# Patient Record
Sex: MALE | Race: BLACK OR AFRICAN AMERICAN | NOT HISPANIC OR LATINO | URBAN - METROPOLITAN AREA
[De-identification: names, ages, dates, MRNs, and addresses within clinical notes are randomized per-mention and may not be internally consistent; named-entity substitution may affect disease eponyms.]

---

## 2017-01-30 ENCOUNTER — INPATIENT (INPATIENT)
Facility: HOSPITAL | Age: 82
LOS: 3 days | Discharge: NURSING, REHAB OR LONG-TERM CARE | End: 2017-02-03
Attending: FAMILY MEDICINE | Admitting: HOSPITALIST
Payer: MEDICARE

## 2017-01-30 VITALS
WEIGHT: 171.08 LBS | HEART RATE: 102 BPM | SYSTOLIC BLOOD PRESSURE: 187 MMHG | HEIGHT: 69 IN | DIASTOLIC BLOOD PRESSURE: 97 MMHG | TEMPERATURE: 98 F | RESPIRATION RATE: 18 BRPM | OXYGEN SATURATION: 100 %

## 2017-01-30 PROCEDURE — 99285 EMERGENCY DEPT VISIT HI MDM: CPT

## 2017-01-30 NOTE — ED PROVIDER NOTE - OBJECTIVE STATEMENT
89 y/o male presents to the ED c/o weakness. Pt has no complaints at this time. Further hx of events is limited.

## 2017-01-30 NOTE — ED PROVIDER NOTE - CARE PLAN
Principal Discharge DX:	Urinary tract infection without hematuria, site unspecified  Secondary Diagnosis:	Altered mental status, unspecified altered mental status type

## 2017-01-30 NOTE — ED PROVIDER NOTE - DETAILS:
I, Shaun Ingram, performed the initial face to face bedside interview with this patient regarding history of present illness, review of symptoms and relevant past medical, social and family history.  I completed an independent physical examination.  I was the initial provider who evaluated this patient. I have signed out the follow up of any pending tests (i.e. labs, radiological studies) to the ACP.  I have communicated the patient’s plan of care and disposition with the ACP.  The history, relevant review of systems, past medical and surgical history, medical decision making, and physical examination was documented by the scribe in my presence and I attest to the accuracy of the documentation.

## 2017-01-30 NOTE — ED ADULT NURSE NOTE - PAIN: PRESENCE, MLM
non-verbal indicator of pain/discomfort not present non-verbal indicator of pain/discomfort present denies pain/discomfort

## 2017-01-30 NOTE — ED PROVIDER NOTE - CONSTITUTIONAL, MLM
normal... Well appearing, well nourished, awake, alert, oriented to person, place, time/situation and in no apparent distress. Well appearing, well nourished, no apparent distress.

## 2017-01-31 LAB
ACANTHOCYTES BLD QL SMEAR: SLIGHT — SIGNIFICANT CHANGE UP
ALBUMIN SERPL ELPH-MCNC: 3.5 G/DL — SIGNIFICANT CHANGE UP (ref 3.3–5)
ALP SERPL-CCNC: 127 U/L — HIGH (ref 40–120)
ALT FLD-CCNC: 44 U/L — SIGNIFICANT CHANGE UP (ref 12–78)
ANION GAP SERPL CALC-SCNC: 8 MMOL/L — SIGNIFICANT CHANGE UP (ref 5–17)
ANISOCYTOSIS BLD QL: SLIGHT — SIGNIFICANT CHANGE UP
APPEARANCE UR: CLEAR — SIGNIFICANT CHANGE UP
AST SERPL-CCNC: 59 U/L — HIGH (ref 15–37)
BACTERIA # UR AUTO: (no result)
BASOPHILS # BLD AUTO: 0 K/UL — SIGNIFICANT CHANGE UP (ref 0–0.2)
BASOPHILS NFR BLD AUTO: 1.4 % — SIGNIFICANT CHANGE UP (ref 0–2)
BILIRUB SERPL-MCNC: 0.3 MG/DL — SIGNIFICANT CHANGE UP (ref 0.2–1.2)
BILIRUB UR-MCNC: NEGATIVE — SIGNIFICANT CHANGE UP
BUN SERPL-MCNC: 33 MG/DL — HIGH (ref 7–23)
BURR CELLS BLD QL SMEAR: PRESENT — SIGNIFICANT CHANGE UP
CALCIUM SERPL-MCNC: 8.9 MG/DL — SIGNIFICANT CHANGE UP (ref 8.5–10.1)
CHLORIDE SERPL-SCNC: 104 MMOL/L — SIGNIFICANT CHANGE UP (ref 96–108)
CO2 SERPL-SCNC: 27 MMOL/L — SIGNIFICANT CHANGE UP (ref 22–31)
COLOR SPEC: YELLOW — SIGNIFICANT CHANGE UP
CREAT SERPL-MCNC: 1.84 MG/DL — HIGH (ref 0.5–1.3)
DIFF PNL FLD: (no result)
ELLIPTOCYTES BLD QL SMEAR: SLIGHT — SIGNIFICANT CHANGE UP
EOSINOPHIL # BLD AUTO: 0 K/UL — SIGNIFICANT CHANGE UP (ref 0–0.5)
EOSINOPHIL NFR BLD AUTO: 0.9 % — SIGNIFICANT CHANGE UP (ref 0–6)
EPI CELLS # UR: NEGATIVE — SIGNIFICANT CHANGE UP
GIANT PLATELETS BLD QL SMEAR: PRESENT — SIGNIFICANT CHANGE UP
GLUCOSE SERPL-MCNC: 219 MG/DL — HIGH (ref 70–99)
GLUCOSE UR QL: NEGATIVE MG/DL — SIGNIFICANT CHANGE UP
HCT VFR BLD CALC: 36.6 % — LOW (ref 39–50)
HGB BLD-MCNC: 11.6 G/DL — LOW (ref 13–17)
KETONES UR-MCNC: NEGATIVE — SIGNIFICANT CHANGE UP
LACTATE SERPL-SCNC: 0.7 MMOL/L — SIGNIFICANT CHANGE UP (ref 0.7–2)
LEUKOCYTE ESTERASE UR-ACNC: NEGATIVE — SIGNIFICANT CHANGE UP
LG PLATELETS BLD QL AUTO: SLIGHT — SIGNIFICANT CHANGE UP
LYMPHOCYTES # BLD AUTO: 0.9 K/UL — LOW (ref 1–3.3)
LYMPHOCYTES # BLD AUTO: 28.2 % — SIGNIFICANT CHANGE UP (ref 13–44)
MACROCYTES BLD QL: SLIGHT — SIGNIFICANT CHANGE UP
MANUAL DIF COMMENT BLD-IMP: SIGNIFICANT CHANGE UP
MCHC RBC-ENTMCNC: 27.8 PG — SIGNIFICANT CHANGE UP (ref 27–34)
MCHC RBC-ENTMCNC: 31.6 GM/DL — LOW (ref 32–36)
MCV RBC AUTO: 88 FL — SIGNIFICANT CHANGE UP (ref 80–100)
MONOCYTES # BLD AUTO: 0.4 K/UL — SIGNIFICANT CHANGE UP (ref 0–0.9)
MONOCYTES NFR BLD AUTO: 12.4 % — SIGNIFICANT CHANGE UP (ref 2–14)
NEUTROPHILS # BLD AUTO: 1.9 K/UL — SIGNIFICANT CHANGE UP (ref 1.8–7.4)
NEUTROPHILS NFR BLD AUTO: 57.1 % — SIGNIFICANT CHANGE UP (ref 43–77)
NITRITE UR-MCNC: NEGATIVE — SIGNIFICANT CHANGE UP
OVALOCYTES BLD QL SMEAR: SLIGHT — SIGNIFICANT CHANGE UP
PH UR: 6 — SIGNIFICANT CHANGE UP (ref 4.8–8)
PLAT MORPH BLD: NORMAL — SIGNIFICANT CHANGE UP
PLATELET # BLD AUTO: 131 K/UL — LOW (ref 150–400)
POIKILOCYTOSIS BLD QL AUTO: SLIGHT — SIGNIFICANT CHANGE UP
POLYCHROMASIA BLD QL SMEAR: SLIGHT — SIGNIFICANT CHANGE UP
POTASSIUM SERPL-MCNC: 4.5 MMOL/L — SIGNIFICANT CHANGE UP (ref 3.5–5.3)
POTASSIUM SERPL-SCNC: 4.5 MMOL/L — SIGNIFICANT CHANGE UP (ref 3.5–5.3)
PROT SERPL-MCNC: 7.7 GM/DL — SIGNIFICANT CHANGE UP (ref 6–8.3)
PROT UR-MCNC: 15 MG/DL
RBC # BLD: 4.16 M/UL — LOW (ref 4.2–5.8)
RBC # FLD: 14.1 % — SIGNIFICANT CHANGE UP (ref 10.3–14.5)
RBC BLD AUTO: (no result)
RBC CASTS # UR COMP ASSIST: >50 /HPF (ref 0–4)
SCHISTOCYTES BLD QL AUTO: SLIGHT — SIGNIFICANT CHANGE UP
SODIUM SERPL-SCNC: 139 MMOL/L — SIGNIFICANT CHANGE UP (ref 135–145)
SP GR SPEC: 1.01 — SIGNIFICANT CHANGE UP (ref 1.01–1.02)
TROPONIN I SERPL-MCNC: <0.015 NG/ML — SIGNIFICANT CHANGE UP (ref 0.01–0.04)
UROBILINOGEN FLD QL: NEGATIVE MG/DL — SIGNIFICANT CHANGE UP
WBC # BLD: 3.2 K/UL — LOW (ref 3.8–10.5)
WBC # FLD AUTO: 3.2 K/UL — LOW (ref 3.8–10.5)
WBC UR QL: (no result)

## 2017-01-31 PROCEDURE — 93010 ELECTROCARDIOGRAM REPORT: CPT

## 2017-01-31 PROCEDURE — 70450 CT HEAD/BRAIN W/O DYE: CPT | Mod: 26

## 2017-01-31 RX ORDER — ACETAMINOPHEN 500 MG
650 TABLET ORAL EVERY 6 HOURS
Qty: 0 | Refills: 0 | Status: DISCONTINUED | OUTPATIENT
Start: 2017-01-31 | End: 2017-02-03

## 2017-01-31 RX ORDER — DEXTROSE 50 % IN WATER 50 %
25 SYRINGE (ML) INTRAVENOUS ONCE
Qty: 0 | Refills: 0 | Status: DISCONTINUED | OUTPATIENT
Start: 2017-01-31 | End: 2017-02-03

## 2017-01-31 RX ORDER — CEFTRIAXONE 500 MG/1
1 INJECTION, POWDER, FOR SOLUTION INTRAMUSCULAR; INTRAVENOUS EVERY 24 HOURS
Qty: 0 | Refills: 0 | Status: DISCONTINUED | OUTPATIENT
Start: 2017-01-31 | End: 2017-02-01

## 2017-01-31 RX ORDER — SENNA PLUS 8.6 MG/1
2 TABLET ORAL AT BEDTIME
Qty: 0 | Refills: 0 | Status: DISCONTINUED | OUTPATIENT
Start: 2017-01-31 | End: 2017-02-03

## 2017-01-31 RX ORDER — DEXTROSE 50 % IN WATER 50 %
1 SYRINGE (ML) INTRAVENOUS ONCE
Qty: 0 | Refills: 0 | Status: DISCONTINUED | OUTPATIENT
Start: 2017-01-31 | End: 2017-02-03

## 2017-01-31 RX ORDER — CEFTRIAXONE 500 MG/1
1 INJECTION, POWDER, FOR SOLUTION INTRAMUSCULAR; INTRAVENOUS ONCE
Qty: 0 | Refills: 0 | Status: COMPLETED | OUTPATIENT
Start: 2017-01-31 | End: 2017-01-31

## 2017-01-31 RX ORDER — DOCUSATE SODIUM 100 MG
100 CAPSULE ORAL THREE TIMES A DAY
Qty: 0 | Refills: 0 | Status: DISCONTINUED | OUTPATIENT
Start: 2017-01-31 | End: 2017-02-03

## 2017-01-31 RX ORDER — GLUCAGON INJECTION, SOLUTION 0.5 MG/.1ML
1 INJECTION, SOLUTION SUBCUTANEOUS ONCE
Qty: 0 | Refills: 0 | Status: DISCONTINUED | OUTPATIENT
Start: 2017-01-31 | End: 2017-02-03

## 2017-01-31 RX ORDER — INSULIN LISPRO 100/ML
VIAL (ML) SUBCUTANEOUS
Qty: 0 | Refills: 0 | Status: DISCONTINUED | OUTPATIENT
Start: 2017-01-31 | End: 2017-02-03

## 2017-01-31 RX ORDER — ONDANSETRON 8 MG/1
4 TABLET, FILM COATED ORAL EVERY 6 HOURS
Qty: 0 | Refills: 0 | Status: DISCONTINUED | OUTPATIENT
Start: 2017-01-31 | End: 2017-02-03

## 2017-01-31 RX ORDER — HEPARIN SODIUM 5000 [USP'U]/ML
5000 INJECTION INTRAVENOUS; SUBCUTANEOUS EVERY 12 HOURS
Qty: 0 | Refills: 0 | Status: DISCONTINUED | OUTPATIENT
Start: 2017-01-31 | End: 2017-02-03

## 2017-01-31 RX ORDER — DEXTROSE 50 % IN WATER 50 %
12.5 SYRINGE (ML) INTRAVENOUS ONCE
Qty: 0 | Refills: 0 | Status: DISCONTINUED | OUTPATIENT
Start: 2017-01-31 | End: 2017-02-03

## 2017-01-31 RX ORDER — SODIUM CHLORIDE 9 MG/ML
1000 INJECTION INTRAMUSCULAR; INTRAVENOUS; SUBCUTANEOUS
Qty: 0 | Refills: 0 | Status: COMPLETED | OUTPATIENT
Start: 2017-01-31 | End: 2017-02-01

## 2017-01-31 RX ORDER — SODIUM CHLORIDE 9 MG/ML
1000 INJECTION, SOLUTION INTRAVENOUS
Qty: 0 | Refills: 0 | Status: DISCONTINUED | OUTPATIENT
Start: 2017-01-31 | End: 2017-02-03

## 2017-01-31 RX ORDER — SODIUM CHLORIDE 9 MG/ML
500 INJECTION INTRAMUSCULAR; INTRAVENOUS; SUBCUTANEOUS
Qty: 0 | Refills: 0 | Status: COMPLETED | OUTPATIENT
Start: 2017-01-31 | End: 2017-01-31

## 2017-01-31 RX ADMIN — SODIUM CHLORIDE 2000 MILLILITER(S): 9 INJECTION INTRAMUSCULAR; INTRAVENOUS; SUBCUTANEOUS at 06:36

## 2017-01-31 RX ADMIN — SODIUM CHLORIDE 2000 MILLILITER(S): 9 INJECTION INTRAMUSCULAR; INTRAVENOUS; SUBCUTANEOUS at 06:51

## 2017-01-31 RX ADMIN — SODIUM CHLORIDE 2000 MILLILITER(S): 9 INJECTION INTRAMUSCULAR; INTRAVENOUS; SUBCUTANEOUS at 06:06

## 2017-01-31 RX ADMIN — CEFTRIAXONE 100 GRAM(S): 500 INJECTION, POWDER, FOR SOLUTION INTRAMUSCULAR; INTRAVENOUS at 14:11

## 2017-01-31 RX ADMIN — CEFTRIAXONE 100 GRAM(S): 500 INJECTION, POWDER, FOR SOLUTION INTRAMUSCULAR; INTRAVENOUS at 06:33

## 2017-01-31 RX ADMIN — SODIUM CHLORIDE 2000 MILLILITER(S): 9 INJECTION INTRAMUSCULAR; INTRAVENOUS; SUBCUTANEOUS at 06:20

## 2017-01-31 RX ADMIN — SODIUM CHLORIDE 50 MILLILITER(S): 9 INJECTION INTRAMUSCULAR; INTRAVENOUS; SUBCUTANEOUS at 16:41

## 2017-01-31 RX ADMIN — HEPARIN SODIUM 5000 UNIT(S): 5000 INJECTION INTRAVENOUS; SUBCUTANEOUS at 17:14

## 2017-01-31 RX ADMIN — SODIUM CHLORIDE 2000 MILLILITER(S): 9 INJECTION INTRAMUSCULAR; INTRAVENOUS; SUBCUTANEOUS at 06:33

## 2017-01-31 NOTE — H&P ADULT - ASSESSMENT
pt is 89 yo male with pmh as per HPI here with weakness    * weakness/ARF - pt did meet SIRS criteria on arrival to ED but no clear source for sepsis, doubt UTI give negative UA will f/u pending cx sent from ED. ?pt on previous abx prior to UA being sent, given unable to obtain clear hx will continue empiric abx for now until further hx obtained from family or will d/c if cx negative.  likely the weakness is due to ARF. upon review of chart in [facility] his baseline cr is 1.1 in 7/16.    - will hydrate and monitor cr.  - check BMP in AM  - hold losartan    * prediabetes - as per prev dc summary  - will place on ISS  - check hbA1c    * dementia - was on meds for this on d/c in 7/16   - confirm with family and no meds on dr first pt is 89 yo male with pmh as per HPI here with weakness    * weakness/ARF - pt did meet SIRS criteria on arrival to ED but no clear source for sepsis, doubt UTI give negative UA will f/u pending cx sent from ED. ?pt on previous abx prior to UA being sent, given unable to obtain clear hx will continue empiric abx for now until further hx obtained from family or will d/c if cx negative.  likely the weakness is due to ARF. upon review of chart in [facility] his baseline cr is 1.1 in 7/16.    - will hydrate and monitor cr.  - check BMP in AM  - hold losartan    * prediabetes - as per prev dc summary  - will place on ISS  - check hbA1c    * dementia - was on meds for this on d/c in 7/16   - confirm with family and no meds on dr first    -discussed with Niece , agrees with plan. pt is 89 yo male with pmh as per HPI here with weakness    * weakness/ARF - pt did meet SIRS criteria on arrival to ED but no clear source for sepsis, doubt UTI give negative UA will f/u pending cx sent from ED. ?pt on previous abx prior to UA being sent, given unable to obtain clear hx will continue empiric abx for now until further hx obtained from family or will d/c if cx negative.  likely the weakness is due to ARF. upon review of chart in [facility] his baseline cr is 1.1 in 7/16.    - will hydrate and monitor cr.  - check BMP in AM  - hold losartan    * prediabetes - as per prev dc summary  - will place on ISS  - check hbA1c    * dementia - was on meds for this on d/c in 7/16   - confirm with family and no meds on dr first    -discussed with Niece, would like him to go to rehab if possible

## 2017-01-31 NOTE — PATIENT PROFILE ADULT. - FALL HARM RISK
dimentia/coagulation(Bleeding disorder R/T clinical cond/anti-coags)/age(85 years old or older)/other

## 2017-01-31 NOTE — H&P ADULT - HISTORY OF PRESENT ILLNESS
pt is 89 yo Togolese creole speaking male with dementia who cannot provide any hx brought in for worsening weakness.  unable to reach family for further clarification at this time and as per chart admitted for possible urinary symptoms. pt is 89 yo [place] Verde[place] creole speaking male with dementia who cannot provide any hx brought in for worsening weakness.   as per chart admitted for possible urinary symptoms.    Collateral Information , niece Chelsea states over the past week pt has been feeling weak, and had increased weakness in the RUE and RLE which prevented him from feeding himself. She states pt was confused with weakness after day care on 1/30.Denied fever,chills, syncope, seizures,dizziness,sob,cp. Pt currently lives with niece.

## 2017-01-31 NOTE — H&P ADULT - NSHPPHYSICALEXAM_GEN_ALL_CORE
HEENT:   pupils equal and reactive, EOMI, no oropharyngeal lesions, erythema, exudates, oral thrush    NECK:   supple, no carotid bruits, no palpable lymph nodes, no thyromegaly    CV:  +S1, +S2, regular, no murmurs or rubs    RESP:   lungs clear to auscultation bilaterally, no wheezing, rales, rhonchi, good air entry bilaterally    BREAST:  not examined    GI:  abdomen soft, non-tender, non-distended, normal BS, no bruits, no abdominal masses, no palpable masses    RECTAL:  not examined    :  not examined    MSK:   normal muscle tone, no atrophy, no rigidity, no contractions    EXT:   no clubbing, no cyanosis, no edema, no calf pain, swelling or erythema    NEURO:  AAOX0, no focal neurological deficits, follows all commands, able to move extremities spontaneously    SKIN:  no ulcers, lesions or rashes

## 2017-01-31 NOTE — ED ADULT NURSE REASSESSMENT NOTE - NS ED NURSE REASSESS COMMENT FT1
Pt is resting comfortably without signs and symptoms of distress.  symptoms of weakness have resolved.

## 2017-01-31 NOTE — H&P ADULT - NSHPSOURCEINFOTX_GEN_ALL_CORE
pt is poor historian and hx obtained from chart since family not at bedside and cannot be reached at this time

## 2017-02-01 LAB
ACANTHOCYTES BLD QL SMEAR: SLIGHT — SIGNIFICANT CHANGE UP
ANION GAP SERPL CALC-SCNC: 9 MMOL/L — SIGNIFICANT CHANGE UP (ref 5–17)
ANISOCYTOSIS BLD QL: SLIGHT — SIGNIFICANT CHANGE UP
BASOPHILS # BLD AUTO: 0.1 K/UL — SIGNIFICANT CHANGE UP (ref 0–0.2)
BASOPHILS NFR BLD AUTO: 2.3 % — HIGH (ref 0–2)
BUN SERPL-MCNC: 13 MG/DL — SIGNIFICANT CHANGE UP (ref 7–23)
BURR CELLS BLD QL SMEAR: PRESENT — SIGNIFICANT CHANGE UP
CALCIUM SERPL-MCNC: 8.7 MG/DL — SIGNIFICANT CHANGE UP (ref 8.5–10.1)
CHLORIDE SERPL-SCNC: 106 MMOL/L — SIGNIFICANT CHANGE UP (ref 96–108)
CO2 SERPL-SCNC: 26 MMOL/L — SIGNIFICANT CHANGE UP (ref 22–31)
CREAT SERPL-MCNC: 1.02 MG/DL — SIGNIFICANT CHANGE UP (ref 0.5–1.3)
ELLIPTOCYTES BLD QL SMEAR: SLIGHT — SIGNIFICANT CHANGE UP
EOSINOPHIL # BLD AUTO: 0 K/UL — SIGNIFICANT CHANGE UP (ref 0–0.5)
EOSINOPHIL NFR BLD AUTO: 0.7 % — SIGNIFICANT CHANGE UP (ref 0–6)
GIANT PLATELETS BLD QL SMEAR: PRESENT — SIGNIFICANT CHANGE UP
GLUCOSE SERPL-MCNC: 137 MG/DL — HIGH (ref 70–99)
HBA1C BLD-MCNC: 7 % — HIGH (ref 4–5.6)
HCT VFR BLD CALC: 40 % — SIGNIFICANT CHANGE UP (ref 39–50)
HGB BLD-MCNC: 12.6 G/DL — LOW (ref 13–17)
LYMPHOCYTES # BLD AUTO: 1 K/UL — SIGNIFICANT CHANGE UP (ref 1–3.3)
LYMPHOCYTES # BLD AUTO: 28.3 % — SIGNIFICANT CHANGE UP (ref 13–44)
MAGNESIUM SERPL-MCNC: 1.8 MG/DL — SIGNIFICANT CHANGE UP (ref 1.8–2.4)
MANUAL DIF COMMENT BLD-IMP: SIGNIFICANT CHANGE UP
MCHC RBC-ENTMCNC: 27.3 PG — SIGNIFICANT CHANGE UP (ref 27–34)
MCHC RBC-ENTMCNC: 31.4 GM/DL — LOW (ref 32–36)
MCV RBC AUTO: 86.8 FL — SIGNIFICANT CHANGE UP (ref 80–100)
MONOCYTES # BLD AUTO: 0.4 K/UL — SIGNIFICANT CHANGE UP (ref 0–0.9)
MONOCYTES NFR BLD AUTO: 11.7 % — SIGNIFICANT CHANGE UP (ref 2–14)
NEUTROPHILS # BLD AUTO: 2 K/UL — SIGNIFICANT CHANGE UP (ref 1.8–7.4)
NEUTROPHILS NFR BLD AUTO: 57 % — SIGNIFICANT CHANGE UP (ref 43–77)
PHOSPHATE SERPL-MCNC: 1.9 MG/DL — LOW (ref 2.5–4.5)
PLAT MORPH BLD: NORMAL — SIGNIFICANT CHANGE UP
PLATELET # BLD AUTO: 132 K/UL — LOW (ref 150–400)
PLATELET COUNT - ESTIMATE: (no result)
POIKILOCYTOSIS BLD QL AUTO: SLIGHT — SIGNIFICANT CHANGE UP
POTASSIUM SERPL-MCNC: 3.8 MMOL/L — SIGNIFICANT CHANGE UP (ref 3.5–5.3)
POTASSIUM SERPL-SCNC: 3.8 MMOL/L — SIGNIFICANT CHANGE UP (ref 3.5–5.3)
RBC # BLD: 4.61 M/UL — SIGNIFICANT CHANGE UP (ref 4.2–5.8)
RBC # FLD: 14 % — SIGNIFICANT CHANGE UP (ref 10.3–14.5)
RBC BLD AUTO: (no result)
SCHISTOCYTES BLD QL AUTO: SLIGHT — SIGNIFICANT CHANGE UP
SODIUM SERPL-SCNC: 141 MMOL/L — SIGNIFICANT CHANGE UP (ref 135–145)
TARGETS BLD QL SMEAR: SLIGHT — SIGNIFICANT CHANGE UP
WBC # BLD: 3.5 K/UL — LOW (ref 3.8–10.5)
WBC # FLD AUTO: 3.5 K/UL — LOW (ref 3.8–10.5)

## 2017-02-01 RX ORDER — CEFUROXIME AXETIL 250 MG
250 TABLET ORAL EVERY 12 HOURS
Qty: 0 | Refills: 0 | Status: DISCONTINUED | OUTPATIENT
Start: 2017-02-02 | End: 2017-02-03

## 2017-02-01 RX ORDER — AMLODIPINE BESYLATE 2.5 MG/1
10 TABLET ORAL DAILY
Qty: 0 | Refills: 0 | Status: DISCONTINUED | OUTPATIENT
Start: 2017-02-01 | End: 2017-02-03

## 2017-02-01 RX ORDER — QUETIAPINE FUMARATE 200 MG/1
25 TABLET, FILM COATED ORAL ONCE
Qty: 0 | Refills: 0 | Status: COMPLETED | OUTPATIENT
Start: 2017-02-01 | End: 2017-02-01

## 2017-02-01 RX ADMIN — Medication 100 MILLIGRAM(S): at 13:20

## 2017-02-01 RX ADMIN — AMLODIPINE BESYLATE 10 MILLIGRAM(S): 2.5 TABLET ORAL at 13:20

## 2017-02-01 RX ADMIN — Medication 1 TABLET(S): at 13:20

## 2017-02-01 RX ADMIN — HEPARIN SODIUM 5000 UNIT(S): 5000 INJECTION INTRAVENOUS; SUBCUTANEOUS at 05:45

## 2017-02-01 RX ADMIN — Medication 2: at 13:18

## 2017-02-01 RX ADMIN — Medication 100 MILLIGRAM(S): at 05:45

## 2017-02-01 RX ADMIN — SODIUM CHLORIDE 50 MILLILITER(S): 9 INJECTION INTRAMUSCULAR; INTRAVENOUS; SUBCUTANEOUS at 18:26

## 2017-02-01 RX ADMIN — CEFTRIAXONE 100 GRAM(S): 500 INJECTION, POWDER, FOR SOLUTION INTRAMUSCULAR; INTRAVENOUS at 13:21

## 2017-02-01 RX ADMIN — QUETIAPINE FUMARATE 25 MILLIGRAM(S): 200 TABLET, FILM COATED ORAL at 02:37

## 2017-02-01 RX ADMIN — HEPARIN SODIUM 5000 UNIT(S): 5000 INJECTION INTRAVENOUS; SUBCUTANEOUS at 17:23

## 2017-02-01 NOTE — PHYSICAL THERAPY INITIAL EVALUATION ADULT - PATIENT PROFILE REVIEW, REHAB EVAL
yes pt rec'd lying in bed at nursing station for close observation due to dementia,restless per RN,trying to climb OOB earlier,just settled down and dozing off/yes

## 2017-02-01 NOTE — PHYSICAL THERAPY INITIAL EVALUATION ADULT - MANUAL MUSCLE TESTING RESULTS, REHAB EVAL
grossly assessed due to/appears grossly FAIR,moving all limbs against gravity; unable to follow/comprehend

## 2017-02-01 NOTE — CHART NOTE - NSCHARTNOTEFT_GEN_A_CORE
called by rn as patient agitated     pt is 89 yo Faroese creole speaking male with dementia who cannot provide any hx brought in for worsening weakness.       patient admitted for weakness arf and possible urinary symptoms    Vital Signs Last 24 Hrs  T(C): 36.8, Max: 36.9 (01-31 @ 09:06)  T(F): 98.2, Max: 98.4 (01-31 @ 09:06)  HR: 93 (89 - 95)  BP: 156/78 (143/68 - 156/78)  BP(mean): --  RR: 17 (16 - 17)  SpO2: 99% (97% - 99%)    assessment and plan  #agitation with possible psychosis  - s/p seroquel 25mg x 1   - wrist restraints placed by rn   - continue monitoring patient     d/w Dr Betancur Attending Hospitalist .

## 2017-02-01 NOTE — PHYSICAL THERAPY INITIAL EVALUATION ADULT - PRECAUTIONS/LIMITATIONS, REHAB EVAL
dementia/memory impairment,Language barrier dementia/memory impairment,Language barrier/fall precautions

## 2017-02-01 NOTE — PROGRESS NOTE ADULT - ASSESSMENT
Assessment and Plan:     		  pt is 89 yo male with pmh as per HPI here with weakness    * weakness/ARF - pt did meet SIRS criteria on arrival to ED but no clear source for sepsis, doubt UTI give negative UA will f/u pending cx sent from ED. confirmed with neice not on abx.    - no need for empiric abx as pt improving as ARF resolved  - plan for PT eval today and possible rehab then resuming adult day care program which was enrolled in   - bld cx negative/ UA negative.  likely the weakness is due to ARF. upon review of chart in [facility] his baseline cr is 1.1 in 7/16.    - check BMP in AM  - hold losartan    * prediabetes - as per prev dc summary  - will place on ISS  - check hbA1c    * HTN - uncontrolled  - pt started on norvasc. losartan held due to resolving renal failure  - would benefit from ACEI/ARB in the future as given DM     * dementia - was on meds for this on d/c in 7/16   - confirm with family and no meds on dr first    -discussed with Clare, would like him to go to rehab if possible   pt is 89 yo male with pmh as per HPI here with weakness    * weakness/ARF - pt did meet SIRS criteria on arrival to ED but no clear source for sepsis, doubt UTI give negative UA will f/u pending cx sent from ED. ?pt on previous abx prior to UA being sent, given unable to obtain clear hx will continue empiric abx for now until further hx obtained from family or will d/c if cx negative.  likely the weakness is due to ARF. upon review of chart in [facility] his baseline cr is 1.1 in 7/16.    - will hydrate and monitor cr.  - check BMP in AM  - hold losartan    * prediabetes - as per prev dc summary  - will place on ISS  - check hbA1c    * dementia - was on meds for this on d/c in 7/16   - confirm with family and no meds on dr first    -discussed with Clare , agrees with plan.        Plan discussed with clare Tran [phone].    dispo - possible rehab tomorrow after 3 night stay

## 2017-02-01 NOTE — PHYSICAL THERAPY INITIAL EVALUATION ADULT - PERTINENT HX OF CURRENT PROBLEM, REHAB EVAL
increasing weakness from home,poor historian due to dementia,+ language barrier/unable to effectively use  phone

## 2017-02-01 NOTE — PHYSICAL THERAPY INITIAL EVALUATION ADULT - PASSIVE RANGE OF MOTION EXAMINATION, REHAB EVAL
pt not cooperating/following for AROm assessment,withdraws intermittently when PROM testing performed RUE,LLE/bilateral upper extremity Passive ROM was WFL (within functional limits)/bilateral lower extremity Passive ROM was WFL (within functional limits)

## 2017-02-01 NOTE — PHYSICAL THERAPY INITIAL EVALUATION ADULT - FOLLOWS COMMANDS/ANSWERS QUESTIONS, REHAB EVAL
unable to answer questions/with demonstration & initiation,speaks [place] Creole,native of [place],+ dementia ,unable to use  phone/able to follow single-step instructions/25% of the time

## 2017-02-01 NOTE — PHYSICAL THERAPY INITIAL EVALUATION ADULT - GENERAL OBSERVATIONS, REHAB EVAL
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

## 2017-02-01 NOTE — CHART NOTE - NSCHARTNOTEFT_GEN_A_CORE
called by rn as patient agitated     pt is 89 yo Citizen of [place] creole speaking male with dementia who cannot provide any hx brought in for worsening weakness.       patient admitted for weakness arf and possible urinary symptoms    Vital Signs Last 24 Hrs  T(C): 36.8, Max: 36.9 (01-31 @ 09:06)  T(F): 98.2, Max: 98.4 (01-31 @ 09:06)  HR: 93 (89 - 95)  BP: 156/78 (143/68 - 156/78)  BP(mean): --  RR: 17 (16 - 17)  SpO2: 99% (97% - 99%)    physical   cv +s1/s2  pulm- cta b/l no wheezing no rhonchi  abd- soft nontender bs+    assessment and plan  #agitation with possible psychosis  - seroquel 25mg x 1   - continue monitoring patient     d/w Dr Worley PGY3

## 2017-02-02 DIAGNOSIS — F03.90 UNSPECIFIED DEMENTIA, UNSPECIFIED SEVERITY, WITHOUT BEHAVIORAL DISTURBANCE, PSYCHOTIC DISTURBANCE, MOOD DISTURBANCE, AND ANXIETY: ICD-10-CM

## 2017-02-02 DIAGNOSIS — R53.1 WEAKNESS: ICD-10-CM

## 2017-02-02 DIAGNOSIS — N39.0 URINARY TRACT INFECTION, SITE NOT SPECIFIED: ICD-10-CM

## 2017-02-02 DIAGNOSIS — E11.9 TYPE 2 DIABETES MELLITUS WITHOUT COMPLICATIONS: ICD-10-CM

## 2017-02-02 DIAGNOSIS — N17.9 ACUTE KIDNEY FAILURE, UNSPECIFIED: ICD-10-CM

## 2017-02-02 LAB
ANION GAP SERPL CALC-SCNC: 6 MMOL/L — SIGNIFICANT CHANGE UP (ref 5–17)
BUN SERPL-MCNC: 18 MG/DL — SIGNIFICANT CHANGE UP (ref 7–23)
CALCIUM SERPL-MCNC: 8.7 MG/DL — SIGNIFICANT CHANGE UP (ref 8.5–10.1)
CHLORIDE SERPL-SCNC: 106 MMOL/L — SIGNIFICANT CHANGE UP (ref 96–108)
CO2 SERPL-SCNC: 28 MMOL/L — SIGNIFICANT CHANGE UP (ref 22–31)
CREAT SERPL-MCNC: 1.23 MG/DL — SIGNIFICANT CHANGE UP (ref 0.5–1.3)
GLUCOSE SERPL-MCNC: 89 MG/DL — SIGNIFICANT CHANGE UP (ref 70–99)
POTASSIUM SERPL-MCNC: 3.7 MMOL/L — SIGNIFICANT CHANGE UP (ref 3.5–5.3)
POTASSIUM SERPL-SCNC: 3.7 MMOL/L — SIGNIFICANT CHANGE UP (ref 3.5–5.3)
PREALB SERPL-MCNC: 12 MG/DL — LOW (ref 18–45)
SODIUM SERPL-SCNC: 140 MMOL/L — SIGNIFICANT CHANGE UP (ref 135–145)

## 2017-02-02 RX ORDER — INSULIN LISPRO 100/ML
VIAL (ML) SUBCUTANEOUS AT BEDTIME
Qty: 0 | Refills: 0 | Status: DISCONTINUED | OUTPATIENT
Start: 2017-02-02 | End: 2017-02-03

## 2017-02-02 RX ADMIN — Medication 100 MILLIGRAM(S): at 13:24

## 2017-02-02 RX ADMIN — Medication 1 TABLET(S): at 13:24

## 2017-02-02 RX ADMIN — Medication 250 MILLIGRAM(S): at 18:26

## 2017-02-02 RX ADMIN — Medication 250 MILLIGRAM(S): at 06:07

## 2017-02-02 RX ADMIN — HEPARIN SODIUM 5000 UNIT(S): 5000 INJECTION INTRAVENOUS; SUBCUTANEOUS at 18:26

## 2017-02-02 RX ADMIN — Medication 100 MILLIGRAM(S): at 21:32

## 2017-02-02 RX ADMIN — HEPARIN SODIUM 5000 UNIT(S): 5000 INJECTION INTRAVENOUS; SUBCUTANEOUS at 06:08

## 2017-02-02 RX ADMIN — AMLODIPINE BESYLATE 10 MILLIGRAM(S): 2.5 TABLET ORAL at 06:06

## 2017-02-02 RX ADMIN — Medication 100 MILLIGRAM(S): at 06:08

## 2017-02-02 NOTE — PROGRESS NOTE ADULT - ASSESSMENT
Patient is a 89 y.o. male with PMH Dementia, HTN, Diabetes Type 2 admitted with worsening weakness secondary to acute kidney injury and presumed UTI. Obtained history from Niece Chelsea via telephone. Patient resting comfortably in bed in no apparent distress.  Blood cultures have shown no growth.  UA, leuks and nitrites negative and UTI not likely.

## 2017-02-02 NOTE — CHART NOTE - NSCHARTNOTEFT_GEN_A_CORE
called by rn for fingerstick of 210    iss nighttime scale added   recheck sugar in am     d/w Dr Worley PGY3.

## 2017-02-03 VITALS
DIASTOLIC BLOOD PRESSURE: 67 MMHG | RESPIRATION RATE: 16 BRPM | OXYGEN SATURATION: 100 % | SYSTOLIC BLOOD PRESSURE: 141 MMHG | TEMPERATURE: 98 F | HEART RATE: 80 BPM

## 2017-02-03 RX ORDER — DOCUSATE SODIUM 100 MG
1 CAPSULE ORAL
Qty: 0 | Refills: 0 | COMMUNITY
Start: 2017-02-03

## 2017-02-03 RX ORDER — AMLODIPINE BESYLATE 2.5 MG/1
1 TABLET ORAL
Qty: 0 | Refills: 0 | COMMUNITY
Start: 2017-02-03

## 2017-02-03 RX ORDER — SENNA PLUS 8.6 MG/1
2 TABLET ORAL
Qty: 0 | Refills: 0 | COMMUNITY
Start: 2017-02-03

## 2017-02-03 RX ADMIN — Medication 1 TABLET(S): at 12:07

## 2017-02-03 RX ADMIN — HEPARIN SODIUM 5000 UNIT(S): 5000 INJECTION INTRAVENOUS; SUBCUTANEOUS at 18:19

## 2017-02-03 RX ADMIN — HEPARIN SODIUM 5000 UNIT(S): 5000 INJECTION INTRAVENOUS; SUBCUTANEOUS at 05:34

## 2017-02-03 RX ADMIN — AMLODIPINE BESYLATE 10 MILLIGRAM(S): 2.5 TABLET ORAL at 05:34

## 2017-02-03 RX ADMIN — Medication 100 MILLIGRAM(S): at 12:07

## 2017-02-03 RX ADMIN — Medication 100 MILLIGRAM(S): at 05:34

## 2017-02-03 RX ADMIN — Medication 250 MILLIGRAM(S): at 05:34

## 2017-02-03 NOTE — DISCHARGE NOTE ADULT - NS AS ACTIVITY OBS
No Heavy lifting/straining/Bathing allowed/Do not make important decisions/Do not drive or operate machinery

## 2017-02-03 NOTE — PROGRESS NOTE ADULT - ASSESSMENT
Patient is a 89 y.o. male with PMH Dementia, HTN, Diabetes Type 2 admitted with worsening weakness secondary to acute kidney injury and presumed UTI.

## 2017-02-03 NOTE — PROGRESS NOTE ADULT - PROBLEM SELECTOR PLAN 3
-unlikely as no leuks or nitrites  -no antibiotics on discharge
-unlikely as no leuks or nitrites  -discontinue antibiotics

## 2017-02-03 NOTE — PROGRESS NOTE ADULT - PROBLEM SELECTOR PROBLEM 5
Dementia without behavioral disturbance, unspecified dementia type
Dementia without behavioral disturbance, unspecified dementia type

## 2017-02-03 NOTE — PROGRESS NOTE ADULT - SUBJECTIVE AND OBJECTIVE BOX
CHIEF COMPLAINT:  worsening weakness    SUBJECTIVE: Patient is a 89 y.o. male with PMH Dementia, HTN, Diabetes Type 2 admitted with worsening weakness secondary to acute kidney injury and presumed UTI. Obtained history from Niece Chelsea via telephone. Patient resting comfortably in bed in no apparent distress.  Blood cultures have shown no growth.  UA, leuks and nitrites negative and UTI not likely.     REVIEW OF SYSTEMS:    CONSTITUTIONAL: No weakness, fevers or chills  EYES/ENT: No visual changes;  No vertigo or throat pain   NECK: No pain or stiffness  RESPIRATORY: No cough, wheezing, hemoptysis; No shortness of breath  CARDIOVASCULAR: No chest pain or palpitations  GASTROINTESTINAL: No abdominal or epigastric pain. No nausea, vomiting, or hematemesis; No diarrhea or constipation. No melena or hematochezia.  GENITOURINARY: No dysuria, frequency or hematuria  NEUROLOGICAL: No numbness or weakness  SKIN: No itching, burning, rashes, or lesions   All other review of systems is negative unless indicated above    Vital Signs Last 24 Hrs  T(C): 36.4, Max: 36.4 (02-02 @ 15:11)  T(F): 97.5, Max: 97.5 (02-02 @ 15:11)  HR: 83 (83 - 91)  BP: 159/67 (144/64 - 159/67)  BP(mean): --  RR: 17 (16 - 17)  SpO2: 98% (98% - 98%)    I&O's Summary    I & Os for current day (as of 02 Feb 2017 19:33)  =============================================  IN: 800 ml / OUT: 0 ml / NET: 800 ml      CAPILLARY BLOOD GLUCOSE  111 (02 Feb 2017 15:11)  136 (02 Feb 2017 12:35)  86 (02 Feb 2017 08:02)  210 (01 Feb 2017 21:40)      PHYSICAL EXAM:    Constitutional: NAD, awake and alert, well-developed  HEENT: PERR, EOMI, Normal Hearing, MMM  Neck: Soft and supple, No LAD, No JVD  Respiratory: Breath sounds are clear bilaterally, No wheezing, rales or rhonchi  Cardiovascular: S1 and S2, regular rate and rhythm, no Murmurs, gallops or rubs  Gastrointestinal: Bowel Sounds present, soft, nontender, nondistended, no guarding, no rebound  Extremities: No peripheral edema  Vascular: 2+ peripheral pulses  Neurological: A/O x 3, no focal deficits  Musculoskeletal: 5/5 strength b/l upper and lower extremities  Skin: No rashes    MEDICATIONS:  MEDICATIONS  (STANDING):  docusate sodium 100milliGRAM(s) Oral three times a day  multivitamin 1Tablet(s) Oral daily  insulin lispro (HumaLOG) corrective regimen sliding scale  SubCutaneous three times a day before meals  dextrose 5%. 1000milliLiter(s) IV Continuous <Continuous>  dextrose 50% Injectable 12.5Gram(s) IV Push once  dextrose 50% Injectable 25Gram(s) IV Push once  dextrose 50% Injectable 25Gram(s) IV Push once  heparin  Injectable 5000Unit(s) SubCutaneous every 12 hours  amLODIPine   Tablet 10milliGRAM(s) Oral daily  cefuroxime   Tablet 250milliGRAM(s) Oral every 12 hours  insulin lispro (HumaLOG) corrective regimen sliding scale  SubCutaneous at bedtime      LABS: All Labs Reviewed:                        12.6   3.5   )-----------( 132      ( 01 Feb 2017 10:47 )             40.0     02 Feb 2017 06:08    140    |  106    |  18     ----------------------------<  89     3.7     |  28     |  1.23     Ca    8.7        02 Feb 2017 06:08  Phos  1.9       01 Feb 2017 10:47  Mg     1.8       01 Feb 2017 10:47            Blood Culture: 01-31 @ 06:28  Organism --  Gram Stain Blood -- Gram Stain --  Specimen Source .Blood None  Culture-Blood --    01-30 @ 20:41  Organism --  Gram Stain Blood -- Gram Stain --  Specimen Source .Blood Blood-Venous  Culture-Blood --        RADIOLOGY/EKG:    DVT PPX:    ADVANCED DIRECTIVE:    DISPOSITION:
CHIEF COMPLAINT:  weakness    SUBJECTIVE: Patient is a 89 y.o. male with PMH Dementia, HTN, Diabetes Type 2 admitted with worsening weakness secondary to acute kidney injury and presumed UTI. Obtained history from Niece Chelsea via telephone. Patient resting comfortably in bed in no apparent distress.  Blood cultures have shown no growth.  UA, leuks and nitrites negative and UTI not likely.     2/3  Patient resting comfortably in bed.  Physical Therapy appreciated and plan is to discharge to subacute rehab today for strengthening and conditioning.     REVIEW OF SYSTEMS:    CONSTITUTIONAL: + weakness, no fevers or chills  EYES/ENT: No visual changes;  No vertigo or throat pain   NECK: No pain or stiffness  RESPIRATORY: No cough, wheezing, hemoptysis; No shortness of breath  CARDIOVASCULAR: No chest pain or palpitations  GASTROINTESTINAL: No abdominal or epigastric pain. No nausea, vomiting, or hematemesis; No diarrhea or constipation. No melena or hematochezia.  GENITOURINARY: No dysuria, frequency or hematuria  NEUROLOGICAL: No numbness or weakness  SKIN: No itching, burning, rashes, or lesions   All other review of systems is negative unless indicated above    Vital Signs Last 24 Hrs  T(C): 36.5, Max: 37.1 (02-03 @ 04:18)  T(F): 97.7, Max: 98.7 (02-03 @ 04:18)  HR: 80 (74 - 80)  BP: 141/67 (141/67 - 157/79)  BP(mean): --  RR: 16 (16 - 16)  SpO2: 100% (100% - 100%)    I&O's Summary      CAPILLARY BLOOD GLUCOSE  113 (03 Feb 2017 16:49)  144 (03 Feb 2017 12:45)  86 (03 Feb 2017 08:20)  147 (02 Feb 2017 21:05)      PHYSICAL EXAM:    Constitutional: NAD, awake and alert, well-developed  HEENT: PERR, EOMI, Normal Hearing, MMM  Neck: Soft and supple, No LAD, No JVD  Respiratory: Breath sounds are clear bilaterally, No wheezing, rales or rhonchi  Cardiovascular: S1 and S2, regular rate and rhythm, no Murmurs, gallops or rubs  Gastrointestinal: Bowel Sounds present, soft, nontender, nondistended, no guarding, no rebound  Extremities: No peripheral edema  Vascular: 2+ peripheral pulses  Neurological: pleasantly confused, no focal deficits  Musculoskeletal: 5/5 strength b/l upper and lower extremities  Skin: No rashes    MEDICATIONS:  MEDICATIONS  (STANDING):  docusate sodium 100milliGRAM(s) Oral three times a day  multivitamin 1Tablet(s) Oral daily  insulin lispro (HumaLOG) corrective regimen sliding scale  SubCutaneous three times a day before meals  dextrose 5%. 1000milliLiter(s) IV Continuous <Continuous>  dextrose 50% Injectable 12.5Gram(s) IV Push once  dextrose 50% Injectable 25Gram(s) IV Push once  dextrose 50% Injectable 25Gram(s) IV Push once  heparin  Injectable 5000Unit(s) SubCutaneous every 12 hours  amLODIPine   Tablet 10milliGRAM(s) Oral daily  insulin lispro (HumaLOG) corrective regimen sliding scale  SubCutaneous at bedtime      LABS: All Labs Reviewed:    02 Feb 2017 06:08    140    |  106    |  18     ----------------------------<  89     3.7     |  28     |  1.23     Ca    8.7        02 Feb 2017 06:08            Blood Culture: 01-31 @ 06:28  Organism --  Gram Stain Blood -- Gram Stain --  Specimen Source .Blood None  Culture-Blood --    01-30 @ 20:41  Organism --  Gram Stain Blood -- Gram Stain --  Specimen Source .Blood Blood-Venous  Culture-Blood --        RADIOLOGY/EKG:    DVT PPX:    ADVANCED DIRECTIVE:    DISPOSITION:
Chief Complaint: weakness    HPI:  pt is 89 yo Taiwanese creole speaking male with dementia who cannot provide any hx brought in for worsening weakness.   as per chart admitted for possible urinary symptoms.    Collateral Information , clare Tran states over the past week pt has been feeling weak, and had increased weakness in the RUE and RLE which prevented him from feeding himself. She states pt was confused with weakness after day care on 1/30.Denied fever,chills, syncope, seizures,dizziness,sob,cp. Pt currently lives with niece. (31 Jan 2017 12:23)    2/1 - tranlator RN - pt still confused but eating more today and agitated at time and sitting by nursing station.        REVIEW OF SYSTEMS:  unable to obtain due to demenita     Vital Signs Last 24 Hrs  T(C): 36.7, Max: 36.8 (01-31 @ 15:31)  T(F): 98, Max: 98.2 (01-31 @ 15:31)  HR: 96 (93 - 96)  BP: 155/78 (150/74 - 156/78)  BP(mean): --  RR: 16 (16 - 17)  SpO2: 99% (99% - 99%)  I&O's Summary        Physical Exam: HEENT:   pupils equal and reactive, EOMI, no oropharyngeal lesions, erythema, exudates, oral thrush    NECK:   supple, no carotid bruits, no palpable lymph nodes, no thyromegaly    CV:  +S1, +S2, regular, no murmurs or rubs    RESP:   lungs clear to auscultation bilaterally, no wheezing, rales, rhonchi, good air entry bilaterally    BREAST:  not examined    GI:  abdomen soft, non-tender, non-distended, normal BS, no bruits, no abdominal masses, no palpable masses    RECTAL:  not examined    :  not examined    MSK:   normal muscle tone, no atrophy, no rigidity, no contractions    EXT:   no clubbing, no cyanosis, no edema, no calf pain, swelling or erythema    NEURO:  AAOX0, no focal neurological deficits, follows all commands, able to move extremities spontaneously    SKIN:  no ulcers, lesions or rashes    Medications:  MEDICATIONS  (STANDING):  sodium chloride 0.9%. 1000milliLiter(s) IV Continuous <Continuous>  docusate sodium 100milliGRAM(s) Oral three times a day  multivitamin 1Tablet(s) Oral daily  insulin lispro (HumaLOG) corrective regimen sliding scale  SubCutaneous three times a day before meals  dextrose 5%. 1000milliLiter(s) IV Continuous <Continuous>  dextrose 50% Injectable 12.5Gram(s) IV Push once  dextrose 50% Injectable 25Gram(s) IV Push once  dextrose 50% Injectable 25Gram(s) IV Push once  cefTRIAXone   IVPB 1Gram(s) IV Intermittent every 24 hours  heparin  Injectable 5000Unit(s) SubCutaneous every 12 hours  amLODIPine   Tablet 10milliGRAM(s) Oral daily      Labs: All Labs Reviewed:                        12.6   3.5   )-----------( 132      ( 01 Feb 2017 10:47 )             40.0     01 Feb 2017 10:47    141    |  106    |  13     ----------------------------<  137    3.8     |  26     |  1.02     Ca    8.7        01 Feb 2017 10:47  Phos  1.9       01 Feb 2017 10:47  Mg     1.8       01 Feb 2017 10:47        Culture - Blood (01.31.17 @ 06:28)    Specimen Source: .Blood None    Culture Results:   No growth to date.

## 2017-02-03 NOTE — DISCHARGE NOTE ADULT - PATIENT PORTAL LINK FT
“You can access the [facility] Patient Portal, offered by [facility], by registering with the following website: [email or web address]”

## 2017-02-03 NOTE — DISCHARGE NOTE ADULT - HOSPITAL COURSE
pt is 89 yo [place] creole speaking male with dementia who cannot provide any hx brought in for worsening weakness.   as per chart admitted for possible urinary symptoms.    Collateral Information , niece Chelsea states over the past week pt has been feeling weak, and had increased weakness in the RUE and RLE which prevented him from feeding himself. She states pt was confused with weakness after day care on 1/30.Denied fever,chills, syncope, seizures,dizziness,sob,cp. Pt currently lives with niece. Initially patient was being treated with antibiotics for presumed uti and bacteruria. No further antibiotics on discharge as UA is not consistent with UTI.  On admission to hospital, patient had an acute kidney injury which likely is the cause of the weakness that patient presented.  Acute kidney injury improved and resolved with gentle hydration. Patient being discharged to [facility] for strengthening and conditioning.

## 2017-02-03 NOTE — PROGRESS NOTE ADULT - PROBLEM SELECTOR PLAN 1
- secondary to acute kidney injury  - creatinine improved significantly since admission and returned near baseline  -discharge to [facility] as per PT recommendations
- secondary to acute kidney injury  - creatinine improved significantly since admission and returned near baseline  -discharge home with home PT as per Physical Therapy recommendations

## 2017-02-03 NOTE — DISCHARGE NOTE ADULT - CARE PLAN
Principal Discharge DX:	Acute kidney injury  Goal:	improved renal function  Instructions for follow-up, activity and diet:	monitor renal function  avoid nephrotoxic agents  Secondary Diagnosis:	Weakness  Goal:	rehab to improve strength  Instructions for follow-up, activity and diet:	Physical therapy as tolerated  Secondary Diagnosis:	Dementia without behavioral disturbance, unspecified dementia type  Goal:	supportive care  Instructions for follow-up, activity and diet:	maintain scheduled routine as much as possible  Secondary Diagnosis:	Type 2 diabetes mellitus without complication, without long-term current use of insulin  Goal:	Glycemic control  Instructions for follow-up, activity and diet:	Carbohydrate consistent diet  Secondary Diagnosis:	Essential hypertension  Goal:	Maintrol good blood pressure control  Instructions for follow-up, activity and diet:	low sodium diet

## 2017-02-03 NOTE — DISCHARGE NOTE ADULT - OTHER SIGNIFICANT FINDINGS
Urinalysis (01.31.17 @ 01:12)    Blood, Urine: Large    Glucose Qualitative, Urine: Negative mg/dL    pH Urine: 6.0    Color: Yellow    Urine Appearance: Clear    Bilirubin: Negative    Ketone - Urine: Negative    Specific Gravity: 1.015    Protein, Urine: 15 mg/dL    Urobilinogen: Negative mg/dL     Nitrite: Negative    Leukocyte Esterase Concentration: Negative  Urine Microscopic-Add On (NC) (01.31.17 @ 01:12)    Epithelial Cells: Negative    White Blood Cell - Urine: 11-25    Bacteria: Occasional    Red Blood Cell - Urine: >50 /HPF      EXAM:  CT BRAIN                            PROCEDURE DATE:  01/31/2017        INTERPRETATION:  CLINICAL INFORMATION: Right-sided weakness.    TECHNIQUE: Contiguous noncontrast axial images of the head were obtained   from the skull base to the vertex with coronal and sagittal reformats.   Beam hardening artifact slightly obscures evaluation of the posterior   fossa and brainstem.    COMPARISON: None available.    FINDINGS:  There is no acute intracranial hemorrhage, hydrocephalus, midline shift,   extra-axial fluid collection or mass effect. There is no evidence of   large acute vascular territory infarct. There is a right pontine lacunar   infarct.    There is diffuse cerebral volume loss with secondary prominence of sulci   and ventricles. There is severe white matter lucency which is nonspecific   and obscures detection of ischemia, but is likely the sequela of chronic   microvascular ischemic change given presence of intracranial   atherosclerotic calcifications.    The paranasal sinuses and mastoids are clear. The calvarium is intact.    IMPRESSION:  No hemorrhage or mass effect. Severe white matter disease, which obscures   detection of ischemia.     If there are new or persistent symptoms, consider further evaluation with   brain MRI if clinically warranted and if there are no contraindications.

## 2017-02-03 NOTE — DISCHARGE NOTE ADULT - SECONDARY DIAGNOSIS.
Weakness Dementia without behavioral disturbance, unspecified dementia type Type 2 diabetes mellitus without complication, without long-term current use of insulin Essential hypertension

## 2017-02-03 NOTE — DISCHARGE NOTE ADULT - CARE PROVIDER_API CALL
Karin Jose), Family Medicine  [address] [place] [address]  Phone: [phone]  Fax: [phone]

## 2017-02-03 NOTE — DISCHARGE NOTE ADULT - PLAN OF CARE
improved renal function monitor renal function  avoid nephrotoxic agents rehab to improve strength Physical therapy as tolerated supportive care maintain scheduled routine as much as possible Glycemic control Carbohydrate consistent diet Maintrol good blood pressure control low sodium diet

## 2017-02-03 NOTE — DISCHARGE NOTE ADULT - MEDICATION SUMMARY - MEDICATIONS TO TAKE
I will START or STAY ON the medications listed below when I get home from the hospital:    losartan 100 mg oral tablet  -- 1 tab(s) by mouth once a day  -- Indication: For HTN    amLODIPine 10 mg oral tablet  -- 1 tab(s) by mouth once a day  -- Indication: For HTN    senna oral tablet  -- 2 tab(s) by mouth once a day (at bedtime), As needed, Constipation  -- Indication: For constipation    docusate sodium 100 mg oral capsule  -- 1 cap(s) by mouth 3 times a day  -- Indication: For constipation    Multiple Vitamins oral tablet  -- 1 tab(s) by mouth once a day  -- Indication: For health maintenance

## 2017-02-05 LAB
CULTURE RESULTS: SIGNIFICANT CHANGE UP
CULTURE RESULTS: SIGNIFICANT CHANGE UP
SPECIMEN SOURCE: SIGNIFICANT CHANGE UP
SPECIMEN SOURCE: SIGNIFICANT CHANGE UP

## 2017-02-07 DIAGNOSIS — N17.9 ACUTE KIDNEY FAILURE, UNSPECIFIED: ICD-10-CM

## 2017-02-07 DIAGNOSIS — F03.90 UNSPECIFIED DEMENTIA, UNSPECIFIED SEVERITY, WITHOUT BEHAVIORAL DISTURBANCE, PSYCHOTIC DISTURBANCE, MOOD DISTURBANCE, AND ANXIETY: ICD-10-CM

## 2017-02-07 DIAGNOSIS — I10 ESSENTIAL (PRIMARY) HYPERTENSION: ICD-10-CM

## 2017-02-07 DIAGNOSIS — R41.82 ALTERED MENTAL STATUS, UNSPECIFIED: ICD-10-CM

## 2017-02-07 DIAGNOSIS — R73.03 PREDIABETES: ICD-10-CM

## 2017-02-27 ENCOUNTER — EMERGENCY (EMERGENCY)
Facility: HOSPITAL | Age: 82
LOS: 0 days | Discharge: HOME | End: 2017-02-27
Attending: EMERGENCY MEDICINE | Admitting: EMERGENCY MEDICINE
Payer: MEDICARE

## 2017-02-27 VITALS
DIASTOLIC BLOOD PRESSURE: 66 MMHG | RESPIRATION RATE: 18 BRPM | TEMPERATURE: 98 F | OXYGEN SATURATION: 99 % | HEART RATE: 88 BPM | SYSTOLIC BLOOD PRESSURE: 135 MMHG

## 2017-02-27 VITALS
DIASTOLIC BLOOD PRESSURE: 70 MMHG | HEART RATE: 91 BPM | OXYGEN SATURATION: 97 % | TEMPERATURE: 99 F | RESPIRATION RATE: 18 BRPM | SYSTOLIC BLOOD PRESSURE: 139 MMHG | HEIGHT: 64 IN | WEIGHT: 134.92 LBS

## 2017-02-27 DIAGNOSIS — R53.1 WEAKNESS: ICD-10-CM

## 2017-02-27 DIAGNOSIS — N18.9 CHRONIC KIDNEY DISEASE, UNSPECIFIED: ICD-10-CM

## 2017-02-27 DIAGNOSIS — F03.90 UNSPECIFIED DEMENTIA, UNSPECIFIED SEVERITY, WITHOUT BEHAVIORAL DISTURBANCE, PSYCHOTIC DISTURBANCE, MOOD DISTURBANCE, AND ANXIETY: ICD-10-CM

## 2017-02-27 DIAGNOSIS — N39.0 URINARY TRACT INFECTION, SITE NOT SPECIFIED: ICD-10-CM

## 2017-02-27 LAB
ALBUMIN SERPL ELPH-MCNC: 3.2 G/DL — LOW (ref 3.3–5)
ALP SERPL-CCNC: 184 U/L — HIGH (ref 40–120)
ALT FLD-CCNC: 29 U/L — SIGNIFICANT CHANGE UP (ref 12–78)
ANION GAP SERPL CALC-SCNC: 13 MMOL/L — SIGNIFICANT CHANGE UP (ref 5–17)
APPEARANCE UR: CLEAR — SIGNIFICANT CHANGE UP
APTT BLD: 22.4 SEC — LOW (ref 27.5–37.4)
AST SERPL-CCNC: 56 U/L — HIGH (ref 15–37)
BACTERIA # UR AUTO: (no result)
BASOPHILS # BLD AUTO: 0.1 K/UL — SIGNIFICANT CHANGE UP (ref 0–0.2)
BASOPHILS NFR BLD AUTO: 1.2 % — SIGNIFICANT CHANGE UP (ref 0–2)
BILIRUB SERPL-MCNC: 0.4 MG/DL — SIGNIFICANT CHANGE UP (ref 0.2–1.2)
BILIRUB UR-MCNC: NEGATIVE — SIGNIFICANT CHANGE UP
BUN SERPL-MCNC: 26 MG/DL — HIGH (ref 7–23)
CALCIUM SERPL-MCNC: 9 MG/DL — SIGNIFICANT CHANGE UP (ref 8.5–10.1)
CHLORIDE SERPL-SCNC: 103 MMOL/L — SIGNIFICANT CHANGE UP (ref 96–108)
CO2 SERPL-SCNC: 24 MMOL/L — SIGNIFICANT CHANGE UP (ref 22–31)
COLOR SPEC: YELLOW — SIGNIFICANT CHANGE UP
CREAT SERPL-MCNC: 2.32 MG/DL — HIGH (ref 0.5–1.3)
DIFF PNL FLD: (no result)
EOSINOPHIL # BLD AUTO: 0 K/UL — SIGNIFICANT CHANGE UP (ref 0–0.5)
EOSINOPHIL NFR BLD AUTO: 0.4 % — SIGNIFICANT CHANGE UP (ref 0–6)
EPI CELLS # UR: SIGNIFICANT CHANGE UP
GLUCOSE SERPL-MCNC: 190 MG/DL — HIGH (ref 70–99)
GLUCOSE UR QL: NEGATIVE MG/DL — SIGNIFICANT CHANGE UP
GRAN CASTS # UR COMP ASSIST: (no result) /LPF
HCT VFR BLD CALC: 43.8 % — SIGNIFICANT CHANGE UP (ref 39–50)
HGB BLD-MCNC: 14.3 G/DL — SIGNIFICANT CHANGE UP (ref 13–17)
INR BLD: 0.98 RATIO — SIGNIFICANT CHANGE UP (ref 0.88–1.16)
KETONES UR-MCNC: NEGATIVE — SIGNIFICANT CHANGE UP
LACTATE SERPL-SCNC: 3.8 MMOL/L — HIGH (ref 0.7–2)
LEUKOCYTE ESTERASE UR-ACNC: NEGATIVE — SIGNIFICANT CHANGE UP
LYMPHOCYTES # BLD AUTO: 1.3 K/UL — SIGNIFICANT CHANGE UP (ref 1–3.3)
LYMPHOCYTES # BLD AUTO: 28.2 % — SIGNIFICANT CHANGE UP (ref 13–44)
MCHC RBC-ENTMCNC: 27.5 PG — SIGNIFICANT CHANGE UP (ref 27–34)
MCHC RBC-ENTMCNC: 32.6 GM/DL — SIGNIFICANT CHANGE UP (ref 32–36)
MCV RBC AUTO: 84.3 FL — SIGNIFICANT CHANGE UP (ref 80–100)
MONOCYTES # BLD AUTO: 0.3 K/UL — SIGNIFICANT CHANGE UP (ref 0–0.9)
MONOCYTES NFR BLD AUTO: 7 % — SIGNIFICANT CHANGE UP (ref 2–14)
NEUTROPHILS # BLD AUTO: 2.8 K/UL — SIGNIFICANT CHANGE UP (ref 1.8–7.4)
NEUTROPHILS NFR BLD AUTO: 63.2 % — SIGNIFICANT CHANGE UP (ref 43–77)
NITRITE UR-MCNC: NEGATIVE — SIGNIFICANT CHANGE UP
PH UR: 7 — SIGNIFICANT CHANGE UP (ref 4.8–8)
PLATELET # BLD AUTO: 115 K/UL — LOW (ref 150–400)
POTASSIUM SERPL-MCNC: 4.3 MMOL/L — SIGNIFICANT CHANGE UP (ref 3.5–5.3)
POTASSIUM SERPL-SCNC: 4.3 MMOL/L — SIGNIFICANT CHANGE UP (ref 3.5–5.3)
PROT SERPL-MCNC: 8.1 GM/DL — SIGNIFICANT CHANGE UP (ref 6–8.3)
PROT UR-MCNC: 30 MG/DL
PROTHROM AB SERPL-ACNC: 10.8 SEC — SIGNIFICANT CHANGE UP (ref 10–13.1)
RBC # BLD: 5.2 M/UL — SIGNIFICANT CHANGE UP (ref 4.2–5.8)
RBC # FLD: 13.5 % — SIGNIFICANT CHANGE UP (ref 10.3–14.5)
RBC CASTS # UR COMP ASSIST: (no result) /HPF (ref 0–4)
SODIUM SERPL-SCNC: 140 MMOL/L — SIGNIFICANT CHANGE UP (ref 135–145)
SP GR SPEC: 1 — LOW (ref 1.01–1.02)
TROPONIN I SERPL-MCNC: <0.015 NG/ML — SIGNIFICANT CHANGE UP (ref 0.01–0.04)
UROBILINOGEN FLD QL: NEGATIVE MG/DL — SIGNIFICANT CHANGE UP
WBC # BLD: 4.5 K/UL — SIGNIFICANT CHANGE UP (ref 3.8–10.5)
WBC # FLD AUTO: 4.5 K/UL — SIGNIFICANT CHANGE UP (ref 3.8–10.5)
WBC UR QL: (no result)

## 2017-02-27 PROCEDURE — 70450 CT HEAD/BRAIN W/O DYE: CPT | Mod: 26

## 2017-02-27 PROCEDURE — 99285 EMERGENCY DEPT VISIT HI MDM: CPT

## 2017-02-27 PROCEDURE — 74176 CT ABD & PELVIS W/O CONTRAST: CPT | Mod: 26

## 2017-02-27 PROCEDURE — 71010: CPT | Mod: 26

## 2017-02-27 PROCEDURE — 93010 ELECTROCARDIOGRAM REPORT: CPT

## 2017-02-27 RX ORDER — CIPROFLOXACIN LACTATE 400MG/40ML
50 VIAL (ML) INTRAVENOUS ONCE
Qty: 0 | Refills: 0 | Status: DISCONTINUED | OUTPATIENT
Start: 2017-02-27 | End: 2017-02-27

## 2017-02-27 RX ORDER — MOXIFLOXACIN HYDROCHLORIDE TABLETS, 400 MG 400 MG/1
1 TABLET, FILM COATED ORAL
Qty: 20 | Refills: 0 | OUTPATIENT
Start: 2017-02-27 | End: 2017-03-09

## 2017-02-27 RX ORDER — CIPROFLOXACIN LACTATE 400MG/40ML
500 VIAL (ML) INTRAVENOUS ONCE
Qty: 0 | Refills: 0 | Status: COMPLETED | OUTPATIENT
Start: 2017-02-27 | End: 2017-02-27

## 2017-02-27 RX ORDER — CIPROFLOXACIN LACTATE 400MG/40ML
400 VIAL (ML) INTRAVENOUS ONCE
Qty: 0 | Refills: 0 | Status: COMPLETED | OUTPATIENT
Start: 2017-02-27 | End: 2017-02-27

## 2017-02-27 RX ORDER — SODIUM CHLORIDE 9 MG/ML
1000 INJECTION INTRAMUSCULAR; INTRAVENOUS; SUBCUTANEOUS ONCE
Qty: 0 | Refills: 0 | Status: COMPLETED | OUTPATIENT
Start: 2017-02-27 | End: 2017-02-27

## 2017-02-27 RX ADMIN — SODIUM CHLORIDE 1000 MILLILITER(S): 9 INJECTION INTRAMUSCULAR; INTRAVENOUS; SUBCUTANEOUS at 17:00

## 2017-02-27 RX ADMIN — Medication 500 MILLIGRAM(S): at 22:23

## 2017-02-27 RX ADMIN — Medication 200 MILLIGRAM(S): at 20:42

## 2017-02-27 NOTE — ED PROVIDER NOTE - PROGRESS NOTE DETAILS
patient awake and alert  tolerating po  patient with slight uti, afebrile, vss  mild worsening renal insufficiency patient has caretaker at home  discussed with son in ED plan to discharge on po antibiotics

## 2017-02-27 NOTE — ED ADULT NURSE REASSESSMENT NOTE - NS ED NURSE REASSESS COMMENT FT1
patient received from Lauren Mckinnon RN
patient resting comfortably. VSS. patient currently waiting for transportation to facility.

## 2017-02-27 NOTE — ED PROVIDER NOTE - CARE PLAN
Principal Discharge DX:	Urinary tract infection  Secondary Diagnosis:	Dementia  Secondary Diagnosis:	Renal insufficiency

## 2017-02-27 NOTE — ED PROVIDER NOTE - NS ED MD SCRIBE ATTENDING SCRIBE SECTIONS
RESULTS/DISPOSITION/REVIEW OF SYSTEMS/PAST MEDICAL/SURGICAL/SOCIAL HISTORY/PHYSICAL EXAM/PROGRESS NOTE/HISTORY OF PRESENT ILLNESS/VITAL SIGNS( Pullset)

## 2017-02-27 NOTE — ED PROVIDER NOTE - GASTROINTESTINAL, MLM
Abdomen soft, no guarding. Diffusely tender. Bowel sounds Abdomen soft, no guarding. Diffusely tender.

## 2017-02-27 NOTE — ED PROVIDER NOTE - MUSCULOSKELETAL, MLM
Spine appears normal, range of motion is not limited, no muscle or joint tenderness. Moving both upper and lower extremities. LLE old healing wound, no erythema no signs of infection.

## 2017-02-27 NOTE — ED PROVIDER NOTE - OBJECTIVE STATEMENT
89 y/o creole speaking male with PMHx of dementia presents to the ED BIBEMS sent in from rehab for increased weakness. Came in for weakness 1 month ago on 1/31 and found to have a UTI. Pt was admitted for 4 days and d/c on 2/3 to subacute rehab. Dale Tran [facility] [phone] Pt is a poor historian.

## 2017-02-27 NOTE — ED PROVIDER NOTE - NEUROLOGICAL, MLM
Alert and oriented, no focal deficits, no motor or sensory deficits. Alert. no focal deficits, no motor or sensory deficits.

## 2017-02-28 LAB
CULTURE RESULTS: SIGNIFICANT CHANGE UP
SPECIMEN SOURCE: SIGNIFICANT CHANGE UP

## 2017-06-06 ENCOUNTER — INPATIENT (INPATIENT)
Facility: HOSPITAL | Age: 82
LOS: 6 days | Discharge: NURSING, REHAB OR LONG-TERM CARE | End: 2017-06-13
Attending: INTERNAL MEDICINE | Admitting: INTERNAL MEDICINE
Payer: MEDICARE

## 2017-06-06 VITALS
RESPIRATION RATE: 15 BRPM | DIASTOLIC BLOOD PRESSURE: 81 MMHG | HEIGHT: 64 IN | HEART RATE: 79 BPM | OXYGEN SATURATION: 100 % | TEMPERATURE: 98 F | WEIGHT: 139.99 LBS | SYSTOLIC BLOOD PRESSURE: 149 MMHG

## 2017-06-06 LAB
ALBUMIN SERPL ELPH-MCNC: 3.3 G/DL — SIGNIFICANT CHANGE UP (ref 3.3–5)
ALP SERPL-CCNC: 158 U/L — HIGH (ref 40–120)
ALT FLD-CCNC: 45 U/L — SIGNIFICANT CHANGE UP (ref 12–78)
ANION GAP SERPL CALC-SCNC: 6 MMOL/L — SIGNIFICANT CHANGE UP (ref 5–17)
APPEARANCE UR: CLEAR — SIGNIFICANT CHANGE UP
APTT BLD: 27.3 SEC — LOW (ref 27.5–37.4)
AST SERPL-CCNC: 79 U/L — HIGH (ref 15–37)
BASOPHILS # BLD AUTO: 0.1 K/UL — SIGNIFICANT CHANGE UP (ref 0–0.2)
BASOPHILS NFR BLD AUTO: 1.4 % — SIGNIFICANT CHANGE UP (ref 0–2)
BILIRUB SERPL-MCNC: 0.3 MG/DL — SIGNIFICANT CHANGE UP (ref 0.2–1.2)
BILIRUB UR-MCNC: NEGATIVE — SIGNIFICANT CHANGE UP
BUN SERPL-MCNC: 32 MG/DL — HIGH (ref 7–23)
CALCIUM SERPL-MCNC: 9.2 MG/DL — SIGNIFICANT CHANGE UP (ref 8.5–10.1)
CHLORIDE SERPL-SCNC: 104 MMOL/L — SIGNIFICANT CHANGE UP (ref 96–108)
CO2 SERPL-SCNC: 30 MMOL/L — SIGNIFICANT CHANGE UP (ref 22–31)
COLOR SPEC: YELLOW — SIGNIFICANT CHANGE UP
CREAT SERPL-MCNC: 1.67 MG/DL — HIGH (ref 0.5–1.3)
DIFF PNL FLD: NEGATIVE — SIGNIFICANT CHANGE UP
EOSINOPHIL # BLD AUTO: 0 K/UL — SIGNIFICANT CHANGE UP (ref 0–0.5)
EOSINOPHIL NFR BLD AUTO: 0.2 % — SIGNIFICANT CHANGE UP (ref 0–6)
GLUCOSE SERPL-MCNC: 159 MG/DL — HIGH (ref 70–99)
GLUCOSE UR QL: NEGATIVE MG/DL — SIGNIFICANT CHANGE UP
HCT VFR BLD CALC: 39.2 % — SIGNIFICANT CHANGE UP (ref 39–50)
HGB BLD-MCNC: 12.8 G/DL — LOW (ref 13–17)
INR BLD: 1.02 RATIO — SIGNIFICANT CHANGE UP (ref 0.88–1.16)
KETONES UR-MCNC: NEGATIVE — SIGNIFICANT CHANGE UP
LACTATE SERPL-SCNC: 1.9 MMOL/L — SIGNIFICANT CHANGE UP (ref 0.7–2)
LEUKOCYTE ESTERASE UR-ACNC: NEGATIVE — SIGNIFICANT CHANGE UP
LIDOCAIN IGE QN: 281 U/L — SIGNIFICANT CHANGE UP (ref 73–393)
LYMPHOCYTES # BLD AUTO: 0.6 K/UL — LOW (ref 1–3.3)
LYMPHOCYTES # BLD AUTO: 15.7 % — SIGNIFICANT CHANGE UP (ref 13–44)
MCHC RBC-ENTMCNC: 28.1 PG — SIGNIFICANT CHANGE UP (ref 27–34)
MCHC RBC-ENTMCNC: 32.7 GM/DL — SIGNIFICANT CHANGE UP (ref 32–36)
MCV RBC AUTO: 86 FL — SIGNIFICANT CHANGE UP (ref 80–100)
MONOCYTES # BLD AUTO: 0.4 K/UL — SIGNIFICANT CHANGE UP (ref 0–0.9)
MONOCYTES NFR BLD AUTO: 10.1 % — SIGNIFICANT CHANGE UP (ref 2–14)
NEUTROPHILS # BLD AUTO: 3 K/UL — SIGNIFICANT CHANGE UP (ref 1.8–7.4)
NEUTROPHILS NFR BLD AUTO: 72.6 % — SIGNIFICANT CHANGE UP (ref 43–77)
NITRITE UR-MCNC: NEGATIVE — SIGNIFICANT CHANGE UP
NT-PROBNP SERPL-SCNC: 171 PG/ML — SIGNIFICANT CHANGE UP (ref 0–450)
PH UR: 6 — SIGNIFICANT CHANGE UP (ref 5–8)
PLATELET # BLD AUTO: 132 K/UL — LOW (ref 150–400)
POTASSIUM SERPL-MCNC: 4.6 MMOL/L — SIGNIFICANT CHANGE UP (ref 3.5–5.3)
POTASSIUM SERPL-SCNC: 4.6 MMOL/L — SIGNIFICANT CHANGE UP (ref 3.5–5.3)
PROT SERPL-MCNC: 7.8 GM/DL — SIGNIFICANT CHANGE UP (ref 6–8.3)
PROT UR-MCNC: 15 MG/DL
PROTHROM AB SERPL-ACNC: 11 SEC — SIGNIFICANT CHANGE UP (ref 9.8–12.7)
RBC # BLD: 4.56 M/UL — SIGNIFICANT CHANGE UP (ref 4.2–5.8)
RBC # FLD: 15 % — HIGH (ref 10.3–14.5)
RBC CASTS # UR COMP ASSIST: (no result) /HPF (ref 0–4)
SODIUM SERPL-SCNC: 140 MMOL/L — SIGNIFICANT CHANGE UP (ref 135–145)
SP GR SPEC: 1.01 — SIGNIFICANT CHANGE UP (ref 1.01–1.02)
TROPONIN I SERPL-MCNC: <0.015 NG/ML — SIGNIFICANT CHANGE UP (ref 0.01–0.04)
UROBILINOGEN FLD QL: NEGATIVE MG/DL — SIGNIFICANT CHANGE UP
WBC # BLD: 4.1 K/UL — SIGNIFICANT CHANGE UP (ref 3.8–10.5)
WBC # FLD AUTO: 4.1 K/UL — SIGNIFICANT CHANGE UP (ref 3.8–10.5)
WBC UR QL: SIGNIFICANT CHANGE UP

## 2017-06-06 PROCEDURE — 99285 EMERGENCY DEPT VISIT HI MDM: CPT

## 2017-06-06 PROCEDURE — 71010: CPT | Mod: 26

## 2017-06-06 PROCEDURE — 70450 CT HEAD/BRAIN W/O DYE: CPT | Mod: 26

## 2017-06-06 PROCEDURE — 93010 ELECTROCARDIOGRAM REPORT: CPT

## 2017-06-06 RX ORDER — INSULIN GLARGINE 100 [IU]/ML
10 INJECTION, SOLUTION SUBCUTANEOUS AT BEDTIME
Qty: 0 | Refills: 0 | Status: DISCONTINUED | OUTPATIENT
Start: 2017-06-06 | End: 2017-06-13

## 2017-06-06 RX ORDER — TAMSULOSIN HYDROCHLORIDE 0.4 MG/1
0.4 CAPSULE ORAL AT BEDTIME
Qty: 0 | Refills: 0 | Status: DISCONTINUED | OUTPATIENT
Start: 2017-06-06 | End: 2017-06-13

## 2017-06-06 RX ORDER — SODIUM CHLORIDE 9 MG/ML
1000 INJECTION, SOLUTION INTRAVENOUS
Qty: 0 | Refills: 0 | Status: DISCONTINUED | OUTPATIENT
Start: 2017-06-06 | End: 2017-06-13

## 2017-06-06 RX ORDER — LOSARTAN POTASSIUM 100 MG/1
1 TABLET, FILM COATED ORAL
Qty: 0 | Refills: 0 | COMMUNITY

## 2017-06-06 RX ORDER — VALSARTAN 80 MG/1
320 TABLET ORAL DAILY
Qty: 0 | Refills: 0 | Status: DISCONTINUED | OUTPATIENT
Start: 2017-06-06 | End: 2017-06-13

## 2017-06-06 RX ORDER — SODIUM CHLORIDE 9 MG/ML
500 INJECTION INTRAMUSCULAR; INTRAVENOUS; SUBCUTANEOUS ONCE
Qty: 0 | Refills: 0 | Status: COMPLETED | OUTPATIENT
Start: 2017-06-06 | End: 2017-06-06

## 2017-06-06 RX ORDER — AMLODIPINE BESYLATE 2.5 MG/1
5 TABLET ORAL DAILY
Qty: 0 | Refills: 0 | Status: DISCONTINUED | OUTPATIENT
Start: 2017-06-06 | End: 2017-06-13

## 2017-06-06 RX ORDER — DEXTROSE 50 % IN WATER 50 %
12.5 SYRINGE (ML) INTRAVENOUS ONCE
Qty: 0 | Refills: 0 | Status: DISCONTINUED | OUTPATIENT
Start: 2017-06-06 | End: 2017-06-13

## 2017-06-06 RX ORDER — ONDANSETRON 8 MG/1
4 TABLET, FILM COATED ORAL EVERY 6 HOURS
Qty: 0 | Refills: 0 | Status: DISCONTINUED | OUTPATIENT
Start: 2017-06-06 | End: 2017-06-13

## 2017-06-06 RX ORDER — HEPARIN SODIUM 5000 [USP'U]/ML
5000 INJECTION INTRAVENOUS; SUBCUTANEOUS EVERY 12 HOURS
Qty: 0 | Refills: 0 | Status: DISCONTINUED | OUTPATIENT
Start: 2017-06-06 | End: 2017-06-13

## 2017-06-06 RX ORDER — ASPIRIN/CALCIUM CARB/MAGNESIUM 324 MG
325 TABLET ORAL DAILY
Qty: 0 | Refills: 0 | Status: DISCONTINUED | OUTPATIENT
Start: 2017-06-06 | End: 2017-06-13

## 2017-06-06 RX ORDER — DEXTROSE 50 % IN WATER 50 %
1 SYRINGE (ML) INTRAVENOUS ONCE
Qty: 0 | Refills: 0 | Status: DISCONTINUED | OUTPATIENT
Start: 2017-06-06 | End: 2017-06-13

## 2017-06-06 RX ORDER — SODIUM CHLORIDE 9 MG/ML
1000 INJECTION INTRAMUSCULAR; INTRAVENOUS; SUBCUTANEOUS
Qty: 0 | Refills: 0 | Status: DISCONTINUED | OUTPATIENT
Start: 2017-06-06 | End: 2017-06-13

## 2017-06-06 RX ORDER — DEXTROSE 50 % IN WATER 50 %
25 SYRINGE (ML) INTRAVENOUS ONCE
Qty: 0 | Refills: 0 | Status: DISCONTINUED | OUTPATIENT
Start: 2017-06-06 | End: 2017-06-13

## 2017-06-06 RX ORDER — INSULIN LISPRO 100/ML
VIAL (ML) SUBCUTANEOUS
Qty: 0 | Refills: 0 | Status: DISCONTINUED | OUTPATIENT
Start: 2017-06-06 | End: 2017-06-13

## 2017-06-06 RX ORDER — GLUCAGON INJECTION, SOLUTION 0.5 MG/.1ML
1 INJECTION, SOLUTION SUBCUTANEOUS ONCE
Qty: 0 | Refills: 0 | Status: DISCONTINUED | OUTPATIENT
Start: 2017-06-06 | End: 2017-06-13

## 2017-06-06 RX ADMIN — SODIUM CHLORIDE 75 MILLILITER(S): 9 INJECTION INTRAMUSCULAR; INTRAVENOUS; SUBCUTANEOUS at 22:08

## 2017-06-06 RX ADMIN — INSULIN GLARGINE 10 UNIT(S): 100 INJECTION, SOLUTION SUBCUTANEOUS at 22:08

## 2017-06-06 RX ADMIN — TAMSULOSIN HYDROCHLORIDE 0.4 MILLIGRAM(S): 0.4 CAPSULE ORAL at 22:07

## 2017-06-06 RX ADMIN — SODIUM CHLORIDE 1000 MILLILITER(S): 9 INJECTION INTRAMUSCULAR; INTRAVENOUS; SUBCUTANEOUS at 14:30

## 2017-06-06 NOTE — H&P ADULT - ASSESSMENT
89 y.o. male Crele speaking with [facility] IDDM, dementia, prostate dz presents for possible syncope. Pt was at [facility] ([phone]) when EMS was called. Attempted to speak with patient in Creole via [employer]  (ID [id number]) but pt was speaking in jiberish and not answering questions appropriately due to dementia. Unable to obtain further history from patient.    #unresponsiveness likely syncope   -admit to tele  -ADAMARIS  -ASA 325mg  -check orthostatic vitals  -CT head  -consider echo  -lipid panel  -iv fluid  -cardio consult    #ARVIN on CKD3  #dehydration  -prior lab from 7/2016, Cr 1.14-1.40  -currently at Cr 1.67  -IV fluids and monitor    #thrombocytopenia  -at baseline 130s    #HTN  -cont home meds for now    #BPH  -on flomax    #DM2  -cont lantus 10 units qhs  -fingerstick monitoring and ISS    #dementia  -supportive care

## 2017-06-06 NOTE — ED PROVIDER NOTE - NS ED MD SCRIBE ATTENDING SCRIBE SECTIONS
PHYSICAL EXAM/RESULTS/PAST MEDICAL/SURGICAL/SOCIAL HISTORY/REVIEW OF SYSTEMS/HISTORY OF PRESENT ILLNESS/VITAL SIGNS( Pullset)/PROGRESS NOTE/DISPOSITION

## 2017-06-06 NOTE — H&P ADULT - NSHPPHYSICALEXAM_GEN_ALL_CORE
Vital Signs Last 24 Hrs  T(C): 36.8, Max: 36.8 (06-06 @ 13:08)  T(F): 98.2, Max: 98.2 (06-06 @ 13:08)  HR: 79 (79 - 79)  BP: 145/82 (145/82 - 149/81)  BP(mean): --  RR: 15 (15 - 15)  SpO2: 100% (100% - 100%)    GEN: appears comfortable, dementia  Neuro: Alert, CN II-XII grossly intact  HEENT: NC/AT, EOMI  Neck: no thyroidmegaly, no JVD  Cardiovascular: S1S2 present, regular rhythm, no murmur  Respiratory: breath sounds normal bilaterally, no wheezing, no rales, no rhonchi  Gastrointestinal: bowel sounds normal, soft, no abdominal tenderness  Musculoskeletal: no muscle tenderness  Extremities: No edema, L shin wound  Skin: No rash

## 2017-06-06 NOTE — ED ADULT NURSE NOTE - OBJECTIVE STATEMENT
Pt sent in by ambulance for vomiting from [facility]. Pt's niece states pt "passed out" in the . Pt is now alert, combative to care. No evidence of pain noted. Pt noted to have digested food vomitus on shirt. Pt cleaned and made comfortable. IV started by IV team because unable to obtain IV

## 2017-06-06 NOTE — ED PROVIDER NOTE - OBJECTIVE STATEMENT
89 y/o male with PMHx presents to the ED c/o Pt was at adult  and started vomiting. Pt speaks Creole. Hx not obtained due to Pt's dementia.

## 2017-06-06 NOTE — ED ADULT NURSE REASSESSMENT NOTE - COMFORT CARE
15:00 rounding complete, vitals done as well, no other assistance needed
side rails up/plan of care explained/repositioned
20:00 rounding complete, vitals done as well, no other assistance needed
linen changed pt clean and dry

## 2017-06-06 NOTE — H&P ADULT - HISTORY OF PRESENT ILLNESS
89 y.o. male Crele speaking with PMH IDDM, dementia, prostate dz presents for possible syncope. Pt was at [facility] ([phone]) when EMS was called. Attempted to speak with patient in Creole via [employer]  (ID [id number]) but pt was speaking in jiberish and not answering questions appropriately due to dementia. Unable to obtain further history from patient.    Per EMS note, found pt sitting in chair at  for possible syncope. .     Contacted [facility], but was closed at this time. Per the staff, pt was sitting in chair and was given lunch. Pt was unresponsive for brief time and 911 was contacted. Pt did came back to baseline. Per staff, doesn't know further details as Day Care was closed.    PMH: as above  PSH: left shin wound  Family Hx: noncontrib to current presentation  Social Hx: goes to Day Care, unknown social hx  ROS: unable to obtain due to dementia

## 2017-06-06 NOTE — PATIENT PROFILE ADULT. - LANGUAGE ASSISTANCE NEEDED
Dementia - Unable to comprehend/No-Patient/Caregiver offered and refused free interpretation services.

## 2017-06-07 DIAGNOSIS — F03.90 UNSPECIFIED DEMENTIA, UNSPECIFIED SEVERITY, WITHOUT BEHAVIORAL DISTURBANCE, PSYCHOTIC DISTURBANCE, MOOD DISTURBANCE, AND ANXIETY: ICD-10-CM

## 2017-06-07 DIAGNOSIS — N18.3 CHRONIC KIDNEY DISEASE, STAGE 3 (MODERATE): ICD-10-CM

## 2017-06-07 DIAGNOSIS — D69.6 THROMBOCYTOPENIA, UNSPECIFIED: ICD-10-CM

## 2017-06-07 DIAGNOSIS — R55 SYNCOPE AND COLLAPSE: ICD-10-CM

## 2017-06-07 DIAGNOSIS — E86.0 DEHYDRATION: ICD-10-CM

## 2017-06-07 LAB
ANION GAP SERPL CALC-SCNC: 6 MMOL/L — SIGNIFICANT CHANGE UP (ref 5–17)
BUN SERPL-MCNC: 26 MG/DL — HIGH (ref 7–23)
CALCIUM SERPL-MCNC: 8.5 MG/DL — SIGNIFICANT CHANGE UP (ref 8.5–10.1)
CHLORIDE SERPL-SCNC: 109 MMOL/L — HIGH (ref 96–108)
CO2 SERPL-SCNC: 29 MMOL/L — SIGNIFICANT CHANGE UP (ref 22–31)
CREAT SERPL-MCNC: 1.13 MG/DL — SIGNIFICANT CHANGE UP (ref 0.5–1.3)
GLUCOSE SERPL-MCNC: 77 MG/DL — SIGNIFICANT CHANGE UP (ref 70–99)
HBA1C BLD-MCNC: 7.4 % — HIGH (ref 4–5.6)
HCT VFR BLD CALC: 37.8 % — LOW (ref 39–50)
HGB BLD-MCNC: 12.7 G/DL — LOW (ref 13–17)
MCHC RBC-ENTMCNC: 28.9 PG — SIGNIFICANT CHANGE UP (ref 27–34)
MCHC RBC-ENTMCNC: 33.4 GM/DL — SIGNIFICANT CHANGE UP (ref 32–36)
MCV RBC AUTO: 86.5 FL — SIGNIFICANT CHANGE UP (ref 80–100)
PLATELET # BLD AUTO: 129 K/UL — LOW (ref 150–400)
POTASSIUM SERPL-MCNC: 3.8 MMOL/L — SIGNIFICANT CHANGE UP (ref 3.5–5.3)
POTASSIUM SERPL-SCNC: 3.8 MMOL/L — SIGNIFICANT CHANGE UP (ref 3.5–5.3)
RBC # BLD: 4.38 M/UL — SIGNIFICANT CHANGE UP (ref 4.2–5.8)
RBC # FLD: 15 % — HIGH (ref 10.3–14.5)
SODIUM SERPL-SCNC: 144 MMOL/L — SIGNIFICANT CHANGE UP (ref 135–145)
WBC # BLD: 3.9 K/UL — SIGNIFICANT CHANGE UP (ref 3.8–10.5)
WBC # FLD AUTO: 3.9 K/UL — SIGNIFICANT CHANGE UP (ref 3.8–10.5)

## 2017-06-07 PROCEDURE — 93306 TTE W/DOPPLER COMPLETE: CPT | Mod: 26

## 2017-06-07 RX ORDER — CEFTRIAXONE 500 MG/1
1 INJECTION, POWDER, FOR SOLUTION INTRAMUSCULAR; INTRAVENOUS EVERY 24 HOURS
Qty: 0 | Refills: 0 | Status: DISCONTINUED | OUTPATIENT
Start: 2017-06-08 | End: 2017-06-13

## 2017-06-07 RX ORDER — CEFTRIAXONE 500 MG/1
INJECTION, POWDER, FOR SOLUTION INTRAMUSCULAR; INTRAVENOUS
Qty: 0 | Refills: 0 | Status: DISCONTINUED | OUTPATIENT
Start: 2017-06-07 | End: 2017-06-13

## 2017-06-07 RX ORDER — CEFTRIAXONE 500 MG/1
1 INJECTION, POWDER, FOR SOLUTION INTRAMUSCULAR; INTRAVENOUS ONCE
Qty: 0 | Refills: 0 | Status: COMPLETED | OUTPATIENT
Start: 2017-06-07 | End: 2017-06-07

## 2017-06-07 RX ORDER — VANCOMYCIN HCL 1 G
1000 VIAL (EA) INTRAVENOUS ONCE
Qty: 0 | Refills: 0 | Status: COMPLETED | OUTPATIENT
Start: 2017-06-07 | End: 2017-06-07

## 2017-06-07 RX ADMIN — Medication 250 MILLIGRAM(S): at 19:09

## 2017-06-07 RX ADMIN — AMLODIPINE BESYLATE 5 MILLIGRAM(S): 2.5 TABLET ORAL at 05:43

## 2017-06-07 RX ADMIN — Medication 1: at 12:44

## 2017-06-07 RX ADMIN — HEPARIN SODIUM 5000 UNIT(S): 5000 INJECTION INTRAVENOUS; SUBCUTANEOUS at 19:09

## 2017-06-07 RX ADMIN — INSULIN GLARGINE 10 UNIT(S): 100 INJECTION, SOLUTION SUBCUTANEOUS at 21:33

## 2017-06-07 RX ADMIN — HEPARIN SODIUM 5000 UNIT(S): 5000 INJECTION INTRAVENOUS; SUBCUTANEOUS at 05:42

## 2017-06-07 RX ADMIN — CEFTRIAXONE 100 GRAM(S): 500 INJECTION, POWDER, FOR SOLUTION INTRAMUSCULAR; INTRAVENOUS at 19:09

## 2017-06-07 RX ADMIN — VALSARTAN 320 MILLIGRAM(S): 80 TABLET ORAL at 05:42

## 2017-06-07 RX ADMIN — Medication 325 MILLIGRAM(S): at 12:44

## 2017-06-07 RX ADMIN — TAMSULOSIN HYDROCHLORIDE 0.4 MILLIGRAM(S): 0.4 CAPSULE ORAL at 21:33

## 2017-06-07 NOTE — PROGRESS NOTE ADULT - ASSESSMENT
89/M admitted with    #unresponsiveness likely syncope   -admit to tele  -MI ruled out  -ASA 325mg  -check orthostatic vitals  -CT head  -consider echo  -lipid panel  -cardio consult apprecaited    #ARVIN on CKD3  #dehydration  -prior lab from 7/2016, Cr 1.14-1.40  -currently at Cr 1.13  -IV fluids and monitor    #thrombocytopenia  -at baseline 130s    #HTN  -cont home meds for now    #BPH  -on flomax    #DM2  -cont lantus 10 units qhs  -fingerstick monitoring and ISS    #dementia + agitation:  -supportive care  - psych consult    # Left leg wound, infected:  starg vanco + rocephin  ID and wound care consults    poc discussed with team

## 2017-06-07 NOTE — CONSULT NOTE ADULT - SUBJECTIVE AND OBJECTIVE BOX
CHIEF COMPLAINT:  Patient is a 89y old  Male who presents with a chief complaint of syncope (2017 18:09)      HPI: 17:  89 y.o. male Crele speaking with PMH IDDM, dementia, prostate dz presents for possible syncope. Pt was at [facility] ([phone]) when EMS was called. Staff attempted to speak with patient in Creole via [employer]  (ID [id number]) but [facility] and not answering questions appropriately due to dementia. Unable to obtain further history from patient.    Per EMS note, found pt sitting in chair at  for possible syncope. .     Hospitalist contacted [facility], but was closed at that time. Per the staff, pt was sitting in chair and was given lunch. Pt was unresponsive for brief time and 911 was contacted. Pt did come back to baseline. Per staff, doesn't know further details as Day Care was closed.    PMH: as above  PSH: left shin wound  Family Hx: noncontrib to current presentation  Social Hx: goes to Day Care, unknown social hx  ROS: unable to obtain due to dementia (2017 18:09)        PMHx:  PAST MEDICAL & SURGICAL HISTORY:  Dementia  IDDM  Prostate disease      FAMILY HISTORY:   FAMILY HISTORY:  No pertinent family history in first degree relatives      ALLERGIES:  Allergies  No Known Allergies      REVIEW OF SYSTEMS:    CONSTITUTIONAL: No fevers or chills  EYES/ENT: No visual changes;  No vertigo or throat pain   NECK: No pain or stiffness  RESPIRATORY: No cough, wheezing, hemoptysis; No shortness of breath  CARDIOVASCULAR: No chest pain or palpitations  GASTROINTESTINAL: No abdominal or epigastric pain. No nausea, vomiting, or hematemesis; No diarrhea or constipation. No melena or hematochezia.  GENITOURINARY: No dysuria, frequency or hematuria  NEUROLOGICAL: No numbness or weakness  SKIN: No itching, burning, rashes, or lesions   All other review of systems is negative unless indicated above    Vital Signs Last 24 Hrs  T(C): 36.6, Max: 37.3 ( @ 21:25)  T(F): 97.9, Max: 99.2 ( @ 21:25)  HR: 81 (75 - 88)  BP: 145/72 (112/66 - 149/81)  BP(mean): --  RR: 16 (15 - 16)  SpO2: 96% (96% - 100%)    I&O's Summary    I & Os for current day (as of 2017 06:48)  =============================================  IN: 1606 ml / OUT: 525 ml / NET: 1081 ml      CAPILLARY BLOOD GLUCOSE  122 (2017 21:44)      PHYSICAL EXAM:   Constitutional: NAD, awake, well-developed  HEENT: PERR, EOMI, Normal Hearing  Neck: Soft and supple, No LAD, No JVD  Respiratory: Breath sounds are clear bilaterally, No wheezing, rales or rhonchi  Cardiovascular: S1 and S2, regular rate and rhythm, soft JOSE ALFREDO at base, no, gallops or rubs  Gastrointestinal: Bowel Sounds present, soft, nontender, nondistended, no guarding, no rebound  Extremities: No peripheral edema  Vascular: 2+ peripheral pulses  Neurological: no focal deficits  Skin: No rashes      MEDICATIONS  (STANDING):  heparin  Injectable 5000Unit(s) SubCutaneous every 12 hours  sodium chloride 0.9%. 1000milliLiter(s) IV Continuous <Continuous>  insulin glargine Injectable (LANTUS) 10Unit(s) SubCutaneous at bedtime  insulin lispro (HumaLOG) corrective regimen sliding scale  SubCutaneous three times a day before meals  dextrose 5%. 1000milliLiter(s) IV Continuous <Continuous>  dextrose 50% Injectable 12.5Gram(s) IV Push once  dextrose 50% Injectable 25Gram(s) IV Push once  dextrose 50% Injectable 25Gram(s) IV Push once  amLODIPine   Tablet 5milliGRAM(s) Oral daily  valsartan 320milliGRAM(s) Oral daily  aspirin 325milliGRAM(s) Oral daily  tamsulosin 0.4milliGRAM(s) Oral at bedtime      LABS: All Labs Reviewed:                        12.8   4.1   )-----------( 132      ( 2017 15:06 )             39.2     06-06    140  |  104  |  32<H>  ----------------------------<  159<H>  4.6   |  30  |  1.67<H>    Ca    9.2      2017 15:06    TPro  7.8  /  Alb  3.3  /  TBili  0.3  /  DBili  x   /  AST  79<H>  /  ALT  45  /  AlkPhos  158<H>      PT/INR - ( 2017 15:06 )   PT: 11.0 sec;   INR: 1.02 ratio         PTT - ( 2017 15:06 )  PTT:27.3 sec  CARDIAC MARKERS ( 2017 22:03 )  <0.015 ng/mL / x     / x     / x     / x      CARDIAC MARKERS ( 2017 19:56 )  <0.015 ng/mL / x     / x     / x     / x      CARDIAC MARKERS ( 2017 15:06 )  <0.015 ng/mL / x     / x     / x     / x        Serum Pro-Brain Natriuretic Peptide: 171 pg/mL ( @ 15:06)      RADIOLOGY:   CXR: 17:  There or biapical right greater than left paratracheal thickening  unchanged as compared to prior study. No acute consolidation.  There are   no pleural effusion. Cardiac and mediastinal structures are within normal limits.  There are osteopenia   and degenerative change    CT of HEAD: 17:  No acute intracranial hemorrhage, large cortical infarct or mass effect.   No significant interval change since 2017. If clinically indicated,  a short-term follow-up or an MRI may be obtained for further evaluation.      EK17:  NSR, WNL    TELEMETRY:  17:  NSR

## 2017-06-07 NOTE — PROGRESS NOTE ADULT - SUBJECTIVE AND OBJECTIVE BOX
89 y.o. male Crele speaking with PMH IDDM, dementia, prostate dz presents for possible syncope. Pt was at [facility] ([phone]) when EMS was called. Attempted to speak with patient in Creole via [employer]  (ID [id number]) but pt was speaking in jiberish and not answering questions appropriately due to dementia. Unable to obtain further history from patient.    :  sitting comfortably in chair      PHYSICAL EXAM:    Daily     Daily Weight in k.2 (2017 21:25)    ICU Vital Signs Last 24 Hrs  T(C): 36.6, Max: 37.3 ( @ 21:25)  T(F): 97.9, Max: 99.2 ( @ 21:25)  HR: 81 (75 - 88)  BP: 145/72 (112/66 - 148/83)  BP(mean): --  ABP: --  ABP(mean): --  RR: 16 (16 - 16)  SpO2: 96% (96% - 100%)      Constitutional: Well appearing  HEENT: Atraumatic, JACQUES, Normal, No congestion  Respiratory: Breath Sounds normal, no rhonchi/wheeze  Cardiovascular: N S1S2; JOSE ALFREDO present  Gastrointestinal: Abdomen soft, non tender, Bowel Sounds present  Extremities: No edema, peripheral pulses present  Neurological: AAO x 0; no gross focal motor deficits  Skin: Non cellulitic, no rash, left leg chronic ulcer, foul smelling  Lymph Nodes: No lymphadenopathy noted  Back: No CVA tenderness   Musculoskeletal: non tender  Breasts: Deferred  Genitourinary: deferred  Rectal: Deferred                          12.7   3.9   )-----------( 129      ( 2017 09:19 )             37.8       CBC Full  -  ( 2017 09:19 )  WBC Count : 3.9 K/uL  Hemoglobin : 12.7 g/dL  Hematocrit : 37.8 %  Platelet Count - Automated : 129 K/uL  Mean Cell Volume : 86.5 fl  Mean Cell Hemoglobin : 28.9 pg  Mean Cell Hemoglobin Concentration : 33.4 gm/dL  Auto Neutrophil # : x  Auto Lymphocyte # : x  Auto Monocyte # : x  Auto Eosinophil # : x  Auto Basophil # : x  Auto Neutrophil % : x  Auto Lymphocyte % : x  Auto Monocyte % : x  Auto Eosinophil % : x  Auto Basophil % : x          144  |  109<H>  |  26<H>  ----------------------------<  77  3.8   |  29  |  1.13    Ca    8.5      2017 06:01    TPro  7.8  /  Alb  3.3  /  TBili  0.3  /  DBili  x   /  AST  79<H>  /  ALT  45  /  AlkPhos  158<H>  06      LIVER FUNCTIONS - ( 2017 15:06 )  Alb: 3.3 g/dL / Pro: 7.8 gm/dL / ALK PHOS: 158 U/L / ALT: 45 U/L / AST: 79 U/L / GGT: x             PT/INR - ( 2017 15:06 )   PT: 11.0 sec;   INR: 1.02 ratio         PTT - ( 2017 15:06 )  PTT:27.3 sec    CARDIAC MARKERS ( 2017 22:03 )  <0.015 ng/mL / x     / x     / x     / x      CARDIAC MARKERS ( 2017 19:56 )  <0.015 ng/mL / x     / x     / x     / x      CARDIAC MARKERS ( 2017 15:06 )  <0.015 ng/mL / x     / x     / x     / x            Urinalysis Basic - ( 2017 19:53 )    Color: Yellow / Appearance: Clear / S.010 / pH: x  Gluc: x / Ketone: Negative  / Bili: Negative / Urobili: Negative mg/dL   Blood: x / Protein: 15 mg/dL / Nitrite: Negative   Leuk Esterase: Negative / RBC: 3-5 /HPF / WBC 3-5   Sq Epi: x / Non Sq Epi: x / Bacteria: x            MEDICATIONS  (STANDING):  heparin  Injectable 5000Unit(s) SubCutaneous every 12 hours  sodium chloride 0.9%. 1000milliLiter(s) IV Continuous <Continuous>  insulin glargine Injectable (LANTUS) 10Unit(s) SubCutaneous at bedtime  insulin lispro (HumaLOG) corrective regimen sliding scale  SubCutaneous three times a day before meals  dextrose 5%. 1000milliLiter(s) IV Continuous <Continuous>  dextrose 50% Injectable 12.5Gram(s) IV Push once  dextrose 50% Injectable 25Gram(s) IV Push once  dextrose 50% Injectable 25Gram(s) IV Push once  amLODIPine   Tablet 5milliGRAM(s) Oral daily  valsartan 320milliGRAM(s) Oral daily  aspirin 325milliGRAM(s) Oral daily  tamsulosin 0.4milliGRAM(s) Oral at bedtime  vancomycin  IVPB 1000milliGRAM(s) IV Intermittent once  cefTRIAXone   IVPB  IV Intermittent

## 2017-06-07 NOTE — CONSULT NOTE ADULT - ASSESSMENT
6/7/17:  Pt with above history and unresponsiveness of ? etiology.  I doubt a significant arrhythmia, and with the normal EKG and (-) troponins, I doubt an ACS.  Agree with medical work-up as outlined by medicine.  An echo would be appropriate and if LVEF normal, makes a significant arrhythmia even less likely.  If symptoms recur, off a monitor, a LINQ placement may be considered.  Dehydration likely contributing to his event with his renal insufficiency and elevated Creatinine from baseline.  I doubt hypoglycemia with normal blood glucose by EMS and here but still possible.  No indication for invasive cardiac testing or tilt table at this time.  Home with outpt follow up with his PCP as outpt if work-up (-).  Will follow intermittently prn.    #ARVIN on CKD3  #dehydration  -prior lab from 7/2016, Cr 1.14-1.40  -currently at Cr 1.67  -IV fluids and monitor    #thrombocytopenia  -at baseline 130s    #HTN  -cont home meds for now    #BPH  -on flomax    #DM2  -cont lantus 10 units qhs  -fingerstick monitoring and ISS    #dementia  -supportive care

## 2017-06-08 LAB
-  AMIKACIN: SIGNIFICANT CHANGE UP
-  AMPICILLIN/SULBACTAM: SIGNIFICANT CHANGE UP
-  AMPICILLIN: SIGNIFICANT CHANGE UP
-  AZTREONAM: SIGNIFICANT CHANGE UP
-  CEFAZOLIN: SIGNIFICANT CHANGE UP
-  CEFEPIME: SIGNIFICANT CHANGE UP
-  CEFOXITIN: SIGNIFICANT CHANGE UP
-  CEFTAZIDIME: SIGNIFICANT CHANGE UP
-  CEFTRIAXONE: SIGNIFICANT CHANGE UP
-  CIPROFLOXACIN: SIGNIFICANT CHANGE UP
-  ERTAPENEM: SIGNIFICANT CHANGE UP
-  GENTAMICIN: SIGNIFICANT CHANGE UP
-  LEVOFLOXACIN: SIGNIFICANT CHANGE UP
-  MEROPENEM: SIGNIFICANT CHANGE UP
-  NITROFURANTOIN: SIGNIFICANT CHANGE UP
-  PIPERACILLIN/TAZOBACTAM: SIGNIFICANT CHANGE UP
-  TOBRAMYCIN: SIGNIFICANT CHANGE UP
-  TRIMETHOPRIM/SULFAMETHOXAZOLE: SIGNIFICANT CHANGE UP
CULTURE RESULTS: SIGNIFICANT CHANGE UP
METHOD TYPE: SIGNIFICANT CHANGE UP
ORGANISM # SPEC MICROSCOPIC CNT: SIGNIFICANT CHANGE UP
ORGANISM # SPEC MICROSCOPIC CNT: SIGNIFICANT CHANGE UP
SPECIMEN SOURCE: SIGNIFICANT CHANGE UP

## 2017-06-08 PROCEDURE — 93970 EXTREMITY STUDY: CPT | Mod: 26

## 2017-06-08 RX ORDER — QUETIAPINE FUMARATE 200 MG/1
12.5 TABLET, FILM COATED ORAL DAILY
Qty: 0 | Refills: 0 | Status: DISCONTINUED | OUTPATIENT
Start: 2017-06-08 | End: 2017-06-13

## 2017-06-08 RX ORDER — QUETIAPINE FUMARATE 200 MG/1
25 TABLET, FILM COATED ORAL AT BEDTIME
Qty: 0 | Refills: 0 | Status: DISCONTINUED | OUTPATIENT
Start: 2017-06-08 | End: 2017-06-13

## 2017-06-08 RX ORDER — HALOPERIDOL DECANOATE 100 MG/ML
1 INJECTION INTRAMUSCULAR
Qty: 0 | Refills: 0 | Status: DISCONTINUED | OUTPATIENT
Start: 2017-06-08 | End: 2017-06-13

## 2017-06-08 RX ADMIN — CEFTRIAXONE 100 GRAM(S): 500 INJECTION, POWDER, FOR SOLUTION INTRAMUSCULAR; INTRAVENOUS at 18:42

## 2017-06-08 RX ADMIN — INSULIN GLARGINE 10 UNIT(S): 100 INJECTION, SOLUTION SUBCUTANEOUS at 23:24

## 2017-06-08 RX ADMIN — AMLODIPINE BESYLATE 5 MILLIGRAM(S): 2.5 TABLET ORAL at 06:28

## 2017-06-08 RX ADMIN — HEPARIN SODIUM 5000 UNIT(S): 5000 INJECTION INTRAVENOUS; SUBCUTANEOUS at 18:42

## 2017-06-08 RX ADMIN — HEPARIN SODIUM 5000 UNIT(S): 5000 INJECTION INTRAVENOUS; SUBCUTANEOUS at 06:28

## 2017-06-08 RX ADMIN — TAMSULOSIN HYDROCHLORIDE 0.4 MILLIGRAM(S): 0.4 CAPSULE ORAL at 23:25

## 2017-06-08 RX ADMIN — QUETIAPINE FUMARATE 12.5 MILLIGRAM(S): 200 TABLET, FILM COATED ORAL at 13:27

## 2017-06-08 RX ADMIN — QUETIAPINE FUMARATE 25 MILLIGRAM(S): 200 TABLET, FILM COATED ORAL at 23:25

## 2017-06-08 RX ADMIN — Medication 325 MILLIGRAM(S): at 13:27

## 2017-06-08 NOTE — PROGRESS NOTE ADULT - ASSESSMENT
89/M admitted with    #unresponsiveness likely syncope   -admitted to tele  -MI ruled out  -ASA 325mg  -CT head negative  - echo EF 60-65%  -lipid panel  -cardio consult appreciated    #ARVIN on CKD3  #dehydration  -prior lab from 7/2016, Cr 1.14-1.40  -currently at Cr 1.13    #thrombocytopenia  -at baseline 130s    #HTN  -cont home meds for now    #BPH  -on flomax    #DM2  -cont lantus 10 units qhs  -fingerstick monitoring and ISS    #dementia + agitation:  -supportive care  - psych consult appreciated; started on seroquel 12.5 mg po in am and 25 mg po q hs    # Left leg wound, infected:  cont rocephin  ID and wound care consults appreciated    # Left lower leg swelling: r/o DVT  venous doppler urgent    poc discussed with team

## 2017-06-08 NOTE — PROGRESS NOTE ADULT - SUBJECTIVE AND OBJECTIVE BOX
89 y.o. male Crele speaking with PMH IDDM, dementia, prostate dz presents for possible syncope. Pt was at [facility] ([phone]) when EMS was called. Attempted to speak with patient in Creole via [employer]  (ID [id number]) but [facility] and not answering questions appropriately due to dementia. Unable to obtain further history from patient.    :  sitting comfortably in chair    :  lying comfortably in bed, no complaints      PHYSICAL EXAM:    Daily     Daily     ICU Vital Signs Last 24 Hrs  T(C): 36.8, Max: 36.9 (-08 @ 05:19)  T(F): 98.3, Max: 98.5 (06-08 @ 05:19)  HR: 74 (67 - 80)  BP: 119/50 (119/50 - 141/73)  BP(mean): 90 (90 - 90)  ABP: --  ABP(mean): --  RR: 16 (16 - 16)  SpO2: 100% (100% - 100%)      Constitutional: Well appearing  HEENT: Atraumatic, JACQUES, Normal, No congestion  Respiratory: Breath Sounds normal, no rhonchi/wheeze  Cardiovascular: N S1S2; JOSE ALFREDO present  Gastrointestinal: Abdomen soft, non tender, Bowel Sounds present  Extremities: No edema, peripheral pulses present  Neurological: AAO x 3, no gross focal motor deficits  Skin: Non cellulitic, no rash, left leg chronic ulcer, foul smelling, swelling of lower left leg  Lymph Nodes: No lymphadenopathy noted  Back: No CVA tenderness   Musculoskeletal: non tender  Breasts: Deferred  Genitourinary: deferred  Rectal: Deferred                          12.7   3.9   )-----------( 129      ( 2017 09:19 )             37.8       CBC Full  -  ( 2017 09:19 )  WBC Count : 3.9 K/uL  Hemoglobin : 12.7 g/dL  Hematocrit : 37.8 %  Platelet Count - Automated : 129 K/uL  Mean Cell Volume : 86.5 fl  Mean Cell Hemoglobin : 28.9 pg  Mean Cell Hemoglobin Concentration : 33.4 gm/dL  Auto Neutrophil # : x  Auto Lymphocyte # : x  Auto Monocyte # : x  Auto Eosinophil # : x  Auto Basophil # : x  Auto Neutrophil % : x  Auto Lymphocyte % : x  Auto Monocyte % : x  Auto Eosinophil % : x  Auto Basophil % : x      06-07    144  |  109<H>  |  26<H>  ----------------------------<  77  3.8   |  29  |  1.13    Ca    8.5      2017 06:01    TPro  7.8  /  Alb  3.3  /  TBili  0.3  /  DBili  x   /  AST  79<H>  /  ALT  45  /  AlkPhos  158<H>        LIVER FUNCTIONS - ( 2017 15:06 )  Alb: 3.3 g/dL / Pro: 7.8 gm/dL / ALK PHOS: 158 U/L / ALT: 45 U/L / AST: 79 U/L / GGT: x             PT/INR - ( 2017 15:06 )   PT: 11.0 sec;   INR: 1.02 ratio         PTT - ( 2017 15:06 )  PTT:27.3 sec    CARDIAC MARKERS ( 2017 22:03 )  <0.015 ng/mL / x     / x     / x     / x      CARDIAC MARKERS ( 2017 19:56 )  <0.015 ng/mL / x     / x     / x     / x      CARDIAC MARKERS ( 2017 15:06 )  <0.015 ng/mL / x     / x     / x     / x            Urinalysis Basic - ( 2017 19:53 )    Color: Yellow / Appearance: Clear / S.010 / pH: x  Gluc: x / Ketone: Negative  / Bili: Negative / Urobili: Negative mg/dL   Blood: x / Protein: 15 mg/dL / Nitrite: Negative   Leuk Esterase: Negative / RBC: 3-5 /HPF / WBC 3-5   Sq Epi: x / Non Sq Epi: x / Bacteria: x            MEDICATIONS  (STANDING):  heparin  Injectable 5000Unit(s) SubCutaneous every 12 hours  sodium chloride 0.9%. 1000milliLiter(s) IV Continuous <Continuous>  insulin glargine Injectable (LANTUS) 10Unit(s) SubCutaneous at bedtime  insulin lispro (HumaLOG) corrective regimen sliding scale  SubCutaneous three times a day before meals  dextrose 5%. 1000milliLiter(s) IV Continuous <Continuous>  dextrose 50% Injectable 12.5Gram(s) IV Push once  dextrose 50% Injectable 25Gram(s) IV Push once  dextrose 50% Injectable 25Gram(s) IV Push once  amLODIPine   Tablet 5milliGRAM(s) Oral daily  valsartan 320milliGRAM(s) Oral daily  aspirin 325milliGRAM(s) Oral daily  tamsulosin 0.4milliGRAM(s) Oral at bedtime  cefTRIAXone   IVPB  IV Intermittent   cefTRIAXone   IVPB 1Gram(s) IV Intermittent every 24 hours  QUEtiapine 25milliGRAM(s) Oral at bedtime  QUEtiapine 12.5milliGRAM(s) Oral daily

## 2017-06-08 NOTE — ADVANCED PRACTICE NURSE CONSULT - ASSESSMENT
This is a 89 year old male that was admitted to the hospital on 6/6/2017 for syncope. PMH- IDDM, dementia, prostate dz. Requested by MD to assess patient's LLE ulcer. Upon removing dressing mild odor noted. Small opening noted to LLE with epithelialized tissue noted around wound. LLE noted to be more edematous than the RLE. Patient also noted to be reporting discomfort with LLE touched. Wound irrigated with normal saline. Xeroform and foam dressing placed.     Dr. Lazo called and made aware of findings. U/S to be ordered of LLE.  Patient remains in bed.

## 2017-06-08 NOTE — PHYSICAL THERAPY INITIAL EVALUATION ADULT - ACTIVE RANGE OF MOTION EXAMINATION, REHAB EVAL
patient observed to be moving all 4 extremities freely against gravity. bilateral  lower extremity Active ROM was WFL (within functional limits)/patient observed to be moving all 4 extremities freely against gravity./bilateral upper extremity Active ROM was WFL (within functional limits)

## 2017-06-08 NOTE — PHYSICAL THERAPY INITIAL EVALUATION ADULT - GENERAL OBSERVATIONS, REHAB EVAL
Patient received sitting on the edge of the bed on 3E, CNA in room trying to get patient to lye down. (+)  left leg wound.

## 2017-06-08 NOTE — CONSULT NOTE ADULT - ASSESSMENT
89 y.o. male Creole speaking with PMH IDDM, dementia, prostate dz presents for possible syncope. Pt was at [facility] ([phone]) when EMS was called.  Pt found with infection ARVIN, left leg wound. No past psychiatric history     Impression    Pt with advanced dementia at baseline, with increased agitation likely due to change in environment vs delirium secondary to infection/ARVIN He is calm currently, though he is agitated intermittently as per staff.     Recommend  Seroquel 12.5mg q am and 25 mg HS  Recommend follow up with outpt psychiatrist to monitor and adjust meds as needed.

## 2017-06-08 NOTE — ADVANCED PRACTICE NURSE CONSULT - RECOMMEDATIONS
1) Turn and position every 2 hours  2) Elevate lower extremities off mattress  3) Change dressing to LLE daily.

## 2017-06-08 NOTE — CONSULT NOTE ADULT - SUBJECTIVE AND OBJECTIVE BOX
HPI:  89 y.o. male  with PMH IDDM, dementia, prostate dz admitted on  for evaluation of syncope; history per medical record as patient unable to provide history; was noted to have left lower extremity ulcer.          PMH: as above  PSH: as above  Meds: per reconcilation sheet, noted below  MEDICATIONS  (STANDING):  heparin  Injectable 5000Unit(s) SubCutaneous every 12 hours  sodium chloride 0.9%. 1000milliLiter(s) IV Continuous <Continuous>  insulin glargine Injectable (LANTUS) 10Unit(s) SubCutaneous at bedtime  insulin lispro (HumaLOG) corrective regimen sliding scale  SubCutaneous three times a day before meals  dextrose 5%. 1000milliLiter(s) IV Continuous <Continuous>  dextrose 50% Injectable 12.5Gram(s) IV Push once  dextrose 50% Injectable 25Gram(s) IV Push once  dextrose 50% Injectable 25Gram(s) IV Push once  amLODIPine   Tablet 5milliGRAM(s) Oral daily  valsartan 320milliGRAM(s) Oral daily  aspirin 325milliGRAM(s) Oral daily  tamsulosin 0.4milliGRAM(s) Oral at bedtime  cefTRIAXone   IVPB  IV Intermittent   cefTRIAXone   IVPB 1Gram(s) IV Intermittent every 24 hours    MEDICATIONS  (PRN):  dextrose Gel 1Dose(s) Oral once PRN Blood Glucose LESS THAN 70 milliGRAM(s)/deciliter  glucagon  Injectable 1milliGRAM(s) IntraMuscular once PRN Glucose LESS THAN 70 milligrams/deciliter  ondansetron Injectable 4milliGRAM(s) IV Push every 6 hours PRN Nausea    Allergies    No Known Allergies    Intolerances      Social: no smoking, no alcohol, no illegal drugs; no recent travel, no exposure to TB  FAMILY HISTORY:  No pertinent family history in first degree relatives    ROS: unable to obtain secondary to patient medical condition   Vital Signs Last 24 Hrs  T(C): 36.8, Max: 36.9 (-08 @ 05:19)  T(F): 98.3, Max: 98.5 (06-08 @ 05:19)  HR: 74 (67 - 80)  BP: 119/50 (119/50 - 141/73)  BP(mean): 90 (90 - 90)  RR: 16 (16 - 16)  SpO2: 100% (100% - 100%)  Daily     Daily   Constitutional: frail looking  HEENT: NC/AT, EOMI, PERRLA  Neck: supple  Respiratory: clear, no r/r/w  Cardiovascular: S1S2 regular, no murmurs  Abdomen: soft, not tender, not distended, positive BS  Genitourinary: deferred  Rectal: deferred  Musculoskeletal: left lower extremity with ulcer on anterior shin  Neurological: nonfocal  Skin: no rashes                          12.7   3.9   )-----------( 129      ( 2017 09:19 )             37.8     -    144  |  109<H>  |  26<H>  ----------------------------<  77  3.8   |  29  |  1.13    Ca    8.5      2017 06:01    TPro  7.8  /  Alb  3.3  /  TBili  0.3  /  DBili  x   /  AST  79<H>  /  ALT  45  /  AlkPhos  158<H>  06-06     LIVER FUNCTIONS - ( 2017 15:06 )  Alb: 3.3 g/dL / Pro: 7.8 gm/dL / ALK PHOS: 158 U/L / ALT: 45 U/L / AST: 79 U/L / GGT: x           Urinalysis Basic - ( 2017 19:53 )    Color: Yellow / Appearance: Clear / S.010 / pH: x  Gluc: x / Ketone: Negative  / Bili: Negative / Urobili: Negative mg/dL   Blood: x / Protein: 15 mg/dL / Nitrite: Negative   Leuk Esterase: Negative / RBC: 3-5 /HPF / WBC 3-5   Sq Epi: x / Non Sq Epi: x / Bacteria: x          Radiology:    Advanced directive addressed: full resuscitation

## 2017-06-08 NOTE — PROGRESS NOTE ADULT - SUBJECTIVE AND OBJECTIVE BOX
CHIEF COMPLAINT:  Patient is a 89y old  Male who presents with a chief complaint of syncope (2017 18:09)      HPI: 17:  89 y.o. male Crele speaking with PMH IDDM, dementia, prostate dz presents for possible syncope. Pt was at [facility] ([phone]) when EMS was called. Staff attempted to speak with patient in Creole via [employer]  (ID [id number]) but [facility] and not answering questions appropriately due to dementia. Unable to obtain further history from patient.    Per EMS note, found pt sitting in chair at  for possible syncope. .     Hospitalist contacted [facility], but was closed at that time. Per the staff, pt was sitting in chair and was given lunch. Pt was unresponsive for brief time and 911 was contacted. Pt did come back to baseline. Per staff, doesn't know further details as Day Care was closed.    17:  No new cardiac symptoms.  Maintaining NSR without arrhythmias or heart block on the monitor.  Echo, 17 showing mild LVH with normal LV systolic function and LVEF=60-65% but mild diastolic dysfunction with mild TR and mild thickening of the mitral valve with trace MR and mild LA enlargement and trace AI but otherwise a normal echo.    PMH: as above  PSH: left shin wound  Family Hx: non-contrib to current presentation  Social Hx: goes to Day Care, unknown social hx  ROS: unable to obtain due to dementia (2017 18:09)        PMHx:  PAST MEDICAL & SURGICAL HISTORY:  Dementia  IDDM  Prostate disease      FAMILY HISTORY:   FAMILY HISTORY:  No pertinent family history in first degree relatives      ALLERGIES:  Allergies  No Known Allergies      REVIEW OF SYSTEMS:    CONSTITUTIONAL: No fevers or chills  EYES/ENT: No visual changes;  No vertigo or throat pain   NECK: No pain or stiffness  RESPIRATORY: No cough, wheezing, hemoptysis; No shortness of breath  CARDIOVASCULAR: No chest pain or palpitations  GASTROINTESTINAL: No abdominal or epigastric pain. No nausea, vomiting, or hematemesis; No diarrhea or constipation. No melena or hematochezia.  GENITOURINARY: No dysuria, frequency or hematuria  NEUROLOGICAL: No numbness or weakness  SKIN: No itching, burning, rashes, or lesions   All other review of systems is negative unless indicated above    Vital Signs Last 24 Hrs  T(C): 36.9, Max: 36.9 (-08 @ 05:19)  T(F): 98.5, Max: 98.5 (-08 @ 05:19)  HR: 67 (67 - 80)  BP: 127/60 (127/60 - 141/73)  BP(mean): 90 (90 - 90)  RR: 16 (16 - 16)  SpO2: 100% (100% - 100%)    I&O's Summary    I & Os for current day (as of 2017 07:38)  =============================================  IN: 320 ml / OUT: 825 ml / NET: -505 ml      CAPILLARY BLOOD GLUCOSE  106 (2017 16:44)      PHYSICAL EXAM:   Constitutional: NAD, awake, well-developed  HEENT: PERR, EOMI, Normal Hearing  Neck: Soft and supple, No LAD, No JVD  Respiratory: Breath sounds are clear bilaterally, No wheezing, rales or rhonchi  Cardiovascular: S1 and S2, regular rate and rhythm, soft JOSE ALFREDO at base, no, gallops or rubs  Gastrointestinal: Bowel Sounds present, soft, nontender, nondistended, no guarding, no rebound  Extremities: No peripheral edema  Vascular: 2+ peripheral pulses  Neurological: no focal deficits  Skin: No rashes      MEDICATIONS  (STANDING):  heparin  Injectable 5000Unit(s) SubCutaneous every 12 hours  sodium chloride 0.9%. 1000milliLiter(s) IV Continuous <Continuous>  insulin glargine Injectable (LANTUS) 10Unit(s) SubCutaneous at bedtime  insulin lispro (HumaLOG) corrective regimen sliding scale  SubCutaneous three times a day before meals  dextrose 5%. 1000milliLiter(s) IV Continuous <Continuous>  dextrose 50% Injectable 12.5Gram(s) IV Push once  dextrose 50% Injectable 25Gram(s) IV Push once  dextrose 50% Injectable 25Gram(s) IV Push once  amLODIPine   Tablet 5milliGRAM(s) Oral daily  valsartan 320milliGRAM(s) Oral daily  aspirin 325milliGRAM(s) Oral daily  tamsulosin 0.4milliGRAM(s) Oral at bedtime  cefTRIAXone   IVPB  IV Intermittent   cefTRIAXone   IVPB 1Gram(s) IV Intermittent every 24 hours      LABS: All Labs Reviewed:                        12.7   3.9   )-----------( 129      ( 2017 09:19 )             37.8         144  |  109<H>  |  26<H>  ----------------------------<  77  3.8   |  29  |  1.13    Ca    8.5      2017 06:01    TPro  7.8  /  Alb  3.3  /  TBili  0.3  /  DBili  x   /  AST  79<H>  /  ALT  45  /  AlkPhos  158<H>      PT/INR - ( 2017 15:06 )   PT: 11.0 sec;   INR: 1.02 ratio      PTT - ( 2017 15:06 )  PTT:27.3 sec  CARDIAC MARKERS ( 2017 22:03 )  <0.015 ng/mL / x     / x     / x     / x      CARDIAC MARKERS ( 2017 19:56 )  <0.015 ng/mL / x     / x     / x     / x      CARDIAC MARKERS ( 2017 15:06 )  <0.015 ng/mL / x     / x     / x     / x        Serum Pro-Brain Natriuretic Peptide: 171 pg/mL ( @ 15:06)    BLOOD CULTURES: Organism --  Gram Stain Blood -- Gram Stain --  Specimen Source .Urine None  Culture-Blood --      RADIOLOGY:   CXR: 17:  There or biapical right greater than left paratracheal thickening  unchanged as compared to prior study. No acute consolidation.  There are   no pleural effusion. Cardiac and mediastinal structures are within normal limits.  There are osteopenia   and degenerative change    CT of HEAD: 17:  No acute intracranial hemorrhage, large cortical infarct or mass effect.   No significant interval change since 2017. If clinically indicated,  a short-term follow-up or an MRI may be obtained for further evaluation.      EK17:  NSR, WNL    TELEMETRY:  17:  NSR    ECHO: 17:   The left ventricle is normal in size and contractility.   Mild concentric left ventricular hypertrophy is present.   Estimated left ventricular ejection fraction is 60-65 %.   The left atrium is mildly dilated.   Normal appearing right ventricle structure and function.   Fibrocalcific changes noted to the aortic valve leaflets with preserved excursion.   Trace aortic regurgitation is present.   EA reversal of the mitral inflow consistent with reduced compliance of the left ventricle.   Trace mitral regurgitation is present. Mildly thickened mitral valve.   Mild mitral annular calcification is present.   Mild (1+) tricuspid valve regurgitation is present.

## 2017-06-08 NOTE — PROGRESS NOTE ADULT - ASSESSMENT
6/7/17:  Pt with above history and unresponsiveness of ? etiology.  I doubt a significant arrhythmia, and with the normal EKG and (-) troponins, I doubt an ACS.  Agree with medical work-up as outlined by medicine.  An echo would be appropriate and if LVEF normal, makes a significant arrhythmia even less likely.  If symptoms recur, off a monitor, a LINQ placement may be considered.  Dehydration likely contributing to his event with his renal insufficiency and elevated Creatinine from baseline.  I doubt hypoglycemia with normal blood glucose by EMS and here but still possible.  No indication for invasive cardiac testing or tilt table at this time.  Home with outpt follow up with his PCP as outpt if work-up (-).  Will follow intermittently prn.    6/8/17:  No new cardiac symptoms and no recurrent syncope of other cardiac issues.  Echo as above of little clinical significance and not change in meds indicated.  Continue as outlined by medicine etal.  No indication for further cardiac testing at this time.  Home when ok with medicine.  Follow up with his PCP as outpt.  Will follow intermittently prn.    #ARVIN on CKD3  #dehydration  -prior lab from 7/2016, Cr 1.14-1.40  -currently at Cr 1.67  -IV fluids and monitor    #thrombocytopenia  -at baseline 130s    #HTN  -cont home meds for now    #BPH  -on flomax    #DM2  -cont lantus 10 units qhs  -fingerstick monitoring and ISS    #dementia  -supportive care

## 2017-06-08 NOTE — CONSULT NOTE ADULT - SUBJECTIVE AND OBJECTIVE BOX
HPI:  89 y.o. male Creole speaking with PMH IDDM, dementia, prostate dz presents for possible syncope. Pt was at [facility] ([phone]) when EMS was called.  Eduardaist attempted to speak with patient in Creole via [employer]  (ID [id number]) but pt was speaking in [facility] and not answering questions appropriately due to dementia. Unable to obtain further history from patient.     Per EMS note, found pt sitting in chair at [facility] for possible syncope. .    Per the staff at [facility] pt was sitting in chair and was given lunch. Pt was unresponsive for brief time and 911 was contacted. Pt did came back to baseline. Per staff, doesn't know further details as Day Care was closed.     Upon interview pt was seen with help of , oriented to self only, not to place or time, he kept saying he was in [facility]. Unable to participate in interview. He was pleasant though, cheerful and smiling as he had a relative visiting. According to staff he has been intermittently agitated but not aggressive, pulls on IV lines, wanders.      PMH: as above  PSH: left shin wound  Family Hx: noncontrib to current presentation  Social Hx: goes to Day Care, unknown social hx  ROS: unable to obtain due to dementia (06 Jun 2017 18:09)          Current Psychiatric Tx  Provider: None  Meds: none    Social Hx  Employment:  retired  Substance abuse:  none  Living situation:  at home , day care    Past Psychiatric Hx  Past hospitalizations: none  Past rehab: none  Past suicidality/aggression: none known    Psychiatric/substance abuse Family Hx: none known          Risk assessment :  high as pt unable to take care of self due to dementia    Mental Status Exam  Oriented to person only  General Appearance:  well nourished.  Behavior: confused  Muscle tone/ Strength: No abnormal movements  Gait/Station: wnl  Speech: rambles  Mood: cheerful  Affect: congruent   Thought Process: illogical   Thought Associations: loose  Thought Content: could not assess  Perceptions: does not appear to be hallucinating   Attention/Concentration: distracted  Recent Memory: impaired  Remote Memory: impaired  Fund of knowledge: impaired  Language: wnl, Creole speaking  Judgement : poor  Insight: poor    Labs:   06-07    144  |  109<H>  |  26<H>  ----------------------------<  77  3.8   |  29  |  1.13    Ca    8.5      07 Jun 2017 06:01    TPro  7.8  /  Alb  3.3  /  TBili  0.3  /  DBili  x   /  AST  79<H>  /  ALT  45  /  AlkPhos  158<H>  06-06                          12.7   3.9   )-----------( 129      ( 07 Jun 2017 09:19 )             37.8     CT brain  No acute intracranial hemorrhage, large cortical infarct or mass effect.   No significant interval change since 2/27/2017. If clinically indicated,   a short-term follow-up or an MRI may be obtained for further evaluation.     EKG  Ventricular Rate 73 BPM    Atrial Rate 73 BPM    P-R Interval 160 ms    QRS Duration 82 ms    Q-T Interval 374 ms    QTC Calculation(Bezet) 412 ms    P Axis 43 degrees    R Axis -2 degrees    T Axis 40 degrees    Diagnosis Line Normal sinus rhythm  Normal ECG  When compared with ECG of 06-JUN-2017 13:19,  Fusion complexes are no longer Present  Premature ventricular complexes are no longer Present  Confirmed by Fabiano Aguilar MD ([id number]) on 6/7/2017 7:54:27 PM

## 2017-06-08 NOTE — CONSULT NOTE ADULT - ASSESSMENT
89 y.o. male  with PMH IDDM, dementia, prostate dz admitted on 6/6 for evaluation of syncope; history per medical record as patient unable to provide history; was noted to have left lower extremity ulcer.  1. Left lower extremity ulcer  - follow up cultures   - wound care  - agree with ceftriaxone as ordered, will give a short course as ulcer looks chronic and slow to heal  2. other issues:  IDDM, dementia, prostate dz   - per medicine

## 2017-06-08 NOTE — PHYSICAL THERAPY INITIAL EVALUATION ADULT - ADDITIONAL COMMENTS
Unknown, patient poor historian. EMR note 7/8/16, patient ambulated independently without device. Unknown, patient poor historian. EMR note 7/8/16, patient ambulated independently without device.  6/12/17 Info above from daughter at bedside. Household ambulator with RW

## 2017-06-08 NOTE — PHYSICAL THERAPY INITIAL EVALUATION ADULT - PERTINENT HX OF CURRENT PROBLEM, REHAB EVAL
89 yo Creole speaking M with dementia, not able to use  phone, admitted from [facility] post syncopal episode.

## 2017-06-09 LAB
ANION GAP SERPL CALC-SCNC: 4 MMOL/L — LOW (ref 5–17)
BUN SERPL-MCNC: 20 MG/DL — SIGNIFICANT CHANGE UP (ref 7–23)
CALCIUM SERPL-MCNC: 9.3 MG/DL — SIGNIFICANT CHANGE UP (ref 8.5–10.1)
CHLORIDE SERPL-SCNC: 106 MMOL/L — SIGNIFICANT CHANGE UP (ref 96–108)
CO2 SERPL-SCNC: 33 MMOL/L — HIGH (ref 22–31)
CREAT SERPL-MCNC: 1.1 MG/DL — SIGNIFICANT CHANGE UP (ref 0.5–1.3)
GLUCOSE SERPL-MCNC: 87 MG/DL — SIGNIFICANT CHANGE UP (ref 70–99)
HCT VFR BLD CALC: 39.9 % — SIGNIFICANT CHANGE UP (ref 39–50)
HGB BLD-MCNC: 13.1 G/DL — SIGNIFICANT CHANGE UP (ref 13–17)
MCHC RBC-ENTMCNC: 28.3 PG — SIGNIFICANT CHANGE UP (ref 27–34)
MCHC RBC-ENTMCNC: 32.8 GM/DL — SIGNIFICANT CHANGE UP (ref 32–36)
MCV RBC AUTO: 86.3 FL — SIGNIFICANT CHANGE UP (ref 80–100)
PLATELET # BLD AUTO: 152 K/UL — SIGNIFICANT CHANGE UP (ref 150–400)
POTASSIUM SERPL-MCNC: 4 MMOL/L — SIGNIFICANT CHANGE UP (ref 3.5–5.3)
POTASSIUM SERPL-SCNC: 4 MMOL/L — SIGNIFICANT CHANGE UP (ref 3.5–5.3)
RBC # BLD: 4.63 M/UL — SIGNIFICANT CHANGE UP (ref 4.2–5.8)
RBC # FLD: 14.8 % — HIGH (ref 10.3–14.5)
SODIUM SERPL-SCNC: 143 MMOL/L — SIGNIFICANT CHANGE UP (ref 135–145)
WBC # BLD: 2.8 K/UL — LOW (ref 3.8–10.5)
WBC # FLD AUTO: 2.8 K/UL — LOW (ref 3.8–10.5)

## 2017-06-09 RX ADMIN — QUETIAPINE FUMARATE 25 MILLIGRAM(S): 200 TABLET, FILM COATED ORAL at 21:35

## 2017-06-09 RX ADMIN — HEPARIN SODIUM 5000 UNIT(S): 5000 INJECTION INTRAVENOUS; SUBCUTANEOUS at 17:42

## 2017-06-09 RX ADMIN — INSULIN GLARGINE 10 UNIT(S): 100 INJECTION, SOLUTION SUBCUTANEOUS at 21:35

## 2017-06-09 RX ADMIN — Medication 1: at 11:07

## 2017-06-09 RX ADMIN — CEFTRIAXONE 100 GRAM(S): 500 INJECTION, POWDER, FOR SOLUTION INTRAMUSCULAR; INTRAVENOUS at 17:42

## 2017-06-09 RX ADMIN — AMLODIPINE BESYLATE 5 MILLIGRAM(S): 2.5 TABLET ORAL at 05:44

## 2017-06-09 RX ADMIN — VALSARTAN 320 MILLIGRAM(S): 80 TABLET ORAL at 05:44

## 2017-06-09 RX ADMIN — TAMSULOSIN HYDROCHLORIDE 0.4 MILLIGRAM(S): 0.4 CAPSULE ORAL at 21:35

## 2017-06-09 RX ADMIN — Medication 325 MILLIGRAM(S): at 11:08

## 2017-06-09 RX ADMIN — QUETIAPINE FUMARATE 12.5 MILLIGRAM(S): 200 TABLET, FILM COATED ORAL at 11:07

## 2017-06-09 NOTE — PROGRESS NOTE ADULT - SUBJECTIVE AND OBJECTIVE BOX
HPI:  89 y.o. male  with PMH IDDM, dementia, prostate dz admitted on  for evaluation of syncope; history per medical record as patient unable to provide history; was noted to have left lower extremity ulcer.  Today  patient comfortable    MEDICATIONS  (STANDING):  heparin  Injectable 5000Unit(s) SubCutaneous every 12 hours  sodium chloride 0.9%. 1000milliLiter(s) IV Continuous <Continuous>  insulin glargine Injectable (LANTUS) 10Unit(s) SubCutaneous at bedtime  insulin lispro (HumaLOG) corrective regimen sliding scale  SubCutaneous three times a day before meals  dextrose 5%. 1000milliLiter(s) IV Continuous <Continuous>  dextrose 50% Injectable 12.5Gram(s) IV Push once  dextrose 50% Injectable 25Gram(s) IV Push once  dextrose 50% Injectable 25Gram(s) IV Push once  amLODIPine   Tablet 5milliGRAM(s) Oral daily  valsartan 320milliGRAM(s) Oral daily  aspirin 325milliGRAM(s) Oral daily  tamsulosin 0.4milliGRAM(s) Oral at bedtime  cefTRIAXone   IVPB  IV Intermittent   cefTRIAXone   IVPB 1Gram(s) IV Intermittent every 24 hours  QUEtiapine 25milliGRAM(s) Oral at bedtime  QUEtiapine 12.5milliGRAM(s) Oral daily    MEDICATIONS  (PRN):  dextrose Gel 1Dose(s) Oral once PRN Blood Glucose LESS THAN 70 milliGRAM(s)/deciliter  glucagon  Injectable 1milliGRAM(s) IntraMuscular once PRN Glucose LESS THAN 70 milligrams/deciliter  ondansetron Injectable 4milliGRAM(s) IV Push every 6 hours PRN Nausea  haloperidol     Tablet 1milliGRAM(s) Oral two times a day PRN agitation      Vital Signs Last 24 Hrs  T(C): 37.4, Max: 37.4 (- @ 11:26)  T(F): 99.3, Max: 99.3 (- @ 11:26)  HR: 75 (63 - 75)  BP: 132/75 (132/75 - 156/75)  BP(mean): 88 (88 - 88)  RR: 17 (17 - 17)  SpO2: 100% (100% - 100%)    Physical Exam:            Constitutional: frail looking  HEENT: NC/AT, EOMI, PERRLA  Neck: supple  Respiratory: clear, no r/r/w  Cardiovascular: S1S2 regular, no murmurs  Abdomen: soft, not tender, not distended, positive BS  Genitourinary: deferred  Rectal: deferred  Musculoskeletal: left lower extremity with ulcer on anterior shin  Neurological: nonfocal  Skin: no rashes                          12.7   3.9   )-----------( 129      ( 2017 09:19 )             37.8     -    144  |  109<H>  |  26<H>  ----------------------------<  77  3.8   |  29  |  1.13    Ca    8.5      2017 06:01    TPro  7.8  /  Alb  3.3  /  TBili  0.3  /  DBili  x   /  AST  79<H>  /  ALT  45  /  AlkPhos  158<H>  06     LIVER FUNCTIONS - ( 2017 15:06 )  Alb: 3.3 g/dL / Pro: 7.8 gm/dL / ALK PHOS: 158 U/L / ALT: 45 U/L / AST: 79 U/L / GGT: x           Urinalysis Basic - ( 2017 19:53 )    Color: Yellow / Appearance: Clear / S.010 / pH: x  Gluc: x / Ketone: Negative  / Bili: Negative / Urobili: Negative mg/dL   Blood: x / Protein: 15 mg/dL / Nitrite: Negative   Leuk Esterase: Negative / RBC: 3-5 /HPF / WBC 3-5   Sq Epi: x / Non Sq Epi: x / Bacteria: x    Culture - Urine (17 @ 19:53)    -  Meropenem: S <=1    -  Amikacin: S <=8    -  Ampicillin/Sulbactam: S <=4/2    -  Cefazolin: S <=2    -  Cefoxitin: S <=4    -  Ertapenem: S <=0.5    -  Levofloxacin: S <=1    -  Trimethoprim/Sulfamethoxazole: S <=0.5/9.5    -  Ceftazidime: S <=1    -  Ceftriaxone: S <=1    -  Ciprofloxacin: S <=0.5    -  Nitrofurantoin: R >64    -  Ampicillin: R >16    -  Aztreonam: S <=4    -  Cefepime: S <=2    -  Gentamicin: S <=1    -  Piperacillin/Tazobactam: S <=8    -  Tobramycin: S <=2    Specimen Source: .Urine None    Culture Results:   10,000 - 49,000 CFU/mL Proteus mirabilis    Organism Identification: Proteus mirabilis    Organism: Proteus mirabilis    Method Type: PAMELA          Radiology:    Advanced directive addressed: full resuscitation

## 2017-06-09 NOTE — PROGRESS NOTE ADULT - SUBJECTIVE AND OBJECTIVE BOX
89 y.o. male Cre[place] speaking with PMH IDDM, dementia, prostate dz presents for possible syncope. Pt was at [facility] ([phone]) when EMS was called. Attempted to speak with patient in Creole via [employer]  (ID [id number]) but pt was speaking in jiberish and not answering questions appropriately due to dementia. Unable to obtain further history from patient.    6/7:  sitting comfortably in chair    6/8:  lying comfortably in bed, no complaints    6/9:  no complaints, eating breakfast      PHYSICAL EXAM:    Daily     Daily     ICU Vital Signs Last 24 Hrs  T(C): 37.4, Max: 37.4 (06-09 @ 11:26)  T(F): 99.3, Max: 99.3 (06-09 @ 11:26)  HR: 75 (63 - 75)  BP: 132/75 (132/75 - 156/75)  BP(mean): 88 (88 - 88)  ABP: --  ABP(mean): --  RR: 17 (17 - 17)  SpO2: 100% (100% - 100%)      Constitutional: Weak appearing  HEENT: Atraumatic, JACQUES, Normal, No congestion  Respiratory: Breath Sounds normal, no rhonchi/wheeze  Cardiovascular: N S1S2; JOSE ALFREDO present  Gastrointestinal: Abdomen soft, non tender, Bowel Sounds present  Extremities: No edema, peripheral pulses present  Neurological: AAO x 0, no gross focal motor deficits  Skin: Non cellulitic, no rash, ulcers  Lymph Nodes: No lymphadenopathy noted  Back: No CVA tenderness   Musculoskeletal: non tender  Breasts: Deferred  Genitourinary: deferred  Rectal: Deferred                          13.1   2.8   )-----------( 152      ( 09 Jun 2017 05:58 )             39.9       CBC Full  -  ( 09 Jun 2017 05:58 )  WBC Count : 2.8 K/uL  Hemoglobin : 13.1 g/dL  Hematocrit : 39.9 %  Platelet Count - Automated : 152 K/uL  Mean Cell Volume : 86.3 fl  Mean Cell Hemoglobin : 28.3 pg  Mean Cell Hemoglobin Concentration : 32.8 gm/dL  Auto Neutrophil # : x  Auto Lymphocyte # : x  Auto Monocyte # : x  Auto Eosinophil # : x  Auto Basophil # : x  Auto Neutrophil % : x  Auto Lymphocyte % : x  Auto Monocyte % : x  Auto Eosinophil % : x  Auto Basophil % : x      06-09    143  |  106  |  20  ----------------------------<  87  4.0   |  33<H>  |  1.10    Ca    9.3      09 Jun 2017 05:58                              MEDICATIONS  (STANDING):  heparin  Injectable 5000Unit(s) SubCutaneous every 12 hours  sodium chloride 0.9%. 1000milliLiter(s) IV Continuous <Continuous>  insulin glargine Injectable (LANTUS) 10Unit(s) SubCutaneous at bedtime  insulin lispro (HumaLOG) corrective regimen sliding scale  SubCutaneous three times a day before meals  dextrose 5%. 1000milliLiter(s) IV Continuous <Continuous>  dextrose 50% Injectable 12.5Gram(s) IV Push once  dextrose 50% Injectable 25Gram(s) IV Push once  dextrose 50% Injectable 25Gram(s) IV Push once  amLODIPine   Tablet 5milliGRAM(s) Oral daily  valsartan 320milliGRAM(s) Oral daily  aspirin 325milliGRAM(s) Oral daily  tamsulosin 0.4milliGRAM(s) Oral at bedtime  cefTRIAXone   IVPB  IV Intermittent   cefTRIAXone   IVPB 1Gram(s) IV Intermittent every 24 hours  QUEtiapine 25milliGRAM(s) Oral at bedtime  QUEtiapine 12.5milliGRAM(s) Oral daily

## 2017-06-09 NOTE — PROGRESS NOTE ADULT - ASSESSMENT
89/M admitted with    #unresponsiveness likely syncope   -admitted to tele  -MI ruled out  -ASA 325mg  -CT head negative  - echo EF 60-65%  -lipid panel  -cardio consult appreciated; no further workup at this time    #ARVIN on CKD3  #dehydration  -prior lab from 7/2016, Cr 1.14-1.40  -currently at Cr 1.1    #thrombocytopenia: resolved, 152 k platelets on 6/9    #HTN  -cont home meds for now    #BPH  -on flomax    #DM2  -cont lantus 10 units qhs  -fingerstick monitoring and ISS    #dementia + agitation:  -supportive care  - psych consult appreciated; started on seroquel 12.5 mg po in am and 25 mg po q hs    # Left leg wound, infected:  cont rocephin  ID and wound care consults appreciated    # Left lower leg swelling: r/o DVT  venous doppler urgently done; NO DVT    Dispo: Would discharge pt home if family could be reached and can take care of him at home. Otherwise for permanent placement in NH.     poc discussed with team 89/M admitted with    #unresponsiveness likely syncope   -admitted to tele  -MI ruled out  -ASA 325mg  -CT head negative  - echo EF 60-65%  -lipid panel  -cardio consult appreciated; no further workup at this time    #ARVIN on CKD3  #dehydration  -prior lab from 7/2016, Cr 1.14-1.40  -currently at Cr 1.1    #thrombocytopenia: resolved, 152 k platelets on 6/9    #HTN  -cont home meds for now    #BPH  -on flomax    #DM2  -cont lantus 10 units qhs  -fingerstick monitoring and ISS    #dementia + agitation:  -supportive care  - psych consult appreciated; started on seroquel 12.5 mg po in am and 25 mg po q hs    # Left leg wound, infected:  cont rocephin  ID and wound care consults appreciated    # Left lower leg swelling: r/o DVT  venous doppler urgently done; NO DVT    Dispo: Would discharge pt home if family could be reached and can take care of him at home. Otherwise for permanent placement in NH.     Spoke with pt's daughter, Lois , she can't take care of him at home and he would need permanent placement in NH.     poc discussed with team

## 2017-06-09 NOTE — PROGRESS NOTE ADULT - ASSESSMENT
89 y.o. male  with PMH IDDM, dementia, prostate dz admitted on 6/6 for evaluation of syncope; history per medical record as patient unable to provide history; was noted to have left lower extremity ulcer.  1. Left lower extremity ulcer  - follow up cultures   - wound care  - agree with ceftriaxone as ordered, will give a short course as ulcer looks chronic and slow to heal, day #2  - tolerating antibiotics without rashes or side effects   2. other issues:  IDDM, dementia, prostate dz   - per medicine

## 2017-06-10 RX ORDER — AMLODIPINE BESYLATE 2.5 MG/1
5 TABLET ORAL ONCE
Qty: 0 | Refills: 0 | Status: COMPLETED | OUTPATIENT
Start: 2017-06-10 | End: 2017-06-10

## 2017-06-10 RX ADMIN — HEPARIN SODIUM 5000 UNIT(S): 5000 INJECTION INTRAVENOUS; SUBCUTANEOUS at 17:19

## 2017-06-10 RX ADMIN — CEFTRIAXONE 100 GRAM(S): 500 INJECTION, POWDER, FOR SOLUTION INTRAMUSCULAR; INTRAVENOUS at 17:19

## 2017-06-10 RX ADMIN — VALSARTAN 320 MILLIGRAM(S): 80 TABLET ORAL at 05:39

## 2017-06-10 RX ADMIN — Medication 1: at 11:02

## 2017-06-10 RX ADMIN — AMLODIPINE BESYLATE 5 MILLIGRAM(S): 2.5 TABLET ORAL at 17:54

## 2017-06-10 RX ADMIN — HEPARIN SODIUM 5000 UNIT(S): 5000 INJECTION INTRAVENOUS; SUBCUTANEOUS at 05:39

## 2017-06-10 RX ADMIN — Medication 325 MILLIGRAM(S): at 11:02

## 2017-06-10 RX ADMIN — QUETIAPINE FUMARATE 25 MILLIGRAM(S): 200 TABLET, FILM COATED ORAL at 22:29

## 2017-06-10 RX ADMIN — QUETIAPINE FUMARATE 12.5 MILLIGRAM(S): 200 TABLET, FILM COATED ORAL at 11:02

## 2017-06-10 RX ADMIN — AMLODIPINE BESYLATE 5 MILLIGRAM(S): 2.5 TABLET ORAL at 05:39

## 2017-06-10 NOTE — PROGRESS NOTE ADULT - ASSESSMENT
89/M admitted with    #unresponsiveness likely syncope   -admitted to tele  -MI ruled out  -ASA 325mg  -CT head negative  - echo EF 60-65%  -lipid panel  -cardio consult appreciated; no further workup at this time    #ARVIN on CKD3  #dehydration  -prior lab from 7/2016, Cr 1.14-1.40  -currently at Cr 1.1    #thrombocytopenia: resolved, 152 k platelets on 6/9    #HTN  -cont home meds for now    #BPH  -on flomax    #DM2  -cont lantus 10 units qhs  -fingerstick monitoring and ISS    #dementia + agitation:  -supportive care  - psych consult appreciated; started on seroquel 12.5 mg po in am and 25 mg po q hs    # Left leg wound, infected:  cont rocephin  ID and wound care consults appreciated    # Left lower leg swelling: r/o DVT  venous doppler urgently done; NO DVT    # Leukopenia 2.8 k: repeat cbc in am tomorrow    Dispo: Would be discharged to SNF on Monday 6/12.    Spoke with pt's daughter, Lois , she can't take care of him at home and he would need permanent placement in NH.     poc discussed with team

## 2017-06-10 NOTE — PROGRESS NOTE ADULT - SUBJECTIVE AND OBJECTIVE BOX
89 y.o. male Creole speaking with PMH IDDM, dementia, prostate dz presents for possible syncope. Pt was at [facility] ([phone]) when EMS was called. Attempted to speak with patient in Creole via [employer]  (ID [id number]) but [facility] and not answering questions appropriately due to dementia. Unable to obtain further history from patient.    6/7:  sitting comfortably in chair    6/8:  lying comfortably in bed, no complaints    6/9:  no complaints, eating breakfast    6/10:  no complaints, comfortable      PHYSICAL EXAM:    Daily     Daily     ICU Vital Signs Last 24 Hrs  T(C): 36.9, Max: 37.2 (06-09 @ 17:27)  T(F): 98.5, Max: 99 (06-09 @ 17:27)  HR: 69 (69 - 70)  BP: 148/79 (148/79 - 156/81)  BP(mean): 97 (97 - 97)  ABP: --  ABP(mean): --  RR: 16 (16 - 16)  SpO2: 100% (100% - 100%)      Constitutional: Well appearing  HEENT: Atraumatic, JACQUES, Normal, No congestion  Respiratory: Breath Sounds normal, no rhonchi/wheeze  Cardiovascular: N S1S2; JOSE ALFREDO present  Gastrointestinal: Abdomen soft, non tender, Bowel Sounds present  Extremities: No edema, peripheral pulses present  Neurological: AAO x 0, no gross focal motor deficits  Skin: Non cellulitic, no rash, ulcers  Lymph Nodes: No lymphadenopathy noted  Back: No CVA tenderness   Musculoskeletal: non tender  Breasts: Deferred  Genitourinary: deferred  Rectal: Deferred                          13.1   2.8   )-----------( 152      ( 09 Jun 2017 05:58 )             39.9       CBC Full  -  ( 09 Jun 2017 05:58 )  WBC Count : 2.8 K/uL  Hemoglobin : 13.1 g/dL  Hematocrit : 39.9 %  Platelet Count - Automated : 152 K/uL  Mean Cell Volume : 86.3 fl  Mean Cell Hemoglobin : 28.3 pg  Mean Cell Hemoglobin Concentration : 32.8 gm/dL  Auto Neutrophil # : x  Auto Lymphocyte # : x  Auto Monocyte # : x  Auto Eosinophil # : x  Auto Basophil # : x  Auto Neutrophil % : x  Auto Lymphocyte % : x  Auto Monocyte % : x  Auto Eosinophil % : x  Auto Basophil % : x      06-09    143  |  106  |  20  ----------------------------<  87  4.0   |  33<H>  |  1.10    Ca    9.3      09 Jun 2017 05:58                              MEDICATIONS  (STANDING):  heparin  Injectable 5000Unit(s) SubCutaneous every 12 hours  sodium chloride 0.9%. 1000milliLiter(s) IV Continuous <Continuous>  insulin glargine Injectable (LANTUS) 10Unit(s) SubCutaneous at bedtime  insulin lispro (HumaLOG) corrective regimen sliding scale  SubCutaneous three times a day before meals  dextrose 5%. 1000milliLiter(s) IV Continuous <Continuous>  dextrose 50% Injectable 12.5Gram(s) IV Push once  dextrose 50% Injectable 25Gram(s) IV Push once  dextrose 50% Injectable 25Gram(s) IV Push once  amLODIPine   Tablet 5milliGRAM(s) Oral daily  valsartan 320milliGRAM(s) Oral daily  aspirin 325milliGRAM(s) Oral daily  tamsulosin 0.4milliGRAM(s) Oral at bedtime  cefTRIAXone   IVPB  IV Intermittent   cefTRIAXone   IVPB 1Gram(s) IV Intermittent every 24 hours  QUEtiapine 25milliGRAM(s) Oral at bedtime  QUEtiapine 12.5milliGRAM(s) Oral daily

## 2017-06-11 LAB
ANION GAP SERPL CALC-SCNC: 5 MMOL/L — SIGNIFICANT CHANGE UP (ref 5–17)
BUN SERPL-MCNC: 25 MG/DL — HIGH (ref 7–23)
CALCIUM SERPL-MCNC: 8.8 MG/DL — SIGNIFICANT CHANGE UP (ref 8.5–10.1)
CHLORIDE SERPL-SCNC: 109 MMOL/L — HIGH (ref 96–108)
CO2 SERPL-SCNC: 28 MMOL/L — SIGNIFICANT CHANGE UP (ref 22–31)
CREAT SERPL-MCNC: 1.2 MG/DL — SIGNIFICANT CHANGE UP (ref 0.5–1.3)
GLUCOSE SERPL-MCNC: 82 MG/DL — SIGNIFICANT CHANGE UP (ref 70–99)
HCT VFR BLD CALC: 38.4 % — LOW (ref 39–50)
HGB BLD-MCNC: 12.8 G/DL — LOW (ref 13–17)
MCHC RBC-ENTMCNC: 28.4 PG — SIGNIFICANT CHANGE UP (ref 27–34)
MCHC RBC-ENTMCNC: 33.3 GM/DL — SIGNIFICANT CHANGE UP (ref 32–36)
MCV RBC AUTO: 85.1 FL — SIGNIFICANT CHANGE UP (ref 80–100)
PLATELET # BLD AUTO: 120 K/UL — LOW (ref 150–400)
POTASSIUM SERPL-MCNC: 3.8 MMOL/L — SIGNIFICANT CHANGE UP (ref 3.5–5.3)
POTASSIUM SERPL-SCNC: 3.8 MMOL/L — SIGNIFICANT CHANGE UP (ref 3.5–5.3)
RBC # BLD: 4.52 M/UL — SIGNIFICANT CHANGE UP (ref 4.2–5.8)
RBC # FLD: 14.8 % — HIGH (ref 10.3–14.5)
SODIUM SERPL-SCNC: 142 MMOL/L — SIGNIFICANT CHANGE UP (ref 135–145)
WBC # BLD: 3.1 K/UL — LOW (ref 3.8–10.5)
WBC # FLD AUTO: 3.1 K/UL — LOW (ref 3.8–10.5)

## 2017-06-11 RX ADMIN — Medication 325 MILLIGRAM(S): at 11:34

## 2017-06-11 RX ADMIN — TAMSULOSIN HYDROCHLORIDE 0.4 MILLIGRAM(S): 0.4 CAPSULE ORAL at 22:27

## 2017-06-11 RX ADMIN — QUETIAPINE FUMARATE 12.5 MILLIGRAM(S): 200 TABLET, FILM COATED ORAL at 11:34

## 2017-06-11 RX ADMIN — AMLODIPINE BESYLATE 5 MILLIGRAM(S): 2.5 TABLET ORAL at 06:52

## 2017-06-11 RX ADMIN — VALSARTAN 320 MILLIGRAM(S): 80 TABLET ORAL at 06:53

## 2017-06-11 RX ADMIN — Medication 2: at 12:16

## 2017-06-11 RX ADMIN — HEPARIN SODIUM 5000 UNIT(S): 5000 INJECTION INTRAVENOUS; SUBCUTANEOUS at 17:06

## 2017-06-11 RX ADMIN — CEFTRIAXONE 100 GRAM(S): 500 INJECTION, POWDER, FOR SOLUTION INTRAMUSCULAR; INTRAVENOUS at 17:06

## 2017-06-11 RX ADMIN — HEPARIN SODIUM 5000 UNIT(S): 5000 INJECTION INTRAVENOUS; SUBCUTANEOUS at 06:58

## 2017-06-11 RX ADMIN — QUETIAPINE FUMARATE 25 MILLIGRAM(S): 200 TABLET, FILM COATED ORAL at 22:27

## 2017-06-11 NOTE — PROGRESS NOTE ADULT - ASSESSMENT
89/M admitted with    #unresponsiveness likely syncope   -admitted to tele  -MI ruled out  -ASA 325mg  -CT head negative  - echo EF 60-65%  -lipid panel  -cardio consult appreciated; no further workup at this time    #ARVIN on CKD3  #dehydration  -prior lab from 7/2016, Cr 1.14-1.40  -currently at Cr 1.1    #thrombocytopenia: chronic, 120 k platelets on 6/11    #HTN: cont home meds for now    #BPH: on flomax    #DM2: cont lantus 10 units qhs; fingerstick monitoring and ISS    #dementia + agitation: supportive care; psych consult appreciated; started on seroquel 12.5 mg po in am and 25 mg po q hs    # Left leg wound, infected; cont rocephin  ID and wound care consults appreciated    # Left lower leg swelling: r/o DVT  venous doppler urgently done; NO DVT    # Leukopenia 2.8 k: repeat cbc in am tomorrow; 3.1 k.    Dispo: Would be discharged to SNF on Monday 6/12.    Spoke with pt's daughter, Lois , she can't take care of him at home and he would need permanent placement in NH.     Dispo: Likely d/c to SNF on 6/12    poc discussed with team

## 2017-06-11 NOTE — PROGRESS NOTE ADULT - SUBJECTIVE AND OBJECTIVE BOX
89 y.o. male Creole speaking with PMH IDDM, dementia, prostate dz presents for possible syncope. Pt was at [facility] ([phone]) when EMS was called. Attempted to speak with patient in Creole via [employer]  (ID [id number]) but pt was speaking in jiberish and not answering questions appropriately due to dementia. Unable to obtain further history from patient.    6/7:  sitting comfortably in chair    6/8:  lying comfortably in bed, no complaints    6/9:  no complaints, eating breakfast    6/10:  no complaints, comfortable    6/11:  comfortable      PHYSICAL EXAM:    Daily     Daily     ICU Vital Signs Last 24 Hrs  T(C): 36.8, Max: 36.8 (06-10 @ 18:45)  T(F): 98.3, Max: 98.3 (06-10 @ 18:45)  HR: 75 (65 - 75)  BP: 157/69 (142/78 - 171/88)  BP(mean): 106 (106 - 106)  ABP: --  ABP(mean): --  RR: 18 (17 - 18)  SpO2: 97% (97% - 100%)      Constitutional: Weak appearing  HEENT: Atraumatic, JACQUES, Normal, No congestion  Respiratory: Breath Sounds normal, no rhonchi/wheeze  Cardiovascular: N S1S2; JOSE ALFREDO present  Gastrointestinal: Abdomen soft, non tender, Bowel Sounds present  Extremities: No edema, peripheral pulses present  Neurological: AAO x 0, no gross focal motor deficits  Skin: Non cellulitic, no rash, ulcers  Lymph Nodes: No lymphadenopathy noted  Back: No CVA tenderness   Musculoskeletal: non tender  Breasts: Deferred  Genitourinary: deferred  Rectal: Deferred                          12.8   3.1   )-----------( 120      ( 11 Jun 2017 07:10 )             38.4       CBC Full  -  ( 11 Jun 2017 07:10 )  WBC Count : 3.1 K/uL  Hemoglobin : 12.8 g/dL  Hematocrit : 38.4 %  Platelet Count - Automated : 120 K/uL  Mean Cell Volume : 85.1 fl  Mean Cell Hemoglobin : 28.4 pg  Mean Cell Hemoglobin Concentration : 33.3 gm/dL  Auto Neutrophil # : x  Auto Lymphocyte # : x  Auto Monocyte # : x  Auto Eosinophil # : x  Auto Basophil # : x  Auto Neutrophil % : x  Auto Lymphocyte % : x  Auto Monocyte % : x  Auto Eosinophil % : x  Auto Basophil % : x      06-11    142  |  109<H>  |  25<H>  ----------------------------<  82  3.8   |  28  |  1.20    Ca    8.8      11 Jun 2017 07:10                              MEDICATIONS  (STANDING):  heparin  Injectable 5000Unit(s) SubCutaneous every 12 hours  sodium chloride 0.9%. 1000milliLiter(s) IV Continuous <Continuous>  insulin glargine Injectable (LANTUS) 10Unit(s) SubCutaneous at bedtime  insulin lispro (HumaLOG) corrective regimen sliding scale  SubCutaneous three times a day before meals  dextrose 5%. 1000milliLiter(s) IV Continuous <Continuous>  dextrose 50% Injectable 12.5Gram(s) IV Push once  dextrose 50% Injectable 25Gram(s) IV Push once  dextrose 50% Injectable 25Gram(s) IV Push once  amLODIPine   Tablet 5milliGRAM(s) Oral daily  valsartan 320milliGRAM(s) Oral daily  aspirin 325milliGRAM(s) Oral daily  tamsulosin 0.4milliGRAM(s) Oral at bedtime  cefTRIAXone   IVPB  IV Intermittent   cefTRIAXone   IVPB 1Gram(s) IV Intermittent every 24 hours  QUEtiapine 25milliGRAM(s) Oral at bedtime  QUEtiapine 12.5milliGRAM(s) Oral daily

## 2017-06-12 LAB
HCT VFR BLD CALC: 39.6 % — SIGNIFICANT CHANGE UP (ref 39–50)
HGB BLD-MCNC: 12.8 G/DL — LOW (ref 13–17)
MCHC RBC-ENTMCNC: 27.7 PG — SIGNIFICANT CHANGE UP (ref 27–34)
MCHC RBC-ENTMCNC: 32.3 GM/DL — SIGNIFICANT CHANGE UP (ref 32–36)
MCV RBC AUTO: 85.7 FL — SIGNIFICANT CHANGE UP (ref 80–100)
PLATELET # BLD AUTO: 119 K/UL — LOW (ref 150–400)
RBC # BLD: 4.62 M/UL — SIGNIFICANT CHANGE UP (ref 4.2–5.8)
RBC # FLD: 14.4 % — SIGNIFICANT CHANGE UP (ref 10.3–14.5)
WBC # BLD: 2.9 K/UL — LOW (ref 3.8–10.5)
WBC # FLD AUTO: 2.9 K/UL — LOW (ref 3.8–10.5)

## 2017-06-12 RX ADMIN — QUETIAPINE FUMARATE 12.5 MILLIGRAM(S): 200 TABLET, FILM COATED ORAL at 13:16

## 2017-06-12 RX ADMIN — AMLODIPINE BESYLATE 5 MILLIGRAM(S): 2.5 TABLET ORAL at 06:07

## 2017-06-12 RX ADMIN — CEFTRIAXONE 100 GRAM(S): 500 INJECTION, POWDER, FOR SOLUTION INTRAMUSCULAR; INTRAVENOUS at 17:31

## 2017-06-12 RX ADMIN — VALSARTAN 320 MILLIGRAM(S): 80 TABLET ORAL at 06:07

## 2017-06-12 RX ADMIN — HEPARIN SODIUM 5000 UNIT(S): 5000 INJECTION INTRAVENOUS; SUBCUTANEOUS at 17:33

## 2017-06-12 RX ADMIN — Medication 325 MILLIGRAM(S): at 13:15

## 2017-06-12 RX ADMIN — INSULIN GLARGINE 10 UNIT(S): 100 INJECTION, SOLUTION SUBCUTANEOUS at 21:53

## 2017-06-12 NOTE — PROGRESS NOTE ADULT - SUBJECTIVE AND OBJECTIVE BOX
89 y.o. male Creole speaking with PMH IDDM, dementia, prostate dz presents for possible syncope. Pt was at [facility] ([phone]) when EMS was called. Attempted to speak with patient in Creole via [employer]  (ID [id number]) but pt was speaking in jiberish and not answering questions appropriately due to dementia. Unable to obtain further history from patient.    6/7:  sitting comfortably in chair    6/8:  lying comfortably in bed, no complaints    6/9:  no complaints, eating breakfast    6/10:  no complaints, comfortable    6/11:  comfortable    6/12: lying comfortably      PHYSICAL EXAM:    Daily     Daily     ICU Vital Signs Last 24 Hrs  T(C): 36.9, Max: 36.9 (06-12 @ 10:56)  T(F): 98.5, Max: 98.5 (06-12 @ 10:56)  HR: 68 (62 - 68)  BP: 151/66 (149/67 - 151/66)  BP(mean): --  ABP: --  ABP(mean): --  RR: 16 (16 - 18)  SpO2: 100% (100% - 100%)      Constitutional: Well appearing  HEENT: Atraumatic, JACQUES, Normal, No congestion  Respiratory: Breath Sounds normal, no rhonchi/wheeze  Cardiovascular: N S1S2; JOSE ALFREDO present  Gastrointestinal: Abdomen soft, non tender, Bowel Sounds present  Extremities: No edema, peripheral pulses present  Neurological: AAO x 0, no gross focal motor deficits  Skin: Non cellulitic, no rash, ulcers  Lymph Nodes: No lymphadenopathy noted  Back: No CVA tenderness   Musculoskeletal: non tender  Breasts: Deferred  Genitourinary: deferred  Rectal: Deferred                          12.8   2.9   )-----------( 119      ( 12 Jun 2017 10:09 )             39.6       CBC Full  -  ( 12 Jun 2017 10:09 )  WBC Count : 2.9 K/uL  Hemoglobin : 12.8 g/dL  Hematocrit : 39.6 %  Platelet Count - Automated : 119 K/uL  Mean Cell Volume : 85.7 fl  Mean Cell Hemoglobin : 27.7 pg  Mean Cell Hemoglobin Concentration : 32.3 gm/dL  Auto Neutrophil # : x  Auto Lymphocyte # : x  Auto Monocyte # : x  Auto Eosinophil # : x  Auto Basophil # : x  Auto Neutrophil % : x  Auto Lymphocyte % : x  Auto Monocyte % : x  Auto Eosinophil % : x  Auto Basophil % : x      06-11    142  |  109<H>  |  25<H>  ----------------------------<  82  3.8   |  28  |  1.20    Ca    8.8      11 Jun 2017 07:10                              MEDICATIONS  (STANDING):  heparin  Injectable 5000Unit(s) SubCutaneous every 12 hours  sodium chloride 0.9%. 1000milliLiter(s) IV Continuous <Continuous>  insulin glargine Injectable (LANTUS) 10Unit(s) SubCutaneous at bedtime  insulin lispro (HumaLOG) corrective regimen sliding scale  SubCutaneous three times a day before meals  dextrose 5%. 1000milliLiter(s) IV Continuous <Continuous>  dextrose 50% Injectable 12.5Gram(s) IV Push once  dextrose 50% Injectable 25Gram(s) IV Push once  dextrose 50% Injectable 25Gram(s) IV Push once  amLODIPine   Tablet 5milliGRAM(s) Oral daily  valsartan 320milliGRAM(s) Oral daily  aspirin 325milliGRAM(s) Oral daily  tamsulosin 0.4milliGRAM(s) Oral at bedtime  cefTRIAXone   IVPB  IV Intermittent   cefTRIAXone   IVPB 1Gram(s) IV Intermittent every 24 hours  QUEtiapine 25milliGRAM(s) Oral at bedtime  QUEtiapine 12.5milliGRAM(s) Oral daily

## 2017-06-12 NOTE — PROGRESS NOTE ADULT - ASSESSMENT
89/M admitted with    #unresponsiveness likely syncope   -admitted to tele  -MI ruled out  -ASA 325mg  -CT head negative  - echo EF 60-65%  -lipid panel  -cardio consult appreciated; no further workup at this time    #ARVIN on CKD3  #dehydration  -prior lab from 7/2016, Cr 1.14-1.40  -currently at Cr 1.1    #thrombocytopenia: chronic, 120 k platelets on 6/11    #HTN: cont home meds for now    #BPH: on flomax    #DM2: cont lantus 10 units qhs; fingerstick monitoring and ISS    #dementia + agitation: supportive care; psych consult appreciated; started on seroquel 12.5 mg po in am and 25 mg po q hs    # Left leg wound, infected; cont rocephin  ID and wound care consults appreciated    # Left lower leg swelling: r/o DVT  venous doppler urgently done; NO DVT    # Leukopenia 2.8 k: repeat cbc in am tomorrow; 3.1 k. 2.9 on 6/12.     Dispo: Would be discharged to SNF on Monday 6/12.    Spoke with pt's daughter, Lois , she can't take care of him at home and he would need permanent placement in NH.     Dispo: Likely d/c to SNF on 6/13 once accepted    poc discussed with team

## 2017-06-13 VITALS
DIASTOLIC BLOOD PRESSURE: 73 MMHG | SYSTOLIC BLOOD PRESSURE: 158 MMHG | HEART RATE: 66 BPM | OXYGEN SATURATION: 100 % | RESPIRATION RATE: 18 BRPM | TEMPERATURE: 98 F

## 2017-06-13 RX ORDER — QUETIAPINE FUMARATE 200 MG/1
1 TABLET, FILM COATED ORAL
Qty: 0 | Refills: 0 | COMMUNITY
Start: 2017-06-13

## 2017-06-13 RX ORDER — TAMSULOSIN HYDROCHLORIDE 0.4 MG/1
1 CAPSULE ORAL
Qty: 0 | Refills: 0 | COMMUNITY
Start: 2017-06-13

## 2017-06-13 RX ORDER — INSULIN LISPRO 100/ML
0 VIAL (ML) SUBCUTANEOUS
Qty: 0 | Refills: 0 | COMMUNITY
Start: 2017-06-13

## 2017-06-13 RX ORDER — TAMSULOSIN HYDROCHLORIDE 0.4 MG/1
1 CAPSULE ORAL
Qty: 0 | Refills: 0 | COMMUNITY

## 2017-06-13 RX ORDER — QUETIAPINE FUMARATE 200 MG/1
0.5 TABLET, FILM COATED ORAL
Qty: 0 | Refills: 0 | COMMUNITY
Start: 2017-06-13

## 2017-06-13 RX ORDER — AMLODIPINE BESYLATE 2.5 MG/1
1 TABLET ORAL
Qty: 0 | Refills: 0 | COMMUNITY
Start: 2017-06-13

## 2017-06-13 RX ORDER — HALOPERIDOL DECANOATE 100 MG/ML
1 INJECTION INTRAMUSCULAR
Qty: 0 | Refills: 0 | COMMUNITY
Start: 2017-06-13

## 2017-06-13 RX ADMIN — Medication 325 MILLIGRAM(S): at 12:23

## 2017-06-13 RX ADMIN — HEPARIN SODIUM 5000 UNIT(S): 5000 INJECTION INTRAVENOUS; SUBCUTANEOUS at 06:16

## 2017-06-13 RX ADMIN — QUETIAPINE FUMARATE 12.5 MILLIGRAM(S): 200 TABLET, FILM COATED ORAL at 12:23

## 2017-06-13 NOTE — DISCHARGE NOTE ADULT - CARE PLAN
Principal Discharge DX:	Syncope, unspecified syncope type  Goal:	resolved  Instructions for follow-up, activity and diet:	f/u with pcp

## 2017-06-13 NOTE — DISCHARGE NOTE ADULT - CARE PROVIDER_API CALL
Karin Jose), Family Medicine  [address] [place] [address]  Phone: [phone]  Fax: [phone]

## 2017-06-13 NOTE — PROGRESS NOTE ADULT - SUBJECTIVE AND OBJECTIVE BOX
HPI:  89 y.o. male  with PMH IDDM, dementia, prostate dz admitted on  for evaluation of syncope; history per medical record as patient unable to provide history; was noted to have left lower extremity ulcer.  Today  patient comfortable  Today  patient appears comfortable; afebrile          MEDICATIONS  (STANDING):  heparin  Injectable 5000Unit(s) SubCutaneous every 12 hours  sodium chloride 0.9%. 1000milliLiter(s) IV Continuous <Continuous>  insulin glargine Injectable (LANTUS) 10Unit(s) SubCutaneous at bedtime  insulin lispro (HumaLOG) corrective regimen sliding scale  SubCutaneous three times a day before meals  dextrose 5%. 1000milliLiter(s) IV Continuous <Continuous>  dextrose 50% Injectable 12.5Gram(s) IV Push once  dextrose 50% Injectable 25Gram(s) IV Push once  dextrose 50% Injectable 25Gram(s) IV Push once  amLODIPine   Tablet 5milliGRAM(s) Oral daily  valsartan 320milliGRAM(s) Oral daily  aspirin 325milliGRAM(s) Oral daily  tamsulosin 0.4milliGRAM(s) Oral at bedtime  QUEtiapine 25milliGRAM(s) Oral at bedtime  QUEtiapine 12.5milliGRAM(s) Oral daily    MEDICATIONS  (PRN):  dextrose Gel 1Dose(s) Oral once PRN Blood Glucose LESS THAN 70 milliGRAM(s)/deciliter  glucagon  Injectable 1milliGRAM(s) IntraMuscular once PRN Glucose LESS THAN 70 milligrams/deciliter  ondansetron Injectable 4milliGRAM(s) IV Push every 6 hours PRN Nausea  haloperidol     Tablet 1milliGRAM(s) Oral two times a day PRN agitation      Vital Signs Last 24 Hrs  T(C): 36.9, Max: 37.2 (06-12 @ 23:35)  T(F): 98.4, Max: 99 (06-12 @ 23:35)  HR: 66 (57 - 66)  BP: 158/73 (129/67 - 158/73)  BP(mean): --  RR: 18 (18 - 18)  SpO2: 100% (97% - 100%)    Physical Exam:            Constitutional: frail looking  HEENT: NC/AT, EOMI, PERRLA  Neck: supple  Respiratory: clear, no r/r/w  Cardiovascular: S1S2 regular, no murmurs  Abdomen: soft, not tender, not distended, positive BS  Genitourinary: deferred  Rectal: deferred  Musculoskeletal: left lower extremity with ulcer on anterior shin  Neurological: nonfocal  Skin: no rashes                          12.7   3.9   )-----------( 129      ( 2017 09:19 )             37.8         144  |  109<H>  |  26<H>  ----------------------------<  77  3.8   |  29  |  1.13    Ca    8.5      2017 06:01    TPro  7.8  /  Alb  3.3  /  TBili  0.3  /  DBili  x   /  AST  79<H>  /  ALT  45  /  AlkPhos  158<H>       LIVER FUNCTIONS - ( 2017 15:06 )  Alb: 3.3 g/dL / Pro: 7.8 gm/dL / ALK PHOS: 158 U/L / ALT: 45 U/L / AST: 79 U/L / GGT: x           Urinalysis Basic - ( 2017 19:53 )    Color: Yellow / Appearance: Clear / S.010 / pH: x  Gluc: x / Ketone: Negative  / Bili: Negative / Urobili: Negative mg/dL   Blood: x / Protein: 15 mg/dL / Nitrite: Negative   Leuk Esterase: Negative / RBC: 3-5 /HPF / WBC 3-5   Sq Epi: x / Non Sq Epi: x / Bacteria: x    Culture - Urine (17 @ 19:53)    -  Meropenem: S <=1    -  Amikacin: S <=8    -  Ampicillin/Sulbactam: S <=4/2    -  Cefazolin: S <=2    -  Cefoxitin: S <=4    -  Ertapenem: S <=0.5    -  Levofloxacin: S <=1    -  Trimethoprim/Sulfamethoxazole: S <=0.5/9.5    -  Ceftazidime: S <=1    -  Ceftriaxone: S <=1    -  Ciprofloxacin: S <=0.5    -  Nitrofurantoin: R >64    -  Ampicillin: R >16    -  Aztreonam: S <=4    -  Cefepime: S <=2    -  Gentamicin: S <=1    -  Piperacillin/Tazobactam: S <=8    -  Tobramycin: S <=2    Specimen Source: .Urine None    Culture Results:   10,000 - 49,000 CFU/mL Proteus mirabilis    Organism Identification: Proteus mirabilis    Organism: Proteus mirabilis    Method Type: PAMELA          Radiology:    Advanced directive addressed: full resuscitation

## 2017-06-13 NOTE — DISCHARGE NOTE ADULT - MEDICATION SUMMARY - MEDICATIONS TO TAKE
I will START or STAY ON the medications listed below when I get home from the hospital:    aspirin 325 mg oral tablet  -- 1 tab(s) by mouth once a day  -- Indication: For prophy    tamsulosin 0.4 mg oral capsule  -- 1 cap(s) by mouth once a day (at bedtime)  -- Indication: For bph    Lantus 100 units/mL subcutaneous solution  -- 10 unit(s) subcutaneous once a day (at bedtime)  -- Indication: For Dm    insulin lispro 100 units/mL subcutaneous solution  --  subcutaneous 3 times a day (before meals); 1 Unit(s) if Glucose 151 - 200  2 Unit(s) if Glucose 201 - 250  3 Unit(s) if Glucose 251 - 300  4 Unit(s) if Glucose 301 - 350  5 Unit(s) if Glucose 351 - 400  6 Unit(s) if Glucose Greater Than 400  -- Indication: For Dm    amlodipine-valsartan 5 mg-320 mg oral tablet  -- 1 tab(s) by mouth once a day  -- Indication: For htn    haloperidol 1 mg oral tablet  -- 1 tab(s) by mouth 2 times a day, As needed, agitation  -- Indication: For agitation    QUEtiapine 25 mg oral tablet  -- 1 tab(s) by mouth once a day (at bedtime)  -- Indication: For agitation    QUEtiapine 25 mg oral tablet  -- 0.5 tab(s) by mouth once a day  -- Indication: For agitation    senna oral tablet  -- 2 tab(s) by mouth once a day (at bedtime), As needed, Constipation  -- Indication: For prn constipation    docusate sodium 100 mg oral capsule  -- 1 cap(s) by mouth 3 times a day  -- Indication: For prn constipation    Multiple Vitamins oral tablet  -- 1 tab(s) by mouth once a day  -- Indication: For Suppl

## 2017-06-13 NOTE — DIETITIAN INITIAL EVALUATION ADULT. - OTHER INFO
IDDM, dementia, prostate dz IDDM, dementia, prostate dz, Patient creole speaking, has dementia, unwilling to use  phone. Spoke with RN who reports good appetite, patient able to feed himself. Patient normally eats better when daughter is present for meals. UBW from 7/4/16: 77.1 kg- unable to obtain details on weight loss due to hx of dementia

## 2017-06-13 NOTE — PROGRESS NOTE ADULT - ASSESSMENT
89 y.o. male  with PMH IDDM, dementia, prostate dz admitted on 6/6 for evaluation of syncope; history per medical record as patient unable to provide history; was noted to have left lower extremity ulcer.  1. Left lower extremity ulcer  - follow up cultures   - wound care  - agree with ceftriaxone as ordered, will give a short course as ulcer looks chronic and slow to heal, day #6  -will stop antibiotics today  - discharge planning  - tolerating antibiotics without rashes or side effects   2. other issues:  IDDM, dementia, prostate dz   - per medicine

## 2017-06-13 NOTE — DISCHARGE NOTE ADULT - MEDICATION SUMMARY - MEDICATIONS TO STOP TAKING
I will STOP taking the medications listed below when I get home from the hospital:    losartan 100 mg oral tablet  -- 1 tab(s) by mouth once a day    amLODIPine 10 mg oral tablet  -- 1 tab(s) by mouth once a day    Cipro 500 mg oral tablet  -- 1 tab(s) by mouth every 12 hours  -- Avoid prolonged or excessive exposure to direct and/or artificial sunlight while taking this medication.  Check with your doctor before becoming pregnant.  Do not take dairy products, antacids, or iron preparations within one hour of this medication.  Finish all this medication unless otherwise directed by prescriber.  Medication should be taken with plenty of water.

## 2017-06-16 DIAGNOSIS — E86.0 DEHYDRATION: ICD-10-CM

## 2017-06-16 DIAGNOSIS — E11.9 TYPE 2 DIABETES MELLITUS WITHOUT COMPLICATIONS: ICD-10-CM

## 2017-06-16 DIAGNOSIS — R55 SYNCOPE AND COLLAPSE: ICD-10-CM

## 2017-06-16 DIAGNOSIS — F03.90 UNSPECIFIED DEMENTIA, UNSPECIFIED SEVERITY, WITHOUT BEHAVIORAL DISTURBANCE, PSYCHOTIC DISTURBANCE, MOOD DISTURBANCE, AND ANXIETY: ICD-10-CM

## 2017-06-16 DIAGNOSIS — N18.3 CHRONIC KIDNEY DISEASE, STAGE 3 (MODERATE): ICD-10-CM

## 2017-06-16 DIAGNOSIS — I12.9 HYPERTENSIVE CHRONIC KIDNEY DISEASE WITH STAGE 1 THROUGH STAGE 4 CHRONIC KIDNEY DISEASE, OR UNSPECIFIED CHRONIC KIDNEY DISEASE: ICD-10-CM

## 2017-06-16 DIAGNOSIS — N17.9 ACUTE KIDNEY FAILURE, UNSPECIFIED: ICD-10-CM

## 2017-06-16 DIAGNOSIS — Z79.4 LONG TERM (CURRENT) USE OF INSULIN: ICD-10-CM

## 2017-06-16 DIAGNOSIS — D69.6 THROMBOCYTOPENIA, UNSPECIFIED: ICD-10-CM

## 2017-06-16 DIAGNOSIS — L97.829 NON-PRESSURE CHRONIC ULCER OF OTHER PART OF LEFT LOWER LEG WITH UNSPECIFIED SEVERITY: ICD-10-CM

## 2017-06-16 DIAGNOSIS — N40.0 BENIGN PROSTATIC HYPERPLASIA WITHOUT LOWER URINARY TRACT SYMPTOMS: ICD-10-CM

## 2017-06-16 DIAGNOSIS — D72.819 DECREASED WHITE BLOOD CELL COUNT, UNSPECIFIED: ICD-10-CM

## 2017-09-26 ENCOUNTER — INPATIENT (INPATIENT)
Facility: HOSPITAL | Age: 82
LOS: 9 days | Discharge: NURSING, REHAB OR LONG-TERM CARE | DRG: 871 | End: 2017-10-06
Attending: INTERNAL MEDICINE | Admitting: INTERNAL MEDICINE
Payer: MEDICARE

## 2017-09-26 VITALS
HEIGHT: 67 IN | DIASTOLIC BLOOD PRESSURE: 74 MMHG | TEMPERATURE: 100 F | RESPIRATION RATE: 32 BRPM | HEART RATE: 120 BPM | WEIGHT: 160.06 LBS | OXYGEN SATURATION: 97 % | SYSTOLIC BLOOD PRESSURE: 139 MMHG

## 2017-09-26 DIAGNOSIS — A41.9 SEPSIS, UNSPECIFIED ORGANISM: ICD-10-CM

## 2017-09-26 DIAGNOSIS — N19 UNSPECIFIED KIDNEY FAILURE: ICD-10-CM

## 2017-09-26 DIAGNOSIS — Z29.9 ENCOUNTER FOR PROPHYLACTIC MEASURES, UNSPECIFIED: ICD-10-CM

## 2017-09-26 DIAGNOSIS — N39.0 URINARY TRACT INFECTION, SITE NOT SPECIFIED: ICD-10-CM

## 2017-09-26 DIAGNOSIS — R73.9 HYPERGLYCEMIA, UNSPECIFIED: ICD-10-CM

## 2017-09-26 DIAGNOSIS — R74.8 ABNORMAL LEVELS OF OTHER SERUM ENZYMES: ICD-10-CM

## 2017-09-26 DIAGNOSIS — F03.90 UNSPECIFIED DEMENTIA, UNSPECIFIED SEVERITY, WITHOUT BEHAVIORAL DISTURBANCE, PSYCHOTIC DISTURBANCE, MOOD DISTURBANCE, AND ANXIETY: ICD-10-CM

## 2017-09-26 DIAGNOSIS — N17.9 ACUTE KIDNEY FAILURE, UNSPECIFIED: ICD-10-CM

## 2017-09-26 DIAGNOSIS — R79.89 OTHER SPECIFIED ABNORMAL FINDINGS OF BLOOD CHEMISTRY: ICD-10-CM

## 2017-09-26 DIAGNOSIS — I10 ESSENTIAL (PRIMARY) HYPERTENSION: ICD-10-CM

## 2017-09-26 DIAGNOSIS — I20.9 ANGINA PECTORIS, UNSPECIFIED: ICD-10-CM

## 2017-09-26 DIAGNOSIS — R41.82 ALTERED MENTAL STATUS, UNSPECIFIED: ICD-10-CM

## 2017-09-26 DIAGNOSIS — E11.9 TYPE 2 DIABETES MELLITUS WITHOUT COMPLICATIONS: ICD-10-CM

## 2017-09-26 LAB
ACETONE SERPL-MCNC: NEGATIVE — SIGNIFICANT CHANGE UP
ALBUMIN SERPL ELPH-MCNC: 1.9 G/DL — LOW (ref 3.3–5)
ALBUMIN SERPL ELPH-MCNC: 2.3 G/DL — LOW (ref 3.3–5)
ALP SERPL-CCNC: 227 U/L — HIGH (ref 30–120)
ALP SERPL-CCNC: 395 U/L — HIGH (ref 30–120)
ALT FLD-CCNC: 73 U/L DA — HIGH (ref 10–60)
ALT FLD-CCNC: 92 U/L DA — HIGH (ref 10–60)
AMMONIA BLD-MCNC: 71 UMOL/L — HIGH (ref 11–32)
ANION GAP SERPL CALC-SCNC: 12 MMOL/L — SIGNIFICANT CHANGE UP (ref 5–17)
ANION GAP SERPL CALC-SCNC: 12 MMOL/L — SIGNIFICANT CHANGE UP (ref 5–17)
APPEARANCE UR: ABNORMAL
APTT BLD: 26.4 SEC — LOW (ref 27.5–37.4)
AST SERPL-CCNC: 225 U/L — HIGH (ref 10–40)
AST SERPL-CCNC: 251 U/L — HIGH (ref 10–40)
BASE EXCESS BLDA CALC-SCNC: -3 MMOL/L — LOW (ref -2–2)
BASOPHILS # BLD AUTO: 0 K/UL — SIGNIFICANT CHANGE UP (ref 0–0.2)
BILIRUB SERPL-MCNC: 1.5 MG/DL — HIGH (ref 0.2–1.2)
BILIRUB SERPL-MCNC: 2.2 MG/DL — HIGH (ref 0.2–1.2)
BILIRUB UR-MCNC: ABNORMAL
BLOOD GAS SOURCE: SIGNIFICANT CHANGE UP
BUN SERPL-MCNC: 138 MG/DL — HIGH (ref 7–23)
BUN SERPL-MCNC: 153 MG/DL — HIGH (ref 7–23)
CALCIUM SERPL-MCNC: 10.7 MG/DL — HIGH (ref 8.4–10.5)
CALCIUM SERPL-MCNC: 9.4 MG/DL — SIGNIFICANT CHANGE UP (ref 8.4–10.5)
CHLORIDE SERPL-SCNC: 113 MMOL/L — HIGH (ref 96–108)
CHLORIDE SERPL-SCNC: 121 MMOL/L — HIGH (ref 96–108)
CO2 SERPL-SCNC: 22 MMOL/L — SIGNIFICANT CHANGE UP (ref 22–31)
CO2 SERPL-SCNC: 26 MMOL/L — SIGNIFICANT CHANGE UP (ref 22–31)
COLOR SPEC: YELLOW — SIGNIFICANT CHANGE UP
CREAT SERPL-MCNC: 4.13 MG/DL — HIGH (ref 0.5–1.3)
CREAT SERPL-MCNC: 5.27 MG/DL — HIGH (ref 0.5–1.3)
DIFF PNL FLD: ABNORMAL
EOSINOPHIL # BLD AUTO: 0 K/UL — SIGNIFICANT CHANGE UP (ref 0–0.5)
GLUCOSE SERPL-MCNC: 298 MG/DL — HIGH (ref 70–99)
GLUCOSE SERPL-MCNC: 615 MG/DL — CRITICAL HIGH (ref 70–99)
GLUCOSE UR QL: 250 MG/DL
HCO3 BLDA-SCNC: 22 MMOL/L — SIGNIFICANT CHANGE UP (ref 21–29)
HCT VFR BLD CALC: 51.1 % — HIGH (ref 39–50)
HGB BLD-MCNC: 16.1 G/DL — SIGNIFICANT CHANGE UP (ref 13–17)
HOROWITZ INDEX BLDA+IHG-RTO: 28 — SIGNIFICANT CHANGE UP
INR BLD: 1.06 RATIO — SIGNIFICANT CHANGE UP (ref 0.88–1.16)
KETONES UR-MCNC: NEGATIVE — SIGNIFICANT CHANGE UP
LACTATE SERPL-SCNC: 1.5 MMOL/L — SIGNIFICANT CHANGE UP (ref 0.7–2)
LACTATE SERPL-SCNC: 2.9 MMOL/L — HIGH (ref 0.7–2)
LACTATE SERPL-SCNC: 3.2 MMOL/L — HIGH (ref 0.7–2)
LACTATE SERPL-SCNC: 5.2 MMOL/L — CRITICAL HIGH (ref 0.7–2)
LEUKOCYTE ESTERASE UR-ACNC: ABNORMAL
LIDOCAIN IGE QN: 192 U/L — SIGNIFICANT CHANGE UP (ref 73–393)
LYMPHOCYTES # BLD AUTO: 0.6 K/UL — LOW (ref 1–3.3)
LYMPHOCYTES # BLD AUTO: 7 % — LOW (ref 13–44)
MCHC RBC-ENTMCNC: 28.9 PG — SIGNIFICANT CHANGE UP (ref 27–34)
MCHC RBC-ENTMCNC: 31.5 GM/DL — LOW (ref 32–36)
MCV RBC AUTO: 91.8 FL — SIGNIFICANT CHANGE UP (ref 80–100)
MONOCYTES # BLD AUTO: 0.9 K/UL — SIGNIFICANT CHANGE UP (ref 0–0.9)
MONOCYTES NFR BLD AUTO: 5 % — SIGNIFICANT CHANGE UP (ref 2–14)
NEUTROPHILS # BLD AUTO: 10.7 K/UL — HIGH (ref 1.8–7.4)
NEUTROPHILS NFR BLD AUTO: 82 % — HIGH (ref 43–77)
NITRITE UR-MCNC: NEGATIVE — SIGNIFICANT CHANGE UP
PCO2 BLDA: 37 MMHG — SIGNIFICANT CHANGE UP (ref 32–46)
PH BLD: 7.38 — SIGNIFICANT CHANGE UP (ref 7.35–7.45)
PH UR: 5 — SIGNIFICANT CHANGE UP (ref 5–8)
PLATELET # BLD AUTO: 112 K/UL — LOW (ref 150–400)
PO2 BLDA: 90 MMHG — SIGNIFICANT CHANGE UP (ref 74–108)
POTASSIUM SERPL-MCNC: 4 MMOL/L — SIGNIFICANT CHANGE UP (ref 3.5–5.3)
POTASSIUM SERPL-MCNC: 4.6 MMOL/L — SIGNIFICANT CHANGE UP (ref 3.5–5.3)
POTASSIUM SERPL-SCNC: 4 MMOL/L — SIGNIFICANT CHANGE UP (ref 3.5–5.3)
POTASSIUM SERPL-SCNC: 4.6 MMOL/L — SIGNIFICANT CHANGE UP (ref 3.5–5.3)
PROCALCITONIN SERPL-MCNC: 6.7 NG/ML — HIGH (ref 0–0.04)
PROT SERPL-MCNC: 6.4 G/DL — SIGNIFICANT CHANGE UP (ref 6–8.3)
PROT SERPL-MCNC: 7.8 G/DL — SIGNIFICANT CHANGE UP (ref 6–8.3)
PROT UR-MCNC: 100 MG/DL
PROTHROM AB SERPL-ACNC: 11.6 SEC — SIGNIFICANT CHANGE UP (ref 9.8–12.7)
RBC # BLD: 5.57 M/UL — SIGNIFICANT CHANGE UP (ref 4.2–5.8)
RBC # FLD: 16 % — HIGH (ref 10.3–14.5)
SAO2 % BLDA: 96 % — SIGNIFICANT CHANGE UP (ref 92–96)
SODIUM SERPL-SCNC: 151 MMOL/L — HIGH (ref 135–145)
SODIUM SERPL-SCNC: 155 MMOL/L — HIGH (ref 135–145)
SP GR SPEC: 1.02 — SIGNIFICANT CHANGE UP (ref 1.01–1.02)
TROPONIN I SERPL-MCNC: 0.5 NG/ML — HIGH (ref 0.02–0.06)
TROPONIN I SERPL-MCNC: 0.53 NG/ML — HIGH (ref 0.02–0.06)
UROBILINOGEN FLD QL: 8 MG/DL
WBC # BLD: 12.2 K/UL — HIGH (ref 3.8–10.5)
WBC # FLD AUTO: 12.2 K/UL — HIGH (ref 3.8–10.5)

## 2017-09-26 PROCEDURE — 76700 US EXAM ABDOM COMPLETE: CPT | Mod: 26

## 2017-09-26 PROCEDURE — 71250 CT THORAX DX C-: CPT | Mod: 26

## 2017-09-26 PROCEDURE — 99285 EMERGENCY DEPT VISIT HI MDM: CPT

## 2017-09-26 PROCEDURE — 70450 CT HEAD/BRAIN W/O DYE: CPT | Mod: 26

## 2017-09-26 PROCEDURE — 71010: CPT | Mod: 26

## 2017-09-26 PROCEDURE — 93010 ELECTROCARDIOGRAM REPORT: CPT

## 2017-09-26 PROCEDURE — 74176 CT ABD & PELVIS W/O CONTRAST: CPT | Mod: 26

## 2017-09-26 RX ORDER — DEXTROSE 50 % IN WATER 50 %
12.5 SYRINGE (ML) INTRAVENOUS ONCE
Qty: 0 | Refills: 0 | Status: DISCONTINUED | OUTPATIENT
Start: 2017-09-26 | End: 2017-10-04

## 2017-09-26 RX ORDER — HEPARIN SODIUM 5000 [USP'U]/ML
5000 INJECTION INTRAVENOUS; SUBCUTANEOUS EVERY 12 HOURS
Qty: 0 | Refills: 0 | Status: DISCONTINUED | OUTPATIENT
Start: 2017-09-26 | End: 2017-09-30

## 2017-09-26 RX ORDER — DEXTROSE 50 % IN WATER 50 %
25 SYRINGE (ML) INTRAVENOUS ONCE
Qty: 0 | Refills: 0 | Status: DISCONTINUED | OUTPATIENT
Start: 2017-09-26 | End: 2017-10-04

## 2017-09-26 RX ORDER — SODIUM CHLORIDE 9 MG/ML
3 INJECTION INTRAMUSCULAR; INTRAVENOUS; SUBCUTANEOUS ONCE
Qty: 0 | Refills: 0 | Status: COMPLETED | OUTPATIENT
Start: 2017-09-26 | End: 2017-09-26

## 2017-09-26 RX ORDER — SODIUM CHLORIDE 9 MG/ML
1000 INJECTION INTRAMUSCULAR; INTRAVENOUS; SUBCUTANEOUS ONCE
Qty: 0 | Refills: 0 | Status: COMPLETED | OUTPATIENT
Start: 2017-09-26 | End: 2017-09-26

## 2017-09-26 RX ORDER — ASPIRIN/CALCIUM CARB/MAGNESIUM 324 MG
81 TABLET ORAL DAILY
Qty: 0 | Refills: 0 | Status: DISCONTINUED | OUTPATIENT
Start: 2017-09-26 | End: 2017-09-27

## 2017-09-26 RX ORDER — ACETAMINOPHEN 500 MG
650 TABLET ORAL EVERY 6 HOURS
Qty: 0 | Refills: 0 | Status: DISCONTINUED | OUTPATIENT
Start: 2017-09-26 | End: 2017-09-26

## 2017-09-26 RX ORDER — DEXTROSE 50 % IN WATER 50 %
1 SYRINGE (ML) INTRAVENOUS ONCE
Qty: 0 | Refills: 0 | Status: DISCONTINUED | OUTPATIENT
Start: 2017-09-26 | End: 2017-10-04

## 2017-09-26 RX ORDER — SODIUM CHLORIDE 9 MG/ML
1000 INJECTION, SOLUTION INTRAVENOUS
Qty: 0 | Refills: 0 | Status: DISCONTINUED | OUTPATIENT
Start: 2017-09-26 | End: 2017-10-04

## 2017-09-26 RX ORDER — ACETAMINOPHEN 500 MG
650 TABLET ORAL ONCE
Qty: 0 | Refills: 0 | Status: COMPLETED | OUTPATIENT
Start: 2017-09-26 | End: 2017-09-26

## 2017-09-26 RX ORDER — SODIUM CHLORIDE 9 MG/ML
1000 INJECTION, SOLUTION INTRAVENOUS
Qty: 0 | Refills: 0 | Status: DISCONTINUED | OUTPATIENT
Start: 2017-09-26 | End: 2017-09-28

## 2017-09-26 RX ORDER — FAMOTIDINE 10 MG/ML
20 INJECTION INTRAVENOUS DAILY
Qty: 0 | Refills: 0 | Status: DISCONTINUED | OUTPATIENT
Start: 2017-09-26 | End: 2017-09-27

## 2017-09-26 RX ORDER — LACTULOSE 10 G/15ML
200 SOLUTION ORAL EVERY 6 HOURS
Qty: 0 | Refills: 0 | Status: DISCONTINUED | OUTPATIENT
Start: 2017-09-26 | End: 2017-09-27

## 2017-09-26 RX ORDER — INSULIN GLARGINE 100 [IU]/ML
10 INJECTION, SOLUTION SUBCUTANEOUS AT BEDTIME
Qty: 0 | Refills: 0 | Status: DISCONTINUED | OUTPATIENT
Start: 2017-09-26 | End: 2017-10-02

## 2017-09-26 RX ORDER — ACETAMINOPHEN 500 MG
650 TABLET ORAL ONCE
Qty: 0 | Refills: 0 | Status: DISCONTINUED | OUTPATIENT
Start: 2017-09-26 | End: 2017-09-26

## 2017-09-26 RX ORDER — INSULIN LISPRO 100/ML
VIAL (ML) SUBCUTANEOUS
Qty: 0 | Refills: 0 | Status: DISCONTINUED | OUTPATIENT
Start: 2017-09-26 | End: 2017-09-27

## 2017-09-26 RX ORDER — GLUCAGON INJECTION, SOLUTION 0.5 MG/.1ML
1 INJECTION, SOLUTION SUBCUTANEOUS ONCE
Qty: 0 | Refills: 0 | Status: DISCONTINUED | OUTPATIENT
Start: 2017-09-26 | End: 2017-10-04

## 2017-09-26 RX ORDER — SODIUM CHLORIDE 9 MG/ML
1000 INJECTION INTRAMUSCULAR; INTRAVENOUS; SUBCUTANEOUS ONCE
Qty: 0 | Refills: 0 | Status: DISCONTINUED | OUTPATIENT
Start: 2017-09-26 | End: 2017-09-26

## 2017-09-26 RX ORDER — TAMSULOSIN HYDROCHLORIDE 0.4 MG/1
0.4 CAPSULE ORAL AT BEDTIME
Qty: 0 | Refills: 0 | Status: DISCONTINUED | OUTPATIENT
Start: 2017-09-26 | End: 2017-09-27

## 2017-09-26 RX ORDER — PIPERACILLIN AND TAZOBACTAM 4; .5 G/20ML; G/20ML
3.38 INJECTION, POWDER, LYOPHILIZED, FOR SOLUTION INTRAVENOUS ONCE
Qty: 0 | Refills: 0 | Status: COMPLETED | OUTPATIENT
Start: 2017-09-26 | End: 2017-09-26

## 2017-09-26 RX ORDER — SODIUM CHLORIDE 9 MG/ML
500 INJECTION INTRAMUSCULAR; INTRAVENOUS; SUBCUTANEOUS ONCE
Qty: 0 | Refills: 0 | Status: COMPLETED | OUTPATIENT
Start: 2017-09-26 | End: 2017-09-26

## 2017-09-26 RX ORDER — PIPERACILLIN AND TAZOBACTAM 4; .5 G/20ML; G/20ML
3.38 INJECTION, POWDER, LYOPHILIZED, FOR SOLUTION INTRAVENOUS EVERY 12 HOURS
Qty: 0 | Refills: 0 | Status: DISCONTINUED | OUTPATIENT
Start: 2017-09-26 | End: 2017-09-29

## 2017-09-26 RX ORDER — PIPERACILLIN AND TAZOBACTAM 4; .5 G/20ML; G/20ML
3.38 INJECTION, POWDER, LYOPHILIZED, FOR SOLUTION INTRAVENOUS ONCE
Qty: 0 | Refills: 0 | Status: DISCONTINUED | OUTPATIENT
Start: 2017-09-26 | End: 2017-09-26

## 2017-09-26 RX ORDER — VANCOMYCIN HCL 1 G
1000 VIAL (EA) INTRAVENOUS ONCE
Qty: 0 | Refills: 0 | Status: COMPLETED | OUTPATIENT
Start: 2017-09-26 | End: 2017-09-26

## 2017-09-26 RX ORDER — INSULIN LISPRO 100/ML
4 VIAL (ML) SUBCUTANEOUS ONCE
Qty: 0 | Refills: 0 | Status: COMPLETED | OUTPATIENT
Start: 2017-09-26 | End: 2017-09-26

## 2017-09-26 RX ORDER — LACTOBACILLUS ACIDOPHILUS 100MM CELL
1 CAPSULE ORAL EVERY 8 HOURS
Qty: 0 | Refills: 0 | Status: DISCONTINUED | OUTPATIENT
Start: 2017-09-26 | End: 2017-09-27

## 2017-09-26 RX ORDER — NYSTATIN CREAM 100000 [USP'U]/G
1 CREAM TOPICAL
Qty: 0 | Refills: 0 | Status: DISCONTINUED | OUTPATIENT
Start: 2017-09-26 | End: 2017-10-04

## 2017-09-26 RX ORDER — PIPERACILLIN AND TAZOBACTAM 4; .5 G/20ML; G/20ML
4.5 INJECTION, POWDER, LYOPHILIZED, FOR SOLUTION INTRAVENOUS ONCE
Qty: 0 | Refills: 0 | Status: DISCONTINUED | OUTPATIENT
Start: 2017-09-26 | End: 2017-09-26

## 2017-09-26 RX ADMIN — Medication 250 MILLIGRAM(S): at 13:38

## 2017-09-26 RX ADMIN — SODIUM CHLORIDE 1000 MILLILITER(S): 9 INJECTION INTRAMUSCULAR; INTRAVENOUS; SUBCUTANEOUS at 12:28

## 2017-09-26 RX ADMIN — SODIUM CHLORIDE 1000 MILLILITER(S): 9 INJECTION INTRAMUSCULAR; INTRAVENOUS; SUBCUTANEOUS at 13:28

## 2017-09-26 RX ADMIN — SODIUM CHLORIDE 3 MILLILITER(S): 9 INJECTION INTRAMUSCULAR; INTRAVENOUS; SUBCUTANEOUS at 12:55

## 2017-09-26 RX ADMIN — SODIUM CHLORIDE 1000 MILLILITER(S): 9 INJECTION INTRAMUSCULAR; INTRAVENOUS; SUBCUTANEOUS at 21:30

## 2017-09-26 RX ADMIN — Medication: at 19:00

## 2017-09-26 RX ADMIN — HEPARIN SODIUM 5000 UNIT(S): 5000 INJECTION INTRAVENOUS; SUBCUTANEOUS at 17:17

## 2017-09-26 RX ADMIN — SODIUM CHLORIDE 120 MILLILITER(S): 9 INJECTION, SOLUTION INTRAVENOUS at 15:57

## 2017-09-26 RX ADMIN — Medication 650 MILLIGRAM(S): at 13:04

## 2017-09-26 RX ADMIN — Medication 4 UNIT(S): at 15:43

## 2017-09-26 RX ADMIN — Medication: at 15:24

## 2017-09-26 RX ADMIN — Medication 2: at 22:08

## 2017-09-26 RX ADMIN — PIPERACILLIN AND TAZOBACTAM 200 GRAM(S): 4; .5 INJECTION, POWDER, LYOPHILIZED, FOR SOLUTION INTRAVENOUS at 14:39

## 2017-09-26 RX ADMIN — SODIUM CHLORIDE 120 MILLILITER(S): 9 INJECTION, SOLUTION INTRAVENOUS at 18:31

## 2017-09-26 RX ADMIN — INSULIN GLARGINE 10 UNIT(S): 100 INJECTION, SOLUTION SUBCUTANEOUS at 22:07

## 2017-09-26 RX ADMIN — SODIUM CHLORIDE 1000 MILLILITER(S): 9 INJECTION INTRAMUSCULAR; INTRAVENOUS; SUBCUTANEOUS at 14:18

## 2017-09-26 NOTE — CONSULT NOTE ADULT - SUBJECTIVE AND OBJECTIVE BOX
Patient seen and examined today Plan discussed/Questions answered  (with patient/ancillary staff/colleagues) Chart notes reviewd More detailed Pulm/Critical Care notes, assessment and plan  will be added later today as needed after reviewing further labs as they become available     Notable interval events reviewed    ROS/PE  . REVIEW OF SYSTEMS Patient is unable to give any reliable review of systems   PHYSICAL EXAM    HEENT Unremarkable PERRLA atraumatic  RESP Fair air entry EXP prolonged    Harsh breath sound Resp distres mild  CARDIAC S1 S2 No S3     NO JVD   ABDOMEN SOFT BS PRESENT NOT DISTENDED No hepatosplenomegaly  PEDAL EDEMA present No calf tenderness  NO rash GENERAL Not TOXIC looking  Awake A & O x 1  CONTACT Shikha Lemos  Loli Best Yinllon    . HPI 9/26/2017 Sent from [facility] for lethargy BP 90  Hi bld sugar found to be septic with pus draining from Quinones   PMH CVA DM2 Hypertension CAD Chr ulcer L thigh with necrosis ARVIN bph sYNCOPE   6/11/2017 w 3.1 hB 12.8 pLT 120     9/26/2017 EKG  prwp V1 V2   NH Meds  Norvasc 5 ASA 81 Lantus 10 mvi Tamsuloin .4 JANUSZ NCS Lo fat reg consistency Novolog R groin excoriated area Patient seen and examined today Plan discussed/Questions answered  (with patient/ancillary staff/colleagues) Chart notes reviewd More detailed Pulm/Critical Care notes, assessment and plan  will be added later today as needed after reviewing further labs as they become available     Notable interval events reviewed    ROS/PE  . REVIEW OF SYSTEMS Patient is unable to give any reliable review of systems   PHYSICAL EXAM    HEENT Unremarkable PERRLA atraumatic  RESP Fair air entry EXP prolonged    Harsh breath sound Resp distres mild  CARDIAC S1 S2 No S3     NO JVD   ABDOMEN SOFT BS PRESENT NOT DISTENDED No hepatosplenomegaly  PEDAL EDEMA present No calf tenderness  NO rash GENERAL Not TOXIC looking  Awake A & O x 1  CONTACT Shikha Lemos  Loli Silaw Loraine    . HPI 9/26/2017 Sent from [facility] for lethargy BP 90  Hi bld sugar found to be septic with pus draining from Quinones   PM CVA DM2 Hypertension CAD Chr ulcer L thigh with necrosis ARVIN bph sYNCOPE   6/11/2017 w 3.1 hB 12.8 pLT 120     9/26/2017 EKG  prwp V1 V2   NH Meds  Norvasc 5 ASA 81 Lantus 10 mvi Tamsuloin .4 JANUSZ NCS Lo fat reg consistency Novolog R groin excoriated area   Patient                                       NAVEEN EDUARDO [facility] P [id number] 11/13/1924  ALLERGY nka   CONTACT Dtr  Self  Shikha Lemos  Loli Silaw Loraine    REASON FOR VISIT   Initial evaluation/Pulmonary Critical Care consultation requested  9/26/2017 by Dr Steve from Dr Calvin   Patient examined chart reviewed  HOSPITAL ADMISSION [facility] Dr Steve  9/26/2017  PATIENT CAME  FROM (if information available)    PAST MEDICAL & SURGICAL HISTORY:   Past Medical History:  Angina pectoris    Dementia    Diabetes    Hypertension    Renal failure.   Past Surgical History:  No significant past surgical history.  PATIENT DESCRIPTION CC HPI PMH SOCIAL   89 m NH resident [facility] was sent from [facility] for lethargy BP 90  Hi bld sugar found to be septic with pus draining from Quinones  admitted [facility] S 9/26/2017   PM CVA DM2 Hypertension CAD 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   NH Meds  Norvasc 5 ASA 81 Lantus 10 mvi Tamsuloin .4 JANUSZ NCS Lo fat reg consistency Novolog R groin excoriated area   PROBLEM ASSESSMENT PLAN   RESP   9/26/2017 3p 2l 738/37/90   LACTICEMIA POA   9/26/2017 LA 2.9-1.5   UTI PNEUMONIA SEPSIS   Zosyn (9/26)   9/26 W 12.2 B 6   9/26/2017 UA 1020 Notr n L estr Mod Bld lge W Over 50 Bact tntc   9/26/2017 CXR napd   POSSIBLE MI   9/26/2017 Tr 1 .531 (i) ASA 81 (9/26)   HYPERNATREMIA  9/26/2017 Na 151  ARVIN   9/26 Cr 5.2   IV 1/2  (9/26)   HYPERGLYCEMIA  9/26/20-17 G 615   ELEVATED LFTs   9/26/2017   alt 92   GLOBAL ISSUE/BEST PRACTICE:        PROBLEM:      Analgesia:     na                        PROBLEM: Sedation:     na               PROBLEM: HOB elevation:   y              PROBLEM: Stress ulcer proph:    pepcid 20 (9/26)                       PROBLEM: VTE prophylaxis:      hsc (9/26)                   PROBLEM: Glycemic control:    iss (9/26)    PROBLEM: Nutrition:    npo (9/26)           PROBLEM: Advanced directive: na     PROBLEM: Allergies:  na  HOSPITAL COURSE 9/26/2017 ADMISSION [facility] S 9/26/2017  PROBLEM SPICIFIC COURSE  PLAN   RESP  Gas exchange was acceptable on low flow O2 on arrival   LACTICEMIA POA Rxed with IVF   UTI PNEUMONIA SEPSIS POA Rxed with zosyn (9/26) empirically   9/26/2017 CXR napd   POSSIBLE MI  9/26/2017 Tr 1 .531 (i)  Was Rxed with ASA (9/26)   HYPERNATREMIA 9/26/2017 Na 151 Rxed with hypotonic fluids   ARVIN   9/26 Cr 5.2 P{ossibly sec dehydrationm Was Rxed with IVF    HYPERGLYCEMIA 9/26/2017 G 615 Was Rxed with iss (9/26) No evidence of HONK or DKA (Ketones neg   ELEVATED LFTs  9/26/2017   alt 92 Poss sec shock US abd ordeerd 9/26/2017  TIME SPENT Over 55 minutes aggregate critical care time spent on encounter; activities included   direct patient care, counseling and/or coordinating care reviewing notes, lab data/ imaging , discussion with multidisciplinary team/ patient  /family. Risks, benefits, alternatives  discussed in detail. Questions/concerns  were addressed  to the best of my ability .

## 2017-09-26 NOTE — CONSULT NOTE ADULT - SUBJECTIVE AND OBJECTIVE BOX
Patient is a 89y Male whom presented to the hospital with     PAST MEDICAL & SURGICAL HISTORY:  Parkinson disease  Neuropathy  CKD (chronic kidney disease)  BPH (benign prostatic hyperplasia)  Diabetes  Angina pectoris  Hypertension  Diabetes  Renal failure  Dementia  No significant past surgical history      MEDICATIONS  (STANDING):  aspirin enteric coated 81 milliGRAM(s) Oral daily  piperacillin/tazobactam IVPB. 3.375 Gram(s) IV Intermittent every 12 hours  insulin lispro (HumaLOG) corrective regimen sliding scale   SubCutaneous every 3 hours  dextrose 5%. 1000 milliLiter(s) (50 mL/Hr) IV Continuous <Continuous>  dextrose 50% Injectable 12.5 Gram(s) IV Push once  dextrose 50% Injectable 25 Gram(s) IV Push once  dextrose 50% Injectable 25 Gram(s) IV Push once  sodium chloride 0.45%. 1000 milliLiter(s) (120 mL/Hr) IV Continuous <Continuous>  heparin  Injectable 5000 Unit(s) SubCutaneous every 12 hours  tamsulosin 0.4 milliGRAM(s) Oral at bedtime  lactobacillus acidophilus 1 Tablet(s) Oral every 8 hours  famotidine    Tablet 20 milliGRAM(s) Oral daily      Allergies    No Known Allergies    Intolerances        SOCIAL HISTORY:  Denies ETOh,Smoking,     FAMILY HISTORY:  No pertinent family history in first degree relatives      REVIEW OF SYSTEMS:    CONSTITUTIONAL: No weakness, fevers or chills  EYES/ENT: No visual changes;  No vertigo or throat pain   NECK: No pain or stiffness  RESPIRATORY: No cough, wheezing, hemoptysis; No shortness of breath  CARDIOVASCULAR: No chest pain or palpitations  GASTROINTESTINAL: No abdominal or epigastric pain. No nausea, vomiting, or hematemesis; No diarrhea or constipation. No melena or hematochezia.  GENITOURINARY: No dysuria, frequency or hematuria  NEUROLOGICAL: No numbness or weakness  SKIN: No itching, burning, rashes, or lesions   All other review of systems is negative unless indicated above.    VITAL:  T(C): , Max: 37.8 (17 @ 12:15)  T(F): , Max: 100 (17 @ 12:15)  HR: 114 (17 @ 16:10)  BP: 176/91 (17 @ 16:10)  BP(mean): --  RR: 24 (17 @ 16:10)  SpO2: 99% (17 @ 16:10)  Wt(kg): --    I and O's:     @ 07:01  -   @ 16:33  --------------------------------------------------------  IN: 2250 mL / OUT: 300 mL / NET: 1950 mL      Height (cm): 170.18 ( @ 12:24)  Weight (kg): 72.6 ( @ 12:24)  BMI (kg/m2): 25.1 ( @ 12:24)  BSA (m2): 1.84 ( @ :)    PHYSICAL EXAM:    Constitutional: NAD  HEENT: PERRLA, EOMI,    Neck: No LAD, No JVD  Respiratory: CTAB  Cardiovascular: S1 and S2  Gastrointestinal: BS+, soft, NT/ND  Extremities: No peripheral edema  Neurological: A/O x 3, no focal deficits  Psychiatric: Normal mood, normal affect  : No Quinones  Skin: No rashes  Access: Not applicable    LABS:                        16.1   12.2  )-----------( 112      ( 26 Sep 2017 12:53 )             51.1         151<H>  |  113<H>  |  153<H>  ----------------------------<  615<HH>  4.6   |  26  |  5.27<H>    Ca    10.7<H>      26 Sep 2017 12:53    TPro  7.8  /  Alb  2.3<L>  /  TBili  1.5<H>  /  DBili  x   /  AST  251<H>  /  ALT  92<H>  /  AlkPhos  227<H>        Urine Studies:  Urinalysis Basic - ( 26 Sep 2017 13:13 )    Color: Yellow / Appearance: Turbid / S.020 / pH: x  Gluc: x / Ketone: Negative  / Bili: Moderate / Urobili: 8 mg/dL   Blood: x / Protein: 100 mg/dL / Nitrite: Negative   Leuk Esterase: Moderate / RBC: >50 /HPF / WBC >50   Sq Epi: x / Non Sq Epi: x / Bacteria: TNTC            RADIOLOGY & ADDITIONAL STUDIES: Patient is a 89y Male whom presented to the hospital with ckd and chrissy     PAST MEDICAL & SURGICAL HISTORY:  Parkinson disease  Neuropathy  CKD (chronic kidney disease)  BPH (benign prostatic hyperplasia)  Diabetes  Angina pectoris  Hypertension  Diabetes  Renal failure  Dementia  No significant past surgical history      MEDICATIONS  (STANDING):  aspirin enteric coated 81 milliGRAM(s) Oral daily  piperacillin/tazobactam IVPB. 3.375 Gram(s) IV Intermittent every 12 hours  insulin lispro (HumaLOG) corrective regimen sliding scale   SubCutaneous every 3 hours  dextrose 5%. 1000 milliLiter(s) (50 mL/Hr) IV Continuous <Continuous>  dextrose 50% Injectable 12.5 Gram(s) IV Push once  dextrose 50% Injectable 25 Gram(s) IV Push once  dextrose 50% Injectable 25 Gram(s) IV Push once  sodium chloride 0.45%. 1000 milliLiter(s) (120 mL/Hr) IV Continuous <Continuous>  heparin  Injectable 5000 Unit(s) SubCutaneous every 12 hours  tamsulosin 0.4 milliGRAM(s) Oral at bedtime  lactobacillus acidophilus 1 Tablet(s) Oral every 8 hours  famotidine    Tablet 20 milliGRAM(s) Oral daily      Allergies    No Known Allergies    Intolerances        SOCIAL HISTORY:  Denies ETOh,Smoking,     FAMILY HISTORY:  No pertinent family history in first degree relatives      REVIEW OF SYSTEMS:    CONSTITUTIONAL: No  fevers or chills  unable     VITAL:  T(C): , Max: 37.8 (17 @ 12:15)  T(F): , Max: 100 (17 @ 12:15)  HR: 114 (17 @ 16:10)  BP: 176/91 (17 @ 16:10)  BP(mean): --  RR: 24 (17 @ 16:10)  SpO2: 99% (17 @ 16:10)  Wt(kg): --    I and O's:     @ 07:01  -   @ 16:33  --------------------------------------------------------  IN: 2250 mL / OUT: 300 mL / NET: 1950 mL      Height (cm): 170.18 ( @ 12:24)  Weight (kg): 72.6 ( @ 12:24)  BMI (kg/m2): 25.1 ( @ 12:24)  BSA (m2): 1.84 ( @ 12:24)    PHYSICAL EXAM:    Constitutional: NAD  Neck: No LAD, No JVD  Respiratory: CTAB  Cardiovascular: S1 and S2  Gastrointestinal: BS+, soft, NT/ND  Extremities: No peripheral edema  :  Quinones  Skin: dry   Access: Not applicable    LABS:                        16.1   12.2  )-----------( 112      ( 26 Sep 2017 12:53 )             51.1         151<H>  |  113<H>  |  153<H>  ----------------------------<  615<HH>  4.6   |  26  |  5.27<H>    Ca    10.7<H>      26 Sep 2017 12:53    TPro  7.8  /  Alb  2.3<L>  /  TBili  1.5<H>  /  DBili  x   /  AST  251<H>  /  ALT  92<H>  /  AlkPhos  227<H>        Urine Studies:  Urinalysis Basic - ( 26 Sep 2017 13:13 )    Color: Yellow / Appearance: Turbid / S.020 / pH: x  Gluc: x / Ketone: Negative  / Bili: Moderate / Urobili: 8 mg/dL   Blood: x / Protein: 100 mg/dL / Nitrite: Negative   Leuk Esterase: Moderate / RBC: >50 /HPF / WBC >50   Sq Epi: x / Non Sq Epi: x / Bacteria: TNTC            RADIOLOGY & ADDITIONAL STUDIES:

## 2017-09-26 NOTE — ED PROVIDER NOTE - OBJECTIVE STATEMENT
89 y/o M presents to the ED from NH for sepsis and hyperglycemia, 89 y/o M presents to the ED from NH for sepsis and hyperglycemia, as per nursing home staff pt was given 15U of novolog at 730 am, still hyperglycemic, given another 10U at 930 was still hyperglycemic, was sent to the ED to for sepsis work up. No other information available at this time.

## 2017-09-26 NOTE — ED ADULT NURSE NOTE - OBJECTIVE STATEMENT
Pt presents from [facility]. According to AMT pt had fever and was unresponsive. Unresponsive to verbal stimuli. Responds to tactile and painful stimuli. Skin hot and dry to touch color fair. Tachypneic. RR 32. CM sinus tach with no ectopics noted. Lungs essentially clear on auscultation. Abdomen soft non tender. Positive bowel sounds noted. stage 2 pressure ulcer surrounding rectum. No drainage noted. healed 6cmx 4cm open area noted left lower leg surrounded by chronic stasis changes. No erythema or drainage noted Pt presents from [facility]. According to AMT pt had fever and was unresponsive. Unresponsive to verbal stimuli. Responds to tactile and painful stimuli. Skin hot and dry to touch color fair. Tachypneic. RR 32. CM sinus tach with no ectopics noted. Lungs essentially clear on auscultation. Abdomen soft non tender. Positive bowel sounds noted. stage 2 pressure ulcer surrounding rectum. No drainage noted. healed 6cmx 4cm open area noted left lower leg surrounded by chronic stasis changes. 1 cm x 0.75 cm stage 1 in center No erythema or drainage noted

## 2017-09-26 NOTE — CONSULT NOTE ADULT - PROBLEM SELECTOR RECOMMENDATION 4
Admit for septic workup and ID evaluation,send blood and urine cx,serial lactate levels,monitor vitals closley,ivfs hydration,monitor urine output and renal profile,iv abx as per id cons
Likely related to hypoperfusion during shock state. Continue IVF hydration. Will trend BUN/Cr. Avoid nephrotoxic agents.

## 2017-09-26 NOTE — CONSULT NOTE ADULT - PROBLEM SELECTOR RECOMMENDATION 6
Likely multifactorial- demand ischemia secondary to shock state and acute on chronic renal failure. Downtrending. Will continue to follow.

## 2017-09-26 NOTE — H&P ADULT - PMH
Angina pectoris    Dementia    Diabetes    Hypertension    Renal failure Angina pectoris    BPH (benign prostatic hyperplasia)    CKD (chronic kidney disease)    Dementia    Diabetes    Diabetes    Hypertension    Neuropathy    Parkinson disease    Renal failure

## 2017-09-26 NOTE — H&P ADULT - NSHPATTENDINGPLANDISCUSS_GEN_ALL_CORE
med staff , Dr Calvin ,Dr Allred and daughter on a phone .Spoke to PCP from [facility] Dr Freedman

## 2017-09-26 NOTE — ED PROVIDER NOTE - MEDICAL DECISION MAKING DETAILS
89 y/o M presents to the ED for sepsis and hyperglycemia, urine appears to be purulent, pt in urosepsis, will admit to ICU.

## 2017-09-26 NOTE — CONSULT NOTE ADULT - PROBLEM SELECTOR RECOMMENDATION 8
BP currently stable. Will hold antihypertensive medications for now in the setting of recent sepsis related hypotension.

## 2017-09-26 NOTE — CONSULT NOTE ADULT - PROBLEM SELECTOR RECOMMENDATION 2
serial lactate levels,monitor vitals closley,ivfs hydration,monitor urine output and renal profile,iv abx as per id cons

## 2017-09-26 NOTE — CONSULT NOTE ADULT - PROBLEM SELECTOR RECOMMENDATION 9
Urosepsis. Initially hypotensive but BP has responded to IVF boluses. Urosepsis. Initially hypotensive but BP has responded to IVF boluses. Lactate initially normalized but has increased to 5.2. Given additional IVF bolus. Will continue to trend lactate. F/u CT abdomen and pelvis. Continue empiric antibiotic therapy with zosyn, received dose of vanco in ED, will check random level in morning given decreased renal function. F/u pancultures and adjust antibiotics accordingly. F/u procalcitonin. Continue IVF hydration. Will continue to closely monitor hemodynamics.

## 2017-09-26 NOTE — CONSULT NOTE ADULT - PROBLEM SELECTOR RECOMMENDATION 5
Cannot exclude this as a stress response to urosepsis. As per report, received 10u IV insulin in ED. Currently on ISS (q 3 hrs). BG most recently in mid-200's. May require insulin gtt. Lantus started.

## 2017-09-26 NOTE — H&P ADULT - HISTORY OF PRESENT ILLNESS
89 yo AAM [facility] resident was brought to ER for evaluation of cms  uncontrolled ,hyperglycemia ,recived total of 25 u of humalog this morning with no effect BG esvin dmission was found to be above 600 Quinones cath is draining purulent urine and patient diagnosed with uti and urosepsis Found to have hypotension ,ARVIN on CKD and elevated LFTS ,likely secondary to shock liver and due to sepsis Admitted for septic workup and evaluation,send blood and urine cx,serial lactate levels,monitor vitals closley,ivfs hydration,monitor urine output and renal profile,iv abx initiated Found to have troponin elevation and admitted to spcu ,seen by cardiologist ,likely troponinemia related to ARF patient became a long term resident about 3 mnts ago due to progression of dem,entia and Parkinsons disease Palliative care consult requested ,to discuss advance directives and complete MOLST

## 2017-09-26 NOTE — ED ADULT NURSE REASSESSMENT NOTE - NS ED NURSE REASSESS COMMENT FT1
More alert. responsive to tactile painful stimuli. CM sinus tach with no ectopics noted. Lungs clear on auscultation Tachypnea less. Attended by Dr Calvin and Dr Borjas. Cloudy purulent drainage noted from limon

## 2017-09-26 NOTE — H&P ADULT - PROBLEM SELECTOR PLAN 6
seen by card Admit to telemetry unit for monitoring,send 3 sets of cardiac ensymes to rule out coronary event,obtain ECHO to evaluate LVEF,cardiology consult,continue current managmnet,O2 supply,anticoagulation plan as per cardiology consult

## 2017-09-26 NOTE — CONSULT NOTE ADULT - SUBJECTIVE AND OBJECTIVE BOX
Patient is a 89y old  Male who presents with a chief complaint of cms (26 Sep 2017 13:48)      BRIEF HOSPITAL COURSE: 89 y/o M with a h/o DM, CKD, HTN, BPH, Parkinson's dz, dementia, from NH, presents with uncontrolled hyperglycemia (BG > 600)- not responsive to 25u @ NH this morning). Found to have UTI with purulent urine draining from limon, urosepsis with hypotension that was responsive to 3L IVF bolus.       PAST MEDICAL & SURGICAL HISTORY:  Parkinson disease  Neuropathy  CKD (chronic kidney disease)  BPH (benign prostatic hyperplasia)  Diabetes  Angina pectoris  Hypertension  Diabetes  Renal failure  Dementia  No significant past surgical history    Allergies    No Known Allergies    Intolerances      FAMILY HISTORY:  No pertinent family history in first degree relatives      Review of Systems:  CONSTITUTIONAL: No fever, chills, or fatigue  EYES: No eye pain, visual disturbances, or discharge  ENMT:  No difficulty hearing, tinnitus, vertigo; No sinus or throat pain  NECK: No pain or stiffness  RESPIRATORY: No cough, wheezing, chills or hemoptysis; No shortness of breath  CARDIOVASCULAR: No chest pain, palpitations, dizziness, or leg swelling  GASTROINTESTINAL: No abdominal or epigastric pain. No nausea, vomiting, or hematemesis; No diarrhea or constipation. No melena or hematochezia.  GENITOURINARY: No dysuria, frequency, hematuria, or incontinence  NEUROLOGICAL: No headaches, memory loss, loss of strength, numbness, or tremors  SKIN: No itching, burning, rashes, or lesions   MUSCULOSKELETAL: No joint pain or swelling; No muscle, back, or extremity pain  PSYCHIATRIC: No depression, anxiety, mood swings, or difficulty sleeping      Medications:  piperacillin/tazobactam IVPB. 3.375 Gram(s) IV Intermittent every 12 hours    tamsulosin 0.4 milliGRAM(s) Oral at bedtime          aspirin enteric coated 81 milliGRAM(s) Oral daily  heparin  Injectable 5000 Unit(s) SubCutaneous every 12 hours    famotidine    Tablet 20 milliGRAM(s) Oral daily      insulin lispro (HumaLOG) corrective regimen sliding scale   SubCutaneous every 3 hours  dextrose Gel 1 Dose(s) Oral once PRN  dextrose 50% Injectable 12.5 Gram(s) IV Push once  dextrose 50% Injectable 25 Gram(s) IV Push once  dextrose 50% Injectable 25 Gram(s) IV Push once  glucagon  Injectable 1 milliGRAM(s) IntraMuscular once PRN  insulin glargine Injectable (LANTUS) 10 Unit(s) SubCutaneous at bedtime    dextrose 5%. 1000 milliLiter(s) IV Continuous <Continuous>  sodium chloride 0.45%. 1000 milliLiter(s) IV Continuous <Continuous>  sodium chloride 0.9% Bolus 500 milliLiter(s) IV Bolus once      nystatin Powder 1 Application(s) Topical two times a day    lactobacillus acidophilus 1 Tablet(s) Oral every 8 hours          ICU Vital Signs Last 24 Hrs  T(C): 37.3 (26 Sep 2017 21:00), Max: 38.3 (26 Sep 2017 17:15)  T(F): 99.2 (26 Sep 2017 21:00), Max: 101 (26 Sep 2017 17:15)  HR: 109 (26 Sep 2017 21:00) (99 - 126)  BP: 117/71 (26 Sep 2017 21:00) (95/54 - 176/91)  BP(mean): --  ABP: --  ABP(mean): --  RR: 22 (26 Sep 2017 21:00) (20 - 32)  SpO2: 94% (26 Sep 2017 21:00) (94% - 99%)    Vital Signs Last 24 Hrs  T(C): 37.3 (26 Sep 2017 21:00), Max: 38.3 (26 Sep 2017 17:15)  T(F): 99.2 (26 Sep 2017 21:00), Max: 101 (26 Sep 2017 17:15)  HR: 109 (26 Sep 2017 21:00) (99 - 126)  BP: 117/71 (26 Sep 2017 21:00) (95/54 - 176/91)  BP(mean): --  RR: 22 (26 Sep 2017 21:00) (20 - 32)  SpO2: 94% (26 Sep 2017 21:00) (94% - 99%)    ABG - ( 26 Sep 2017 15:50 )  pH: x     /  pCO2: 37    /  pO2: 90    / HCO3: 22    / Base Excess: -3.0  /  SaO2: 96                  I&O's Detail    26 Sep 2017 07:01  -  26 Sep 2017 21:36  --------------------------------------------------------  IN:    IV PiggyBack: 250 mL    Sodium Chloride 0.9% IV Bolus: 2000 mL  Total IN: 2250 mL    OUT:    Indwelling Catheter - Urethral: 300 mL  Total OUT: 300 mL    Total NET: 1950 mL            LABS:                        16.1   12.2  )-----------( 112      ( 26 Sep 2017 12:53 )             51.1         151<H>  |  113<H>  |  153<H>  ----------------------------<  615<HH>  4.6   |    |  5.27<H>    Ca    10.7<H>      26 Sep 2017 12:53    TPro  7.8  /  Alb  2.3<L>  /  TBili  1.5<H>  /  DBili  x   /  AST  251<H>  /  ALT  92<H>  /  AlkPhos  227<H>        CARDIAC MARKERS ( 26 Sep 2017 19:49 )  .499 ng/mL / x     / x     / x     / x      CARDIAC MARKERS ( 26 Sep 2017 13:17 )  .531 ng/mL / x     / x     / x     / x          CAPILLARY BLOOD GLUCOSE  511 (26 Sep 2017 12:28)        PT/INR - ( 26 Sep 2017 12:53 )   PT: 11.6 sec;   INR: 1.06 ratio         PTT - ( 26 Sep 2017 12:53 )  PTT:26.4 sec  Urinalysis Basic - ( 26 Sep 2017 13:13 )    Color: Yellow / Appearance: Turbid / S.020 / pH: x  Gluc: x / Ketone: Negative  / Bili: Moderate / Urobili: 8 mg/dL   Blood: x / Protein: 100 mg/dL / Nitrite: Negative   Leuk Esterase: Moderate / RBC: >50 /HPF / WBC >50   Sq Epi: x / Non Sq Epi: x / Bacteria: TNTC      CULTURES:      Physical Examination:    General: No acute distress.  Alert, oriented, interactive, nonfocal    HEENT: Pupils equal, reactive to light.  Symmetric.    PULM: Clear to auscultation bilaterally, no significant sputum production    CVS: Regular rate and rhythm, no murmurs, rubs, or gallops    ABD: Soft, nondistended, nontender, normoactive bowel sounds, no masses    EXT: No edema, nontender    SKIN: Warm and well perfused, no rashes noted.    NEURO: A&Ox3, strength 5/5 all extremities, cranial nerves grossly intact, no focal deficits    RADIOLOGY: ***    CRITICAL CARE TIME SPENT: ***  Evaluating/treating patient, reviewing data/labs/imaging, discussing case with multidisciplinary team, discussing plan/goals of care with patient/family. Non-inclusive of procedure time. Patient is a 89y old  Male who presents with a chief complaint of cms (26 Sep 2017 13:48)      BRIEF HOSPITAL COURSE: 89 y/o M with a h/o DM, CKD, HTN, BPH, Parkinson's dz, dementia, from NH, presents with uncontrolled hyperglycemia (BG > 600)- not responsive to 25u Humalog @ NH this morning). Found to have UTI with purulent urine draining from limon, hypotension that was responsive to 3L IVF bolus. Patient seen and examined in ED. Obtunded, not following commands, arousable to noxious stimuli. Tachycardic (HR: 110-115), BP stable (MAP: 71). Febrile (Tmax: 101). Lactate: 2.9 --> 1.5 --> 5.2. Trops (+), ARVIN on CKD. Elevated LFT's. CT head shows no acute intracranial hemorrhage or acute territorial infarct.Ventricles are prominent somewhat out of proportion to sulci raising the possibility of normal pressure hydrocephalus.      PAST MEDICAL & SURGICAL HISTORY:  Parkinson disease  Neuropathy  CKD (chronic kidney disease)  BPH (benign prostatic hyperplasia)  Diabetes  Angina pectoris  Hypertension  Diabetes  Renal failure  Dementia  No significant past surgical history    Allergies    No Known Allergies    Intolerances      FAMILY HISTORY:  No pertinent family history in first degree relatives      Review of Systems: Unable to obtain secondary to mental status.  CONSTITUTIONAL: No fever, chills, or fatigue  EYES: No eye pain, visual disturbances, or discharge  ENMT:  No difficulty hearing, tinnitus, vertigo; No sinus or throat pain  NECK: No pain or stiffness  RESPIRATORY: No cough, wheezing, chills or hemoptysis; No shortness of breath  CARDIOVASCULAR: No chest pain, palpitations, dizziness, or leg swelling  GASTROINTESTINAL: No abdominal or epigastric pain. No nausea, vomiting, or hematemesis; No diarrhea or constipation. No melena or hematochezia.  GENITOURINARY: No dysuria, frequency, hematuria, or incontinence  NEUROLOGICAL: No headaches, memory loss, loss of strength, numbness, or tremors  SKIN: No itching, burning, rashes, or lesions   MUSCULOSKELETAL: No joint pain or swelling; No muscle, back, or extremity pain  PSYCHIATRIC: No depression, anxiety, mood swings, or difficulty sleeping      Medications:  piperacillin/tazobactam IVPB. 3.375 Gram(s) IV Intermittent every 12 hours  tamsulosin 0.4 milliGRAM(s) Oral at bedtime  aspirin enteric coated 81 milliGRAM(s) Oral daily  heparin  Injectable 5000 Unit(s) SubCutaneous every 12 hours  famotidine    Tablet 20 milliGRAM(s) Oral daily  insulin lispro (HumaLOG) corrective regimen sliding scale   SubCutaneous every 3 hours  dextrose Gel 1 Dose(s) Oral once PRN  dextrose 50% Injectable 12.5 Gram(s) IV Push once  dextrose 50% Injectable 25 Gram(s) IV Push once  dextrose 50% Injectable 25 Gram(s) IV Push once  glucagon  Injectable 1 milliGRAM(s) IntraMuscular once PRN  insulin glargine Injectable (LANTUS) 10 Unit(s) SubCutaneous at bedtime  dextrose 5%. 1000 milliLiter(s) IV Continuous <Continuous>  sodium chloride 0.45%. 1000 milliLiter(s) IV Continuous <Continuous>  sodium chloride 0.9% Bolus 500 milliLiter(s) IV Bolus once  nystatin Powder 1 Application(s) Topical two times a day  lactobacillus acidophilus 1 Tablet(s) Oral every 8 hours          ICU Vital Signs Last 24 Hrs  T(C): 37.3 (26 Sep 2017 21:00), Max: 38.3 (26 Sep 2017 17:15)  T(F): 99.2 (26 Sep 2017 21:00), Max: 101 (26 Sep 2017 17:15)  HR: 109 (26 Sep 2017 21:00) (99 - 126)  BP: 117/71 (26 Sep 2017 21:00) (95/54 - 176/91)  BP(mean): --  ABP: --  ABP(mean): --  RR: 22 (26 Sep 2017 21:00) (20 - 32)  SpO2: 94% (26 Sep 2017 21:00) (94% - 99%)    Vital Signs Last 24 Hrs  T(C): 37.3 (26 Sep 2017 21:00), Max: 38.3 (26 Sep 2017 17:15)  T(F): 99.2 (26 Sep 2017 21:00), Max: 101 (26 Sep 2017 17:15)  HR: 109 (26 Sep 2017 21:00) (99 - 126)  BP: 117/71 (26 Sep 2017 21:00) (95/54 - 176/91)  BP(mean): --  RR: 22 (26 Sep 2017 21:00) (20 - 32)  SpO2: 94% (26 Sep 2017 21:00) (94% - 99%)    ABG - ( 26 Sep 2017 15:50 )  pH: x     /  pCO2: 37    /  pO2: 90    / HCO3: 22    / Base Excess: -3.0  /  SaO2: 96                  I&O's Detail    26 Sep 2017 07:01  -  26 Sep 2017 21:36  --------------------------------------------------------  IN:    IV PiggyBack: 250 mL    Sodium Chloride 0.9% IV Bolus: 2000 mL  Total IN: 2250 mL    OUT:    Indwelling Catheter - Urethral: 300 mL  Total OUT: 300 mL    Total NET: 1950 mL            LABS:                        16.1   12.2  )-----------( 112      ( 26 Sep 2017 12:53 )             51.1     09-    151<H>  |  113<H>  |  153<H>  ----------------------------<  615<HH>  4.6   |  26  |  5.27<H>    Ca    10.7<H>      26 Sep 2017 12:53    TPro  7.8  /  Alb  2.3<L>  /  TBili  1.5<H>  /  DBili  x   /  AST  251<H>  /  ALT  92<H>  /  AlkPhos  227<H>  09-26      CARDIAC MARKERS ( 26 Sep 2017 19:49 )  .499 ng/mL / x     / x     / x     / x      CARDIAC MARKERS ( 26 Sep 2017 13:17 )  .531 ng/mL / x     / x     / x     / x          CAPILLARY BLOOD GLUCOSE  511 (26 Sep 2017 12:28)        PT/INR - ( 26 Sep 2017 12:53 )   PT: 11.6 sec;   INR: 1.06 ratio         PTT - ( 26 Sep 2017 12:53 )  PTT:26.4 sec  Urinalysis Basic - ( 26 Sep 2017 13:13 )    Color: Yellow / Appearance: Turbid / S.020 / pH: x  Gluc: x / Ketone: Negative  / Bili: Moderate / Urobili: 8 mg/dL   Blood: x / Protein: 100 mg/dL / Nitrite: Negative   Leuk Esterase: Moderate / RBC: >50 /HPF / WBC >50   Sq Epi: x / Non Sq Epi: x / Bacteria: TNTC      CULTURES:      Physical Examination:    General: No acute distress.  Obtunded, not following commands    HEENT: Pupils equal (2mm), reactive to light.  Symmetric.    PULM: Clear to auscultation bilaterally, no significant sputum production    CVS: tachycardic, regular rhythm, no murmurs, rubs, or gallops    ABD: Soft, nondistended, nontender, normoactive bowel sounds, no masses    EXT: No edema, nontender    SKIN: Warm and well perfused, left lower extremity ulceration    NEURO: A&Ox0, obtunded, not following commands, arouses to noxious stimuli    RADIOLOGY:     < from: CT Head No Cont (17 @ 20:25) >  IMPRESSION:    No acute intracranial hemorrhage or acute territorial infarct.   Ventricles are prominent somewhat out of proportion to sulci raising the   possibility of normal pressure hydrocephalus.  Scattered benign-appearing punctate calcifications      < from: US Abdomen Complete (17 @ 17:29) >  IMPRESSION:    Coarsened liver without focal abnormality  Gallbladder sludge with mild gallbladder wall thickening. There is no   ductal dilatation  Small right renal cyst    < from: Xray Chest 1 View AP- PORTABLE-Urgent (17 @ 12:49) >    No evidence of infiltrate effusion or congestive failure. Heart size   within normal limits. Hilar regions mediastinal contours and bony thorax   are intact. Atherosclerotic changes aorta.    IMPRESSION: No active disease.      CRITICAL CARE TIME SPENT: 65 mins  Evaluating/treating patient, reviewing data/labs/imaging, discussing case with multidisciplinary team, discussing plan/goals of care with patient/family. Non-inclusive of procedure time.

## 2017-09-26 NOTE — CONSULT NOTE ADULT - PROBLEM SELECTOR RECOMMENDATION 7
Likely shock liver, although past labs from prior admissions all seem to show transaminitis. No documented h/o liver disease. Will trend hepatic function panels. Abdominal US shows coarsened liver without focal abnormality. F/u ammonia level.

## 2017-09-26 NOTE — ED PROVIDER NOTE - CRITICAL CARE PROVIDED
documentation/direct patient care (not related to procedure)/interpretation of diagnostic studies/consultation with other physicians/additional history taking

## 2017-09-26 NOTE — ED PROVIDER NOTE - PROGRESS NOTE DETAILS
89 y/o M from [facility] sent for evaluation of fever today. Hx of dementia, non verbal cannot provide history. Orange, purulent urine noted. Will admit for urosepsis 89 y/o M from [facility] sent for evaluation of fever today. Hx of dementia, non verbal cannot provide history. Orange, purulent urine noted. Will admit for urosepsis. Dr. Steve covering Dr. Freedman. Dr. Calvin consulting.

## 2017-09-26 NOTE — INPATIENT CERTIFICATION FOR MEDICARE PATIENTS - RISKS OF ADVERSE EVENTS
Concern for delay in diagnosis and treatment/Concern for cardiopulmonary deterioration/Concern for renal deterioration

## 2017-09-26 NOTE — ED ADULT NURSE REASSESSMENT NOTE - NS ED NURSE REASSESS COMMENT FT1
condition improving. Remains unresponsive to verbal stimuli. responsive to tactile and painful stimuli. CM sinus tach with no ectopics noted. Less tachypneic RR 26 o2 on at 2 liters. vital signs stable. IV fluids infusing well

## 2017-09-26 NOTE — ED PROVIDER NOTE - CARE PLAN
Principal Discharge DX:	Sepsis, due to unspecified organism Principal Discharge DX:	Sepsis, due to unspecified organism  Secondary Diagnosis:	UTI (urinary tract infection)

## 2017-09-26 NOTE — CONSULT NOTE ADULT - SUBJECTIVE AND OBJECTIVE BOX
History of Present Illness: The patient is a 89 year old male with a history of HTN, DM, CKD, dementia who presents with AMS, fever, hyperglycemia. The patient is unable to provide additional history. In ED, noted to have orange, purulent urine.    Past Medical/Surgical History:  HTN, DM, CKD, dementia    Medications:  MEDICATIONS  (STANDING):  sodium chloride 0.9% Bolus 1000 milliLiter(s) IV Bolus once  piperacillin/tazobactam IVPB. 3.375 Gram(s) IV Intermittent once    MEDICATIONS  (PRN):      Family History: Non-contributory family history of premature cardiovascular atherosclerotic disease    Social History: No tobacco, alcohol or drug use    Review of Systems:  General: No fevers, chills, weight loss or gain  Skin: No rashes, color changes  Cardiovascular: No chest pain, orthopnea  Respiratory: No shortness of breath, cough  Gastrointestinal: No nausea, abdominal pain  Genitourinary: No incontinence, pain with urination  Musculoskeletal: No pain, swelling, decreased range of motion  Neurological: No headache, weakness  Psychiatric: No depression, anxiety  Endocrine: No weight loss or gain, increased thirst  All other systems are comprehensively negative.    Physical Exam:  Vitals:        Vital Signs Last 24 Hrs  T(C): 37.8 (26 Sep 2017 12:15), Max: 37.8 (26 Sep 2017 12:15)  T(F): 100 (26 Sep 2017 12:15), Max: 100 (26 Sep 2017 12:15)  HR: 104 (26 Sep 2017 14:06) (104 - 120)  BP: 119/54 (26 Sep 2017 14:06) (116/62 - 139/74)  BP(mean): --  RR: 24 (26 Sep 2017 14:06) (24 - 32)  SpO2: 97% (26 Sep 2017 14:06) (97% - 97%)  General: Lethargic  HEENT: dry MM  Neck: No JVD, no carotid bruit  Lungs: CTAB  CV: Tachycardic, nl S1/S2, no M/R/G  Abdomen: S/NT/ND, +BS  Extremities: No LE edema, no cyanosis  Neuro: AAOx3, non-focal  Skin: No rash    Labs:                        16.1   12.2  )-----------( 112      ( 26 Sep 2017 12:53 )             51.1     09-26    151<H>  |  113<H>  |  153<H>  ----------------------------<  615<HH>  4.6   |  26  |  5.27<H>    Ca    10.7<H>      26 Sep 2017 12:53    TPro  7.8  /  Alb  2.3<L>  /  TBili  1.5<H>  /  DBili  x   /  AST  251<H>  /  ALT  92<H>  /  AlkPhos  227<H>  09-26    CARDIAC MARKERS ( 26 Sep 2017 13:17 )  .531 ng/mL / x     / x     / x     / x          PT/INR - ( 26 Sep 2017 12:53 )   PT: 11.6 sec;   INR: 1.06 ratio         PTT - ( 26 Sep 2017 12:53 )  PTT:26.4 sec    ECG: Sinus tachycardia, no ST abnormality

## 2017-09-27 LAB
ALBUMIN SERPL ELPH-MCNC: 2.1 G/DL — LOW (ref 3.3–5)
ALP SERPL-CCNC: 264 U/L — HIGH (ref 30–120)
ALT FLD-CCNC: 87 U/L DA — HIGH (ref 10–60)
AMMONIA BLD-MCNC: <10 UMOL/L — LOW (ref 11–32)
ANION GAP SERPL CALC-SCNC: 9 MMOL/L — SIGNIFICANT CHANGE UP (ref 5–17)
AST SERPL-CCNC: 243 U/L — HIGH (ref 10–40)
BASE EXCESS BLDV CALC-SCNC: -0.6 MMOL/L — SIGNIFICANT CHANGE UP (ref -2–2)
BILIRUB DIRECT SERPL-MCNC: 2.1 MG/DL — HIGH (ref 0–0.2)
BILIRUB INDIRECT FLD-MCNC: 0.5 MG/DL — SIGNIFICANT CHANGE UP (ref 0.2–1)
BILIRUB SERPL-MCNC: 2.6 MG/DL — HIGH (ref 0.2–1.2)
BUN SERPL-MCNC: 122 MG/DL — HIGH (ref 7–23)
CALCIUM SERPL-MCNC: 9.6 MG/DL — SIGNIFICANT CHANGE UP (ref 8.4–10.5)
CHLORIDE SERPL-SCNC: 120 MMOL/L — HIGH (ref 96–108)
CK SERPL-CCNC: 3906 U/L — HIGH (ref 39–308)
CO2 SERPL-SCNC: 26 MMOL/L — SIGNIFICANT CHANGE UP (ref 22–31)
CREAT SERPL-MCNC: 3.76 MG/DL — HIGH (ref 0.5–1.3)
E COLI DNA BLD POS QL NAA+NON-PROBE: SIGNIFICANT CHANGE UP
GAS PNL BLDV: SIGNIFICANT CHANGE UP
GLUCOSE SERPL-MCNC: 303 MG/DL — HIGH (ref 70–99)
GRAM STN FLD: SIGNIFICANT CHANGE UP
GRAM STN FLD: SIGNIFICANT CHANGE UP
HAV IGM SER-ACNC: SIGNIFICANT CHANGE UP
HBA1C BLD-MCNC: 9.5 % — HIGH (ref 4–5.6)
HBV CORE IGM SER-ACNC: SIGNIFICANT CHANGE UP
HBV SURFACE AG SER-ACNC: SIGNIFICANT CHANGE UP
HCO3 BLDV-SCNC: 22 MMOL/L — SIGNIFICANT CHANGE UP (ref 21–29)
HCT VFR BLD CALC: 47.4 % — SIGNIFICANT CHANGE UP (ref 39–50)
HCV AB S/CO SERPL IA: 15.18 S/CO — SIGNIFICANT CHANGE UP
HCV AB SERPL-IMP: REACTIVE
HGB BLD-MCNC: 15.4 G/DL — SIGNIFICANT CHANGE UP (ref 13–17)
HOROWITZ INDEX BLDV+IHG-RTO: 21 — SIGNIFICANT CHANGE UP
INR BLD: 1.14 RATIO — SIGNIFICANT CHANGE UP (ref 0.88–1.16)
LACTATE SERPL-SCNC: 1.5 MMOL/L — SIGNIFICANT CHANGE UP (ref 0.7–2)
LACTATE SERPL-SCNC: 2.6 MMOL/L — HIGH (ref 0.7–2)
LEGIONELLA AG UR QL: NEGATIVE — SIGNIFICANT CHANGE UP
MAGNESIUM SERPL-MCNC: 2.6 MG/DL — SIGNIFICANT CHANGE UP (ref 1.6–2.6)
MCHC RBC-ENTMCNC: 29.2 PG — SIGNIFICANT CHANGE UP (ref 27–34)
MCHC RBC-ENTMCNC: 32.5 GM/DL — SIGNIFICANT CHANGE UP (ref 32–36)
MCV RBC AUTO: 90.1 FL — SIGNIFICANT CHANGE UP (ref 80–100)
METHOD TYPE: SIGNIFICANT CHANGE UP
PCO2 BLDV: 46 MMHG — SIGNIFICANT CHANGE UP (ref 35–50)
PH BLDV: 7.34 — LOW (ref 7.35–7.45)
PHOSPHATE SERPL-MCNC: 2.7 MG/DL — SIGNIFICANT CHANGE UP (ref 2.5–4.5)
PLATELET # BLD AUTO: 102 K/UL — LOW (ref 150–400)
PO2 BLDV: 36 — SIGNIFICANT CHANGE UP (ref 25–45)
POTASSIUM SERPL-MCNC: 4.8 MMOL/L — SIGNIFICANT CHANGE UP (ref 3.5–5.3)
POTASSIUM SERPL-SCNC: 4.8 MMOL/L — SIGNIFICANT CHANGE UP (ref 3.5–5.3)
PROT SERPL-MCNC: 7.3 G/DL — SIGNIFICANT CHANGE UP (ref 6–8.3)
PROTHROM AB SERPL-ACNC: 12.5 SEC — SIGNIFICANT CHANGE UP (ref 9.8–12.7)
RBC # BLD: 5.25 M/UL — SIGNIFICANT CHANGE UP (ref 4.2–5.8)
RBC # FLD: 16 % — HIGH (ref 10.3–14.5)
SAO2 % BLDV: 59 % — LOW (ref 67–88)
SODIUM SERPL-SCNC: 155 MMOL/L — HIGH (ref 135–145)
SPECIMEN SOURCE: SIGNIFICANT CHANGE UP
SPECIMEN SOURCE: SIGNIFICANT CHANGE UP
TROPONIN I SERPL-MCNC: 0.48 NG/ML — HIGH (ref 0.02–0.06)
TROPONIN I SERPL-MCNC: 0.49 NG/ML — HIGH (ref 0.02–0.06)
VANCOMYCIN FLD-MCNC: 9.1 UG/ML — SIGNIFICANT CHANGE UP
WBC # BLD: 20.8 K/UL — HIGH (ref 3.8–10.5)
WBC # FLD AUTO: 20.8 K/UL — HIGH (ref 3.8–10.5)

## 2017-09-27 PROCEDURE — 99231 SBSQ HOSP IP/OBS SF/LOW 25: CPT

## 2017-09-27 PROCEDURE — 71010: CPT | Mod: 26

## 2017-09-27 RX ORDER — ASPIRIN/CALCIUM CARB/MAGNESIUM 324 MG
300 TABLET ORAL DAILY
Qty: 0 | Refills: 0 | Status: DISCONTINUED | OUTPATIENT
Start: 2017-09-27 | End: 2017-09-28

## 2017-09-27 RX ORDER — INSULIN LISPRO 100/ML
VIAL (ML) SUBCUTANEOUS
Qty: 0 | Refills: 0 | Status: DISCONTINUED | OUTPATIENT
Start: 2017-09-27 | End: 2017-09-28

## 2017-09-27 RX ORDER — FAMOTIDINE 10 MG/ML
20 INJECTION INTRAVENOUS DAILY
Qty: 0 | Refills: 0 | Status: DISCONTINUED | OUTPATIENT
Start: 2017-09-27 | End: 2017-10-04

## 2017-09-27 RX ORDER — LACTOBACILLUS ACIDOPHILUS 100MM CELL
1 CAPSULE ORAL EVERY 8 HOURS
Qty: 0 | Refills: 0 | Status: DISCONTINUED | OUTPATIENT
Start: 2017-09-27 | End: 2017-10-04

## 2017-09-27 RX ORDER — INSULIN LISPRO 100/ML
VIAL (ML) SUBCUTANEOUS
Qty: 0 | Refills: 0 | Status: DISCONTINUED | OUTPATIENT
Start: 2017-09-27 | End: 2017-09-27

## 2017-09-27 RX ORDER — INSULIN HUMAN 100 [IU]/ML
5 INJECTION, SOLUTION SUBCUTANEOUS
Qty: 100 | Refills: 0 | Status: DISCONTINUED | OUTPATIENT
Start: 2017-09-27 | End: 2017-09-27

## 2017-09-27 RX ORDER — TAMSULOSIN HYDROCHLORIDE 0.4 MG/1
0.4 CAPSULE ORAL AT BEDTIME
Qty: 0 | Refills: 0 | Status: DISCONTINUED | OUTPATIENT
Start: 2017-09-27 | End: 2017-10-04

## 2017-09-27 RX ADMIN — HEPARIN SODIUM 5000 UNIT(S): 5000 INJECTION INTRAVENOUS; SUBCUTANEOUS at 06:26

## 2017-09-27 RX ADMIN — Medication 1: at 23:20

## 2017-09-27 RX ADMIN — PIPERACILLIN AND TAZOBACTAM 25 GRAM(S): 4; .5 INJECTION, POWDER, LYOPHILIZED, FOR SOLUTION INTRAVENOUS at 23:23

## 2017-09-27 RX ADMIN — SODIUM CHLORIDE 120 MILLILITER(S): 9 INJECTION, SOLUTION INTRAVENOUS at 03:58

## 2017-09-27 RX ADMIN — Medication 4: at 15:40

## 2017-09-27 RX ADMIN — PIPERACILLIN AND TAZOBACTAM 25 GRAM(S): 4; .5 INJECTION, POWDER, LYOPHILIZED, FOR SOLUTION INTRAVENOUS at 00:51

## 2017-09-27 RX ADMIN — Medication 3: at 00:58

## 2017-09-27 RX ADMIN — NYSTATIN CREAM 1 APPLICATION(S): 100000 CREAM TOPICAL at 17:25

## 2017-09-27 RX ADMIN — LACTULOSE 200 GRAM(S): 10 SOLUTION ORAL at 04:20

## 2017-09-27 RX ADMIN — Medication 1: at 21:15

## 2017-09-27 RX ADMIN — Medication 300 MILLIGRAM(S): at 15:45

## 2017-09-27 RX ADMIN — SODIUM CHLORIDE 120 MILLILITER(S): 9 INJECTION, SOLUTION INTRAVENOUS at 13:02

## 2017-09-27 RX ADMIN — Medication 3: at 03:57

## 2017-09-27 RX ADMIN — LACTULOSE 200 GRAM(S): 10 SOLUTION ORAL at 00:53

## 2017-09-27 RX ADMIN — NYSTATIN CREAM 1 APPLICATION(S): 100000 CREAM TOPICAL at 06:26

## 2017-09-27 RX ADMIN — Medication 3: at 06:36

## 2017-09-27 RX ADMIN — PIPERACILLIN AND TAZOBACTAM 25 GRAM(S): 4; .5 INJECTION, POWDER, LYOPHILIZED, FOR SOLUTION INTRAVENOUS at 13:57

## 2017-09-27 RX ADMIN — HEPARIN SODIUM 5000 UNIT(S): 5000 INJECTION INTRAVENOUS; SUBCUTANEOUS at 17:25

## 2017-09-27 RX ADMIN — Medication 1 TABLET(S): at 21:16

## 2017-09-27 RX ADMIN — Medication 4: at 17:35

## 2017-09-27 RX ADMIN — INSULIN GLARGINE 10 UNIT(S): 100 INJECTION, SOLUTION SUBCUTANEOUS at 21:15

## 2017-09-27 RX ADMIN — Medication 2: at 09:31

## 2017-09-27 RX ADMIN — FAMOTIDINE 20 MILLIGRAM(S): 10 INJECTION INTRAVENOUS at 15:42

## 2017-09-27 RX ADMIN — SODIUM CHLORIDE 120 MILLILITER(S): 9 INJECTION, SOLUTION INTRAVENOUS at 21:16

## 2017-09-27 RX ADMIN — Medication 2: at 11:49

## 2017-09-27 NOTE — PROGRESS NOTE ADULT - SUBJECTIVE AND OBJECTIVE BOX
Patient is a 89y Male with past medical history of  ckd          presenting with     chrissy   who reports no complaints overnight.    REVIEW OF SYSTEMS:    CONSTITUTIONAL: No  fevers or chills  unable to get a good ros    Allergies    No Known Allergies    Intolerances        MEDICATIONS  (STANDING):  aspirin enteric coated 81 milliGRAM(s) Oral daily  piperacillin/tazobactam IVPB. 3.375 Gram(s) IV Intermittent every 12 hours  dextrose 5%. 1000 milliLiter(s) (50 mL/Hr) IV Continuous <Continuous>  dextrose 50% Injectable 12.5 Gram(s) IV Push once  dextrose 50% Injectable 25 Gram(s) IV Push once  dextrose 50% Injectable 25 Gram(s) IV Push once  sodium chloride 0.45%. 1000 milliLiter(s) (120 mL/Hr) IV Continuous <Continuous>  heparin  Injectable 5000 Unit(s) SubCutaneous every 12 hours  tamsulosin 0.4 milliGRAM(s) Oral at bedtime  lactobacillus acidophilus 1 Tablet(s) Oral every 8 hours  famotidine    Tablet 20 milliGRAM(s) Oral daily  nystatin Powder 1 Application(s) Topical two times a day  insulin glargine Injectable (LANTUS) 10 Unit(s) SubCutaneous at bedtime  lactulose Retention Enema 200 Gram(s) Rectal every 6 hours  insulin Infusion 5 Unit(s)/Hr (5 mL/Hr) IV Continuous <Continuous>      Vitals:  T(F): 97.5 (17 @ 07:16), Max: 101 (17 @ 17:15)  HR: 90 (17 @ 06:00)  BP: 149/95 (17 @ 06:00)  BP(mean): 112 (17 @ 06:00)  RR: 17 (17 @ 06:00)  SpO2: 95% (17 @ 06:00)  Wt(kg): --    I and O's:     @ 07:01  -   @ 07:00  --------------------------------------------------------  IN: 4170 mL / OUT: 1101 mL / NET: 3069 mL     @ 07:01  -   @ 08:20  --------------------------------------------------------  IN: 120 mL / OUT: 200 mL / NET: -80 mL        Height (cm): 170.18 ( @ 12:24)  Weight (kg): 72.6 ( @ 12:24)  BMI (kg/m2): 25.1 ( @ 12:24)    PHYSICAL EXAM:    Constitutional: NAD,   Neck: No JVD  Respiratory: CTAB  Cardiovascular: S1 and S2  Gastrointestinal: BS+, soft, NT/ND  Extremities: No peripheral edema  Neurological:  no focal deficits  Psychiatric: Normal mood, normal affect  : pos  Joni  Skin: dry  Dialysis Access: Not applicable    LABS:                        15.4   20.8  )-----------( x        ( 27 Sep 2017 06:31 )             47.4                         16.1   12.2  )-----------( 112      ( 26 Sep 2017 12:53 )             51.1       155    |  120    |  122    ----------------------------<  303       27 Sep 2017 06:31  4.8     |  26     |  3.76     155    |  121    |  138    ----------------------------<  298       26 Sep 2017 23:15  4.0     |  22     |  4.13     151    |  113    |  153    ----------------------------<  615       26 Sep 2017 12:53  4.6     |  26     |  5.27     Ca    9.6        27 Sep 2017 06:31  Ca    9.4        26 Sep 2017 23:15    Phos  2.7       27 Sep 2017 06:31    Mg     2.6       27 Sep 2017 06:31    TPro  7.3    /  Alb  2.1    /  TBili  2.6    /        27 Sep 2017 06:31  DBili  2.1    /  AST  243    /  ALT  87     /  AlkPhos  264      TPro  6.4    /  Alb  1.9    /  TBili  2.2    /        26 Sep 2017 23:15  DBili  x      /  AST  225    /  ALT  73     /  AlkPhos  395          Serum Osmo:   Serum Uric Acid:   MAVERICK:   Double Stranded DNA:   C3:   C3 Nephritic Factor:   C4:   p-ANCA:   c-ANCA:   Anti-GBM:   CHAPO-Smith Antibody:   Immunofixation:   [place]/Lambda Free Light Chain:   SPEP:   Hepatitis Panel:   HIV-1/2:     Urine Studies:  Urinalysis Basic - ( 26 Sep 2017 13:13 )    Color: Yellow / Appearance: Turbid / S.020 / pH: x  Gluc: x / Ketone: Negative  / Bili: Moderate / Urobili: 8 mg/dL   Blood: x / Protein: 100 mg/dL / Nitrite: Negative   Leuk Esterase: Moderate / RBC: >50 /HPF / WBC >50   Sq Epi: x / Non Sq Epi: x / Bacteria: TNTC        Urine chemistry:   Urine Na:   Urine Creatinine:   Urine Protein/Cr ratio:  Urine K:   Urine Osm:   24 Hr urine studies:     24 hr urine Creatinine Clearance:    RADIOLOGY & ADDITIONAL STUDIES:

## 2017-09-27 NOTE — PROGRESS NOTE ADULT - SUBJECTIVE AND OBJECTIVE BOX
PROGRESS NOTE    OVERNIGHT  No new issues reported by medical staff . All above noted ,seen by ID Blood cx reported positive  SUBJECTIVE  Patient is resting in a bed comfortably .Confused ,poor mentation .  PAST MEDICAL & SURGICAL HISTORY:  Parkinson disease  Neuropathy  CKD (chronic kidney disease)  BPH (benign prostatic hyperplasia)  Diabetes  Angina pectoris  Hypertension  Diabetes  Renal failure  Dementia  No significant past surgical history    HEALTH ISSUES - PROBLEM Dx:  Elevated LFTs: Elevated LFTs  Elevated troponin: Elevated troponin  Hyperglycemia: Hyperglycemia  Acute on chronic renal failure: Acute on chronic renal failure  Altered mental status: Altered mental status  Severe sepsis: Severe sepsis  ARVIN (acute kidney injury): ARVIN (acute kidney injury)  Prophylactic measure: Prophylactic measure  UTI (urinary tract infection): UTI (urinary tract infection)  Dementia: Dementia  Angina pectoris: Angina pectoris  Hypertension: Hypertension  Diabetes: Diabetes  Renal failure: Renal failure  Sepsis, due to unspecified organism: Sepsis, due to unspecified organism          MEDICATIONS  (STANDING):  aspirin enteric coated 81 milliGRAM(s) Oral daily  piperacillin/tazobactam IVPB. 3.375 Gram(s) IV Intermittent every 12 hours  dextrose 5%. 1000 milliLiter(s) (50 mL/Hr) IV Continuous <Continuous>  dextrose 50% Injectable 12.5 Gram(s) IV Push once  dextrose 50% Injectable 25 Gram(s) IV Push once  dextrose 50% Injectable 25 Gram(s) IV Push once  sodium chloride 0.45%. 1000 milliLiter(s) (120 mL/Hr) IV Continuous <Continuous>  heparin  Injectable 5000 Unit(s) SubCutaneous every 12 hours  tamsulosin 0.4 milliGRAM(s) Oral at bedtime  lactobacillus acidophilus 1 Tablet(s) Oral every 8 hours  famotidine    Tablet 20 milliGRAM(s) Oral daily  nystatin Powder 1 Application(s) Topical two times a day  insulin glargine Injectable (LANTUS) 10 Unit(s) SubCutaneous at bedtime  lactulose Retention Enema 200 Gram(s) Rectal every 6 hours  insulin lispro (HumaLOG) corrective regimen sliding scale   SubCutaneous every 3 hours    MEDICATIONS  (PRN):  dextrose Gel 1 Dose(s) Oral once PRN Blood Glucose LESS THAN 70 milliGRAM(s)/deciliter  glucagon  Injectable 1 milliGRAM(s) IntraMuscular once PRN Glucose LESS THAN 70 milligrams/deciliter      OBJECTIVE    T(C): 36.5 (17 @ 11:49), Max: 38.3 (17 @ 17:15)  HR: 90 (17 @ 06:00) (90 - 126)  BP: 149/95 (17 @ 06:00) (95/54 - 176/91)  RR: 17 (17 @ 06:00) (17 - 31)  SpO2: 95% (17 @ 06:00) (94% - 99%)  Wt(kg): --  I&O's Summary    26 Sep 2017 07:  -  27 Sep 2017 07:00  --------------------------------------------------------  IN: 4170 mL / OUT: 1101 mL / NET: 3069 mL    27 Sep 2017 07:01  -  27 Sep 2017 13:33  --------------------------------------------------------  IN: 120 mL / OUT: 260 mL / NET: -140 mL          REVIEW OF SYSTEMS:  ROS is unobtainable dur to lethargy and confusion       PHYSICAL EXAM:  Appearance: NAD. lethargy  HEENT:   Moist oral mucosa. Conjunctiva clear b/l.   Neck : supple  Cardiovascular: RRR ,S1S2 present  Respiratory: Lungs CTAB.  Gastrointestinal:  Soft, nontender. No rebound. No rigidity. BS present	  Skin: No rashes, No ecchymoses, No cyanosis.	  Neurologic: Non-focal. Moving all extremities. Following commands.  Extremities: No edema. No erythema. No calf tenderness.  	  LABS:                        15.4   20.8  )-----------( 102      ( 27 Sep 2017 06:31 )             47.4         155<H>  |  120<H>  |  122<H>  ----------------------------<  303<H>  4.8   |  26  |  3.76<H>    Ca    9.6      27 Sep 2017 06:31  Phos  2.7       Mg     2.6         TPro  7.3  /  Alb  2.1<L>  /  TBili  2.6<H>  /  DBili  2.1<H>  /  AST  243<H>  /  ALT  87<H>  /  AlkPhos  264<H>      PT/INR - ( 27 Sep 2017 06:31 )   PT: 12.5 sec;   INR: 1.14 ratio         PTT - ( 26 Sep 2017 12:53 )  PTT:26.4 sec  Urinalysis Basic - ( 26 Sep 2017 13:13 )    Color: Yellow / Appearance: Turbid / S.020 / pH: x  Gluc: x / Ketone: Negative  / Bili: Moderate / Urobili: 8 mg/dL   Blood: x / Protein: 100 mg/dL / Nitrite: Negative   Leuk Esterase: Moderate / RBC: >50 /HPF / WBC >50   Sq Epi: x / Non Sq Epi: x / Bacteria: TNTC  < from: CT Chest No Cont (17 @ 20:37) >  EXAM:  CT ABDOMEN AND PELVIS                          EXAM:  CT CHEST                                  PROCEDURE DATE:  2017          INTERPRETATION:  [facility] PRELIMINARY REPORT    EXAM:    CT Chest Without Intravenous Contrast    CTAbdomen and Pelvis Without Intravenous Contrast    CLINICAL HISTORY:    89 years old, male; Signs and symptoms; Wheezing; Additional info: Cat   scan   of chest, abdomen/pelvis. R/O pneumonia; Sepsis.    TECHNIQUE:    Axial computed tomography imagesof the chest, abdomen and pelvis   without   intravenous contrast.  All CT scans at this facility use one or more dose   reduction techniques, viz.: automated exposure control; ma/kV adjustment   per   patient size (including targeted exams where dose is matched to   indication;   i.e. head); or iterative reconstruction technique.    Coronal and sagittal reformatted images were created and reviewed.    COMPARISON:    No relevant prior studies available.    FINDINGS:      Limitations:  Motion/streak/beam hardening artifact.  Limited without   intravenous or oral contrast.   CHEST:      Lungs:  Bibasilar atelectasis with focal consolidation in the left   lower lobe   likely representing acute air space disease/pneumonia. Followup to   resolution   recommended.  Scattered nonspecific subcentimeter pulmonary nodules.      Pleural space:  No significant effusion.  No pneumothorax.      Heart:  Mild coronary arterial calcifications.  No significant   pericardial   effusion.      Mediastinum:  Small hiatal hernia.     ABDOMEN:      Liver:  Slightly nodular hepatic contour suggestive of cirrhosis.    Limited   evaluation for focal hepatic lesions without intravenous contrast.      Gallbladder and bile ducts:  Grossly unremarkable on this unenhanced   study.      Pancreas:  Grossly unremarkable on this unenhanced study.      Spleen:  Grossly unremarkable on this unenhanced study.      Adrenals:  Grossly unremarkable on this unenhanced study.      Kidneys and ureters:  Right low attenuationrenal lesion, incompletely   evaluated without intravenous contrast.  Further evaluation with a   contrast   enhanced study or renal ultrasound may be obtained on a non-emergent   basis.  No   hydronephrosis or obstructing stones.      Stomach and bowel:  Abundant fecal material in the colon which may   represent   constipation.  No obstruction.        Appendix:  Visualized without findings to suggest acute appendicitis.     PELVIS:      Bladder:  Urinary bladder collapsed around a Quinones catheter.        Reproductive:  Enlarged prostate gland.     CHEST, ABDOMEN and PELVIS:      Intraperitoneal space:  No significant fluid collection.  No free air.      Bones/joints:  Degenerative changes.  Mild multilevel thoracolumbar   vertebral   body compression deformities, age-indeterminate.  No dislocation.      Soft tissues:  Grossly unremarkable on this unenhanced study.      Vasculature:  Atherosclerotic calcifications.      Lymph nodes:  Grossly unremarkable on this unenhanced study.    IMPRESSION:         Chest:  1.  Bibasilar atelectasis with focal consolidation in the left lower lobe   likely representing acute air space disease/pneumonia. Followup to   resolution   recommended.    2.  Small hiatal hernia.    Abdomen/pelvis:  3.  Slightly nodular hepatic contour suggestive of cirrhosis.  Limited   evaluation for focal hepatic lesions without intravenous contrast.    4.  Abundant fecal material in the colon which may represent constipation.    5.  Incidental/non-acute findings are described above.    CT CHEST/ABDOMEN/PELVIS:     CLINICAL INFORMATION:  Sepsis.    COMPARISON: None.    PROCEDURE:  Using multislice helical CT, 2.5 mm sections were obtained   from the thoracic inlet through the ischial tuberosities.   Multiplanar reformatted images.       There is patchy left lower lobe airspace consolidation, which may   reflect pneumonia in the appropriate clinical setting.  The central airways remain patent. There are minimal atelectatic changes   at the lung bases.  No thickened pleural effusion is noted.    There are small calcified subcarinal mediastinal lymph nodes.  The evaluation of the pulmonary hilum is limited without intravenous   contrast.    There is arteriosclerotic calcification of the thoracic aorta and   coronary artery calcification.  No pericardial effusion is noted.    There is a small hiatal hernia.    The evaluation of the solid organ parenchyma is limited without   intravenous contrast.    There is a slightly nodular contour of the liver, suggestive of   cirrhosis. The evaluation for hepatic masses is limited on noncontrast   exam.    The spleen and gallbladder appear unremarkable.    The adrenal glands and nonenhanced pancreas are unremarkable in   appearance.    There is a cyst at the lower pole the right kidney. No hydronephrosis is   noted.    There is arteriosclerotic calcification of the abdominal aorta and major   branch vessels.  No enlarged retroperitoneal lymphadenopathy is noted.    The evaluation of the stomach and gastrointestinal tract is limited   without oral contrast distention.  There is dense stool with mild distention of the rectosigmoid colon and   mild bowel wall thickening.  No bowel obstruction is noted.  No localized intra-abdominal fluid collection or pneumoperitoneum is   noted.    There is a normal-appearing appendix.    There is an enlarged prostate gland. There is a balloon Quinones catheter   with air in the nondistended urinary bladder.  No free fluid is noted within the pelvis.    There are multilevel degenerative changes of the thoracic and lumbar   spine.  There are multiple age indeterminate compression deformities of the mid   to lower thoracic and upper lumbar spine.    Impression:    Bibasilar atelectasis with left lower lobe airspace consolidation which   may reflect pneumonia.  Recommend follow-up to resolution.    Fecal impaction with mild distention of the rectosigmoid colon, correlate   clinically for stercoral colitis.    Other findings as discussed above.    < end of copied text >        RADIOLOGY & ADDITIONAL TESTS:  Labs and imaging reviewed electronically   ASSESSMENT/PLAN: PROGRESS NOTE    OVERNIGHT  No new issues reported by medical staff . All above noted ,seen by ID Blood cx reported positive  SUBJECTIVE  Patient is resting in a bed comfortably ,lethargic,no distress noted  PAST MEDICAL & SURGICAL HISTORY:  Parkinson disease  Neuropathy  CKD (chronic kidney disease)  BPH (benign prostatic hyperplasia)  Diabetes  Angina pectoris  Hypertension  Diabetes  Renal failure  Dementia  No significant past surgical history    HEALTH ISSUES - PROBLEM Dx:  Elevated LFTs: Elevated LFTs  Elevated troponin: Elevated troponin  Hyperglycemia: Hyperglycemia  Acute on chronic renal failure: Acute on chronic renal failure  Altered mental status: Altered mental status  Severe sepsis: Severe sepsis  ARVIN (acute kidney injury): ARVIN (acute kidney injury)  Prophylactic measure: Prophylactic measure  UTI (urinary tract infection): UTI (urinary tract infection)  Dementia: Dementia  Angina pectoris: Angina pectoris  Hypertension: Hypertension  Diabetes: Diabetes  Renal failure: Renal failure  Sepsis, due to unspecified organism: Sepsis, due to unspecified organism          MEDICATIONS  (STANDING):  aspirin enteric coated 81 milliGRAM(s) Oral daily  piperacillin/tazobactam IVPB. 3.375 Gram(s) IV Intermittent every 12 hours  dextrose 5%. 1000 milliLiter(s) (50 mL/Hr) IV Continuous <Continuous>  dextrose 50% Injectable 12.5 Gram(s) IV Push once  dextrose 50% Injectable 25 Gram(s) IV Push once  dextrose 50% Injectable 25 Gram(s) IV Push once  sodium chloride 0.45%. 1000 milliLiter(s) (120 mL/Hr) IV Continuous <Continuous>  heparin  Injectable 5000 Unit(s) SubCutaneous every 12 hours  tamsulosin 0.4 milliGRAM(s) Oral at bedtime  lactobacillus acidophilus 1 Tablet(s) Oral every 8 hours  famotidine    Tablet 20 milliGRAM(s) Oral daily  nystatin Powder 1 Application(s) Topical two times a day  insulin glargine Injectable (LANTUS) 10 Unit(s) SubCutaneous at bedtime  lactulose Retention Enema 200 Gram(s) Rectal every 6 hours  insulin lispro (HumaLOG) corrective regimen sliding scale   SubCutaneous every 3 hours    MEDICATIONS  (PRN):  dextrose Gel 1 Dose(s) Oral once PRN Blood Glucose LESS THAN 70 milliGRAM(s)/deciliter  glucagon  Injectable 1 milliGRAM(s) IntraMuscular once PRN Glucose LESS THAN 70 milligrams/deciliter      OBJECTIVE    T(C): 36.5 (17 @ 11:49), Max: 38.3 (17 @ 17:15)  HR: 90 (17 @ 06:00) (90 - 126)  BP: 149/95 (17 @ 06:00) (95/54 - 176/91)  RR: 17 (17 @ 06:00) (17 - 31)  SpO2: 95% (17 @ 06:00) (94% - 99%)  Wt(kg): --  I&O's Summary    26 Sep 2017 07:  -  27 Sep 2017 07:00  --------------------------------------------------------  IN: 4170 mL / OUT: 1101 mL / NET: 3069 mL    27 Sep 2017 07:01  -  27 Sep 2017 13:33  --------------------------------------------------------  IN: 120 mL / OUT: 260 mL / NET: -140 mL          REVIEW OF SYSTEMS:  ROS is unobtainable dur to lethargy and confusion       PHYSICAL EXAM:  Appearance: NAD. lethargy  HEENT:   Moist oral mucosa. Conjunctiva clear b/l.   Neck : supple  Cardiovascular: RRR ,S1S2 present  Respiratory: Lungs CTAB.  Gastrointestinal:  Soft, nontender. No rebound. No rigidity. BS present	  Skin: No rashes, No ecchymoses, No cyanosis.	  Neurologic: Non-focal. Moving all extremities. Following commands.  Extremities: No edema. No erythema. No calf tenderness.  	  LABS:                        15.4   20.8  )-----------( 102      ( 27 Sep 2017 06:31 )             47.4         155<H>  |  120<H>  |  122<H>  ----------------------------<  303<H>  4.8   |  26  |  3.76<H>    Ca    9.6      27 Sep 2017 06:31  Phos  2.7       Mg     2.6         TPro  7.3  /  Alb  2.1<L>  /  TBili  2.6<H>  /  DBili  2.1<H>  /  AST  243<H>  /  ALT  87<H>  /  AlkPhos  264<H>      PT/INR - ( 27 Sep 2017 06:31 )   PT: 12.5 sec;   INR: 1.14 ratio         PTT - ( 26 Sep 2017 12:53 )  PTT:26.4 sec  Urinalysis Basic - ( 26 Sep 2017 13:13 )    Color: Yellow / Appearance: Turbid / S.020 / pH: x  Gluc: x / Ketone: Negative  / Bili: Moderate / Urobili: 8 mg/dL   Blood: x / Protein: 100 mg/dL / Nitrite: Negative   Leuk Esterase: Moderate / RBC: >50 /HPF / WBC >50   Sq Epi: x / Non Sq Epi: x / Bacteria: TNTC  < from: CT Chest No Cont (17 @ 20:37) >  EXAM:  CT ABDOMEN AND PELVIS                          EXAM:  CT CHEST                                  PROCEDURE DATE:  2017          INTERPRETATION:  [facility] PRELIMINARY REPORT    EXAM:    CT Chest Without Intravenous Contrast    CTAbdomen and Pelvis Without Intravenous Contrast    CLINICAL HISTORY:    89 years old, male; Signs and symptoms; Wheezing; Additional info: Cat   scan   of chest, abdomen/pelvis. R/O pneumonia; Sepsis.    TECHNIQUE:    Axial computed tomography imagesof the chest, abdomen and pelvis   without   intravenous contrast.  All CT scans at this facility use one or more dose   reduction techniques, viz.: automated exposure control; ma/kV adjustment   per   patient size (including targeted exams where dose is matched to   indication;   i.e. head); or iterative reconstruction technique.    Coronal and sagittal reformatted images were created and reviewed.    COMPARISON:    No relevant prior studies available.    FINDINGS:      Limitations:  Motion/streak/beam hardening artifact.  Limited without   intravenous or oral contrast.   CHEST:      Lungs:  Bibasilar atelectasis with focal consolidation in the left   lower lobe   likely representing acute air space disease/pneumonia. Followup to   resolution   recommended.  Scattered nonspecific subcentimeter pulmonary nodules.      Pleural space:  No significant effusion.  No pneumothorax.      Heart:  Mild coronary arterial calcifications.  No significant   pericardial   effusion.      Mediastinum:  Small hiatal hernia.     ABDOMEN:      Liver:  Slightly nodular hepatic contour suggestive of cirrhosis.    Limited   evaluation for focal hepatic lesions without intravenous contrast.      Gallbladder and bile ducts:  Grossly unremarkable on this unenhanced   study.      Pancreas:  Grossly unremarkable on this unenhanced study.      Spleen:  Grossly unremarkable on this unenhanced study.      Adrenals:  Grossly unremarkable on this unenhanced study.      Kidneys and ureters:  Right low attenuationrenal lesion, incompletely   evaluated without intravenous contrast.  Further evaluation with a   contrast   enhanced study or renal ultrasound may be obtained on a non-emergent   basis.  No   hydronephrosis or obstructing stones.      Stomach and bowel:  Abundant fecal material in the colon which may   represent   constipation.  No obstruction.        Appendix:  Visualized without findings to suggest acute appendicitis.     PELVIS:      Bladder:  Urinary bladder collapsed around a Quinones catheter.        Reproductive:  Enlarged prostate gland.     CHEST, ABDOMEN and PELVIS:      Intraperitoneal space:  No significant fluid collection.  No free air.      Bones/joints:  Degenerative changes.  Mild multilevel thoracolumbar   vertebral   body compression deformities, age-indeterminate.  No dislocation.      Soft tissues:  Grossly unremarkable on this unenhanced study.      Vasculature:  Atherosclerotic calcifications.      Lymph nodes:  Grossly unremarkable on this unenhanced study.    IMPRESSION:         Chest:  1.  Bibasilar atelectasis with focal consolidation in the left lower lobe   likely representing acute air space disease/pneumonia. Followup to   resolution   recommended.    2.  Small hiatal hernia.    Abdomen/pelvis:  3.  Slightly nodular hepatic contour suggestive of cirrhosis.  Limited   evaluation for focal hepatic lesions without intravenous contrast.    4.  Abundant fecal material in the colon which may represent constipation.    5.  Incidental/non-acute findings are described above.    CT CHEST/ABDOMEN/PELVIS:     CLINICAL INFORMATION:  Sepsis.    COMPARISON: None.    PROCEDURE:  Using multislice helical CT, 2.5 mm sections were obtained   from the thoracic inlet through the ischial tuberosities.   Multiplanar reformatted images.       There is patchy left lower lobe airspace consolidation, which may   reflect pneumonia in the appropriate clinical setting.  The central airways remain patent. There are minimal atelectatic changes   at the lung bases.  No thickened pleural effusion is noted.    There are small calcified subcarinal mediastinal lymph nodes.  The evaluation of the pulmonary hilum is limited without intravenous   contrast.    There is arteriosclerotic calcification of the thoracic aorta and   coronary artery calcification.  No pericardial effusion is noted.    There is a small hiatal hernia.    The evaluation of the solid organ parenchyma is limited without   intravenous contrast.    There is a slightly nodular contour of the liver, suggestive of   cirrhosis. The evaluation for hepatic masses is limited on noncontrast   exam.    The spleen and gallbladder appear unremarkable.    The adrenal glands and nonenhanced pancreas are unremarkable in   appearance.    There is a cyst at the lower pole the right kidney. No hydronephrosis is   noted.    There is arteriosclerotic calcification of the abdominal aorta and major   branch vessels.  No enlarged retroperitoneal lymphadenopathy is noted.    The evaluation of the stomach and gastrointestinal tract is limited   without oral contrast distention.  There is dense stool with mild distention of the rectosigmoid colon and   mild bowel wall thickening.  No bowel obstruction is noted.  No localized intra-abdominal fluid collection or pneumoperitoneum is   noted.    There is a normal-appearing appendix.    There is an enlarged prostate gland. There is a balloon Quinones catheter   with air in the nondistended urinary bladder.  No free fluid is noted within the pelvis.    There are multilevel degenerative changes of the thoracic and lumbar   spine.  There are multiple age indeterminate compression deformities of the mid   to lower thoracic and upper lumbar spine.    Impression:    Bibasilar atelectasis with left lower lobe airspace consolidation which   may reflect pneumonia.  Recommend follow-up to resolution.    Fecal impaction with mild distention of the rectosigmoid colon, correlate   clinically for stercoral colitis.    Other findings as discussed above.    < end of copied text >        RADIOLOGY & ADDITIONAL TESTS:  Labs and imaging reviewed electronically   ASSESSMENT/PLAN:

## 2017-09-27 NOTE — PROGRESS NOTE ADULT - ASSESSMENT
The patient is a 89 year old male with a history of HTN, DM, CKD, dementia who is admitted with urosepsis.    Plan:  - Echocardiogram results from 6/2017 noted; normal LV systolic function, no significant valve issues  - Troponin elevated and flat likely due to type II NSTEMI (demand ischemia) and retention of enzymes from ARVIN on CKD. No need to trend further at this point.  - Continue aspirin 81 mg daily  - Hold amlodipine-valsartan; likely will not restart ARB  - Continue IVF, now receiving 1/2 NS due to hypernatremia  - Continue zosyn  - Renal follow-up

## 2017-09-27 NOTE — PROGRESS NOTE ADULT - ASSESSMENT
89 yo AAM [facility] resident was brought to ER for evaluation of cms  uncontrolled ,hyperglycemia ,recived total of 25 u of humalog this morning with no effect BG esvin dmission was found to be above 600 Quinones cath is draining purulent urine and patient diagnosed with uti and urosepsis Found to have hypotension ,ARVIN on CKD and elevated LFTS ,likely secondary to shock liver and due to sepsis Admitted for septic workup and evaluation,send blood and urine cx,serial lactate levels,monitor vitals closley,ivfs hydration,monitor urine output and renal profile,iv abx initiated Found to have troponin elevation and admitted to spcu ,seen by cardiologist ,likely troponinemia related to ARF patient became a long term resident about 3 mnts ago due to progression of dementia and Parkinsons disease Palliative care consult requested ,to discuss advance directives and complete MOLST Patient diagnosed with severe sepsis with GN bacteremia secondary to uti and obstructive uropathy

## 2017-09-27 NOTE — PROGRESS NOTE ADULT - PROBLEM SELECTOR PLAN 1
ACUTE RENAL FAILURE: due to urosepsis , dehydration   continue with 1/2 ns   continue with abx   Serum creatinine is improved    , approximating GFR improved    ml/min.   [There is no progression]. [No uremic symptoms]   [No evidence of anemia].  Fluid status stable.  Will continue to avoid nephrotoxic drugs.  Patient remains asymptomatic.   Continue current therapy.  Start   Increase    Decrease  diuretic.  Start   Increase   Decrease  ACE inhibitor.  Start   Increase   Decrease  ARB.  Start  Increase   Decrease   B-blocker.  Start  Increase   Decrease  calcium channel blocker.  Start  Increase   Decrease  .  Additional evaluation:   ECG,    echocardiogram,   refer to cardiology,   CXR,   refer to vascular surgeon,   renal ultrasound,  renal biopsy.

## 2017-09-27 NOTE — PROGRESS NOTE ADULT - SUBJECTIVE AND OBJECTIVE BOX
Chief Complaint: Fever, AMS    Interval Events: Troponin and creatinine trending down.    Review of Systems:  General: No fevers, chills, weight loss or gain  Skin: No rashes, color changes  Cardiovascular: No chest pain, orthopnea  Respiratory: No shortness of breath, cough  Gastrointestinal: No nausea, abdominal pain  Genitourinary: No incontinence, pain with urination  Musculoskeletal: No pain, swelling, decreased range of motion  Neurological: No headache, weakness  Psychiatric: No depression, anxiety  Endocrine: No weight loss or gain, increased thirst  All other systems are comprehensively negative.    Physical Exam:  Vitals:        Vital Signs Last 24 Hrs  T(C): 36.4 (27 Sep 2017 07:16), Max: 38.3 (26 Sep 2017 17:15)  T(F): 97.5 (27 Sep 2017 07:16), Max: 101 (26 Sep 2017 17:15)  HR: 90 (27 Sep 2017 06:00) (90 - 126)  BP: 149/95 (27 Sep 2017 06:00) (95/54 - 176/91)  BP(mean): 112 (27 Sep 2017 06:00) (69 - 112)  RR: 17 (27 Sep 2017 06:00) (17 - 32)  SpO2: 95% (27 Sep 2017 06:00) (94% - 99%)  General: NAD  HEENT: MMM  Neck: No JVD, no carotid bruit  Lungs: CTAB  CV: RRR, nl S1/S2, no M/R/G  Abdomen: S/NT/ND, +BS  Extremities: No LE edema, no cyanosis  Neuro: AAOx3, non-focal  Skin: No rash    Labs:                        15.4   20.8  )-----------( 102      ( 27 Sep 2017 06:31 )             47.4     09-27    155<H>  |  120<H>  |  122<H>  ----------------------------<  303<H>  4.8   |  26  |  3.76<H>    Ca    9.6      27 Sep 2017 06:31  Phos  2.7     09-27  Mg     2.6     09-27    TPro  7.3  /  Alb  2.1<L>  /  TBili  2.6<H>  /  DBili  2.1<H>  /  AST  243<H>  /  ALT  87<H>  /  AlkPhos  264<H>  09-27    CARDIAC MARKERS ( 27 Sep 2017 06:31 )  .491 ng/mL / x     / 3906 U/L / x     / x      CARDIAC MARKERS ( 26 Sep 2017 19:49 )  .499 ng/mL / x     / x     / x     / x      CARDIAC MARKERS ( 26 Sep 2017 13:17 )  .531 ng/mL / x     / x     / x     / x          PT/INR - ( 27 Sep 2017 06:31 )   PT: 12.5 sec;   INR: 1.14 ratio         PTT - ( 26 Sep 2017 12:53 )  PTT:26.4 sec    Telemetry: Sinus rhythm, tachycardic at times, atrial runs

## 2017-09-27 NOTE — CONSULT NOTE ADULT - SUBJECTIVE AND OBJECTIVE BOX
HPI:  89 yo AAM hx Parkinson's BPH HTn DM  [facility] resident was brought to ER with CC of mental   status change and uncontrolled hyperglycemia. In ER lab work showed ARVIN with urinary retention.  Quinones placed with gross pyuria. Low grade temp of 100. No documented n/v/diarrhea. Pt unable  to provide hx. Blood cultures grew GNR. UA grossly positive.     Infectious Disease consult was called to evaluate pt and for antibiotic management.    Past Medical & Surgical Hx:  PAST MEDICAL & SURGICAL HISTORY:  Parkinson disease  Neuropathy  CKD (chronic kidney disease)  BPH (benign prostatic hyperplasia)  Diabetes  Angina pectoris  Hypertension  Diabetes  Renal failure  Dementia  No significant past surgical history      Social History--  EtOH: denies   Tobacco: denies   Drug Use: denies   Resident of [facility]    FAMILY HISTORY:  No pertinent family history in first degree relatives    Allergies  No Known Allergies      Home Medications:   * Patient Currently Takes Medications as of 26-Sep-2017 12:47 documented in Structured Notes  · 	insulin lispro 100 units/mL subcutaneous solution:  subcutaneous 3 times a day (before meals), 1 Unit(s) if Glucose 151 - 200  	2 Unit(s) if Glucose 201 - 250  	3 Unit(s) if Glucose 251 - 300  	4 Unit(s) if Glucose 301 - 350  	5 Unit(s) if Glucose 351 - 400  	6 Unit(s) if Glucose Greater Than 400, Last Dose Taken:    · 	tamsulosin 0.4 mg oral capsule: 1 cap(s) orally once a day (at bedtime), Last Dose Taken:    · 	Multiple Vitamins oral tablet: 1 tab(s) orally once a day, Last Dose Taken:    · 	amlodipine-valsartan 5 mg-320 mg oral tablet: 1 tab(s) orally once a day, Last Dose Taken:    · 	aspirin 325 mg oral tablet: 1 tab(s) orally once a day, Last Dose Taken:    · 	Lantus 100 units/mL subcutaneous solution: 10 unit(s) subcutaneous once a day (at bedtime), Last Dose Taken:        Current Inpatient Medications :    ANTIBIOTICS:   piperacillin/tazobactam IVPB. 3.375 Gram(s) IV Intermittent every 12 hours      OTHER RELEVANT MEDICATIONS :  aspirin enteric coated 81 milliGRAM(s) Oral daily  dextrose 5%. 1000 milliLiter(s) IV Continuous <Continuous>  dextrose Gel 1 Dose(s) Oral once PRN  dextrose 50% Injectable 12.5 Gram(s) IV Push once  dextrose 50% Injectable 25 Gram(s) IV Push once  dextrose 50% Injectable 25 Gram(s) IV Push once  glucagon  Injectable 1 milliGRAM(s) IntraMuscular once PRN  sodium chloride 0.45%. 1000 milliLiter(s) IV Continuous <Continuous>  heparin  Injectable 5000 Unit(s) SubCutaneous every 12 hours  tamsulosin 0.4 milliGRAM(s) Oral at bedtime  famotidine    Tablet 20 milliGRAM(s) Oral daily  nystatin Powder 1 Application(s) Topical two times a day  insulin glargine Injectable (LANTUS) 10 Unit(s) SubCutaneous at bedtime  lactulose Retention Enema 200 Gram(s) Rectal every 6 hours  insulin lispro (HumaLOG) corrective regimen sliding scale   SubCutaneous every 3 hours  insulin lispro (HumaLOG) corrective regimen sliding scale   SubCutaneous every 3 hours          REVIEW OF SYSTEMS:    ROS:  Unable to obtain due to : Lethargy and dementia      I&O's Detail    26 Sep 2017 07:01  -  27 Sep 2017 07:00  --------------------------------------------------------  IN:    IV PiggyBack: 350 mL    sodium chloride 0.45%.: 1320 mL    Sodium Chloride 0.9% IV Bolus: 2500 mL  Total IN: 4170 mL    OUT:    Indwelling Catheter - Urethral: 301 mL    Voided: 800 mL  Total OUT: 1101 mL    Total NET: 3069 mL      27 Sep 2017 07:  -  27 Sep 2017 12:00  --------------------------------------------------------  IN:    sodium chloride 0.45%.: 120 mL  Total IN: 120 mL    OUT:    Indwelling Catheter - Urethral: 260 mL  Total OUT: 260 mL    Total NET: -140 mL    Physical Exam:  Vital Signs Last 24 Hrs  T(C): 36.5 (27 Sep 2017 11:49), Max: 38.3 (26 Sep 2017 17:15)  T(F): 97.7 (27 Sep 2017 11:49), Max: 101 (26 Sep 2017 17:15)  HR: 90 (27 Sep 2017 06:00) (90 - 126)  BP: 149/95 (27 Sep 2017 06:00) (95/54 - 176/91)  BP(mean): 112 (27 Sep 2017 06:00) (69 - 112)  RR: 17 (27 Sep 2017 06:00) (17 - 32)  SpO2: 95% (27 Sep 2017 06:00) (94% - 99%)  Height (cm): 170.18 ( @ 12:24)  Weight (kg): 72.6 ( @ 12:24)  BMI (kg/m2): 25.1 ( @ 12:24)  BSA (m2): 1.84 ( @ 12:24)      GEN: Lethargic no distress  HEENT: normocephalic and atraumatic. EOMI. MARTIR. Moist mucosa. Clear Posterior pharynx.  NECK: Supple. No carotid bruits.  No lymphadenopathy or thyromegaly.  LUNGS: Decreased to auscultation.  HEART: Regular rate and rhythm without murmur.  ABDOMEN: Soft, nontender, and nondistended.  Positive bowel sounds.  No hepatosplenomegaly was noted.  EXTREMITIES: Without any cyanosis, clubbing, rash, lesions or edema.  NEUROLOGIC: Lethargic   SKIN: No ulceration or induration present.      Labs:         155<H>  |  120<H>  |  122<H>  ----------------------------<  303<H>  4.8   |  26  |  3.76<H>    Ca    9.6      27 Sep 2017 06:31  Phos  2.7       Mg     2.6         TPro  7.3  /  Alb  2.1<L>  /  TBili  2.6<H>  /  DBili  2.1<H>  /  AST  243<H>  /  ALT  87<H>  /  AlkPhos  264<H>                            15.4   20.8  )-----------( 102      ( 27 Sep 2017 06:31 )             47.4       PT/INR - ( 27 Sep 2017 06:31 )   PT: 12.5 sec;   INR: 1.14 ratio         PTT - ( 26 Sep 2017 12:53 )  PTT:26.4 sec  Urinalysis Basic - ( 26 Sep 2017 13:13 )    Color: Yellow / Appearance: Turbid / S.020 / pH: x  Gluc: x / Ketone: Negative  / Bili: Moderate / Urobili: 8 mg/dL   Blood: x / Protein: 100 mg/dL / Nitrite: Negative   Leuk Esterase: Moderate / RBC: >50 /HPF / WBC >50   Sq Epi: x / Non Sq Epi: x / Bacteria: TNTC      LIVER FUNCTIONS - ( 27 Sep 2017 06:31 )  Alb: 2.1 g/dL / Pro: 7.3 g/dL / ALK PHOS: 264 U/L / ALT: 87 U/L DA / AST: 243 U/L / GGT: x           CARDIAC MARKERS ( 27 Sep 2017 06:31 )  .491 ng/mL / x     / 3906 U/L / x     / x      CARDIAC MARKERS ( 26 Sep 2017 19:49 )  .499 ng/mL / x     / x     / x     / x      CARDIAC MARKERS ( 26 Sep 2017 13:17 )  .531 ng/mL / x     / x     / x     / x          CAPILLARY BLOOD GLUCOSE  212 (27 Sep 2017 11:21)  274 (27 Sep 2017 06:00)  276 (27 Sep 2017 04:00)  253 (27 Sep 2017 00:00)  239 (26 Sep 2017 22:00)  511 (26 Sep 2017 12:28)        ABG - ( 26 Sep 2017 15:50 )  pH: x     /  pCO2: 37    /  pO2: 90    / HCO3: 22    / Base Excess: -3.0  /  SaO2: 96          ABG - ( 26 Sep 2017 15:50 )  pH: x     /  pCO2: 37    /  pO2: 90    / HCO3: 22    / Base Excess: -3.0  /  SaO2: 96            MICROBIOLOGY:  Culture - Blood (17 @ 20:59)    Gram Stain:   Growth in aerobic and anaerobic bottles: Gram Negative Rods    -  Escherichia coli: Detec    Specimen Source: .Blood Blood-Peripheral    Organism: Blood Culture PCR    Culture Results:   Growth in aerobic and anaerobic bottles: Gram Negative Rods  ***Blood Panel PCR results on this specimen are available  approximately 3 hours after the Gram stain result.***  Gram stain, PCR, and/or culture results may not always  correspond due todifference in methodologies.  ************************************************************  This PCR assay was performed using [employer].  The following targets are tested for: Enterococcus,  vancomycin resistant enterococci, Listeria monocytogenes,  coagulase negative staphylococci, S. aureus,  methicillin resistant S. aureus, Streptococcus agalactiae  (Group B), S. pneumoniae, S. pyogenes (Group A),  Acinetobacter baumannii, Enterobacter cloacae, E. coli,  Klebsiella oxytoca, K. pneumoniae, Proteus sp.,  Serratia marcescens, Haemophilus influenzae,  Neisseria meningitidis, Pseudomonas aeruginosa, Candida  albicans, C. glabrata, C krusei, C parapsilosis,  C. tropicalis and the KPC resistance gene.    Organism Identification: Blood Culture PCR    Method Type: PCR    Culture - Blood (17 @ 20:59)    Gram Stain:   Growth in aerobic and anaerobic bottles: Gram Negative Rods    Specimen Source: .Blood Blood-Peripheral    Culture Results:   Growth in aerobic and anaerobic bottles: Gram Negative Rods    RADIOLOGY & ADDITIONAL STUDIES:      EXAM:  CT ABDOMEN AND PELVIS                          EXAM:  CT CHEST                            PROCEDURE DATE:  2017      INTERPRETATION:  [facility] PRELIMINARY REPORT    EXAM:    CT Chest Without Intravenous Contrast    CTAbdomen and Pelvis Without Intravenous Contrast    CLINICAL HISTORY:    89 years old, male; Signs and symptoms; Wheezing; Additional info: Cat   scan   of chest, abdomen/pelvis. R/O pneumonia; Sepsis.    TECHNIQUE:    Axial computed tomography imagesof the chest, abdomen and pelvis   without   intravenous contrast.  All CT scans at this facility use one or more dose   reduction techniques, viz.: automated exposure control; ma/kV adjustment   per   patient size (including targeted exams where dose is matched to   indication;   i.e. head); or iterative reconstruction technique.    Coronal and sagittal reformatted images were created and reviewed.    COMPARISON:    No relevant prior studies available.    FINDINGS:      Limitations:  Motion/streak/beam hardening artifact.  Limited without   intravenous or oral contrast.   CHEST:      Lungs:  Bibasilar atelectasis with focal consolidation in the left   lower lobe   likely representing acute air space disease/pneumonia. Followup to   resolution   recommended.  Scattered nonspecific subcentimeter pulmonary nodules.      Pleural space:  No significant effusion.  No pneumothorax.      Heart:  Mild coronary arterial calcifications.  No significant   pericardial   effusion.      Mediastinum:  Small hiatal hernia.     ABDOMEN:      Liver:  Slightly nodular hepatic contour suggestive of cirrhosis.    Limited   evaluation for focal hepatic lesions without intravenous contrast.      Gallbladder and bile ducts:  Grossly unremarkable on this unenhanced   study.      Pancreas:  Grossly unremarkable on this unenhanced study.      Spleen:  Grossly unremarkable on this unenhanced study.      Adrenals:  Grossly unremarkable on this unenhanced study.      Kidneys and ureters:  Right low attenuationrenal lesion, incompletely   evaluated without intravenous contrast.  Further evaluation with a   contrast   enhanced study or renal ultrasound may be obtained on a non-emergent   basis.  No   hydronephrosis or obstructing stones.      Stomach and bowel:  Abundant fecal material in the colon which may   represent   constipation.  No obstruction.        Appendix:  Visualized without findings to suggest acute appendicitis.     PELVIS:      Bladder:  Urinary bladder collapsed around a Quinones catheter.        Reproductive:  Enlarged prostate gland.     CHEST, ABDOMEN and PELVIS:      Intraperitoneal space:  No significant fluid collection.  No free air.      Bones/joints:  Degenerative changes.  Mild multilevel thoracolumbar   vertebral   body compression deformities, age-indeterminate.  No dislocation.      Soft tissues:  Grossly unremarkable on this unenhanced study.      Vasculature:  Atherosclerotic calcifications.      Lymph nodes:  Grossly unremarkable on this unenhanced study.    IMPRESSION:         Chest:  1.  Bibasilar atelectasis with focal consolidation in the left lower lobe   likely representing acute air space disease/pneumonia. Followup to   resolution   recommended.    2.  Small hiatal hernia.    Abdomen/pelvis:  3.  Slightly nodular hepatic contour suggestive of cirrhosis.  Limited   evaluation for focal hepatic lesions without intravenous contrast.    4.  Abundant fecal material in the colon which may represent constipation.    5.  Incidental/non-acute findings are described above.    CT CHEST/ABDOMEN/PELVIS:     CLINICAL INFORMATION:  Sepsis.    COMPARISON: None.    PROCEDURE:  Using multislice helical CT, 2.5 mm sections were obtained   from the thoracic inlet through the ischial tuberosities.   Multiplanar reformatted images.       There is patchy left lower lobe airspace consolidation, which may   reflect pneumonia in the appropriate clinical setting.  The central airways remain patent. There are minimal atelectatic changes   at the lung bases.  No thickened pleural effusion is noted.    There are small calcified subcarinal mediastinal lymph nodes.  The evaluation of the pulmonary hilum is limited without intravenous   contrast.    There is arteriosclerotic calcification of the thoracic aorta and   coronary artery calcification.  No pericardial effusion is noted.    There is a small hiatal hernia.    The evaluation of the solid organ parenchyma is limited without   intravenous contrast.    There is a slightly nodular contour of the liver, suggestive of   cirrhosis. The evaluation for hepatic masses is limited on noncontrast   exam.    The spleen and gallbladder appear unremarkable.    The adrenal glands and nonenhanced pancreas are unremarkable in   appearance.    There is a cyst at the lower pole the right kidney. No hydronephrosis is   noted.    There is arteriosclerotic calcification of the abdominal aorta and major   branch vessels.  No enlarged retroperitoneal lymphadenopathy is noted.    The evaluation of the stomach and gastrointestinal tract is limited   without oral contrast distention.  There is dense stool with mild distention of the rectosigmoid colon and   mild bowel wall thickening.  No bowel obstruction is noted.  No localized intra-abdominal fluid collection or pneumoperitoneum is   noted.    There is a normal-appearing appendix.    There is an enlarged prostate gland. There is a balloon Quinones catheter   with air in the nondistended urinary bladder.  No free fluid is noted within the pelvis.    There are multilevel degenerative changes of the thoracic and lumbar   spine.  There are multiple age indeterminate compression deformities of the mid   to lower thoracic and upper lumbar spine.    Impression:    Bibasilar atelectasis with left lower lobe airspace consolidation which   may reflect pneumonia.  Recommend follow-up to resolution.    Fecal impaction with mild distention of the rectosigmoid colon, correlate   clinically for stercoral colitis.    Other findings as discussed above.    This study was preliminary reported by [facility].      Assessment :   89 year old male hx parkinson's dementia BPH admitted with severe sepsis with GNR septicemia  sec obstructive uropathy sec urinary retention and UTI  Poss concurrent pna though suspect atelectasis  ARVIN   Abn LFTs sec sepsis  +troponins    Plan :   Cont Zosyn  Repeat blood cultures  Fu cultures  Trend LFTS  Trend wbc  Aspiration precautions    Continue with present regiment.  Appropriate use of antibiotics and adverse effects reviewed.    I have discussed the above plan of care with patient/family in detail. They expressed understanding of the treatment plan . Risks, benefits and alternatives discussed in detail.   I have asked if they have any questions or concerns and appropriately addressed them to the best of my ability .    Critical care time more than 45 minutes spent in direct patient care reviewing  the notes, lab data/ imaging , discussion with multidisciplinary team. All questions were addressed and answered to the best of my capacity .    Thank you for allowing me to participate in the care of your patient .    Allen Hall MD  Infectious Disease  [phone] HPI:  89 yo AAM hx Parkinson's BPH HTn DM  [facility] resident was brought to ER with CC of mental   status change and uncontrolled hyperglycemia. In ER lab work showed ARVIN with urinary retention.  Quinones placed with gross pyuria. Low grade temp of 100. No documented n/v/diarrhea. Pt unable  to provide hx. Blood cultures grew GNR. UA grossly positive.     Infectious Disease consult was called to evaluate pt and for antibiotic management.    Past Medical & Surgical Hx:  PAST MEDICAL & SURGICAL HISTORY:  Parkinson disease  Neuropathy  CKD (chronic kidney disease)  BPH (benign prostatic hyperplasia)  Diabetes  Angina pectoris  Hypertension  Diabetes  Renal failure  Dementia  No significant past surgical history      Social History--  EtOH: denies   Tobacco: denies   Drug Use: denies   Resident of [facility]    FAMILY HISTORY:  No pertinent family history in first degree relatives    Allergies  No Known Allergies      Home Medications:   * Patient Currently Takes Medications as of 26-Sep-2017 12:47 documented in Structured Notes  · 	insulin lispro 100 units/mL subcutaneous solution:  subcutaneous 3 times a day (before meals), 1 Unit(s) if Glucose 151 - 200  	2 Unit(s) if Glucose 201 - 250  	3 Unit(s) if Glucose 251 - 300  	4 Unit(s) if Glucose 301 - 350  	5 Unit(s) if Glucose 351 - 400  	6 Unit(s) if Glucose Greater Than 400, Last Dose Taken:    · 	tamsulosin 0.4 mg oral capsule: 1 cap(s) orally once a day (at bedtime), Last Dose Taken:    · 	Multiple Vitamins oral tablet: 1 tab(s) orally once a day, Last Dose Taken:    · 	amlodipine-valsartan 5 mg-320 mg oral tablet: 1 tab(s) orally once a day, Last Dose Taken:    · 	aspirin 325 mg oral tablet: 1 tab(s) orally once a day, Last Dose Taken:    · 	Lantus 100 units/mL subcutaneous solution: 10 unit(s) subcutaneous once a day (at bedtime), Last Dose Taken:        Current Inpatient Medications :    ANTIBIOTICS:   piperacillin/tazobactam IVPB. 3.375 Gram(s) IV Intermittent every 12 hours    OTHER RELEVANT MEDICATIONS :  aspirin enteric coated 81 milliGRAM(s) Oral daily  dextrose 5%. 1000 milliLiter(s) IV Continuous <Continuous>  dextrose Gel 1 Dose(s) Oral once PRN  dextrose 50% Injectable 12.5 Gram(s) IV Push once  dextrose 50% Injectable 25 Gram(s) IV Push once  dextrose 50% Injectable 25 Gram(s) IV Push once  glucagon  Injectable 1 milliGRAM(s) IntraMuscular once PRN  sodium chloride 0.45%. 1000 milliLiter(s) IV Continuous <Continuous>  heparin  Injectable 5000 Unit(s) SubCutaneous every 12 hourstamsulosin 0.4 milliGRAM(s) Oral at bedtime  famotidine    Tablet 20 milliGRAM(s) Oral daily  nystatin Powder 1 Application(s) Topical two times a day  insulin glargine Injectable (LANTUS) 10 Unit(s) SubCutaneous at bedtime  lactulose Retention Enema 200 Gram(s) Rectal every 6 hours  insulin lispro (HumaLOG) corrective regimen sliding scale   SubCutaneous every 3 hours  insulin lispro (HumaLOG) corrective regimen sliding scale   SubCutaneous every 3 hours    REVIEW OF SYSTEMS:    ROS:  Unable to obtain due to : Lethargy and dementia      I&O's Detail    26 Sep 2017 07:01  -  27 Sep 2017 07:00  --------------------------------------------------------  IN:    IV PiggyBack: 350 mL    sodium chloride 0.45%.: 1320 mL    Sodium Chloride 0.9% IV Bolus: 2500 mL  Total IN: 4170 mL    OUT:    Indwelling Catheter - Urethral: 301 mL    Voided: 800 mL  Total OUT: 1101 mL    Total NET: 3069 mL    27 Sep 2017 07:  -  27 Sep 2017 12:00  --------------------------------------------------------  IN:    sodium chloride 0.45%.: 120 mL  Total IN: 120 mL    OUT:    Indwelling Catheter - Urethral: 260 mL  Total OUT: 260 mL    Total NET: -140 mL    Physical Exam:  Vital Signs Last 24 Hrs  T(C): 36.5 (27 Sep 2017 11:49), Max: 38.3 (26 Sep 2017 17:15)  T(F): 97.7 (27 Sep 2017 11:49), Max: 101 (26 Sep 2017 17:15)  HR: 90 (27 Sep 2017 06:00) (90 - 126)  BP: 149/95 (27 Sep 2017 06:00) (95/54 - 176/91)  BP(mean): 112 (27 Sep 2017 06:00) (69 - 112)  RR: 17 (27 Sep 2017 06:00) (17 - 32)  SpO2: 95% (27 Sep 2017 06:00) (94% - 99%)  Height (cm): 170.18 ( @ 12:24)  Weight (kg): 72.6 ( @ 12:24)  BMI (kg/m2): 25.1 ( @ 12:24)  BSA (m2): 1.84 ( @ 12:24)      GEN: Lethargic no distress  HEENT: normocephalic and atraumatic. EOMI. MARTIR. Moist mucosa. Clear Posterior pharynx.  NECK: Supple. No carotid bruits.  No lymphadenopathy or thyromegaly.  LUNGS: Decreased to auscultation.  HEART: Regular rate and rhythm without murmur.  ABDOMEN: Soft, nontender, and nondistended.  Positive bowel sounds.  No hepatosplenomegaly was noted.  EXTREMITIES: Without any cyanosis, clubbing, rash, lesions or edema.  NEUROLOGIC: Lethargic   SKIN: No ulceration or induration present.      Labs:         155<H>  |  120<H>  |  122<H>  ----------------------------<  303<H>  4.8   |  26  |  3.76<H>    Ca    9.6      27 Sep 2017 06:31  Phos  2.7       Mg     2.6         TPro  7.3  /  Alb  2.1<L>  /  TBili  2.6<H>  /  DBili  2.1<H>  /  AST  243<H>  /  ALT  87<H>  /  AlkPhos  264<H>                            15.4   20.8  )-----------( 102      ( 27 Sep 2017 06:31 )             47.4       PT/INR - ( 27 Sep 2017 06:31 )   PT: 12.5 sec;   INR: 1.14 ratio         PTT - ( 26 Sep 2017 12:53 )  PTT:26.4 sec  Urinalysis Basic - ( 26 Sep 2017 13:13 )    Color: Yellow / Appearance: Turbid / S.020 / pH: x  Gluc: x / Ketone: Negative  / Bili: Moderate / Urobili: 8 mg/dL   Blood: x / Protein: 100 mg/dL / Nitrite: Negative   Leuk Esterase: Moderate / RBC: >50 /HPF / WBC >50   Sq Epi: x / Non Sq Epi: x / Bacteria: TNTC      LIVER FUNCTIONS - ( 27 Sep 2017 06:31 )  Alb: 2.1 g/dL / Pro: 7.3 g/dL / ALK PHOS: 264 U/L / ALT: 87 U/L DA / AST: 243 U/L / GGT: x           CARDIAC MARKERS ( 27 Sep 2017 06:31 )  .491 ng/mL / x     / 3906 U/L / x     / x      CARDIAC MARKERS ( 26 Sep 2017 19:49 )  .499 ng/mL / x     / x     / x     / x      CARDIAC MARKERS ( 26 Sep 2017 13:17 )  .531 ng/mL / x     / x     / x     / x          CAPILLARY BLOOD GLUCOSE  212 (27 Sep 2017 11:21)  274 (27 Sep 2017 06:00)  276 (27 Sep 2017 04:00)  253 (27 Sep 2017 00:00)  239 (26 Sep 2017 22:00)  511 (26 Sep 2017 12:28)        ABG - ( 26 Sep 2017 15:50 )  pH: x     /  pCO2: 37    /  pO2: 90    / HCO3: 22    / Base Excess: -3.0  /  SaO2: 96          ABG - ( 26 Sep 2017 15:50 )  pH: x     /  pCO2: 37    /  pO2: 90    / HCO3: 22    / Base Excess: -3.0  /  SaO2: 96        Lactate, Blood (17 @ 09:30)    Lactate, Blood: 2.6 mmol/L    Procalcitonin, Serum (17 @ 22:30)    Procalcitonin, Serum: 6.70: Procalcitonin (PCT) Interpretation (ng/mL) - Diagnosis of systemic  bacterial infection/sepsis  PCT < 0.5: Systemic infection (sepsis) is not likely and risk for  progression to severe systemic infection is low. Local bacterial  infection is possible.6.70:  If early sepsis is suspected clinically, PCT  should be re-assessed in 6-24 hours.  PCT >/= 0.5 but < 2.0: Systemic infection (sepsis) is possible, but other  conditions are known to elevate PCT as well. Moderate risk for  progression to severe syste6.70: shala infection. The patient should be closely  monitored both clinically and by re-assessing PCT within 6-24 hours.  PCT >/= 2.0 but < 10.0: Systemic infection (sepsis) is likely, unless  other causes are known. High risk of progression to severe syste6.70: shala  infection (severe sepsis/septic shock).  PCT >/= 10.0: Important systemic inflammatory response, almost  exclusively due to severe bacterial sepsis or septic shock. High  likelihood of severe sepsis or septic shock. ng/mL    Troponin I, Serum (17 @ 06:31)    Troponin I, Serum: .482: The new reference range for Troponin-I performed on the Siemens EXL  system is 0.017-0.056 ng/mL which includes the 99th percentile of a  healthy reference population. Studies have shown that elevated troponin  levels above the cutoff are associated w.482: ith an increased risk for adverse  cardiac events, with the risk increasing as troponin levels increase.  As  per a joint committee of the American College of Cardiology and   Society of Cardiology, diagnosis of classic MI is based upon the  d.482: etection of a rise or fall of cardiac troponin values, with at least one  value above the 99th percentile upper reference limit, in the appropriate  clinical context.  · Troponin I (ng/mL) Interpretation  · 0.017-0.056  Normal range (includes the 99th.482:  percentile of a healthy  reference population)  · >0.056  Elevated troponin level indicating increased risk  · Note: Troponin-I and Troponin T cannot be used interchangeably in  serial measurements.  Minimally elevated Troponin results should be  int.482: erpreted in the context of clinical findings and risk factors. ng/mL      MICROBIOLOGY:  Culture - Blood (17 @ 20:59)    Gram Stain:   Growth in aerobic and anaerobic bottles: Gram Negative Rods    -  Escherichia coli: Detec    Specimen Source: .Blood Blood-Peripheral    Organism: Blood Culture PCR    Culture Results:   Growth in aerobic and anaerobic bottles: Gram Negative Rods  ***Blood Panel PCR results on this specimen are available  approximately 3 hours after the Gram stain result.***  Gram stain, PCR, and/or culture results may not always  correspond due todifference in methodologies.  ************************************************************  This PCR assay was performed using milog.  The following targets are tested for: Enterococcus,  vancomycin resistant enterococci, Listeria monocytogenes,  coagulase negative staphylococci, S. aureus,  methicillin resistant S. aureus, Streptococcus agalactiae  (Group B), S. pneumoniae, S. pyogenes (Group A),  Acinetobacter baumannii, Enterobacter cloacae, E. coli,  Klebsiella oxytoca, K. pneumoniae, Proteus sp.,  Serratia marcescens, Haemophilus influenzae,  Neisseria meningitidis, Pseudomonas aeruginosa, Candida  albicans, C. glabrata, C krusei, C parapsilosis,  C. tropicalis and the KPC resistance gene.    Organism Identification: Blood Culture PCR    Method Type: PCR    Culture - Blood (17 @ 20:59)    Gram Stain:   Growth in aerobic and anaerobic bottles: Gram Negative Rods    Specimen Source: .Blood Blood-Peripheral    Culture Results:   Growth in aerobic and anaerobic bottles: Gram Negative Rods    RADIOLOGY & ADDITIONAL STUDIES:    CT CHEST/ABDOMEN/PELVIS:     CLINICAL INFORMATION:  Sepsis.    COMPARISON: None.    PROCEDURE:  Using multislice helical CT, 2.5 mm sections were obtained   from the thoracic inlet through the ischial tuberosities.   Multiplanar reformatted images.       There is patchy left lower lobe airspace consolidation, which may   reflect pneumonia in the appropriate clinical setting.  The central airways remain patent. There are minimal atelectatic changes   at the lung bases.  No thickened pleural effusion is noted.    There are small calcified subcarinal mediastinal lymph nodes.  The evaluation of the pulmonary hilum is limited without intravenous   contrast.    There is arteriosclerotic calcification of the thoracic aorta and   coronary artery calcification.  No pericardial effusion is noted.    There is a small hiatal hernia.    The evaluation of the solid organ parenchyma is limited without   intravenous contrast.    There is a slightly nodular contour of the liver, suggestive of   cirrhosis. The evaluation for hepatic masses is limited on noncontrast   exam.    The spleen and gallbladder appear unremarkable.    The adrenal glands and nonenhanced pancreas are unremarkable in   appearance.    There is a cyst at the lower pole the right kidney. No hydronephrosis is   noted.    There is arteriosclerotic calcification of the abdominal aorta and major   branch vessels.  No enlarged retroperitoneal lymphadenopathy is noted.    The evaluation of the stomach and gastrointestinal tract is limited   without oral contrast distention.  There is dense stool with mild distention of the rectosigmoid colon and   mild bowel wall thickening.  No bowel obstruction is noted.  No localized intra-abdominal fluid collection or pneumoperitoneum is   noted.    There is a normal-appearing appendix.    There is an enlarged prostate gland. There is a balloon Quinones catheter   with air in the nondistended urinary bladder.  No free fluid is noted within the pelvis.    There are multilevel degenerative changes of the thoracic and lumbar   spine.  There are multiple age indeterminate compression deformities of the mid   to lower thoracic and upper lumbar spine.    Impression:    Bibasilar atelectasis with left lower lobe airspace consolidation which   may reflect pneumonia.  Recommend follow-up to resolution.    Fecal impaction with mild distention of the rectosigmoid colon, correlate   clinically for stercoral colitis.    Other findings as discussed above.    [facility]    Assessment :   89 year old male hx parkinson's dementia BPH admitted with severe sepsis with GNR septicemia  sec obstructive uropathy sec urinary retention and UTI  Poss concurrent pna though suspect atelectasis  ARVIN   Abn LFTs sec sepsis  +troponins  Uncontrolled DM     Plan :   Cont Zosyn  Repeat blood cultures  Fu cultures  Trend LFTS  Trend wbc  Dm control  Aspiration precautions    Continue with present regiment.  Appropriate use of antibiotics and adverse effects reviewed.    I have discussed the above plan of care with patient/family in detail. They expressed understanding of the treatment plan . Risks, benefits and alternatives discussed in detail.   I have asked if they have any questions or concerns and appropriately addressed them to the best of my ability .    Critical care time more than 45 minutes spent in direct patient care reviewing  the notes, lab data/ imaging , discussion with multidisciplinary team. All questions were addressed and answered to the best of my capacity .    Thank you for allowing me to participate in the care of your patient .    Allen Hall MD  Infectious Disease  [phone]

## 2017-09-27 NOTE — PROGRESS NOTE ADULT - ASSESSMENT
89 yo AAM [facility] SN[facility] resident was brought to ER for evaluation of cms  uncontrolled ,hyperglycemia ,recived total of 25 u of humalog this morning with no effect BG esvin dmission was found to be above 600 Quinones cath is draining purulent urine and patient diagnosed with uti and urosepsis Found to have hypotension ,ARVIN on CKD and

## 2017-09-27 NOTE — PROGRESS NOTE ADULT - PROBLEM SELECTOR PLAN 3
DIABETES:   not Good control, continue current therapy.  [Control is   poor ].   Encourage weight loss.

## 2017-09-27 NOTE — PROGRESS NOTE ADULT - SUBJECTIVE AND OBJECTIVE BOX
Chart reviewed: Assessment plan is as below Note will be updated as more  patient data is gathered     VITALS/LABS  9/27/2017 afeb 90 140/95 17 95% 65   9/27/2017 acc 274-212   9/27/2017 1/2  (9/26)   9/27/2017 W 20.8 Hb 15.4 Plt 102  Na 155 K 4.8 CO2 26 Cr 3.7   PROBLEM ASSESSMENT PLAN   RESP   9/26/2017 3p 2l 738/37/90   9/27 VBG pH 734   LACTICEMIA POA   9/26/2017 LA 2.9-1.5   9/27 LA 2.6   UTI PNEUMONIA SEPSIS E COLI BACTEREMIA (9/26)  Zosyn (9/26)   9/27 Vanco Random 9.1   9/27 Leg n   9/26-9/27 W 12.2-20.8    9/26 B 6   9/26 CT CAP NC 1) Basal atelectasis with focal consolidation lll likely pneumonia 2) Scattered subcm nodules 3) Nodular liver sugg cirrhosis 4) R low atteniation renal lesion needs sono 5) No hydro 6) No obstrn abundant feces   9/26/2017 UA 1020 Notr n L estr Mod Bld lge W Over 50 Bact tntc   9/26/2017 CXR napd   9/26/2017 US abd 1) Mild gbw thickening 2) No duct dilation   POSSIBLE MI   9/26-9/27/2017  CK - 3906 Tr 1 .531 (i) - .482 (i) - .491 (i)   ASA 81 (9/26)   HYPERNATREMIA  9/26-9/27/2017 Na 151-155  9/27 On q 3 h sl scale   ARVIN   9/26-9/27 Cr 5.2-3.7   IV 1/2  (9/26)   HYPERGLYCEMIA  9/26/20-17 G 615 9/27 HbA1C 9.5   ELEVATED LFTs   9/26-9/27/2017 -264 -243 alt 92-87   AMS 9/26 CT H No ac  GLOBAL ISSUE/BEST PRACTICE:        PROBLEM:      Analgesia:     na                        PROBLEM: Sedation:     na               PROBLEM: HOB elevation:   y              PROBLEM: Stress ulcer proph:    pepcid 20 (9/26)                       PROBLEM: VTE prophylaxis:      hsc (9/26)                   PROBLEM: Glycemic control:    iss (9/26)  lantus 10 (9/26)   PROBLEM: Nutrition:    npo (9/26)           PROBLEM: Advanced directive: na     PROBLEM: Allergies:  na  HOSPITAL COURSE 9/26/2017 ADMISSION [facility] S 9/26/2017  PROBLEM SPICIFIC COURSE  PLAN   RESP  Gas exchange was acceptable on low flow O2 on arrival   LACTICEMIA POA Rxed with IVF   UTI PNEUMONIA E COLI BACTEREMIA (9/26) (LIKELY SEC UTI) SEPSIS POA Rxed with zosyn (9/26) empirically   9/26/2017 CXR napd   POSSIBLE MI  9/26/2017 Tr 1 .531 (i)  Was Rxed with ASA (9/26)   HYPERNATREMIA 9/26/2017 Na 151 Rxed with hypotonic fluids   ARVIN   9/26 Cr 5.2 P{ossibly sec dehydrationm Was Rxed with IVF    HYPERGLYCEMIA 9/26/2017 G 615 Was Rxed with iss (9/26) No evidence of HONK or DKA (Ketones neg 9/27 FW ordered NGT   ELEVATED LFTs  9/26/2017   alt 92 Poss sec shock US abd ordeerd 9/26/2017  AMS 9/26 CT H No ac  TIME SPENT Over 35 minutes aggregate critical care time spent on encounter; activities included   direct patient care, counseling and/or coordinating care reviewing notes, lab data/ imaging , discussion with multidisciplinary team/ patient  /family. Risks, benefits, alternatives  discussed in detail. Questions/concerns  were addressed  to the best of my ability .

## 2017-09-27 NOTE — DIETITIAN INITIAL EVALUATION ADULT. - PERTINENT LABORATORY DATA
9/27 wbc 20.8, lactate 2.6, na 155, bun 122, creat 3.76, glu 303, alb 2.1, bilirubin 2.6, alk phos 264, ast 243, alt 87, trop .491-cardiology following

## 2017-09-27 NOTE — GOALS OF CARE CONVERSATION - PERSONAL ADVANCE DIRECTIVE - CONVERSATION DETAILS
discussed resuscitation and intubation. His daughter states he would want to extraordinary attempts made to keep him alive. She consents to DNR/DNI

## 2017-09-27 NOTE — DIETITIAN INITIAL EVALUATION ADULT. - OTHER INFO
89M adm c urosepsis, hyperglycemia (>600mg/dl on adm), hypotension, ARVIN on CKD and dehydration. NPO except meds at present, pending SLP consult. Pt appears lethargic, confused c hx of parkinsons dementia. Noted to be on JANUSZ, NCS, low fat regular consistency diet at [facility]. Appears underweight, unable to assess wt status pta as family not present at bedside. Palliative care consult pending for adv directives. Pt c hx DM, A1c pending. Skin: L shin wound noted.

## 2017-09-27 NOTE — CHART NOTE - NSCHARTNOTEFT_GEN_A_CORE
NGT placed as per primary team request.  Patient has acute encephalopathy and not taking oral feeds or meds.  Sodium high.  CXR ordered to confirm placement.  May use to give free water for hypernatremia once confirmed.  d/w RN.

## 2017-09-27 NOTE — PROGRESS NOTE ADULT - PROBLEM SELECTOR PLAN 6
seen by card Admit to telemetry unit for monitoring,send 3 sets of cardiac ensymes to rule out coronary event,obtain ECHO to evaluate LVEF,cardiology consult,continue current managmnet,O2 supply,anticoagulation plan as per cardiology consult Troponinemia likely rel;ated to demand ischemia secondary surya severe sepsis and ARF on CKD ,followed by cardiology consult

## 2017-09-28 DIAGNOSIS — G93.41 METABOLIC ENCEPHALOPATHY: ICD-10-CM

## 2017-09-28 LAB
-  AMIKACIN: SIGNIFICANT CHANGE UP
-  AMPICILLIN/SULBACTAM: SIGNIFICANT CHANGE UP
-  AMPICILLIN: SIGNIFICANT CHANGE UP
-  AZTREONAM: SIGNIFICANT CHANGE UP
-  CEFAZOLIN: SIGNIFICANT CHANGE UP
-  CEFEPIME: SIGNIFICANT CHANGE UP
-  CEFOXITIN: SIGNIFICANT CHANGE UP
-  CEFTAZIDIME: SIGNIFICANT CHANGE UP
-  CEFTRIAXONE: SIGNIFICANT CHANGE UP
-  CIPROFLOXACIN: SIGNIFICANT CHANGE UP
-  ERTAPENEM: SIGNIFICANT CHANGE UP
-  GENTAMICIN: SIGNIFICANT CHANGE UP
-  IMIPENEM: SIGNIFICANT CHANGE UP
-  LEVOFLOXACIN: SIGNIFICANT CHANGE UP
-  MEROPENEM: SIGNIFICANT CHANGE UP
-  NITROFURANTOIN: SIGNIFICANT CHANGE UP
-  PIPERACILLIN/TAZOBACTAM: SIGNIFICANT CHANGE UP
-  TOBRAMYCIN: SIGNIFICANT CHANGE UP
-  TRIMETHOPRIM/SULFAMETHOXAZOLE: SIGNIFICANT CHANGE UP
ALBUMIN SERPL ELPH-MCNC: 2 G/DL — LOW (ref 3.3–5)
ALP SERPL-CCNC: 221 U/L — HIGH (ref 30–120)
ALT FLD-CCNC: 69 U/L DA — HIGH (ref 10–60)
AMMONIA BLD-MCNC: 41 UMOL/L — HIGH (ref 11–32)
ANION GAP SERPL CALC-SCNC: 10 MMOL/L — SIGNIFICANT CHANGE UP (ref 5–17)
AST SERPL-CCNC: 144 U/L — HIGH (ref 10–40)
BASE EXCESS BLDA CALC-SCNC: 0.2 MMOL/L — SIGNIFICANT CHANGE UP (ref -2–2)
BILIRUB DIRECT SERPL-MCNC: 2.3 MG/DL — HIGH (ref 0–0.2)
BILIRUB INDIRECT FLD-MCNC: 0.8 MG/DL — SIGNIFICANT CHANGE UP (ref 0.2–1)
BILIRUB SERPL-MCNC: 3.1 MG/DL — HIGH (ref 0.2–1.2)
BLOOD GAS SOURCE: SIGNIFICANT CHANGE UP
BUN SERPL-MCNC: 92 MG/DL — HIGH (ref 7–23)
CALCIUM SERPL-MCNC: 9.4 MG/DL — SIGNIFICANT CHANGE UP (ref 8.4–10.5)
CHLORIDE SERPL-SCNC: 121 MMOL/L — HIGH (ref 96–108)
CK SERPL-CCNC: 924 U/L — HIGH (ref 39–308)
CO2 SERPL-SCNC: 25 MMOL/L — SIGNIFICANT CHANGE UP (ref 22–31)
CREAT SERPL-MCNC: 2.32 MG/DL — HIGH (ref 0.5–1.3)
CULTURE RESULTS: SIGNIFICANT CHANGE UP
GGT SERPL-CCNC: 235 U/L — HIGH (ref 9–50)
GLUCOSE SERPL-MCNC: 170 MG/DL — HIGH (ref 70–99)
GRAM STN FLD: SIGNIFICANT CHANGE UP
GRAM STN FLD: SIGNIFICANT CHANGE UP
HCO3 BLDA-SCNC: 25 MMOL/L — SIGNIFICANT CHANGE UP (ref 21–29)
HCT VFR BLD CALC: 48 % — SIGNIFICANT CHANGE UP (ref 39–50)
HGB BLD-MCNC: 15.4 G/DL — SIGNIFICANT CHANGE UP (ref 13–17)
HOROWITZ INDEX BLDA+IHG-RTO: 21 — SIGNIFICANT CHANGE UP
INR BLD: 1.02 RATIO — SIGNIFICANT CHANGE UP (ref 0.88–1.16)
LACTATE SERPL-SCNC: 1.6 MMOL/L — SIGNIFICANT CHANGE UP (ref 0.7–2)
LYMPHOCYTES # BLD AUTO: 9 % — LOW (ref 13–44)
MAGNESIUM SERPL-MCNC: 2.3 MG/DL — SIGNIFICANT CHANGE UP (ref 1.6–2.6)
MCHC RBC-ENTMCNC: 29.1 PG — SIGNIFICANT CHANGE UP (ref 27–34)
MCHC RBC-ENTMCNC: 32 GM/DL — SIGNIFICANT CHANGE UP (ref 32–36)
MCV RBC AUTO: 90.9 FL — SIGNIFICANT CHANGE UP (ref 80–100)
METHOD TYPE: SIGNIFICANT CHANGE UP
MONOCYTES NFR BLD AUTO: 2 % — SIGNIFICANT CHANGE UP (ref 2–14)
NEUTROPHILS NFR BLD AUTO: 86 % — HIGH (ref 43–77)
ORGANISM # SPEC MICROSCOPIC CNT: SIGNIFICANT CHANGE UP
ORGANISM # SPEC MICROSCOPIC CNT: SIGNIFICANT CHANGE UP
PCO2 BLDA: 32 MMHG — SIGNIFICANT CHANGE UP (ref 32–46)
PH BLD: 7.48 — HIGH (ref 7.35–7.45)
PHOSPHATE SERPL-MCNC: 2.3 MG/DL — LOW (ref 2.5–4.5)
PLATELET # BLD AUTO: 112 K/UL — LOW (ref 150–400)
PO2 BLDA: 82 MMHG — SIGNIFICANT CHANGE UP (ref 74–108)
POTASSIUM SERPL-MCNC: 4.2 MMOL/L — SIGNIFICANT CHANGE UP (ref 3.5–5.3)
POTASSIUM SERPL-SCNC: 4.2 MMOL/L — SIGNIFICANT CHANGE UP (ref 3.5–5.3)
PROT SERPL-MCNC: 7.6 G/DL — SIGNIFICANT CHANGE UP (ref 6–8.3)
PROTHROM AB SERPL-ACNC: 11.1 SEC — SIGNIFICANT CHANGE UP (ref 9.8–12.7)
RBC # BLD: 5.28 M/UL — SIGNIFICANT CHANGE UP (ref 4.2–5.8)
RBC # FLD: 16.3 % — HIGH (ref 10.3–14.5)
SAO2 % BLDA: 97 % — HIGH (ref 92–96)
SODIUM SERPL-SCNC: 156 MMOL/L — HIGH (ref 135–145)
SPECIMEN SOURCE: SIGNIFICANT CHANGE UP
TROPONIN I SERPL-MCNC: 0.28 NG/ML — HIGH (ref 0.02–0.06)
TSH SERPL-MCNC: 1 UIU/ML — SIGNIFICANT CHANGE UP (ref 0.27–4.2)
WBC # BLD: 12.9 K/UL — HIGH (ref 3.8–10.5)
WBC # FLD AUTO: 12.9 K/UL — HIGH (ref 3.8–10.5)

## 2017-09-28 RX ORDER — METOPROLOL TARTRATE 50 MG
2.5 TABLET ORAL ONCE
Qty: 0 | Refills: 0 | Status: COMPLETED | OUTPATIENT
Start: 2017-09-28 | End: 2017-09-28

## 2017-09-28 RX ORDER — HYDRALAZINE HCL 50 MG
25 TABLET ORAL EVERY 8 HOURS
Qty: 0 | Refills: 0 | Status: DISCONTINUED | OUTPATIENT
Start: 2017-09-28 | End: 2017-09-29

## 2017-09-28 RX ORDER — AMLODIPINE BESYLATE 2.5 MG/1
10 TABLET ORAL DAILY
Qty: 0 | Refills: 0 | Status: DISCONTINUED | OUTPATIENT
Start: 2017-09-28 | End: 2017-10-02

## 2017-09-28 RX ORDER — DOCUSATE SODIUM 100 MG
100 CAPSULE ORAL THREE TIMES A DAY
Qty: 0 | Refills: 0 | Status: DISCONTINUED | OUTPATIENT
Start: 2017-09-28 | End: 2017-10-04

## 2017-09-28 RX ORDER — SODIUM CHLORIDE 9 MG/ML
1000 INJECTION, SOLUTION INTRAVENOUS
Qty: 0 | Refills: 0 | Status: DISCONTINUED | OUTPATIENT
Start: 2017-09-28 | End: 2017-09-29

## 2017-09-28 RX ORDER — HYDROCORTISONE 1 %
1 OINTMENT (GRAM) TOPICAL DAILY
Qty: 0 | Refills: 0 | Status: DISCONTINUED | OUTPATIENT
Start: 2017-09-28 | End: 2017-10-04

## 2017-09-28 RX ORDER — ASPIRIN/CALCIUM CARB/MAGNESIUM 324 MG
81 TABLET ORAL DAILY
Qty: 0 | Refills: 0 | Status: DISCONTINUED | OUTPATIENT
Start: 2017-09-28 | End: 2017-09-28

## 2017-09-28 RX ORDER — INSULIN LISPRO 100/ML
VIAL (ML) SUBCUTANEOUS
Qty: 0 | Refills: 0 | Status: DISCONTINUED | OUTPATIENT
Start: 2017-09-28 | End: 2017-09-29

## 2017-09-28 RX ORDER — SENNA PLUS 8.6 MG/1
2 TABLET ORAL AT BEDTIME
Qty: 0 | Refills: 0 | Status: DISCONTINUED | OUTPATIENT
Start: 2017-09-28 | End: 2017-10-04

## 2017-09-28 RX ORDER — ASPIRIN/CALCIUM CARB/MAGNESIUM 324 MG
81 TABLET ORAL DAILY
Qty: 0 | Refills: 0 | Status: DISCONTINUED | OUTPATIENT
Start: 2017-09-28 | End: 2017-10-04

## 2017-09-28 RX ORDER — HYDRALAZINE HCL 50 MG
5 TABLET ORAL ONCE
Qty: 0 | Refills: 0 | Status: COMPLETED | OUTPATIENT
Start: 2017-09-28 | End: 2017-09-28

## 2017-09-28 RX ORDER — INSULIN LISPRO 100/ML
VIAL (ML) SUBCUTANEOUS
Qty: 0 | Refills: 0 | Status: DISCONTINUED | OUTPATIENT
Start: 2017-09-28 | End: 2017-09-28

## 2017-09-28 RX ORDER — POLYETHYLENE GLYCOL 3350 17 G/17G
17 POWDER, FOR SOLUTION ORAL DAILY
Qty: 0 | Refills: 0 | Status: DISCONTINUED | OUTPATIENT
Start: 2017-09-28 | End: 2017-10-04

## 2017-09-28 RX ORDER — METOPROLOL TARTRATE 50 MG
2.5 TABLET ORAL EVERY 6 HOURS
Qty: 0 | Refills: 0 | Status: DISCONTINUED | OUTPATIENT
Start: 2017-09-28 | End: 2017-09-29

## 2017-09-28 RX ADMIN — HEPARIN SODIUM 5000 UNIT(S): 5000 INJECTION INTRAVENOUS; SUBCUTANEOUS at 05:10

## 2017-09-28 RX ADMIN — PIPERACILLIN AND TAZOBACTAM 25 GRAM(S): 4; .5 INJECTION, POWDER, LYOPHILIZED, FOR SOLUTION INTRAVENOUS at 12:31

## 2017-09-28 RX ADMIN — Medication 3: at 17:13

## 2017-09-28 RX ADMIN — SODIUM CHLORIDE 120 MILLILITER(S): 9 INJECTION, SOLUTION INTRAVENOUS at 05:29

## 2017-09-28 RX ADMIN — AMLODIPINE BESYLATE 10 MILLIGRAM(S): 2.5 TABLET ORAL at 09:04

## 2017-09-28 RX ADMIN — SODIUM CHLORIDE 140 MILLILITER(S): 9 INJECTION, SOLUTION INTRAVENOUS at 11:25

## 2017-09-28 RX ADMIN — Medication 2.5 MILLIGRAM(S): at 12:15

## 2017-09-28 RX ADMIN — NYSTATIN CREAM 1 APPLICATION(S): 100000 CREAM TOPICAL at 05:10

## 2017-09-28 RX ADMIN — INSULIN GLARGINE 10 UNIT(S): 100 INJECTION, SOLUTION SUBCUTANEOUS at 22:07

## 2017-09-28 RX ADMIN — SENNA PLUS 2 TABLET(S): 8.6 TABLET ORAL at 22:07

## 2017-09-28 RX ADMIN — Medication 1 TABLET(S): at 22:07

## 2017-09-28 RX ADMIN — Medication 2.5 MILLIGRAM(S): at 17:14

## 2017-09-28 RX ADMIN — Medication 300 MILLIGRAM(S): at 13:24

## 2017-09-28 RX ADMIN — Medication 2.5 MILLIGRAM(S): at 02:49

## 2017-09-28 RX ADMIN — Medication 1 TABLET(S): at 14:20

## 2017-09-28 RX ADMIN — Medication 5 MILLIGRAM(S): at 06:19

## 2017-09-28 RX ADMIN — Medication 100 MILLIGRAM(S): at 22:07

## 2017-09-28 RX ADMIN — HEPARIN SODIUM 5000 UNIT(S): 5000 INJECTION INTRAVENOUS; SUBCUTANEOUS at 17:13

## 2017-09-28 RX ADMIN — Medication 1: at 09:12

## 2017-09-28 RX ADMIN — NYSTATIN CREAM 1 APPLICATION(S): 100000 CREAM TOPICAL at 17:15

## 2017-09-28 RX ADMIN — Medication 1 TABLET(S): at 05:10

## 2017-09-28 RX ADMIN — Medication 1: at 02:49

## 2017-09-28 RX ADMIN — Medication 25 MILLIGRAM(S): at 14:20

## 2017-09-28 RX ADMIN — FAMOTIDINE 20 MILLIGRAM(S): 10 INJECTION INTRAVENOUS at 13:24

## 2017-09-28 NOTE — PROGRESS NOTE ADULT - SUBJECTIVE AND OBJECTIVE BOX
Chief Complaint: Fever, AMS    Interval Events: Labs overall improved. Hypertensive this morning.    Review of Systems:  General: No fevers, chills, weight loss or gain  Skin: No rashes, color changes  Cardiovascular: No chest pain, orthopnea  Respiratory: No shortness of breath, cough  Gastrointestinal: No nausea, abdominal pain  Genitourinary: No incontinence, pain with urination  Musculoskeletal: No pain, swelling, decreased range of motion  Neurological: No headache, weakness  Psychiatric: No depression, anxiety  Endocrine: No weight loss or gain, increased thirst  All other systems are comprehensively negative.    Physical Exam:  Vital Signs Last 24 Hrs  T(C): 37.1 (28 Sep 2017 08:01), Max: 37.1 (28 Sep 2017 08:01)  T(F): 98.8 (28 Sep 2017 08:01), Max: 98.8 (28 Sep 2017 08:01)  HR: 107 (28 Sep 2017 08:00) (88 - 111)  BP: 195/102 (28 Sep 2017 08:00) (145/82 - 195/102)  BP(mean): 129 (28 Sep 2017 08:00) (99 - 129)  RR: 20 (28 Sep 2017 08:00) (18 - 29)  SpO2: 94% (28 Sep 2017 08:00) (94% - 98%)  General: NAD  HEENT: MMM  Neck: No JVD, no carotid bruit  Lungs: CTAB  CV: RRR, nl S1/S2, no M/R/G  Abdomen: S/NT/ND, +BS  Extremities: No LE edema, no cyanosis  Neuro: AAOx3, non-focal  Skin: No rash    Labs:             09-28    156<H>  |  121<H>  |  92<H>  ----------------------------<  170<H>  4.2   |  25  |  2.32<H>    Ca    9.4      28 Sep 2017 06:31  Phos  2.3     09-28  Mg     2.3     09-28    TPro  7.6  /  Alb  2.0<L>  /  TBili  3.1<H>  /  DBili  2.3<H>  /  AST  144<H>  /  ALT  69<H>  /  AlkPhos  221<H>  09-28                          15.4   12.9  )-----------( 112      ( 28 Sep 2017 06:31 )             48.0       Telemetry: Sinus rhythm, tachycardic at times, atrial run, PACs

## 2017-09-28 NOTE — PROGRESS NOTE ADULT - ASSESSMENT
89 yo AAM [facility] SNF resident was brought to ER for evaluation of cms  uncontrolled ,hyperglycemia ,recived total of 25 u of humalog this morning with no effect BG esvin dmission was found to be above 600 Quinones cath is draining purulent urine and patient diagnosed with uti and urosepsis Found to have hypotension ,ARVIN on CKD and elevated LFTS ,likely secondary to shock liver and due to sepsis Admitted for septic workup and evaluation,send blood and urine cx,serial lactate levels,monitor vitals closley,ivfs hydration,monitor urine output and renal profile,iv abx initiated Found to have troponin elevation and admitted to spcu ,seen by cardiologist ,likely troponinemia related to ARF patient became a long term resident about 3 mnts ago due to progression of dementia and Parkinsons disease Palliative care consult requested ,to discuss advance directives and complete MOLST Patient diagnosed with severe sepsis with GN bacteremia secondary to uti and obstructive uropathy

## 2017-09-28 NOTE — PROGRESS NOTE ADULT - ASSESSMENT
89 yo AAM [facility] SNF resident was brought to ER for evaluation of cms  uncontrolled ,hyperglycemia ,recived total of 25 u of humalog this morning with no effect BG esvin dmission was found to be above 600 Quinones cath is draining purulent urine and patient diagnosed with uti and urosepsis Found to have hypotension ,ARVIN on CKD and

## 2017-09-28 NOTE — PROGRESS NOTE ADULT - ASSESSMENT
The patient is a 89 year old male with a history of HTN, DM, CKD, dementia who is admitted with urosepsis.    Plan:  - Echocardiogram results from 6/2017 noted; normal LV systolic function, no significant valve issues  - Troponin elevated and flat likely due to type II NSTEMI (demand ischemia) and retention of enzymes from ARVIN on CKD. No need to trend further at this point.  - Continue aspirin 81 mg daily  - Restart amlodipine 10 mg daily  - Start hydralazine 25 mg q8h prn

## 2017-09-28 NOTE — PROGRESS NOTE ADULT - SUBJECTIVE AND OBJECTIVE BOX
PROGRESS NOTE    OVERNIGHT  No new issues reported by medical staff . All above noted Recieves free water flushes via NGT   SUBJECTIVE  Patient is resting in a recliner  comfortably .Lethargic,not able to answer questions  .No distress noted   PAST MEDICAL & SURGICAL HISTORY:  Parkinson disease  Neuropathy  CKD (chronic kidney disease)  BPH (benign prostatic hyperplasia)  Diabetes  Angina pectoris  Hypertension  Diabetes  Renal failure  Dementia  No significant past surgical history    HEALTH ISSUES - PROBLEM Dx:  Elevated LFTs: Elevated LFTs  Elevated troponin: Elevated troponin  Hyperglycemia: Hyperglycemia  Acute on chronic renal failure: Acute on chronic renal failure  Altered mental status: Altered mental status  Severe sepsis: Severe sepsis  ARVIN (acute kidney injury): ARVIN (acute kidney injury)  Prophylactic measure: Prophylactic measure  UTI (urinary tract infection): UTI (urinary tract infection)  Dementia: Dementia  Angina pectoris: Angina pectoris  Hypertension: Hypertension  Diabetes: Diabetes  Renal failure: Renal failure  Sepsis, due to unspecified organism: Sepsis, due to unspecified organism          MEDICATIONS  (STANDING):  piperacillin/tazobactam IVPB. 3.375 Gram(s) IV Intermittent every 12 hours  dextrose 5%. 1000 milliLiter(s) (50 mL/Hr) IV Continuous <Continuous>  dextrose 50% Injectable 12.5 Gram(s) IV Push once  dextrose 50% Injectable 25 Gram(s) IV Push once  dextrose 50% Injectable 25 Gram(s) IV Push once  heparin  Injectable 5000 Unit(s) SubCutaneous every 12 hours  nystatin Powder 1 Application(s) Topical two times a day  insulin glargine Injectable (LANTUS) 10 Unit(s) SubCutaneous at bedtime  aspirin Suppository 300 milliGRAM(s) Rectal daily  famotidine Injectable 20 milliGRAM(s) IV Push daily  lactobacillus acidophilus 1 Tablet(s) Oral every 8 hours  tamsulosin 0.4 milliGRAM(s) Oral at bedtime  amLODIPine   Tablet 10 milliGRAM(s) Oral daily  dextrose 5%. 1000 milliLiter(s) (140 mL/Hr) IV Continuous <Continuous>  metoprolol Injectable 2.5 milliGRAM(s) IV Push every 6 hours  insulin lispro (HumaLOG) corrective regimen sliding scale   SubCutaneous three times a day before meals    MEDICATIONS  (PRN):  dextrose Gel 1 Dose(s) Oral once PRN Blood Glucose LESS THAN 70 milliGRAM(s)/deciliter  glucagon  Injectable 1 milliGRAM(s) IntraMuscular once PRN Glucose LESS THAN 70 milligrams/deciliter  hydrALAZINE 25 milliGRAM(s) Oral every 8 hours PRN Systolic blood pressure > 180 or Diastolic blood pressure >110      OBJECTIVE    T(C): 36.8 (09-28-17 @ 14:05), Max: 37.1 (09-28-17 @ 08:01)  HR: 97 (09-28-17 @ 14:00) (88 - 111)  BP: 189/106 (09-28-17 @ 14:00) (160/86 - 199/109)  RR: 25 (09-28-17 @ 14:00) (18 - 27)  SpO2: 100% (09-28-17 @ 14:00) (94% - 100%)  Wt(kg): --  I&O's Summary    27 Sep 2017 07:01  -  28 Sep 2017 07:00  --------------------------------------------------------  IN: 3270 mL / OUT: 2360 mL / NET: 910 mL    28 Sep 2017 07:01  -  28 Sep 2017 14:29  --------------------------------------------------------  IN: 220 mL / OUT: 1325 mL / NET: -1105 mL    REVIEW OF SYSTEMS:  ROS is unobtainable dur to lethargy and confusion       PHYSICAL EXAM:  Appearance: NAD. lethargy  HEENT:   Moist oral mucosa. Conjunctiva clear b/l. NGT   Neck : supple  Cardiovascular: RRR ,S1S2 present  Respiratory: Lungs CTAB,decreased bs at bases   Gastrointestinal:  Soft, nontender. No rebound. No rigidity. BS present	  Skin: No rashes, No ecchymoses, No cyanosis.	  Neurologic: Non-focal. Moving all extremities.Not able to follow commands   Extremities: No edema. No erythema. No calf tenderness.LLE shin ulcer   	  LABS:                        15.4   12.9  )-----------( 112      ( 28 Sep 2017 06:31 )             48.0     09-28    156<H>  |  121<H>  |  92<H>  ----------------------------<  170<H>  4.2   |  25  |  2.32<H>    Ca    9.4      28 Sep 2017 06:31  Phos  2.3     09-28  Mg     2.3     09-28    TPro  7.6  /  Alb  2.0<L>  /  TBili  3.1<H>  /  DBili  2.3<H>  /  AST  144<H>  /  ALT  69<H>  /  AlkPhos  221<H>  09-28    PT/INR - ( 28 Sep 2017 06:31 )   PT: 11.1 sec;   INR: 1.02 ratio               RADIOLOGY & ADDITIONAL TESTS:  Labs and imaging reviewed electronically   ASSESSMENT/PLAN:

## 2017-09-28 NOTE — PROGRESS NOTE ADULT - SUBJECTIVE AND OBJECTIVE BOX
Patient seen and examined today Plan discussed/Questions answered  (with patient/ancillary staff/colleagues) Chart notes reviewd More detailed Pulm/Critical Care notes, assessment and plan  will be added later today as needed after reviewing further labs as they become available     Notable interval events reviewed    ROS/PE  . REVIEW OF SYSTEMS Patient is unable to give any reliable review of systems   PHYSICAL EXAM    HEENT Unremarkable PERRLA atraumatic  RESP Fair air entry EXP prolonged    Harsh breath sound Resp distres mild  CARDIAC S1 S2 No S3     NO JVD   ABDOMEN SOFT BS PRESENT NOT DISTENDED No hepatosplenomegaly  PEDAL EDEMA present No calf tenderness  NO rash GENERAL Not TOXIC looking  Awake A & O x 1   . Patient seen and examined today Plan discussed/Questions answered  (with patient/ancillary staff/colleagues) Chart notes reviewd More detailed Pulm/Critical Care notes, assessment and plan  will be added later today as needed after reviewing further labs as they become available     Notable interval events reviewed    ROS/PE  . REVIEW OF SYSTEMS Patient is unable to give any reliable review of systems   PHYSICAL EXAM    HEENT Unremarkable PERRLA atraumatic  RESP Fair air entry EXP prolonged    Harsh breath sound Resp distres mild  CARDIAC S1 S2 No S3     NO JVD   ABDOMEN SOFT BS PRESENT NOT DISTENDED No hepatosplenomegaly  PEDAL EDEMA present No calf tenderness  NO rash GENERAL Not TOXIC looking  Awake A & O x 1   . VITALS/LABS  9/28/2017 afeb 107 195/102 20 94%   9/28/2017 12  (9/26)Changed to D5 140 (9/28)  9/28 acc 133-161   9/28 u 1100 last s   9/28/2017 W 12.9 Hb 15.4 Plt 112 INR 1.02 Na 156 K 4.2 CO2 Cr 2.32  PROBLEM ASSESSMENT PLAN   RESP   9/28/2017 .4 vm 748/32/82   9/26/2017 3p 2l 738/37/90   9/27 VBG pH 734   LACTICEMIA POA   9/26-9/28/2017 LA 2.9-1.5-1.6  9/27 LA 2.6   UTI PNEUMONIA SEPSIS E COLI BACTEREMIA (9/26)  Zosyn (9/26)   9/27 Vanco Random 9.1   9/27 Leg n   9/26-9/27-9/28 W 12.2-20.8-12.9     9/26-9/28 B 6-3  9/26 CT CAP NC 1) Basal atelectasis with focal consolidation lll likely pneumonia 2) Scattered subcm nodules 3) Nodular liver sugg cirrhosis 4) R low atteniation renal lesion needs sono 5) No hydro 6) No obstrn abundant feces   9/26/2017 UA 1020 Notr n L estr Mod Bld lge W Over 50 Bact tntc   9/26/2017 CXR napd   9/26/2017 US abd 1) Mild gbw thickening 2) No duct dilation   POSSIBLE MI   9/26-9/27-9/28/2017  CK - 3906-924 Tr 1 .531 (i) - .482 (i) - .491 (i)-.280 (i)   ASA 81 (9/26) Lopressor 2.5x4 (9/28)  HYPERTENSION  Norvasc 10 (9/28)   HYPERNATREMIA  .8 (9/27) IV D5 140 (9/28)   9/26-9/27/2017 Na 151-155  9/27 On q 3 h sl scale   ARVIN   9/26-9/27-9/28 Cr 5.2-3.7-2.3   HYPERGLYCEMIA  9/26/20-17 G 615 9/27 HbA1C 9.5   ELEVATED LFTs   9/26-9/27-9/28/2017 -264-221 -243-144 alt 92-87-69   9/27 Hep C reactive   AMS 9/26 CT H No ac  PREEXISTING ISSUES   PROBLEM: BPH Tamsulosin .4 (9/26)                 GLOBAL ISSUE/BEST PRACTICE:        PROBLEM:      Analgesia:     na                        PROBLEM: Sedation:     na               PROBLEM: HOB elevation:   y              PROBLEM: Stress ulcer proph:    pepcid 20 (9/26)                       PROBLEM: VTE prophylaxis:      hsc (9/26)                   PROBLEM: Glycemic control:    iss (9/26)  lantus 10 (9/26)   PROBLEM: Nutrition:    npo (9/26)           PROBLEM: Advanced directive: dnr (9/27)      PROBLEM: Allergies:  na  PATIENT DESCRIPTION CC HPI PMH SOCIAL   89 m [facility] resident IKP was sent from [facility] for lethargy BP 90  Hi bld sugar found to be septic with pus draining from Quinones  admitted [facility] S 9/26/2017   PM CVA DM2 Hypertension CAD 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   NH Meds  Norvasc 5 ASA 81 Lantus 10 mvi Tamsuloin .4 JANUSZ NCS Lo fat reg consistency Novolog R groin excoriated area   HOSPITAL COURSE 9/26/2017 ADMISSION [facility] S 9/26/2017  PROBLEM SPICIFIC COURSE  PLAN   RESP  Gas exchange was acceptable on low flow O2 on arrival   LACTICEMIA POA Rxed with IVF   E COLI BACTEREMIA (9/26)  (LIKELY SEC TO)  E COLI UTI AND PNEUMONIA SEPSIS POA Rxed with zosyn (9/26) empirically   9/26/2017 CXR napd   POSSIBLE MI  9/26/2017 Tr 1 .531 (i)  Was Rxed with ASA (9/26)   HYPERNATREMIA 9/26/2017 Na 151 Rxed with hypotonic fluids  IV changed to D5 (9/28)  ARVIN   9/26 Cr 5.2 P{ossibly sec dehydrationm Was Rxed with IVF    HYPERGLYCEMIA 9/26/2017 G 615 Was Rxed with iss (9/26) No evidence of HONK or DKA (Ketones neg) 9/27 FW ordered NGT   ELEVATED LFTs  9/26/2017   alt 92 Poss sec shock US abd ordeerd 9/26/2017 9/27 Hep c came positive   AMS 9/26 CT H No ac  HYPERTENSION Norvasc added   TIME SPENT Over 35 minutes aggregate critical care time spent on encounter; activities included   direct patient care, counseling and/or coordinating care reviewing notes, lab data/ imaging , discussion with multidisciplinary team/ patient  /family. Risks, benefits, alternatives  discussed in detail. Questions/concerns  were addressed  to the best of my ability .

## 2017-09-28 NOTE — PROGRESS NOTE ADULT - SUBJECTIVE AND OBJECTIVE BOX
ALESHA HODGE is a 89yMale , patient examined and chart reviewed.    INTERVAL HPI/ OVERNIGHT EVENTS:  Afebrile. Lethargic. BP stable.    Past Medical History--  PAST MEDICAL & SURGICAL HISTORY:  Parkinson disease  Neuropathy  CKD (chronic kidney disease)  BPH (benign prostatic hyperplasia)  Diabetes  Angina pectoris  Hypertension  Diabetes  Renal failure  Dementia  No significant past surgical history      For details regarding the patient's social history, family history, and other miscellaneous elements, please refer the initial infectious diseases consultation and/or the admitting history and physical examination for this admission.    ROS:  Unable to obtain due to : Lethargic  Current inpatient medications :    ANTIBIOTICS/RELEVANT:  piperacillin/tazobactam IVPB. 3.375 Gram(s) IV Intermittent every 12 hours  lactobacillus acidophilus 1 Tablet(s) Oral every 8 hours      dextrose 5%. 1000 milliLiter(s) IV Continuous <Continuous>  dextrose Gel 1 Dose(s) Oral once PRN  dextrose 50% Injectable 12.5 Gram(s) IV Push once  dextrose 50% Injectable 25 Gram(s) IV Push once  dextrose 50% Injectable 25 Gram(s) IV Push once  glucagon  Injectable 1 milliGRAM(s) IntraMuscular once PRN  heparin  Injectable 5000 Unit(s) SubCutaneous every 12 hours  nystatin Powder 1 Application(s) Topical two times a day  insulin glargine Injectable (LANTUS) 10 Unit(s) SubCutaneous at bedtime  famotidine Injectable 20 milliGRAM(s) IV Push daily  tamsulosin 0.4 milliGRAM(s) Oral at bedtime  amLODIPine   Tablet 10 milliGRAM(s) Oral daily  dextrose 5%. 1000 milliLiter(s) IV Continuous <Continuous>  metoprolol Injectable 2.5 milliGRAM(s) IV Push every 6 hours  hydrALAZINE 25 milliGRAM(s) Oral every 8 hours PRN  insulin lispro (HumaLOG) corrective regimen sliding scale   SubCutaneous three times a day before meals  hydrocortisone hemorrhoidal Suppository 1 Suppository(s) Rectal daily  senna 2 Tablet(s) Oral at bedtime  docusate sodium 100 milliGRAM(s) Oral three times a day  bisacodyl Suppository 10 milliGRAM(s) Rectal daily PRN  polyethylene glycol 3350 17 Gram(s) Oral daily  aspirin  chewable 81 milliGRAM(s) Enteral Tube daily      Objective:    09-27 @ 07:01  -  09-28 @ 07:00  --------------------------------------------------------  IN: 3270 mL / OUT: 2360 mL / NET: 910 mL    09-28 @ 07:01  -  09-28 @ 16:13  --------------------------------------------------------  IN: 1420 mL / OUT: 1325 mL / NET: 95 mL      T(C): 36.8 (09-28-17 @ 14:05), Max: 37.1 (09-28-17 @ 08:01)  HR: 97 (09-28-17 @ 14:00) (88 - 108)  BP: 189/106 (09-28-17 @ 14:00) (162/92 - 199/109)  RR: 25 (09-28-17 @ 14:00) (18 - 27)  SpO2: 100% (09-28-17 @ 14:00) (94% - 100%)  Wt(kg): --      Physical Exam:  GEN: Lethargic NGT in place  HEENT: normocephalic and atraumatic. EOMI. MARTIR. Moist mucosa. Clear Posterior pharynx.  NECK: Supple. No carotid bruits.  No lymphadenopathy or thyromegaly.  LUNGS: Decreased to auscultation.  HEART: Regular rate and rhythm without murmur.  ABDOMEN: Soft, nontender, and nondistended.  Positive bowel sounds.  No hepatosplenomegaly was noted.  EXTREMITIES: Without any cyanosis, clubbing, rash, lesions or edema.  NEUROLOGIC: Lethargic  SKIN: No ulceration or induration present.      LABS:                        15.4   12.9  )-----------( 112      ( 28 Sep 2017 06:31 )             48.0       09-28    156<H>  |  121<H>  |  92<H>  ----------------------------<  170<H>  4.2   |  25  |  2.32<H>    Ca    9.4      28 Sep 2017 06:31  Phos  2.3     09-28  Mg     2.3     09-28    TPro  7.6  /  Alb  2.0<L>  /  TBili  3.1<H>  /  DBili  2.3<H>  /  AST  144<H>  /  ALT  69<H>  /  AlkPhos  221<H>  09-28      PT/INR - ( 28 Sep 2017 06:31 )   PT: 11.1 sec;   INR: 1.02 ratio        ABG - ( 28 Sep 2017 06:09 )  pH: x     /  pCO2: 32    /  pO2: 82    / HCO3: 25    / Base Excess: 0.2   /  SaO2: 97          MICROBIOLOGY:  Culture - Blood (09.28.17 @ 00:20)    Gram Stain:   Growth in aerobic and anaerobic bottles: Gram Negative Rods    Specimen Source: .Blood Blood-Peripheral    Culture Results:   Growth in aerobic and anaerobic bottles: Gram Negative Rods    Culture - Blood (09.26.17 @ 20:59)    -  Escherichia coli: Detec    Gram Stain:   Growth in aerobic and anaerobic bottles: Gram Negative Rods    Specimen Source: .Blood Blood-Peripheral    Organism: Blood Culture PCR    Culture Results:   Growth in aerobic and anaerobic bottles: Escherichia coli  ***Blood Panel PCR results on this specimen are available  approximately 3 hours after the Gram stain result.***  Gram stain, PCR, and/or culture results may not always  correspond due to difference in methodologies.  ************************************************************  This PCR assay was performed using [employer].  The following targets are tested for: Enterococcus,  vancomycin resistant enterococci, Listeria monocytogenes,  coagulase negative staphylococci, S. aureus,  methicillin resistant S. aureus, Streptococcus agalactiae  (Group B), S. pneumoniae, S. pyogenes (Group A),  Acinetobacter baumannii, Enterobacter cloacae, E. coli,  Klebsiella oxytoca, K. pneumoniae, Proteus sp.,  Serratia marcescens, Haemophilus influenzae,  Neisseria meningitidis, Pseudomonas aeruginosa, Candida  albicans, C. glabrata, C krusei, C parapsilosis,  C. tropicalis and the KPC resistance gene.    Organism Identification: Blood Culture PCR    Method Type: PCR    Culture - Urine (09.26.17 @ 21:10)    Specimen Source: .Urine Clean Catch (Midstream)    Culture Results:   >100,000 CFU/ml Escherichia coli    RADIOLOGY & ADDITIONAL STUDIES:  CT CHEST/ABDOMEN/PELVIS:     CLINICAL INFORMATION:  Sepsis.    COMPARISON: None.    PROCEDURE:  Using multislice helical CT, 2.5 mm sections were obtained   from the thoracic inlet through the ischial tuberosities.   Multiplanar reformatted images.       There is patchy left lower lobe airspace consolidation, which may   reflect pneumonia in the appropriate clinical setting.  The central airways remain patent. There are minimal atelectatic changes   at the lung bases.  No thickened pleural effusion is noted.    There are small calcified subcarinal mediastinal lymph nodes.  The evaluation of the pulmonary hilum is limited without intravenous   contrast.    There is arteriosclerotic calcification of the thoracic aorta and   coronary artery calcification.  No pericardial effusion is noted.    There is a small hiatal hernia.    The evaluation of the solid organ parenchyma is limited without   intravenous contrast.    There is a slightly nodular contour of the liver, suggestive of   cirrhosis. The evaluation for hepatic masses is limited on noncontrast   exam.    The spleen and gallbladder appear unremarkable.    The adrenal glands and nonenhanced pancreas are unremarkable in   appearance.    There is a cyst at the lower pole the right kidney. No hydronephrosis is   noted.    There is arteriosclerotic calcification of the abdominal aorta and major   branch vessels.  No enlarged retroperitoneal lymphadenopathy is noted.    The evaluation of the stomach and gastrointestinal tract is limited   without oral contrast distention.  There is dense stool with mild distention of the rectosigmoid colon and   mild bowel wall thickening.  No bowel obstruction is noted.  No localized intra-abdominal fluid collection or pneumoperitoneum is   noted.    There is a normal-appearing appendix.    There is an enlarged prostate gland. There is a balloon Quinones catheter   with air in the nondistended urinary bladder.  No free fluid is noted within the pelvis.    There are multilevel degenerative changes of the thoracic and lumbar   spine.  There are multiple age indeterminate compression deformities of the mid   to lower thoracic and upper lumbar spine.    Impression:    Bibasilar atelectasis with left lower lobe airspace consolidation which   may reflect pneumonia.  Recommend follow-up to resolution.    Fecal impaction with mild distention of the rectosigmoid colon, correlate   clinically for stercoral colitis.    Other findings as discussed above.    This study was preliminary reported by [facility].      Assessment :   89 year old male hx parkinson's dementia BPH admitted with severe sepsis with persistent Ecoli septicemia  sec obstructive uropathy sec urinary retention and UTI  Doubt pna suspect atelectasis  ARVIN improving  Abn LFTs sec sepsis  +troponins  Uncontrolled DM     Plan :   Cont Zosyn  Repeat blood cultures  Fu cultures  Trend LFTS  Trend wbc  Dm control  Aspiration precautions  Continue with present regiment.  Appropriate use of antibiotics and adverse effects reviewed.    Critical care time greater then 35 minutes reviewing notes, labs data/ imaging , discussion with multidisciplinary team.    Thank you for allowing me to participate in care of your patient .        Allen Hall MD  Infectious Disease  [phone]

## 2017-09-28 NOTE — PROVIDER CONTACT NOTE (MEDICATION) - RECOMMENDATIONS
decrease Zosyn to 3.375 Gm  Sepsis dose is 3.375 GM Q12HRS W cRcL <= 20 ML /min
fingerstick every 6 hours with low scale lispro

## 2017-09-28 NOTE — PROGRESS NOTE ADULT - PROBLEM SELECTOR PLAN 7
Troponinemia likely rel;ated to demand ischemia secondary surya severe sepsis and ARF on CKD ,followed by cardiology consult

## 2017-09-28 NOTE — PROGRESS NOTE ADULT - PROBLEM SELECTOR PLAN 3
iv fluids hydration and free water flushes via NGT ,serial bmp ,nephrology consult called Limon is draining cloudy urine Continuye limon cath

## 2017-09-28 NOTE — PROGRESS NOTE ADULT - SUBJECTIVE AND OBJECTIVE BOX
Patient is a 89y Male with past medical history of           presenting with        who reports no complaints overnight.    REVIEW OF SYSTEMS:    CONSTITUTIONAL: No  fevers or chills    Allergies    No Known Allergies    Intolerances        MEDICATIONS  (STANDING):  piperacillin/tazobactam IVPB. 3.375 Gram(s) IV Intermittent every 12 hours  dextrose 5%. 1000 milliLiter(s) (50 mL/Hr) IV Continuous <Continuous>  dextrose 50% Injectable 12.5 Gram(s) IV Push once  dextrose 50% Injectable 25 Gram(s) IV Push once  dextrose 50% Injectable 25 Gram(s) IV Push once  heparin  Injectable 5000 Unit(s) SubCutaneous every 12 hours  nystatin Powder 1 Application(s) Topical two times a day  insulin glargine Injectable (LANTUS) 10 Unit(s) SubCutaneous at bedtime  famotidine Injectable 20 milliGRAM(s) IV Push daily  lactobacillus acidophilus 1 Tablet(s) Oral every 8 hours  tamsulosin 0.4 milliGRAM(s) Oral at bedtime  amLODIPine   Tablet 10 milliGRAM(s) Oral daily  dextrose 5%. 1000 milliLiter(s) (140 mL/Hr) IV Continuous <Continuous>  metoprolol Injectable 2.5 milliGRAM(s) IV Push every 6 hours  insulin lispro (HumaLOG) corrective regimen sliding scale   SubCutaneous three times a day before meals  hydrocortisone hemorrhoidal Suppository 1 Suppository(s) Rectal daily  senna 2 Tablet(s) Oral at bedtime  docusate sodium 100 milliGRAM(s) Oral three times a day  polyethylene glycol 3350 17 Gram(s) Oral daily  aspirin  chewable 81 milliGRAM(s) Enteral Tube daily      Vitals:  T(F): 98.3 (09-28-17 @ 14:05), Max: 98.8 (09-28-17 @ 08:01)  HR: 97 (09-28-17 @ 14:00)  BP: 189/106 (09-28-17 @ 14:00)  BP(mean): 119 (09-28-17 @ 12:00)  RR: 25 (09-28-17 @ 14:00)  SpO2: 100% (09-28-17 @ 14:00)  Wt(kg): --    I and O's:    09-27 @ 07:01  -  09-28 @ 07:00  --------------------------------------------------------  IN: 3270 mL / OUT: 2360 mL / NET: 910 mL    09-28 @ 07:01  - 09-28 @ 15:26  --------------------------------------------------------  IN: 220 mL / OUT: 1325 mL / NET: -1105 mL            PHYSICAL EXAM:    Constitutional: NAD,   Neck: No JVD  Respiratory: CTAB  Cardiovascular: S1 and S2  Gastrointestinal: BS+, soft, NT/ND  Extremities: No peripheral edema  Neurological:  no focal deficits    LABS:                        15.4   12.9  )-----------( 112      ( 28 Sep 2017 06:31 )             48.0                         15.4   20.8  )-----------( 102      ( 27 Sep 2017 06:31 )             47.4       156    |  121    |  92     ----------------------------<  170       28 Sep 2017 06:31  4.2     |  25     |  2.32     155    |  120    |  122    ----------------------------<  303       27 Sep 2017 06:31  4.8     |  26     |  3.76     155    |  121    |  138    ----------------------------<  298       26 Sep 2017 23:15  4.0     |  22     |  4.13     Ca    9.4        28 Sep 2017 06:31  Ca    9.6        27 Sep 2017 06:31    Phos  2.3       28 Sep 2017 06:31  Phos  2.7       27 Sep 2017 06:31    Mg     2.3       28 Sep 2017 06:31  Mg     2.6       27 Sep 2017 06:31    TPro  7.6    /  Alb  2.0    /  TBili  3.1    /        28 Sep 2017 06:31  DBili  2.3    /  AST  144    /  ALT  69     /  AlkPhos  221      TPro  7.3    /  Alb  2.1    /  TBili  2.6    /        27 Sep 2017 06:31  DBili  2.1    /  AST  243    /  ALT  87     /  AlkPhos  264          Serum Osmo:   Serum Uric Acid:   MAVERICK:   Double Stranded DNA:   C3:   C3 Nephritic Factor:   C4:   p-ANCA:   c-ANCA:   Anti-GBM:   CHAPO-Smith Antibody:   Immunofixation:   [place]/Lambda Free Light Chain:   SPEP:   Hepatitis Panel: Hepatitis B Surface Antigen: Nonreact (09-27 @ 11:52)    HIV-1/2:     Urine Studies:      Urine chemistry:   Urine Na:   Urine Creatinine:   Urine Protein/Cr ratio:  Urine K:   Urine Osm:   24 Hr urine studies:     24 hr urine Creatinine Clearance:    RADIOLOGY & ADDITIONAL STUDIES:

## 2017-09-28 NOTE — PROGRESS NOTE ADULT - PROBLEM SELECTOR PLAN 5
continue current managmnet and medications,accuchecks q 6 hrs Nutritionist consult regarding artificial nutrition if remains leathrgic and not able to eat

## 2017-09-28 NOTE — PROVIDER CONTACT NOTE (MEDICATION) - BACKGROUND
pt Bryce Crcl = 9.1 mL/min
NPO type 2 diabetes  on Lantus 10 units hs lispro every 3 hours low scale with good glycemic control

## 2017-09-29 DIAGNOSIS — T14.90 INJURY, UNSPECIFIED: ICD-10-CM

## 2017-09-29 DIAGNOSIS — K74.60 UNSPECIFIED CIRRHOSIS OF LIVER: ICD-10-CM

## 2017-09-29 DIAGNOSIS — E80.6 OTHER DISORDERS OF BILIRUBIN METABOLISM: ICD-10-CM

## 2017-09-29 LAB
-  AMIKACIN: SIGNIFICANT CHANGE UP
-  AMPICILLIN/SULBACTAM: SIGNIFICANT CHANGE UP
-  AMPICILLIN: SIGNIFICANT CHANGE UP
-  AZTREONAM: SIGNIFICANT CHANGE UP
-  CEFAZOLIN: SIGNIFICANT CHANGE UP
-  CEFEPIME: SIGNIFICANT CHANGE UP
-  CEFOXITIN: SIGNIFICANT CHANGE UP
-  CEFTAZIDIME: SIGNIFICANT CHANGE UP
-  CEFTRIAXONE: SIGNIFICANT CHANGE UP
-  CIPROFLOXACIN: SIGNIFICANT CHANGE UP
-  ERTAPENEM: SIGNIFICANT CHANGE UP
-  GENTAMICIN: SIGNIFICANT CHANGE UP
-  IMIPENEM: SIGNIFICANT CHANGE UP
-  LEVOFLOXACIN: SIGNIFICANT CHANGE UP
-  MEROPENEM: SIGNIFICANT CHANGE UP
-  PIPERACILLIN/TAZOBACTAM: SIGNIFICANT CHANGE UP
-  TOBRAMYCIN: SIGNIFICANT CHANGE UP
-  TRIMETHOPRIM/SULFAMETHOXAZOLE: SIGNIFICANT CHANGE UP
ALBUMIN SERPL ELPH-MCNC: 1.7 G/DL — LOW (ref 3.3–5)
ALBUMIN SERPL ELPH-MCNC: 1.8 G/DL — LOW (ref 3.3–5)
ALP SERPL-CCNC: 176 U/L — HIGH (ref 30–120)
ALP SERPL-CCNC: 179 U/L — HIGH (ref 30–120)
ALT FLD-CCNC: 47 U/L DA — SIGNIFICANT CHANGE UP (ref 10–60)
ALT FLD-CCNC: 50 U/L DA — SIGNIFICANT CHANGE UP (ref 10–60)
AMMONIA BLD-MCNC: 46 UMOL/L — HIGH (ref 11–32)
ANION GAP SERPL CALC-SCNC: 7 MMOL/L — SIGNIFICANT CHANGE UP (ref 5–17)
AST SERPL-CCNC: 75 U/L — HIGH (ref 10–40)
AST SERPL-CCNC: 80 U/L — HIGH (ref 10–40)
BASE EXCESS BLDV CALC-SCNC: 0.4 MMOL/L — SIGNIFICANT CHANGE UP (ref -2–2)
BILIRUB DIRECT SERPL-MCNC: 4 MG/DL — HIGH (ref 0–0.2)
BILIRUB INDIRECT FLD-MCNC: 1 MG/DL — SIGNIFICANT CHANGE UP (ref 0.2–1)
BILIRUB SERPL-MCNC: 4.5 MG/DL — HIGH (ref 0.2–1.2)
BILIRUB SERPL-MCNC: 5 MG/DL — HIGH (ref 0.2–1.2)
BUN SERPL-MCNC: 65 MG/DL — HIGH (ref 7–23)
CALCIUM SERPL-MCNC: 8.9 MG/DL — SIGNIFICANT CHANGE UP (ref 8.4–10.5)
CHLORIDE SERPL-SCNC: 114 MMOL/L — HIGH (ref 96–108)
CK SERPL-CCNC: 293 U/L — SIGNIFICANT CHANGE UP (ref 39–308)
CO2 SERPL-SCNC: 25 MMOL/L — SIGNIFICANT CHANGE UP (ref 22–31)
CREAT SERPL-MCNC: 2.16 MG/DL — HIGH (ref 0.5–1.3)
CULTURE RESULTS: SIGNIFICANT CHANGE UP
GAS PNL BLDV: SIGNIFICANT CHANGE UP
GLUCOSE SERPL-MCNC: 552 MG/DL — CRITICAL HIGH (ref 70–99)
GRAM STN FLD: SIGNIFICANT CHANGE UP
HAV IGG+IGM SER QL: REACTIVE
HAV IGM SER-ACNC: SIGNIFICANT CHANGE UP
HBV CORE AB SER-ACNC: SIGNIFICANT CHANGE UP
HBV CORE IGM SER-ACNC: SIGNIFICANT CHANGE UP
HBV CORE IGM SER-ACNC: SIGNIFICANT CHANGE UP
HBV SURFACE AB SER-ACNC: SIGNIFICANT CHANGE UP
HBV SURFACE AG SER-ACNC: SIGNIFICANT CHANGE UP
HBV SURFACE AG SER-ACNC: SIGNIFICANT CHANGE UP
HCO3 BLDV-SCNC: 25 MMOL/L — SIGNIFICANT CHANGE UP (ref 21–29)
HCT VFR BLD CALC: 47.4 % — SIGNIFICANT CHANGE UP (ref 39–50)
HCV AB S/CO SERPL IA: 14.32 S/CO — SIGNIFICANT CHANGE UP
HCV AB SERPL-IMP: REACTIVE
HCV RNA FLD QL NAA+PROBE: SIGNIFICANT CHANGE UP
HCV RNA SPEC QL PROBE+SIG AMP: DETECTED
HGB BLD-MCNC: 15.3 G/DL — SIGNIFICANT CHANGE UP (ref 13–17)
HOROWITZ INDEX BLDV+IHG-RTO: 21 — SIGNIFICANT CHANGE UP
INR BLD: 1.06 RATIO — SIGNIFICANT CHANGE UP (ref 0.88–1.16)
MAGNESIUM SERPL-MCNC: 2.1 MG/DL — SIGNIFICANT CHANGE UP (ref 1.6–2.6)
MCHC RBC-ENTMCNC: 28.7 PG — SIGNIFICANT CHANGE UP (ref 27–34)
MCHC RBC-ENTMCNC: 32.3 GM/DL — SIGNIFICANT CHANGE UP (ref 32–36)
MCV RBC AUTO: 89.1 FL — SIGNIFICANT CHANGE UP (ref 80–100)
METHOD TYPE: SIGNIFICANT CHANGE UP
ORGANISM # SPEC MICROSCOPIC CNT: SIGNIFICANT CHANGE UP
PCO2 BLDV: 35 MMHG — SIGNIFICANT CHANGE UP (ref 35–50)
PH BLDV: 7.45 — SIGNIFICANT CHANGE UP (ref 7.35–7.45)
PHOSPHATE SERPL-MCNC: 1.9 MG/DL — LOW (ref 2.5–4.5)
PLATELET # BLD AUTO: 76 K/UL — LOW (ref 150–400)
PO2 BLDV: 50 — HIGH (ref 25–45)
POTASSIUM SERPL-MCNC: 3.7 MMOL/L — SIGNIFICANT CHANGE UP (ref 3.5–5.3)
POTASSIUM SERPL-SCNC: 3.7 MMOL/L — SIGNIFICANT CHANGE UP (ref 3.5–5.3)
PROT SERPL-MCNC: 6.6 G/DL — SIGNIFICANT CHANGE UP (ref 6–8.3)
PROT SERPL-MCNC: 6.7 G/DL — SIGNIFICANT CHANGE UP (ref 6–8.3)
PROTHROM AB SERPL-ACNC: 11.6 SEC — SIGNIFICANT CHANGE UP (ref 9.8–12.7)
RBC # BLD: 5.33 M/UL — SIGNIFICANT CHANGE UP (ref 4.2–5.8)
RBC # FLD: 15.8 % — HIGH (ref 10.3–14.5)
S PNEUM AG SER QL: SIGNIFICANT CHANGE UP
SAO2 % BLDV: 83 % — SIGNIFICANT CHANGE UP (ref 67–88)
SODIUM SERPL-SCNC: 146 MMOL/L — HIGH (ref 135–145)
SPECIMEN SOURCE: SIGNIFICANT CHANGE UP
TROPONIN I SERPL-MCNC: 0.16 NG/ML — HIGH (ref 0.02–0.06)
TSH SERPL-MCNC: 1.38 UIU/ML — SIGNIFICANT CHANGE UP (ref 0.27–4.2)
WBC # BLD: 9.2 K/UL — SIGNIFICANT CHANGE UP (ref 3.8–10.5)
WBC # FLD AUTO: 9.2 K/UL — SIGNIFICANT CHANGE UP (ref 3.8–10.5)

## 2017-09-29 PROCEDURE — 71010: CPT | Mod: 26,77

## 2017-09-29 PROCEDURE — 71010: CPT | Mod: 26

## 2017-09-29 RX ORDER — ERTAPENEM SODIUM 1 G/1
500 INJECTION, POWDER, LYOPHILIZED, FOR SOLUTION INTRAMUSCULAR; INTRAVENOUS EVERY 24 HOURS
Qty: 0 | Refills: 0 | Status: DISCONTINUED | OUTPATIENT
Start: 2017-09-30 | End: 2017-10-04

## 2017-09-29 RX ORDER — POTASSIUM PHOSPHATE, MONOBASIC POTASSIUM PHOSPHATE, DIBASIC 236; 224 MG/ML; MG/ML
15 INJECTION, SOLUTION INTRAVENOUS ONCE
Qty: 0 | Refills: 0 | Status: COMPLETED | OUTPATIENT
Start: 2017-09-29 | End: 2017-09-29

## 2017-09-29 RX ORDER — INSULIN LISPRO 100/ML
VIAL (ML) SUBCUTANEOUS AT BEDTIME
Qty: 0 | Refills: 0 | Status: DISCONTINUED | OUTPATIENT
Start: 2017-09-29 | End: 2017-09-29

## 2017-09-29 RX ORDER — HYDRALAZINE HCL 50 MG
25 TABLET ORAL EVERY 8 HOURS
Qty: 0 | Refills: 0 | Status: DISCONTINUED | OUTPATIENT
Start: 2017-09-29 | End: 2017-10-02

## 2017-09-29 RX ORDER — SODIUM CHLORIDE 9 MG/ML
1000 INJECTION, SOLUTION INTRAVENOUS
Qty: 0 | Refills: 0 | Status: DISCONTINUED | OUTPATIENT
Start: 2017-09-29 | End: 2017-09-30

## 2017-09-29 RX ORDER — INSULIN LISPRO 100/ML
VIAL (ML) SUBCUTANEOUS
Qty: 0 | Refills: 0 | Status: DISCONTINUED | OUTPATIENT
Start: 2017-09-29 | End: 2017-09-29

## 2017-09-29 RX ORDER — INSULIN LISPRO 100/ML
4 VIAL (ML) SUBCUTANEOUS ONCE
Qty: 0 | Refills: 0 | Status: COMPLETED | OUTPATIENT
Start: 2017-09-29 | End: 2017-09-29

## 2017-09-29 RX ORDER — ERTAPENEM SODIUM 1 G/1
1000 INJECTION, POWDER, LYOPHILIZED, FOR SOLUTION INTRAMUSCULAR; INTRAVENOUS ONCE
Qty: 0 | Refills: 0 | Status: COMPLETED | OUTPATIENT
Start: 2017-09-29 | End: 2017-09-29

## 2017-09-29 RX ORDER — ERTAPENEM SODIUM 1 G/1
INJECTION, POWDER, LYOPHILIZED, FOR SOLUTION INTRAMUSCULAR; INTRAVENOUS
Qty: 0 | Refills: 0 | Status: DISCONTINUED | OUTPATIENT
Start: 2017-09-29 | End: 2017-10-04

## 2017-09-29 RX ORDER — INSULIN LISPRO 100/ML
VIAL (ML) SUBCUTANEOUS EVERY 6 HOURS
Qty: 0 | Refills: 0 | Status: DISCONTINUED | OUTPATIENT
Start: 2017-09-29 | End: 2017-09-30

## 2017-09-29 RX ADMIN — Medication 1 SUPPOSITORY(S): at 11:57

## 2017-09-29 RX ADMIN — SODIUM CHLORIDE 120 MILLILITER(S): 9 INJECTION, SOLUTION INTRAVENOUS at 15:17

## 2017-09-29 RX ADMIN — NYSTATIN CREAM 1 APPLICATION(S): 100000 CREAM TOPICAL at 06:53

## 2017-09-29 RX ADMIN — Medication 1 TABLET(S): at 22:25

## 2017-09-29 RX ADMIN — ERTAPENEM SODIUM 120 MILLIGRAM(S): 1 INJECTION, POWDER, LYOPHILIZED, FOR SOLUTION INTRAMUSCULAR; INTRAVENOUS at 12:02

## 2017-09-29 RX ADMIN — AMLODIPINE BESYLATE 10 MILLIGRAM(S): 2.5 TABLET ORAL at 06:51

## 2017-09-29 RX ADMIN — PIPERACILLIN AND TAZOBACTAM 25 GRAM(S): 4; .5 INJECTION, POWDER, LYOPHILIZED, FOR SOLUTION INTRAVENOUS at 00:49

## 2017-09-29 RX ADMIN — Medication 25 MILLIGRAM(S): at 15:17

## 2017-09-29 RX ADMIN — Medication 6: at 08:16

## 2017-09-29 RX ADMIN — Medication 2.5 MILLIGRAM(S): at 06:51

## 2017-09-29 RX ADMIN — Medication 25 MILLIGRAM(S): at 22:25

## 2017-09-29 RX ADMIN — Medication 4: at 18:03

## 2017-09-29 RX ADMIN — HEPARIN SODIUM 5000 UNIT(S): 5000 INJECTION INTRAVENOUS; SUBCUTANEOUS at 19:11

## 2017-09-29 RX ADMIN — SODIUM CHLORIDE 120 MILLILITER(S): 9 INJECTION, SOLUTION INTRAVENOUS at 08:10

## 2017-09-29 RX ADMIN — SENNA PLUS 2 TABLET(S): 8.6 TABLET ORAL at 22:25

## 2017-09-29 RX ADMIN — Medication 81 MILLIGRAM(S): at 12:02

## 2017-09-29 RX ADMIN — Medication 4 UNIT(S): at 08:24

## 2017-09-29 RX ADMIN — POLYETHYLENE GLYCOL 3350 17 GRAM(S): 17 POWDER, FOR SOLUTION ORAL at 12:02

## 2017-09-29 RX ADMIN — INSULIN GLARGINE 10 UNIT(S): 100 INJECTION, SOLUTION SUBCUTANEOUS at 22:25

## 2017-09-29 RX ADMIN — Medication 100 MILLIGRAM(S): at 06:51

## 2017-09-29 RX ADMIN — Medication 2.5 MILLIGRAM(S): at 00:00

## 2017-09-29 RX ADMIN — FAMOTIDINE 20 MILLIGRAM(S): 10 INJECTION INTRAVENOUS at 12:02

## 2017-09-29 RX ADMIN — NYSTATIN CREAM 1 APPLICATION(S): 100000 CREAM TOPICAL at 18:04

## 2017-09-29 RX ADMIN — Medication 100 MILLIGRAM(S): at 22:25

## 2017-09-29 RX ADMIN — HEPARIN SODIUM 5000 UNIT(S): 5000 INJECTION INTRAVENOUS; SUBCUTANEOUS at 06:52

## 2017-09-29 RX ADMIN — Medication 1 TABLET(S): at 06:51

## 2017-09-29 RX ADMIN — Medication 1 TABLET(S): at 15:17

## 2017-09-29 RX ADMIN — Medication 6: at 12:50

## 2017-09-29 RX ADMIN — Medication 100 MILLIGRAM(S): at 15:17

## 2017-09-29 RX ADMIN — POTASSIUM PHOSPHATE, MONOBASIC POTASSIUM PHOSPHATE, DIBASIC 63.75 MILLIMOLE(S): 236; 224 INJECTION, SOLUTION INTRAVENOUS at 10:28

## 2017-09-29 NOTE — CONSULT NOTE ADULT - PROBLEM SELECTOR RECOMMENDATION 2
possibly secondary to HCV  check HCV viral load, genotype  check HAV, HBV serologies   check ammonia level MELD 20  possibly secondary to HCV  check HCV viral load, genotype  check HAV, HBV serologies   check ammonia level

## 2017-09-29 NOTE — PROGRESS NOTE ADULT - ASSESSMENT
The patient is a 89 year old male with a history of HTN, DM, CKD, dementia who is admitted with urosepsis.    Plan:  - Echocardiogram results from 6/2017 noted; normal LV systolic function, no significant valve issues  - Troponin elevated and flat likely due to type II NSTEMI (demand ischemia) and retention of enzymes from ARVIN on CKD. No need to trend further at this point.  - Continue aspirin 81 mg daily  - Continue amlodipine 10 mg daily  - Change hydralazine 25 mg q8h standing

## 2017-09-29 NOTE — PROGRESS NOTE ADULT - SUBJECTIVE AND OBJECTIVE BOX
Chief Complaint: Fever, AMS    Interval Events: No events overnight.    Review of Systems:  General: No fevers, chills, weight loss or gain  Skin: No rashes, color changes  Cardiovascular: No chest pain, orthopnea  Respiratory: No shortness of breath, cough  Gastrointestinal: No nausea, abdominal pain  Genitourinary: No incontinence, pain with urination  Musculoskeletal: No pain, swelling, decreased range of motion  Neurological: No headache, weakness  Psychiatric: No depression, anxiety  Endocrine: No weight loss or gain, increased thirst  All other systems are comprehensively negative.    Physical Exam:  Vital Signs Last 24 Hrs  T(C): 36.8 (29 Sep 2017 08:18), Max: 37.4 (29 Sep 2017 04:06)  T(F): 98.3 (29 Sep 2017 08:18), Max: 99.4 (29 Sep 2017 04:06)  HR: 87 (29 Sep 2017 08:00) (87 - 106)  BP: 172/88 (29 Sep 2017 08:00) (146/76 - 189/106)  BP(mean): 113 (29 Sep 2017 08:00) (94 - 119)  RR: 26 (29 Sep 2017 08:00) (21 - 31)  SpO2: 94% (29 Sep 2017 08:00) (94% - 100%)  General: NAD  HEENT: MMM  Neck: No JVD, no carotid bruit  Lungs: CTAB  CV: RRR, nl S1/S2, no M/R/G  Abdomen: S/NT/ND, +BS  Extremities: No LE edema, no cyanosis  Neuro: AAOx3, non-focal  Skin: No rash    Labs:             09-29    146<H>  |  114<H>  |  65<H>  ----------------------------<  552<HH>  3.7   |  25  |  2.16<H>    Ca    8.9      29 Sep 2017 06:22  Phos  1.9     09-29  Mg     2.1     09-29    TPro  6.7  /  Alb  1.7<L>  /  TBili  4.5<H>  /  DBili  x   /  AST  75<H>  /  ALT  47  /  AlkPhos  176<H>  09-29                        15.3   9.2   )-----------( 76       ( 29 Sep 2017 06:22 )             47.4       Telemetry: Sinus rhythm, tachycardic at times, atrial run, PACs

## 2017-09-29 NOTE — CONSULT NOTE ADULT - PROBLEM SELECTOR RECOMMENDATION 3
DIABETES:   not Good control, continue current therapy.  [Control is   poor ].
anusol supp  wound care evaluation
Cannot exclude relation to sepsis and toxic metabolic encephalopathy. Continue with neuro checks. CT head reading as possible normal pressure hydrocephalus. Neurology consult. Will check ammonia level given elevated LFT's.

## 2017-09-29 NOTE — PROGRESS NOTE ADULT - ASSESSMENT
89 yo AAM [facility] SN[facility] resident was brought to ER for evaluation of cms  uncontrolled ,hyperglycemia ,recived total of 25 u of humalog this morning with no effect BG esvin dmission was found to be above 600 Quinones cath is draining purulent urine and patient diagnosed with uti and urosepsis Found to have hypotension ,ARVIN on CKD and elevated LFTS ,likely secondary to shock liver and due to sepsis Admitted for septic workup and evaluation,send blood and urine cx,serial lactate levels,monitor vitals closley,ivfs hydration,monitor urine output and renal profile,iv abx initiated Found to have troponin elevation and admitted to spcu ,seen by cardiologist ,likely troponinemia related to ARF patient became a long term resident about 3 mnts ago due to progression of dementia and Parkinsons disease Palliative care consult requested ,to discuss advance directives and complete MOLST Patient diagnosed with severe sepsis with GN bacteremia secondary to uti and obstructive uropathy

## 2017-09-29 NOTE — CONSULT NOTE ADULT - ASSESSMENT
89 yo AAM [facility] SN[facility] resident was brought to ER for evaluation of cms  uncontrolled ,hyperglycemia ,recived total of 25 u of humalog this morning with no effect BG esvin dmission was found to be above 600 Quinones cath is draining purulent urine and patient diagnosed with uti and urosepsis Found to have hypotension ,ARVIN on CKD and elevated LFTS ,
The patient is a 89 year old male with a history of HTN, DM, CKD, dementia who presents with sepsis, likely urosepsis.    Plan:  - Echocardiogram results from 6/2017 noted; normal LV systolic function, no significant valve issues  - Troponin elevated at 0.531 likely due to type II NSTEMI (demand ischemia) and retention of enzymes from ARVIN on CKD. Continue to trend until trending down.  - Hold amlodipine-valsartan; likely will not restart ARB  - Continue IVF; received 3 L bolus thus far  - Continue zosyn  - Renal evaluation  - Will order aspirin 81 mg daily although unclear if patient will be able to take PO
89 y/o M with a h/o DM, CKD, HTN, BPH, Parkinson's dz, dementia with urosepsis, hyperglycemia, AMS.
as above
89 year old male with sepsis noted to have elevated liver enzymes

## 2017-09-29 NOTE — PROGRESS NOTE ADULT - SUBJECTIVE AND OBJECTIVE BOX
Patient is a 89y Male with past medical history of           presenting with        who reports no complaints overnight.    REVIEW OF SYSTEMS:    CONSTITUTIONAL: No  fevers or chills    Allergies    No Known Allergies    Intolerances        MEDICATIONS  (STANDING):  dextrose 5%. 1000 milliLiter(s) (50 mL/Hr) IV Continuous <Continuous>  dextrose 50% Injectable 12.5 Gram(s) IV Push once  dextrose 50% Injectable 25 Gram(s) IV Push once  dextrose 50% Injectable 25 Gram(s) IV Push once  heparin  Injectable 5000 Unit(s) SubCutaneous every 12 hours  nystatin Powder 1 Application(s) Topical two times a day  insulin glargine Injectable (LANTUS) 10 Unit(s) SubCutaneous at bedtime  famotidine Injectable 20 milliGRAM(s) IV Push daily  lactobacillus acidophilus 1 Tablet(s) Oral every 8 hours  tamsulosin 0.4 milliGRAM(s) Oral at bedtime  amLODIPine   Tablet 10 milliGRAM(s) Oral daily  hydrocortisone hemorrhoidal Suppository 1 Suppository(s) Rectal daily  senna 2 Tablet(s) Oral at bedtime  docusate sodium 100 milliGRAM(s) Oral three times a day  polyethylene glycol 3350 17 Gram(s) Oral daily  aspirin  chewable 81 milliGRAM(s) Enteral Tube daily  sodium chloride 0.45%. 1000 milliLiter(s) (120 mL/Hr) IV Continuous <Continuous>  hydrALAZINE 25 milliGRAM(s) Oral every 8 hours  ertapenem  IVPB      insulin lispro (HumaLOG) corrective regimen sliding scale   SubCutaneous every 6 hours      Vitals:  T(F): 98.1 (09-29-17 @ 12:14), Max: 99.4 (09-29-17 @ 04:06)  HR: 95 (09-29-17 @ 14:00)  BP: 168/94 (09-29-17 @ 12:00)  BP(mean): 115 (09-29-17 @ 12:00)  RR: 19 (09-29-17 @ 14:00)  SpO2: 99% (09-29-17 @ 14:00)  Wt(kg): --    I and O's:    09-28 @ 07:01  -  09-29 @ 07:00  --------------------------------------------------------  IN: 3400 mL / OUT: 2925 mL / NET: 475 mL    09-29 @ 07:01 - 09-29 @ 15:48  --------------------------------------------------------  IN: 940 mL / OUT: 0 mL / NET: 940 mL            PHYSICAL EXAM:    Constitutional: NAD,   HEENT:  EOMI,  MMM  Neck: No JVD  Respiratory: CTAB  Cardiovascular: S1 and S2  Gastrointestinal: BS+, soft, NT/ND  Extremities: No peripheral edema  Neurological: A/O x 3, no focal deficits  Psychiatric: Normal mood, normal affect  : No Quinones  Skin: No rashes  Dialysis Access: Not applicable    LABS:                        15.3   9.2   )-----------( 76       ( 29 Sep 2017 06:22 )             47.4                         15.4   12.9  )-----------( 112      ( 28 Sep 2017 06:31 )             48.0       146    |  114    |  65     ----------------------------<  552       29 Sep 2017 06:22  3.7     |  25     |  2.16     156    |  121    |  92     ----------------------------<  170       28 Sep 2017 06:31  4.2     |  25     |  2.32     155    |  120    |  122    ----------------------------<  303       27 Sep 2017 06:31  4.8     |  26     |  3.76     Ca    8.9        29 Sep 2017 06:22  Ca    9.4        28 Sep 2017 06:31    Phos  1.9       29 Sep 2017 06:22  Phos  2.3       28 Sep 2017 06:31    Mg     2.1       29 Sep 2017 06:22  Mg     2.3       28 Sep 2017 06:31    TPro  6.6    /  Alb  1.8    /  TBili  5.0    /        29 Sep 2017 12:17  DBili  4.0    /  AST  80     /  ALT  50     /  AlkPhos  179      TPro  6.7    /  Alb  1.7    /  TBili  4.5    /        29 Sep 2017 06:22  DBili  x      /  AST  75     /  ALT  47     /  AlkPhos  176          Serum Osmo:   Serum Uric Acid:   MAVERICK:   Double Stranded DNA:   C3:   C3 Nephritic Factor:   C4:   p-ANCA:   c-ANCA:   Anti-GBM:   CHAPO-Smith Antibody:   Immunofixation:   [place]/Lambda Free Light Chain:   SPEP:   Hepatitis Panel: Hepatitis B Surface Antigen: Nonreact (09-27 @ 11:52)    HIV-1/2:     Urine Studies:      Urine chemistry:   Urine Na:   Urine Creatinine:   Urine Protein/Cr ratio:  Urine K:   Urine Osm:   24 Hr urine studies:     24 hr urine Creatinine Clearance:    RADIOLOGY & ADDITIONAL STUDIES: Patient is a 89y Male with past medical history of           presenting with        who reports no complaints overnight.    REVIEW OF SYSTEMS:    CONSTITUTIONAL: No  fevers or chills    Allergies    No Known Allergies    Intolerances        MEDICATIONS  (STANDING):  dextrose 5%. 1000 milliLiter(s) (50 mL/Hr) IV Continuous <Continuous>  dextrose 50% Injectable 12.5 Gram(s) IV Push once  dextrose 50% Injectable 25 Gram(s) IV Push once  dextrose 50% Injectable 25 Gram(s) IV Push once  heparin  Injectable 5000 Unit(s) SubCutaneous every 12 hours  nystatin Powder 1 Application(s) Topical two times a day  insulin glargine Injectable (LANTUS) 10 Unit(s) SubCutaneous at bedtime  famotidine Injectable 20 milliGRAM(s) IV Push daily  lactobacillus acidophilus 1 Tablet(s) Oral every 8 hours  tamsulosin 0.4 milliGRAM(s) Oral at bedtime  amLODIPine   Tablet 10 milliGRAM(s) Oral daily  hydrocortisone hemorrhoidal Suppository 1 Suppository(s) Rectal daily  senna 2 Tablet(s) Oral at bedtime  docusate sodium 100 milliGRAM(s) Oral three times a day  polyethylene glycol 3350 17 Gram(s) Oral daily  aspirin  chewable 81 milliGRAM(s) Enteral Tube daily  sodium chloride 0.45%. 1000 milliLiter(s) (120 mL/Hr) IV Continuous <Continuous>  hydrALAZINE 25 milliGRAM(s) Oral every 8 hours  ertapenem  IVPB      insulin lispro (HumaLOG) corrective regimen sliding scale   SubCutaneous every 6 hours      Vitals:  T(F): 98.1 (09-29-17 @ 12:14), Max: 99.4 (09-29-17 @ 04:06)  HR: 95 (09-29-17 @ 14:00)  BP: 168/94 (09-29-17 @ 12:00)  BP(mean): 115 (09-29-17 @ 12:00)  RR: 19 (09-29-17 @ 14:00)  SpO2: 99% (09-29-17 @ 14:00)  Wt(kg): --    I and O's:    09-28 @ 07:01  -  09-29 @ 07:00  --------------------------------------------------------  IN: 3400 mL / OUT: 2925 mL / NET: 475 mL    09-29 @ 07:01  -  09-29 @ 15:48  --------------------------------------------------------  IN: 940 mL / OUT: 0 mL / NET: 940 mL            PHYSICAL EXAM:    Constitutional: NAD,   Neck: No JVD  Respiratory: CTAB  Cardiovascular: S1 and S2  Gastrointestinal: BS+, soft, NT/ND  Extremities: No peripheral edema  Neurological:  no focal deficits  Psychiatric: Normal mood, normal affect    LABS:                        15.3   9.2   )-----------( 76       ( 29 Sep 2017 06:22 )             47.4                         15.4   12.9  )-----------( 112      ( 28 Sep 2017 06:31 )             48.0       146    |  114    |  65     ----------------------------<  552       29 Sep 2017 06:22  3.7     |  25     |  2.16     156    |  121    |  92     ----------------------------<  170       28 Sep 2017 06:31  4.2     |  25     |  2.32     155    |  120    |  122    ----------------------------<  303       27 Sep 2017 06:31  4.8     |  26     |  3.76     Ca    8.9        29 Sep 2017 06:22  Ca    9.4        28 Sep 2017 06:31    Phos  1.9       29 Sep 2017 06:22  Phos  2.3       28 Sep 2017 06:31    Mg     2.1       29 Sep 2017 06:22  Mg     2.3       28 Sep 2017 06:31    TPro  6.6    /  Alb  1.8    /  TBili  5.0    /        29 Sep 2017 12:17  DBili  4.0    /  AST  80     /  ALT  50     /  AlkPhos  179      TPro  6.7    /  Alb  1.7    /  TBili  4.5    /        29 Sep 2017 06:22  DBili  x      /  AST  75     /  ALT  47     /  AlkPhos  176          Serum Osmo:   Serum Uric Acid:   MAVERICK:   Double Stranded DNA:   C3:   C3 Nephritic Factor:   C4:   p-ANCA:   c-ANCA:   Anti-GBM:   CHAPO-Smith Antibody:   Immunofixation:   [place]/Lambda Free Light Chain:   SPEP:   Hepatitis Panel: Hepatitis B Surface Antigen: Nonreact (09-27 @ 11:52)    HIV-1/2:     Urine Studies:      Urine chemistry:   Urine Na:   Urine Creatinine:   Urine Protein/Cr ratio:  Urine K:   Urine Osm:   24 Hr urine studies:     24 hr urine Creatinine Clearance:    RADIOLOGY & ADDITIONAL STUDIES:

## 2017-09-29 NOTE — PROGRESS NOTE ADULT - SUBJECTIVE AND OBJECTIVE BOX
Chart reviewed: Assessment plan is as below Note will be updated as more  patient data is gathered     VITALS/LABS  9/29/2017 994 96 160/90 28 96% 66   9/29/2017 W 9.2 Hb 15.3 Plt 76 INR 1.06 Na 146 K 3.7 CO2 25 Cr 2.16 PO4 1.9   PROBLEM ASSESSMENT PLAN   RESP   9/29 VBG pH 745   LACTICEMIA POA   9/26-9/28/2017 LA 2.9-1.5-1.6  9/27 LA 2.6   UTI PNEUMONIA SEPSIS E COLI PERSISTENT BACTEREMIA (9/26, 9/28)  Zosyn (9/26)   9/27 Vanco Random 9.1   9/27 Leg n   9/26-9/27-9/28 W 12.2-20.8-12.9     9/26-9/28 B 6-3  9/26 CT CAP NC 1) Basal atelectasis with focal consolidation lll likely pneumonia 2) Scattered subcm nodules 3) Nodular liver sugg cirrhosis 4) R low atteniation renal lesion needs sono 5) No hydro 6) No obstrn abundant feces   9/26/2017 UA 1020 Notr n L estr Mod Bld lge W Over 50 Bact tntc   9/26/2017 CXR napd   9/26/2017 US abd 1) Mild gbw thickening 2) No duct dilation   POSSIBLE MI   9/26-9/27-9/28-9/29/2017    CK - [id number] Tr 1 .531 (i) - .482 (i) - .491 (i)-.280 (i)-.158 (i)   ASA 81 (9/26) Lopressor 2.5x4 (9/28)  HYPERTENSION  Norvasc 10 (9/28)   THROMBOCYTOPENIA   Likely sec sepsis   HYPERNATREMIA  .8 (9/27) IV D5 140 (9/28)   9/26-9/27-9/28-9/29/2017  Na [phone]  9/27 On q 3 h sl scale   ARVIN   9/26-9/27-9/28-9/29 Cr 5.2-3.7-2.3-2.16    HYPERGLYCEMIA  9/26/20-17 G 615 9/27 HbA1C 9.5   ELEVATED LFTs   9/26-9/27-9/28-9/29/2017   -[phone] -[phone] alt [id number]  9/27 Hep C reactive   AMS 9/26 CT H No ac  MALNUTRITION 9/29 Alb 1.7   PREEXISTING ISSUES   PROBLEM: BPH Tamsulosin .4 (9/26)                 GLOBAL ISSUE/BEST PRACTICE:        PROBLEM:      Analgesia:     na                        PROBLEM: Sedation:     na               PROBLEM: HOB elevation:   y              PROBLEM: Stress ulcer proph:    pepcid 20 (9/26)                       PROBLEM: VTE prophylaxis:      hsc (9/26)                   PROBLEM: Glycemic control:    iss (9/26)  lantus 10 (9/26)   PROBLEM: Nutrition:    npo (9/26)           PROBLEM: Advanced directive: dnr (9/27)      PROBLEM: Allergies:  na  PATIENT DESCRIPTION CC HPI PMH SOCIAL   89 m [facility] resident KIP was sent from [facility] for lethargy BP 90  Hi bld sugar found to be septic with pus draining from Quinones  admitted [facility] 9/26/2017   PM CVA DM2 Hypertension CAD 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   [facility] Meds  Norvasc 5 ASA 81 Lantus 10 mvi Tamsuloin .4 JANUSZ NCS Lo fat reg consistency Novolog R groin excoriated area   HOSPITAL COURSE 9/26/2017 ADMISSION [facility]   PROBLEM SPICIFIC COURSE  PLAN   RESP  Gas exchange was acceptable on low flow O2 on arrival   LACTICEMIA POA Rxed with IVF   UTI PNEUMONIA SEPSIS E COLI PERSISTENT BACTEREMIA (9/26, 9/28)  (LIKELY SEC TO)  E COLI UTI AND PNEUMONIA SEPSIS POA Rxed with zosyn (9/26) empirically   9/26/2017 CXR napd   POSSIBLE MI  9/26/2017 Tr 1 .531 (i)  Was Rxed with ASA (9/26) bb  HYPERNATREMIA 9/26/2017 Na 151 Rxed with hypotonic fluids  IV changed to D5 (9/28) then 1/2 NS (9/29)   ARVIN   9/26 Cr 5.2 P{ossibly sec dehydrationm Was Rxed with IVF    HYPERGLYCEMIA 9/26/2017 G 615 Was Rxed with iss (9/26) No evidence of HONK or DKA (Ketones neg) 9/27 FW ordered NGT   ELEVATED LFTs  9/26/2017   alt 92 Poss sec shock US abd ordeerd 9/26/2017 9/27 Hep c came positive   AMS 9/26 CT H No ac  HYPERTENSION Norvasc Hydralazine added   THROMBOCYTOPENIA Likely sec sepsis   TIME SPENT Over 35 minutes aggregate critical care time spent on encounter; activities included   direct patient care, counseling and/or coordinating care reviewing notes, lab data/ imaging , discussion with multidisciplinary team/ patient  /family. Risks, benefits, alternatives  discussed in detail. Questions/concerns  were addressed  to the best of my ability .

## 2017-09-29 NOTE — CONSULT NOTE ADULT - SUBJECTIVE AND OBJECTIVE BOX
Chief Complaint:  Patient is a 89y old  Male who presents with a chief complaint of cms (26 Sep 2017 13:48)      HPI:    Allergies:  No Known Allergies      Medications:  piperacillin/tazobactam IVPB. 3.375 Gram(s) IV Intermittent every 12 hours  dextrose 5%. 1000 milliLiter(s) IV Continuous <Continuous>  dextrose Gel 1 Dose(s) Oral once PRN  dextrose 50% Injectable 12.5 Gram(s) IV Push once  dextrose 50% Injectable 25 Gram(s) IV Push once  dextrose 50% Injectable 25 Gram(s) IV Push once  glucagon  Injectable 1 milliGRAM(s) IntraMuscular once PRN  heparin  Injectable 5000 Unit(s) SubCutaneous every 12 hours  nystatin Powder 1 Application(s) Topical two times a day  insulin glargine Injectable (LANTUS) 10 Unit(s) SubCutaneous at bedtime  famotidine Injectable 20 milliGRAM(s) IV Push daily  lactobacillus acidophilus 1 Tablet(s) Oral every 8 hours  tamsulosin 0.4 milliGRAM(s) Oral at bedtime  amLODIPine   Tablet 10 milliGRAM(s) Oral daily  hydrocortisone hemorrhoidal Suppository 1 Suppository(s) Rectal daily  senna 2 Tablet(s) Oral at bedtime  docusate sodium 100 milliGRAM(s) Oral three times a day  bisacodyl Suppository 10 milliGRAM(s) Rectal daily PRN  polyethylene glycol 3350 17 Gram(s) Oral daily  aspirin  chewable 81 milliGRAM(s) Enteral Tube daily  sodium chloride 0.45%. 1000 milliLiter(s) IV Continuous <Continuous>  insulin lispro (HumaLOG) corrective regimen sliding scale   SubCutaneous three times a day before meals  insulin lispro (HumaLOG) corrective regimen sliding scale   SubCutaneous at bedtime  hydrALAZINE 25 milliGRAM(s) Oral every 8 hours      PMHX/PSHX:  Parkinson disease  Neuropathy  CKD (chronic kidney disease)  BPH (benign prostatic hyperplasia)  Diabetes  Angina pectoris  Hypertension  Diabetes  Renal failure  Dementia  No pertinent past medical history  No significant past surgical history      Family history:  No pertinent family history in first degree relatives      Social History:     ROS:     General:  No wt loss, fevers, chills, night sweats, fatigue,   Eyes:  Good vision, no reported pain  ENT:  No sore throat, pain, runny nose, dysphagia  CV:  No pain, palpitations, hypo/hypertension  Resp:  No dyspnea, cough, tachypnea, wheezing  GI:  No pain, No nausea, No vomiting, No diarrhea, No constipation, No weight loss, No fever, No pruritis, No rectal bleeding, No tarry stools, No dysphagia,  :  No pain, bleeding, incontinence, nocturia  Muscle:  No pain, weakness  Neuro:  No weakness, tingling, memory problems  Psych:  No fatigue, insomnia, mood problems, depression  Endocrine:  No polyuria, polydipsia, cold/heat intolerance  Heme:  No petechiae, ecchymosis, easy bruisability  Skin:  No rash, tattoos, scars, edema      PHYSICAL EXAM:   Vital Signs:  Vital Signs Last 24 Hrs  T(C): 36.8 (29 Sep 2017 08:18), Max: 37.4 (29 Sep 2017 04:06)  T(F): 98.3 (29 Sep 2017 08:18), Max: 99.4 (29 Sep 2017 04:06)  HR: 87 (29 Sep 2017 08:00) (87 - 106)  BP: 172/88 (29 Sep 2017 08:00) (146/76 - 189/106)  BP(mean): 113 (29 Sep 2017 08:00) (94 - 119)  RR: 26 (29 Sep 2017 08:00) (21 - 31)  SpO2: 94% (29 Sep 2017 08:00) (94% - 100%)  Daily     Daily Weight in k.5 (29 Sep 2017 04:06)    GENERAL:  Appears stated age, well-groomed, well-nourished, no distress  HEENT:  NC/AT,  conjunctivae clear and pink, no thyromegaly, nodules, adenopathy, no JVD, sclera -anicteric  CHEST:  Full & symmetric excursion, no increased effort, breath sounds clear  HEART:  Regular rhythm, S1, S2, no murmur/rub/S3/S4, no abdominal bruit, no edema  ABDOMEN:  Soft, non-tender, non-distended, normoactive bowel sounds,  no masses ,no hepato-splenomegaly, no signs of chronic liver disease  EXTEREMITIES:  no cyanosis,clubbing or edema  SKIN:  No rash/erythema/ecchymoses/petechiae/wounds/abscess/warm/dry  NEURO:  Alert, oriented, no asterixis, no tremor, no encephalopathy    LABS:                        15.3   9.2   )-----------( 76       ( 29 Sep 2017 06:22 )             47.4         146<H>  |  114<H>  |  65<H>  ----------------------------<  552<HH>  3.7   |  25  |  2.16<H>    Ca    8.9      29 Sep 2017 06:22  Phos  1.9       Mg     2.1         TPro  6.7  /  Alb  1.7<L>  /  TBili  4.5<H>  /  DBili  x   /  AST  75<H>  /  ALT  47  /  AlkPhos  176<H>      LIVER FUNCTIONS - ( 29 Sep 2017 06:22 )  Alb: 1.7 g/dL / Pro: 6.7 g/dL / ALK PHOS: 176 U/L / ALT: 47 U/L DA / AST: 75 U/L / GGT: x           PT/INR - ( 29 Sep 2017 06:22 )   PT: 11.6 sec;   INR: 1.06 ratio             Amylase Serum--      Lipase serum--       [id number]      Imaging: Chief Complaint:  Patient is a 89y old  Male who presents with a chief complaint of cms (26 Sep 2017 13:48)      HPI: 89 yo AAM [facility] SN[facility] resident was brought to ER for evaluation of cms  uncontrolled ,hyperglycemia ,recived total of 25 u of humalog this morning with no effect BG esvin dmission was found to be above 600 Quinones cath is draining purulent urine and patient diagnosed with uti and urosepsis Found to have hypotension ,ARVIN on CKD and elevated LFTS ,likely secondary to shock liver and due to sepsis Admitted for septic workup and evaluation,send blood and urine cx,serial lactate levels,monitor vitals closley,ivfs hydration,monitor urine output and renal profile,iv abx initiated Found to have troponin elevation and admitted to spcu ,seen by cardiologist ,likely troponinemia related to ARF patient became a long term resident about 3 mnts ago due to progression of dem,entia and Parkinsons disease Palliative care consult requested ,to discuss advance directives and complete MOLST     Allergies:  No Known Allergies      Medications:  piperacillin/tazobactam IVPB. 3.375 Gram(s) IV Intermittent every 12 hours  dextrose 5%. 1000 milliLiter(s) IV Continuous <Continuous>  dextrose Gel 1 Dose(s) Oral once PRN  dextrose 50% Injectable 12.5 Gram(s) IV Push once  dextrose 50% Injectable 25 Gram(s) IV Push once  dextrose 50% Injectable 25 Gram(s) IV Push once  glucagon  Injectable 1 milliGRAM(s) IntraMuscular once PRN  heparin  Injectable 5000 Unit(s) SubCutaneous every 12 hours  nystatin Powder 1 Application(s) Topical two times a day  insulin glargine Injectable (LANTUS) 10 Unit(s) SubCutaneous at bedtime  famotidine Injectable 20 milliGRAM(s) IV Push daily  lactobacillus acidophilus 1 Tablet(s) Oral every 8 hours  tamsulosin 0.4 milliGRAM(s) Oral at bedtime  amLODIPine   Tablet 10 milliGRAM(s) Oral daily  hydrocortisone hemorrhoidal Suppository 1 Suppository(s) Rectal daily  senna 2 Tablet(s) Oral at bedtime  docusate sodium 100 milliGRAM(s) Oral three times a day  bisacodyl Suppository 10 milliGRAM(s) Rectal daily PRN  polyethylene glycol 3350 17 Gram(s) Oral daily  aspirin  chewable 81 milliGRAM(s) Enteral Tube daily  sodium chloride 0.45%. 1000 milliLiter(s) IV Continuous <Continuous>  insulin lispro (HumaLOG) corrective regimen sliding scale   SubCutaneous three times a day before meals  insulin lispro (HumaLOG) corrective regimen sliding scale   SubCutaneous at bedtime  hydrALAZINE 25 milliGRAM(s) Oral every 8 hours      PMHX/PSHX:  Parkinson disease  Neuropathy  CKD (chronic kidney disease)  BPH (benign prostatic hyperplasia)  Diabetes  Angina pectoris  Hypertension  Diabetes  Renal failure  Dementia  No pertinent past medical history  No significant past surgical history      Family history:  No pertinent family history in first degree relatives      Social History:     ROS:     General:  No wt loss, fevers, chills, night sweats, fatigue,   Eyes:  Good vision, no reported pain  ENT:  No sore throat, pain, runny nose, dysphagia  CV:  No pain, palpitations, hypo/hypertension  Resp:  No dyspnea, cough, tachypnea, wheezing  GI:  No pain, No nausea, No vomiting, No diarrhea, No constipation, No weight loss, No fever, No pruritis, No rectal bleeding, No tarry stools, No dysphagia,  :  No pain, bleeding, incontinence, nocturia  Muscle:  No pain, weakness  Neuro:  No weakness, tingling, memory problems  Psych:  No fatigue, insomnia, mood problems, depression  Endocrine:  No polyuria, polydipsia, cold/heat intolerance  Heme:  No petechiae, ecchymosis, easy bruisability  Skin:  No rash, tattoos, scars, edema      PHYSICAL EXAM:   Vital Signs:  Vital Signs Last 24 Hrs  T(C): 36.8 (29 Sep 2017 08:18), Max: 37.4 (29 Sep 2017 04:06)  T(F): 98.3 (29 Sep 2017 08:18), Max: 99.4 (29 Sep 2017 04:06)  HR: 87 (29 Sep 2017 08:00) (87 - 106)  BP: 172/88 (29 Sep 2017 08:00) (146/76 - 189/106)  BP(mean): 113 (29 Sep 2017 08:00) (94 - 119)  RR: 26 (29 Sep 2017 08:00) (21 - 31)  SpO2: 94% (29 Sep 2017 08:00) (94% - 100%)  Daily     Daily Weight in k.5 (29 Sep 2017 04:06)    GENERAL:  Appears stated age, well-groomed, well-nourished, no distress  HEENT:  NC/AT,  conjunctivae clear and pink, no thyromegaly, nodules, adenopathy, no JVD, sclera -anicteric  CHEST:  Full & symmetric excursion, no increased effort, breath sounds clear  HEART:  Regular rhythm, S1, S2, no murmur/rub/S3/S4, no abdominal bruit, no edema  ABDOMEN:  Soft, non-tender, non-distended, normoactive bowel sounds,  no masses ,no hepato-splenomegaly, no signs of chronic liver disease  EXTEREMITIES:  no cyanosis,clubbing or edema  SKIN:  No rash/erythema/ecchymoses/petechiae/wounds/abscess/warm/dry  NEURO:  Alert, oriented, no asterixis, no tremor, no encephalopathy    LABS:                        15.3   9.2   )-----------( 76       ( 29 Sep 2017 06:22 )             47.4         146<H>  |  114<H>  |  65<H>  ----------------------------<  552<HH>  3.7   |  25  |  2.16<H>    Ca    8.9      29 Sep 2017 06:22  Phos  1.9       Mg     2.1         TPro  6.7  /  Alb  1.7<L>  /  TBili  4.5<H>  /  DBili  x   /  AST  75<H>  /  ALT  47  /  AlkPhos  176<H>      LIVER FUNCTIONS - ( 29 Sep 2017 06:22 )  Alb: 1.7 g/dL / Pro: 6.7 g/dL / ALK PHOS: 176 U/L / ALT: 47 U/L DA / AST: 75 U/L / GGT: x           PT/INR - ( 29 Sep 2017 06:22 )   PT: 11.6 sec;   INR: 1.06 ratio             Amylase Serum--      Lipase serum--       [id number]      Imaging: Chief Complaint:  Patient is a 89y old  Male admitted for sepsis found to have elevated liver enzymes      HPI: 89 yo AAM admitted for ams, sepsis secondary obstructive uropathy noted to have elevated liver enzymes. Patient is minimally communicative, resting in bed. Being treated with antibiotics. On imaging, patient noted to have nodular liver suggestive of cirrhosis and a positive HCV serum antibody. Unclear if patient has had treatment in the past for HCV.     Allergies:  No Known Allergies      Medications:  piperacillin/tazobactam IVPB. 3.375 Gram(s) IV Intermittent every 12 hours  dextrose 5%. 1000 milliLiter(s) IV Continuous <Continuous>  dextrose Gel 1 Dose(s) Oral once PRN  dextrose 50% Injectable 12.5 Gram(s) IV Push once  dextrose 50% Injectable 25 Gram(s) IV Push once  dextrose 50% Injectable 25 Gram(s) IV Push once  glucagon  Injectable 1 milliGRAM(s) IntraMuscular once PRN  heparin  Injectable 5000 Unit(s) SubCutaneous every 12 hours  nystatin Powder 1 Application(s) Topical two times a day  insulin glargine Injectable (LANTUS) 10 Unit(s) SubCutaneous at bedtime  famotidine Injectable 20 milliGRAM(s) IV Push daily  lactobacillus acidophilus 1 Tablet(s) Oral every 8 hours  tamsulosin 0.4 milliGRAM(s) Oral at bedtime  amLODIPine   Tablet 10 milliGRAM(s) Oral daily  hydrocortisone hemorrhoidal Suppository 1 Suppository(s) Rectal daily  senna 2 Tablet(s) Oral at bedtime  docusate sodium 100 milliGRAM(s) Oral three times a day  bisacodyl Suppository 10 milliGRAM(s) Rectal daily PRN  polyethylene glycol 3350 17 Gram(s) Oral daily  aspirin  chewable 81 milliGRAM(s) Enteral Tube daily  sodium chloride 0.45%. 1000 milliLiter(s) IV Continuous <Continuous>  insulin lispro (HumaLOG) corrective regimen sliding scale   SubCutaneous three times a day before meals  insulin lispro (HumaLOG) corrective regimen sliding scale   SubCutaneous at bedtime  hydrALAZINE 25 milliGRAM(s) Oral every 8 hours      PMHX/PSHX:  Parkinson disease  Neuropathy  CKD (chronic kidney disease)  BPH (benign prostatic hyperplasia)  Diabetes  Angina pectoris  Hypertension  Diabetes  Renal failure  Dementia  No pertinent past medical history  No significant past surgical history      Family history:  No pertinent family history in first degree relatives      Social History: unable to obtain    ROS:     General:  minimally communicative, unable to obtain full ROS      PHYSICAL EXAM:   Vital Signs:  Vital Signs Last 24 Hrs  T(C): 36.8 (29 Sep 2017 08:18), Max: 37.4 (29 Sep 2017 04:06)  T(F): 98.3 (29 Sep 2017 08:18), Max: 99.4 (29 Sep 2017 04:06)  HR: 87 (29 Sep 2017 08:00) (87 - 106)  BP: 172/88 (29 Sep 2017 08:00) (146/76 - 189/106)  BP(mean): 113 (29 Sep 2017 08:00) (94 - 119)  RR: 26 (29 Sep 2017 08:00) (21 - 31)  SpO2: 94% (29 Sep 2017 08:00) (94% - 100%)  Daily     Daily Weight in k.5 (29 Sep 2017 04:06)    GENERAL:  Appears stated age, well-groomed, no distress  HEENT:  NC/AT,  conjunctivae clear and pink, no thyromegaly, nodules, adenopathy, no JVD, sclera -anicteric, (+) NG tube  CHEST:  Full & symmetric excursion, no increased effort, breath sounds clear  HEART:  Regular rhythm, S1, S2, no murmur/rub/S3/S4, no abdominal bruit, no edema  ABDOMEN:  Soft, non-tender, non-distended, normoactive bowel sounds,  no masses ,no hepato-splenomegaly  EXTEREMITIES:  no cyanosis,clubbing or edema  SKIN:  (+) perianal erythema with abrasion  NEURO:  Alert, oriented, no asterixis, no tremor, no encephalopathy  RECTAL: (+) abrasion/fissure noted. No blood per rectum    LABS:                        15.3   9.2   )-----------( 76       ( 29 Sep 2017 06:22 )             47.4     -29    146<H>  |  114<H>  |  65<H>  ----------------------------<  552<HH>  3.7   |  25  |  2.16<H>    Ca    8.9      29 Sep 2017 06:22  Phos  1.9     -  Mg     2.1     -    TPro  6.7  /  Alb  1.7<L>  /  TBili  4.5<H>  /  DBili  x   /  AST  75<H>  /  ALT  47  /  AlkPhos  176<H>      LIVER FUNCTIONS - ( 29 Sep 2017 06:22 )  Alb: 1.7 g/dL / Pro: 6.7 g/dL / ALK PHOS: 176 U/L / ALT: 47 U/L DA / AST: 75 U/L / GGT: x           PT/INR - ( 29 Sep 2017 06:22 )   PT: 11.6 sec;   INR: 1.06 ratio             Amylase Serum--      Lipase serum--       [id number]      Imaging:

## 2017-09-29 NOTE — PROGRESS NOTE ADULT - PROBLEM SELECTOR PLAN 6
continue current managmnet and medications,accuchecks q 6 hrs Nutritionist consult regarding artificial nutrition if remains leathrgic and not able to eat uncontrolled  increase RISS coverage to moderate  endocrine consult

## 2017-09-29 NOTE — PROGRESS NOTE ADULT - ASSESSMENT
89 yo AAM [facility] SN[facility] resident was brought to ER for evaluation of cms  uncontrolled ,hyperglycemia ,recived total of 25 u of humalog this morning with no effect BG esvin dmission was found to be above 600 Quinones cath is draining purulent urine and patient diagnosed with uti and urosepsis Found to have hypotension ,ARVIN on CKD and

## 2017-09-29 NOTE — PROGRESS NOTE ADULT - SUBJECTIVE AND OBJECTIVE BOX
ALESHA HODGE is a yMale , patient examined and chart reviewed. Patient being followed for     INTERVAL HPI/ OVERNIGHT EVENTS:  Lethargic. Afebrile.   Blood cultures still positive.     Past Medical History--  PAST MEDICAL & SURGICAL HISTORY:  Parkinson disease  Neuropathy  CKD (chronic kidney disease)  BPH (benign prostatic hyperplasia)  Diabetes  Angina pectoris  Hypertension  Diabetes  Renal failure  Dementia  No significant past surgical history      For details regarding the patient's social history, family history, and other miscellaneous elements, please refer the initial infectious diseases consultation and/or the admitting history and physical examination for this admission.      ROS:  Unable to obtain due to : Lethargy    Current inpatient medications :    ANTIBIOTICS/RELEVANT:  lactobacillus acidophilus 1 Tablet(s) Oral every 8 hours  ertapenem  IVPB      ertapenem  IVPB 1000 milliGRAM(s) IV Intermittent once      dextrose 5%. 1000 milliLiter(s) IV Continuous <Continuous>  dextrose Gel 1 Dose(s) Oral once PRN  dextrose 50% Injectable 12.5 Gram(s) IV Push once  dextrose 50% Injectable 25 Gram(s) IV Push once  dextrose 50% Injectable 25 Gram(s) IV Push once  glucagon  Injectable 1 milliGRAM(s) IntraMuscular once PRN  heparin  Injectable 5000 Unit(s) SubCutaneous every 12 hours  nystatin Powder 1 Application(s) Topical two times a day  insulin glargine Injectable (LANTUS) 10 Unit(s) SubCutaneous at bedtime  famotidine Injectable 20 milliGRAM(s) IV Push daily  tamsulosin 0.4 milliGRAM(s) Oral at bedtime  amLODIPine   Tablet 10 milliGRAM(s) Oral daily  hydrocortisone hemorrhoidal Suppository 1 Suppository(s) Rectal daily  senna 2 Tablet(s) Oral at bedtime  docusate sodium 100 milliGRAM(s) Oral three times a day  bisacodyl Suppository 10 milliGRAM(s) Rectal daily PRN  polyethylene glycol 3350 17 Gram(s) Oral daily  aspirin  chewable 81 milliGRAM(s) Enteral Tube daily  sodium chloride 0.45%. 1000 milliLiter(s) IV Continuous <Continuous>  hydrALAZINE 25 milliGRAM(s) Oral every 8 hours  insulin lispro (HumaLOG) corrective regimen sliding scale   SubCutaneous every 6 hours      Objective:    09-28 @ 07:01  -  09-29 @ 07:00  --------------------------------------------------------  IN: 3400 mL / OUT: 2925 mL / NET: 475 mL      T(C): 36.8 (09-29-17 @ 08:18), Max: 37.4 (09-29-17 @ 04:06)  HR: 90 (09-29-17 @ 10:00) (87 - 106)  BP: 173/91 (09-29-17 @ 10:00) (146/76 - 189/106)  RR: 24 (09-29-17 @ 10:00) (21 - 31)  SpO2: 95% (09-29-17 @ 10:00) (94% - 100%)  Wt(kg): --      Physical Exam:  GEN: Lethargic NGT in place  HEENT: normocephalic and atraumatic. EOMI. MARTIR. Moist mucosa. Clear Posterior pharynx.  NECK: Supple. No carotid bruits.  No lymphadenopathy or thyromegaly.  LUNGS: Decreased to auscultation.  HEART: Regular rate and rhythm without murmur.  ABDOMEN: Soft, nontender, and nondistended.  Positive bowel sounds.  No hepatosplenomegaly was noted.  EXTREMITIES: Without any cyanosis, clubbing, rash, lesions or edema.  NEUROLOGIC: Lethargic  SKIN: No ulceration or induration present.      LABS:                        15.3   9.2   )-----------( 76       ( 29 Sep 2017 06:22 )             47.4       09-29    146<H>  |  114<H>  |  65<H>  ----------------------------<  552<HH>  3.7   |  25  |  2.16<H>    Ca    8.9      29 Sep 2017 06:22  Phos  1.9     09-29  Mg     2.1     09-29    TPro  6.7  /  Alb  1.7<L>  /  TBili  4.5<H>  /  DBili  x   /  AST  75<H>  /  ALT  47  /  AlkPhos  176<H>  09-29      PT/INR - ( 29 Sep 2017 06:22 )   PT: 11.6 sec;   INR: 1.06 ratio             ABG - ( 28 Sep 2017 06:09 )  pH: x     /  pCO2: 32    /  pO2: 82    / HCO3: 25    / Base Excess: 0.2   /  SaO2: 97      Culture - Urine (09.26.17 @ 21:10)    -  Amikacin: S <=8    -  Ampicillin: R >16    -  Ampicillin/Sulbactam: R 16/8    -  Aztreonam: R >16    -  Nitrofurantoin: S <=32    -  Piperacillin/Tazobactam: R <=8    -  Tobramycin: R >8    -  Trimethoprim/Sulfamethoxazole: S <=0.5/9.5    -  Cefazolin: R >16    -  Cefepime: R >16    -  Cefoxitin: S <=4    -  Ceftazidime: R >16    -  Ceftriaxone: R >32    -  Ciprofloxacin: R >2    -  Ertapenem: S <=0.5    -  Gentamicin: R >8    -  Imipenem: S <=1    -  Levofloxacin: R >4    -  Meropenem: S <=1    Specimen Source: .Urine Clean Catch (Midstream)    Culture Results:   >100,000 CFU/ml Escherichia coli ESBL    Organism Identification: Escherichia coli ESBL    Organism: Escherichia coli ESBL    Method Type: PAMELA    MICROBIOLOGY:  Culture - Blood (09.28.17 @ 14:04)    Gram Stain:   Growth in anaerobic bottle: Gram Negative Rods  Growth in aerobic bottle: Gram Negative Rods    Specimen Source: .Blood Blood    Culture Results:   Growth in anaerobic bottle: Gram Negative Rods  Growth in aerobic bottle: Gram Negative Rods    Culture - Blood (09.28.17 @ 00:20)    Gram Stain:   Growth in aerobic and anaerobic bottles: Gram Negative Rods    Specimen Source: .Blood Blood-Peripheral    Culture Results:   Growth in aerobic and anaerobic bottles: Escherichia coli  See previous culture [id number]            RADIOLOGY & ADDITIONAL STUDIES:  CT CHEST/ABDOMEN/PELVIS:     CLINICAL INFORMATION:  Sepsis.    COMPARISON: None.    PROCEDURE:  Using multislice helical CT, 2.5 mm sections were obtained   from the thoracic inlet through the ischial tuberosities.   Multiplanar reformatted images.       There is patchy left lower lobe airspace consolidation, which may   reflect pneumonia in the appropriate clinical setting.  The central airways remain patent. There are minimal atelectatic changes   at the lung bases.  No thickened pleural effusion is noted.    There are small calcified subcarinal mediastinal lymph nodes.  The evaluation of the pulmonary hilum is limited without intravenous   contrast.    There is arteriosclerotic calcification of the thoracic aorta and   coronary artery calcification.  No pericardial effusion is noted.    There is a small hiatal hernia.    The evaluation of the solid organ parenchyma is limited without   intravenous contrast.    There is a slightly nodular contour of the liver, suggestive of   cirrhosis. The evaluation for hepatic masses is limited on noncontrast   exam.    The spleen and gallbladder appear unremarkable.    The adrenal glands and nonenhanced pancreas are unremarkable in   appearance.    There is a cyst at the lower pole the right kidney. No hydronephrosis is   noted.    There is arteriosclerotic calcification of the abdominal aorta and major   branch vessels.  No enlarged retroperitoneal lymphadenopathy is noted.    The evaluation of the stomach and gastrointestinal tract is limited   without oral contrast distention.  There is dense stool with mild distention of the rectosigmoid colon and   mild bowel wall thickening.  No bowel obstruction is noted.  No localized intra-abdominal fluid collection or pneumoperitoneum is   noted.    There is a normal-appearing appendix.    There is an enlarged prostate gland. There is a balloon Quinones catheter   with air in the nondistended urinary bladder.  No free fluid is noted within the pelvis.    There are multilevel degenerative changes of the thoracic and lumbar   spine.  There are multiple age indeterminate compression deformities of the mid   to lower thoracic and upper lumbar spine.    Impression:    Bibasilar atelectasis with left lower lobe airspace consolidation which   may reflect pneumonia.  Recommend follow-up to resolution.    Fecal impaction with mild distention of the rectosigmoid colon, correlate   clinically for stercoral colitis.    Other findings as discussed above.    This study was preliminary reported by [facility].      Assessment :   89 year old male hx parkinson's dementia BPH admitted with severe sepsis with persistent Ecoli septicemia  sec obstructive uropathy sec urinary retention and UTI  Has ESBL Ecoli septicemia  Blood cultures still positive   Doubt pna suspect atelectasis  ARVIN improving  Abn LFTs sec sepsis  +troponins  Uncontrolled DM     Plan :   Off Zosyn  Invanz 500mg IV daily  Repeat blood cultures  Fu cultures  Trend LFTS  Trend wbc  Dm control  Aspiration precautions    Continue with present regiment.  Appropriate use of antibiotics and adverse effects reviewed.    I have discussed the above plan of care with patient/ family in detail. They expressed understanding of the the treatment plan . Risks, benefits and alternatives discussed in detail. I have asked if they have any questions or concerns and appropriately addressed them to the best of my ability .      Critical care time greater then 35 minutes reviewing notes, labs data/ imaging , discussion with multidisciplinary team.    Thank you for allowing me to participate in care of your patient .        Allen Hall MD  Infectious Disease  [phone]

## 2017-09-29 NOTE — PROGRESS NOTE ADULT - SUBJECTIVE AND OBJECTIVE BOX
Patient is a 89y old  Male who presents with a chief complaint of cms (26 Sep 2017 13:48)      INTERVAL HPI/OVERNIGHT EVENTS: Patient seen and examined. NAD.         Vital Signs Last 24 Hrs  T(C): 36.8 (29 Sep 2017 08:18), Max: 37.4 (29 Sep 2017 04:06)  T(F): 98.3 (29 Sep 2017 08:18), Max: 99.4 (29 Sep 2017 04:06)  HR: 87 (29 Sep 2017 08:00) (87 - 108)  BP: 172/88 (29 Sep 2017 08:00) (146/76 - 189/106)  BP(mean): 113 (29 Sep 2017 08:00) (94 - 129)  RR: 26 (29 Sep 2017 08:00) (21 - 31)  SpO2: 94% (29 Sep 2017 08:00) (94% - 100%)I&O's Summary    28 Sep 2017 07:01  -  29 Sep 2017 07:00  --------------------------------------------------------  IN: 3400 mL / OUT: 2925 mL / NET: 475 mL        LABS:                        15.3   9.2   )-----------( 76       ( 29 Sep 2017 06:22 )             47.4     09-29    146<H>  |  114<H>  |  65<H>  ----------------------------<  552<HH>  3.7   |  25  |  2.16<H>    Ca    8.9      29 Sep 2017 06:22  Phos  1.9     09-29  Mg     2.1     09-29    TPro  6.7  /  Alb  1.7<L>  /  TBili  4.5<H>  /  DBili  x   /  AST  75<H>  /  ALT  47  /  AlkPhos  176<H>  09-29    PT/INR - ( 29 Sep 2017 06:22 )   PT: 11.6 sec;   INR: 1.06 ratio             CAPILLARY BLOOD GLUCOSE  427 (29 Sep 2017 06:00)  373 (28 Sep 2017 22:25)  300 (28 Sep 2017 16:02)              piperacillin/tazobactam IVPB. 3.375 Gram(s) IV Intermittent every 12 hours  dextrose 5%. 1000 milliLiter(s) IV Continuous <Continuous>  dextrose Gel 1 Dose(s) Oral once PRN  dextrose 50% Injectable 12.5 Gram(s) IV Push once  dextrose 50% Injectable 25 Gram(s) IV Push once  dextrose 50% Injectable 25 Gram(s) IV Push once  glucagon  Injectable 1 milliGRAM(s) IntraMuscular once PRN  heparin  Injectable 5000 Unit(s) SubCutaneous every 12 hours  nystatin Powder 1 Application(s) Topical two times a day  insulin glargine Injectable (LANTUS) 10 Unit(s) SubCutaneous at bedtime  famotidine Injectable 20 milliGRAM(s) IV Push daily  lactobacillus acidophilus 1 Tablet(s) Oral every 8 hours  tamsulosin 0.4 milliGRAM(s) Oral at bedtime  amLODIPine   Tablet 10 milliGRAM(s) Oral daily  metoprolol Injectable 2.5 milliGRAM(s) IV Push every 6 hours  hydrALAZINE 25 milliGRAM(s) Oral every 8 hours PRN  insulin lispro (HumaLOG) corrective regimen sliding scale   SubCutaneous three times a day before meals  hydrocortisone hemorrhoidal Suppository 1 Suppository(s) Rectal daily  senna 2 Tablet(s) Oral at bedtime  docusate sodium 100 milliGRAM(s) Oral three times a day  bisacodyl Suppository 10 milliGRAM(s) Rectal daily PRN  polyethylene glycol 3350 17 Gram(s) Oral daily  aspirin  chewable 81 milliGRAM(s) Enteral Tube daily  potassium phosphate IVPB 15 milliMole(s) IV Intermittent once  sodium chloride 0.45%. 1000 milliLiter(s) IV Continuous <Continuous>      REVIEW OF SYSTEMS: unobtainable -- pt non-verbal      Consultant(s) Notes Reviewed:  [x ] YES  [ ] NO    PHYSICAL EXAM:  GENERAL: NAD, ill-appearing  HEAD:  Atraumatic, Normocephalic  NERVOUS SYSTEM: non-verbal  CHEST/LUNG: decreased BS b/l  HEART: Regular rate and rhythm  ABDOMEN: Soft    Advanced care planning discussed with patient/family [x ] YES   [ ] NO

## 2017-09-29 NOTE — CONSULT NOTE ADULT - PROBLEM SELECTOR RECOMMENDATION 9
? secondary to hepatobiliary obstruction vs intrahepatic cholestasis (cirrhosis +/- sepsis)  f/u repeat US to evaluate biliary anatomy  trend LRTs

## 2017-09-30 DIAGNOSIS — D69.6 THROMBOCYTOPENIA, UNSPECIFIED: ICD-10-CM

## 2017-09-30 LAB
ALBUMIN SERPL ELPH-MCNC: 1.4 G/DL — LOW (ref 3.3–5)
ALP SERPL-CCNC: 140 U/L — HIGH (ref 30–120)
ALT FLD-CCNC: 32 U/L DA — SIGNIFICANT CHANGE UP (ref 10–60)
AMMONIA BLD-MCNC: 51 UMOL/L — HIGH (ref 11–32)
ANION GAP SERPL CALC-SCNC: 12 MMOL/L — SIGNIFICANT CHANGE UP (ref 5–17)
APTT BLD: 29.8 SEC — SIGNIFICANT CHANGE UP (ref 27.5–37.4)
AST SERPL-CCNC: 67 U/L — HIGH (ref 10–40)
BILIRUB DIRECT SERPL-MCNC: 3.9 MG/DL — HIGH (ref 0–0.2)
BILIRUB INDIRECT FLD-MCNC: 0.8 MG/DL — SIGNIFICANT CHANGE UP (ref 0.2–1)
BILIRUB SERPL-MCNC: 4.7 MG/DL — HIGH (ref 0.2–1.2)
BUN SERPL-MCNC: 50 MG/DL — HIGH (ref 7–23)
CALCIUM SERPL-MCNC: 8.5 MG/DL — SIGNIFICANT CHANGE UP (ref 8.4–10.5)
CHLORIDE SERPL-SCNC: 117 MMOL/L — HIGH (ref 96–108)
CK SERPL-CCNC: 249 U/L — SIGNIFICANT CHANGE UP (ref 39–308)
CO2 SERPL-SCNC: 21 MMOL/L — LOW (ref 22–31)
CREAT SERPL-MCNC: 1.79 MG/DL — HIGH (ref 0.5–1.3)
CULTURE RESULTS: SIGNIFICANT CHANGE UP
FERRITIN SERPL-MCNC: 344 NG/ML — SIGNIFICANT CHANGE UP (ref 30–400)
FOLATE SERPL-MCNC: >20 NG/ML — SIGNIFICANT CHANGE UP (ref 4.8–24.2)
GLUCOSE SERPL-MCNC: 154 MG/DL — HIGH (ref 70–99)
HAPTOGLOB SERPL-MCNC: 95 MG/DL — SIGNIFICANT CHANGE UP (ref 34–200)
HCT VFR BLD CALC: 40.7 % — SIGNIFICANT CHANGE UP (ref 39–50)
HCT VFR BLD CALC: 41.1 % — SIGNIFICANT CHANGE UP (ref 39–50)
HGB BLD-MCNC: 12.9 G/DL — LOW (ref 13–17)
HGB BLD-MCNC: 12.9 G/DL — LOW (ref 13–17)
INR BLD: 1.04 RATIO — SIGNIFICANT CHANGE UP (ref 0.88–1.16)
IRON SATN MFR SERPL: 26 % — SIGNIFICANT CHANGE UP (ref 16–55)
IRON SATN MFR SERPL: 40 UG/DL — LOW (ref 45–165)
LDH SERPL L TO P-CCNC: 337 U/L — HIGH (ref 50–242)
MAGNESIUM SERPL-MCNC: 2 MG/DL — SIGNIFICANT CHANGE UP (ref 1.6–2.6)
MCHC RBC-ENTMCNC: 28 PG — SIGNIFICANT CHANGE UP (ref 27–34)
MCHC RBC-ENTMCNC: 28.6 PG — SIGNIFICANT CHANGE UP (ref 27–34)
MCHC RBC-ENTMCNC: 31.4 GM/DL — LOW (ref 32–36)
MCHC RBC-ENTMCNC: 31.8 GM/DL — LOW (ref 32–36)
MCV RBC AUTO: 89.4 FL — SIGNIFICANT CHANGE UP (ref 80–100)
MCV RBC AUTO: 90 FL — SIGNIFICANT CHANGE UP (ref 80–100)
PHOSPHATE SERPL-MCNC: 2.5 MG/DL — SIGNIFICANT CHANGE UP (ref 2.5–4.5)
PLATELET # BLD AUTO: 59 K/UL — LOW (ref 150–400)
PLATELET # BLD AUTO: 71 K/UL — LOW (ref 150–400)
POTASSIUM SERPL-MCNC: 3.5 MMOL/L — SIGNIFICANT CHANGE UP (ref 3.5–5.3)
POTASSIUM SERPL-SCNC: 3.5 MMOL/L — SIGNIFICANT CHANGE UP (ref 3.5–5.3)
PROT SERPL-MCNC: 5.9 G/DL — LOW (ref 6–8.3)
PROTHROM AB SERPL-ACNC: 11.4 SEC — SIGNIFICANT CHANGE UP (ref 9.8–12.7)
RBC # BLD: 4.49 M/UL — SIGNIFICANT CHANGE UP (ref 4.2–5.8)
RBC # BLD: 4.52 M/UL — SIGNIFICANT CHANGE UP (ref 4.2–5.8)
RBC # BLD: 4.6 M/UL — SIGNIFICANT CHANGE UP (ref 4.2–5.8)
RBC # FLD: 15.9 % — HIGH (ref 10.3–14.5)
RBC # FLD: 16 % — HIGH (ref 10.3–14.5)
RETICS #: 37.7 K/UL — SIGNIFICANT CHANGE UP (ref 25–125)
RETICS/RBC NFR: 0.8 % — SIGNIFICANT CHANGE UP (ref 0.5–2.5)
SODIUM SERPL-SCNC: 150 MMOL/L — HIGH (ref 135–145)
SPECIMEN SOURCE: SIGNIFICANT CHANGE UP
TIBC SERPL-MCNC: 154 UG/DL — LOW (ref 220–430)
UIBC SERPL-MCNC: 114 UG/DL — SIGNIFICANT CHANGE UP (ref 110–370)
VIT B12 SERPL-MCNC: >2000 PG/ML — HIGH (ref 243–894)
WBC # BLD: 10.1 K/UL — SIGNIFICANT CHANGE UP (ref 3.8–10.5)
WBC # BLD: 9.4 K/UL — SIGNIFICANT CHANGE UP (ref 3.8–10.5)
WBC # FLD AUTO: 10.1 K/UL — SIGNIFICANT CHANGE UP (ref 3.8–10.5)
WBC # FLD AUTO: 9.4 K/UL — SIGNIFICANT CHANGE UP (ref 3.8–10.5)

## 2017-09-30 RX ORDER — INSULIN LISPRO 100/ML
VIAL (ML) SUBCUTANEOUS EVERY 4 HOURS
Qty: 0 | Refills: 0 | Status: DISCONTINUED | OUTPATIENT
Start: 2017-09-30 | End: 2017-10-02

## 2017-09-30 RX ORDER — LACTULOSE 10 G/15ML
30 SOLUTION ORAL
Qty: 0 | Refills: 0 | Status: DISCONTINUED | OUTPATIENT
Start: 2017-09-30 | End: 2017-10-03

## 2017-09-30 RX ORDER — URSODIOL 250 MG/1
300 TABLET, FILM COATED ORAL
Qty: 0 | Refills: 0 | Status: DISCONTINUED | OUTPATIENT
Start: 2017-09-30 | End: 2017-10-03

## 2017-09-30 RX ORDER — SODIUM CHLORIDE 9 MG/ML
1000 INJECTION, SOLUTION INTRAVENOUS
Qty: 0 | Refills: 0 | Status: DISCONTINUED | OUTPATIENT
Start: 2017-09-30 | End: 2017-10-01

## 2017-09-30 RX ORDER — FONDAPARINUX SODIUM 2.5 MG/.5ML
2.5 INJECTION, SOLUTION SUBCUTANEOUS DAILY
Qty: 0 | Refills: 0 | Status: DISCONTINUED | OUTPATIENT
Start: 2017-09-30 | End: 2017-10-02

## 2017-09-30 RX ADMIN — ERTAPENEM SODIUM 110 MILLIGRAM(S): 1 INJECTION, POWDER, LYOPHILIZED, FOR SOLUTION INTRAMUSCULAR; INTRAVENOUS at 11:33

## 2017-09-30 RX ADMIN — URSODIOL 300 MILLIGRAM(S): 250 TABLET, FILM COATED ORAL at 14:16

## 2017-09-30 RX ADMIN — Medication 25 MILLIGRAM(S): at 06:09

## 2017-09-30 RX ADMIN — NYSTATIN CREAM 1 APPLICATION(S): 100000 CREAM TOPICAL at 17:28

## 2017-09-30 RX ADMIN — Medication 25 MILLIGRAM(S): at 22:03

## 2017-09-30 RX ADMIN — URSODIOL 300 MILLIGRAM(S): 250 TABLET, FILM COATED ORAL at 17:27

## 2017-09-30 RX ADMIN — SENNA PLUS 2 TABLET(S): 8.6 TABLET ORAL at 22:04

## 2017-09-30 RX ADMIN — Medication 2: at 17:27

## 2017-09-30 RX ADMIN — Medication 100 MILLIGRAM(S): at 14:16

## 2017-09-30 RX ADMIN — POLYETHYLENE GLYCOL 3350 17 GRAM(S): 17 POWDER, FOR SOLUTION ORAL at 11:33

## 2017-09-30 RX ADMIN — NYSTATIN CREAM 1 APPLICATION(S): 100000 CREAM TOPICAL at 06:09

## 2017-09-30 RX ADMIN — Medication 25 MILLIGRAM(S): at 14:16

## 2017-09-30 RX ADMIN — Medication 100 MILLIGRAM(S): at 22:02

## 2017-09-30 RX ADMIN — AMLODIPINE BESYLATE 10 MILLIGRAM(S): 2.5 TABLET ORAL at 06:09

## 2017-09-30 RX ADMIN — HEPARIN SODIUM 5000 UNIT(S): 5000 INJECTION INTRAVENOUS; SUBCUTANEOUS at 06:09

## 2017-09-30 RX ADMIN — SODIUM CHLORIDE 80 MILLILITER(S): 9 INJECTION, SOLUTION INTRAVENOUS at 17:29

## 2017-09-30 RX ADMIN — INSULIN GLARGINE 10 UNIT(S): 100 INJECTION, SOLUTION SUBCUTANEOUS at 22:03

## 2017-09-30 RX ADMIN — LACTULOSE 30 GRAM(S): 10 SOLUTION ORAL at 17:26

## 2017-09-30 RX ADMIN — Medication 1 TABLET(S): at 22:04

## 2017-09-30 RX ADMIN — Medication 100 MILLIGRAM(S): at 06:09

## 2017-09-30 RX ADMIN — Medication 81 MILLIGRAM(S): at 11:33

## 2017-09-30 RX ADMIN — Medication 1 TABLET(S): at 14:16

## 2017-09-30 RX ADMIN — Medication 1 SUPPOSITORY(S): at 11:34

## 2017-09-30 RX ADMIN — FAMOTIDINE 20 MILLIGRAM(S): 10 INJECTION INTRAVENOUS at 11:46

## 2017-09-30 RX ADMIN — Medication 1 TABLET(S): at 06:09

## 2017-09-30 RX ADMIN — Medication 2: at 22:03

## 2017-09-30 RX ADMIN — Medication 2: at 00:02

## 2017-09-30 NOTE — PHARMACY COMMUNICATION NOTE - COMMENTS
Spoke to MD, pt CrCL=26.8, hold until renal function improve. Contraindicated for CrCl<30. Call Dr Gorman to resume med.

## 2017-09-30 NOTE — CONSULT NOTE ADULT - SUBJECTIVE AND OBJECTIVE BOX
Patient is a 89y old  Male who presents with a chief complaint of cms (26 Sep 2017 13:48)      Reason For Consult:     HPI:  89 yo AAM [facility] SNF resident was brought to ER for evaluation of cms  uncontrolled ,hyperglycemia ,recived total of 25 u of humalog this morning with no effect BG esvin dmission was found to be above 600 Quinones cath is draining purulent urine and patient diagnosed with uti and urosepsis Found to have hypotension ,ARVIN on CKD and elevated LFTS ,likely secondary to shock liver and due to sepsis Admitted for septic workup and evaluation,send blood and urine cx,serial lactate levels,monitor vitals closley,ivfs hydration,monitor urine output and renal profile,iv abx initiated Found to have troponin elevation and admitted to spcu ,seen by cardiologist ,likely troponinemia related to ARF patient became a long term resident about 3 mnts ago due to progression of dem,entia and Parkinsons disease Palliative care consult requested ,to discuss advance directives and complete MOLST (26 Sep 2017 13:48)      PAST MEDICAL & SURGICAL HISTORY:  Parkinson disease  Neuropathy  CKD (chronic kidney disease)  BPH (benign prostatic hyperplasia)  Diabetes  Angina pectoris  Hypertension  Diabetes  Renal failure  Dementia  No significant past surgical history      FAMILY HISTORY:  No pertinent family history in first degree relatives        Social History:    MEDICATIONS  (STANDING):  dextrose 5%. 1000 milliLiter(s) (50 mL/Hr) IV Continuous <Continuous>  dextrose 50% Injectable 12.5 Gram(s) IV Push once  dextrose 50% Injectable 25 Gram(s) IV Push once  dextrose 50% Injectable 25 Gram(s) IV Push once  heparin  Injectable 5000 Unit(s) SubCutaneous every 12 hours  nystatin Powder 1 Application(s) Topical two times a day  insulin glargine Injectable (LANTUS) 10 Unit(s) SubCutaneous at bedtime  famotidine Injectable 20 milliGRAM(s) IV Push daily  lactobacillus acidophilus 1 Tablet(s) Oral every 8 hours  tamsulosin 0.4 milliGRAM(s) Oral at bedtime  amLODIPine   Tablet 10 milliGRAM(s) Oral daily  hydrocortisone hemorrhoidal Suppository 1 Suppository(s) Rectal daily  senna 2 Tablet(s) Oral at bedtime  docusate sodium 100 milliGRAM(s) Oral three times a day  polyethylene glycol 3350 17 Gram(s) Oral daily  aspirin  chewable 81 milliGRAM(s) Enteral Tube daily  sodium chloride 0.45%. 1000 milliLiter(s) (120 mL/Hr) IV Continuous <Continuous>  hydrALAZINE 25 milliGRAM(s) Oral every 8 hours  ertapenem  IVPB      insulin lispro (HumaLOG) corrective regimen sliding scale   SubCutaneous every 6 hours  ertapenem  IVPB 500 milliGRAM(s) IV Intermittent every 24 hours    MEDICATIONS  (PRN):  dextrose Gel 1 Dose(s) Oral once PRN Blood Glucose LESS THAN 70 milliGRAM(s)/deciliter  glucagon  Injectable 1 milliGRAM(s) IntraMuscular once PRN Glucose LESS THAN 70 milligrams/deciliter  bisacodyl Suppository 10 milliGRAM(s) Rectal daily PRN Constipation        T(C): 36.7 (09-30-17 @ 08:05), Max: 37.4 (09-30-17 @ 00:08)  HR: 100 (09-30-17 @ 08:00) (89 - 112)  BP: 154/67 (09-30-17 @ 08:00) (110/53 - 169/80)  RR: 18 (09-30-17 @ 08:00) (17 - 24)  SpO2: 96% (09-30-17 @ 08:00) (95% - 99%)  Wt(kg): --    PHYSICAL EXAM:  ngt in place  NECK: Supple, No JVD, Normal thyroid  CHEST/LUNG: Clear to percussion bilaterally; No rales, rhonchi, wheezing, or rubs  HEART: Regular rate and rhythm; No murmurs, rubs, or gallops  ABDOMEN: Soft, Nontender, Nondistended; Bowel sounds present  EXTREMITIES:  2+ Peripheral Pulses, No clubbing, cyanosis, or edema  SKIN: No rashes or lesions    CAPILLARY BLOOD GLUCOSE  216 (29 Sep 2017 18:00)  331 (29 Sep 2017 12:00)                                12.9   9.4   )-----------( 59       ( 30 Sep 2017 06:55 )             40.7       CMP:  09-30 @ 06:55  SGPT 32  Albumin 1.4   Alk Phos 140   Anion Gap 12   SGOT 67   Total Bili 4.7   BUN 50   Calcium Total 8.5   CO2 21   Chloride 117   Creatinine 1.79   eGFR if AA 37   eGFR if non AA 32   Glucose 154   Potassium 3.5   Protein 5.9   Sodium 150      Thyroid Function Tests:  09-29 @ 12:12 TSH 1.38 FreeT4 -- T3 -- Anti TPO -- Anti Thyroglobulin Ab -- TSI --  09-28 @ 12:42 TSH 1.00 FreeT4 -- T3 -- Anti TPO -- Anti Thyroglobulin Ab -- TSI --      Diabetes Tests:       Radiology:

## 2017-09-30 NOTE — PROGRESS NOTE ADULT - PROBLEM SELECTOR PLAN 8
increased troponin likely related to demand ischemia with ARVIN  trend enzymes hold  home medications due to ARF and hypotension

## 2017-09-30 NOTE — PROGRESS NOTE ADULT - PROBLEM SELECTOR PLAN 6
uncontrolled  increase RISS coverage to moderate  endocrine consult likely secondary to liver disease  trend labs  heme consult

## 2017-09-30 NOTE — PROGRESS NOTE ADULT - SUBJECTIVE AND OBJECTIVE BOX
INTERVAL HPI/OVERNIGHT EVENTS:  HPI:  89 yo [facility] SNF resident was brought to ER for evaluation of cms  uncontrolled ,hyperglycemia ,recived total of 25 u of humalog this morning with no effect BG esvin dmission was found to be above 600 Quinones cath is draining purulent urine and patient diagnosed with uti and urosepsis Found to have hypotension ,ARVIN on CKD and elevated LFTS ,likely secondary to shock liver and due to sepsis Admitted for septic workup and evaluation,send blood and urine cx,serial lactate levels,monitor vitals closley,ivfs hydration,monitor urine output and renal profile,iv abx initiated Found to have troponin elevation and admitted to spcu ,seen by cardiologist ,likely troponinemia related to ARF patient became a long term resident about 3 mnts ago due to progression of dem,entia and Parkinsons disease Palliative care consult requested ,to discuss advance directives and complete MOLST   confused encephalopathic ngt in place    MEDICATIONS  (STANDING):  dextrose 5%. 1000 milliLiter(s) (50 mL/Hr) IV Continuous <Continuous>  dextrose 50% Injectable 12.5 Gram(s) IV Push once  dextrose 50% Injectable 25 Gram(s) IV Push once  dextrose 50% Injectable 25 Gram(s) IV Push once  heparin  Injectable 5000 Unit(s) SubCutaneous every 12 hours  nystatin Powder 1 Application(s) Topical two times a day  insulin glargine Injectable (LANTUS) 10 Unit(s) SubCutaneous at bedtime  famotidine Injectable 20 milliGRAM(s) IV Push daily  lactobacillus acidophilus 1 Tablet(s) Oral every 8 hours  tamsulosin 0.4 milliGRAM(s) Oral at bedtime  amLODIPine   Tablet 10 milliGRAM(s) Oral daily  hydrocortisone hemorrhoidal Suppository 1 Suppository(s) Rectal daily  senna 2 Tablet(s) Oral at bedtime  docusate sodium 100 milliGRAM(s) Oral three times a day  polyethylene glycol 3350 17 Gram(s) Oral daily  aspirin  chewable 81 milliGRAM(s) Enteral Tube daily  sodium chloride 0.45%. 1000 milliLiter(s) (120 mL/Hr) IV Continuous <Continuous>  hydrALAZINE 25 milliGRAM(s) Oral every 8 hours  ertapenem  IVPB      insulin lispro (HumaLOG) corrective regimen sliding scale   SubCutaneous every 6 hours  ertapenem  IVPB 500 milliGRAM(s) IV Intermittent every 24 hours  rifaximin 550 milliGRAM(s) Oral two times a day  lactulose Syrup 30 Gram(s) Oral two times a day  ursodiol Capsule 300 milliGRAM(s) Oral three times a day with meals    MEDICATIONS  (PRN):  dextrose Gel 1 Dose(s) Oral once PRN Blood Glucose LESS THAN 70 milliGRAM(s)/deciliter  glucagon  Injectable 1 milliGRAM(s) IntraMuscular once PRN Glucose LESS THAN 70 milligrams/deciliter  bisacodyl Suppository 10 milliGRAM(s) Rectal daily PRN Constipation      Allergies  No Known Allergies    Intolerances  General:  No wt loss, fevers, chills, night sweats, fatigue,   Eyes:  Good vision, no reported pain  ENT:  No sore throat, pain, runny nose, dysphagia  CV:  No pain, palpitations, hypo/hypertension  Resp:  No dyspnea, cough, tachypnea, wheezing  GI:  No pain, No nausea, No vomiting, No diarrhea, No constipation, No weight loss, No fever, No pruritis, No rectal bleeding, No tarry stools, No dysphagia,  :  No pain, bleeding, incontinence, nocturia  Muscle:  No pain, weakness  Neuro:  No weakness, tingling, memory problems  Psych:  No fatigue, insomnia, mood problems, depression  Endocrine:  No polyuria, polydipsia, cold/heat intolerance  Heme:  No petechiae, ecchymosis, easy bruisability  Skin:  No rash, tattoos, scars, edema      PHYSICAL EXAM:   Vital Signs:  Vital Signs Last 24 Hrs  T(C): 36.7 (30 Sep 2017 11:50), Max: 37.4 (30 Sep 2017 00:08)  T(F): 98 (30 Sep 2017 11:50), Max: 99.4 (30 Sep 2017 00:08)  HR: 99 (30 Sep 2017 10:00) (89 - 112)  BP: 130/60 (30 Sep 2017 10:00) (110/53 - 169/80)  BP(mean): 79 (30 Sep 2017 10:00) (69 - 105)  RR: 20 (30 Sep 2017 10:00) (17 - 24)  SpO2: 95% (30 Sep 2017 10:00) (95% - 99%)  Daily     Daily Weight in k.2 (30 Sep 2017 04:37)I&O's Summary    29 Sep 2017 07:01  -  30 Sep 2017 07:00  --------------------------------------------------------  IN: 2820 mL / OUT: 1700 mL / NET: 1120 mL        GENERAL:  Appears stated age, well-groomed, well-nourished, no distress  HEENT:  NC/AT,  conjunctivae clear and pink, no thyromegaly, nodules, adenopathy, no JVD, sclera -anicteric  CHEST:  Full & symmetric excursion, no increased effort, breath sounds clear ngt in place, confused  HEART:  Regular rhythm, S1, S2, no murmur/rub/S3/S4, no abdominal bruit, no edema  ABDOMEN:  Soft, non-tender, non-distended, normoactive bowel sounds,  no masses ,no hepato-splenomegaly, no signs of chronic liver disease  EXTEREMITIES:  no cyanosis,clubbing or edema  SKIN:  No rash/erythema/ecchymoses/petechiae/wounds/abscess/warm/dry  NEURO:  Alert, oriented x0 no asterixis, no tremor, no encephalopathy      LABS:                        12.9   9.4   )-----------( 59       ( 30 Sep 2017 06:55 )             40.7         150<H>  |  117<H>  |  50<H>  ----------------------------<  154<H>  3.5   |  21<L>  |  1.79<H>    Ca    8.5      30 Sep 2017 06:55  Phos  2.5       Mg     2.0         TPro  5.9<L>  /  Alb  1.4<L>  /  TBili  4.7<H>  /  DBili  3.9<H>  /  AST  67<H>  /  ALT  32  /  AlkPhos  140<H>      PT/INR - ( 30 Sep 2017 06:55 )   PT: 11.4 sec;   INR: 1.04 ratio         PTT - ( 30 Sep 2017 06:55 )  PTT:29.8 sec    amylase   lipaseLipase, Serum: 192 U/L ( @ 12:53)    RADIOLOGY & ADDITIONAL TESTS:

## 2017-09-30 NOTE — PROGRESS NOTE ADULT - ASSESSMENT
89 yo AAM [facility] SNF resident was brought to ER for evaluation of cms  uncontrolled ,hyperglycemia ,recived total of 25 u of humalog this morning with no effect BG esvin dmission was found to be above 600 Quinones cath is draining purulent urine and patient diagnosed with uti and urosepsis Found to have hypotension ,ARVIN on CKD and

## 2017-09-30 NOTE — PROGRESS NOTE ADULT - SUBJECTIVE AND OBJECTIVE BOX
Chief Complaint: Fever, AMS    Interval Events: No events overnight.    Review of Systems:  General: No fevers, chills, weight loss or gain  Skin: No rashes, color changes  Cardiovascular: No chest pain, orthopnea  Respiratory: No shortness of breath, cough  Gastrointestinal: No nausea, abdominal pain  Genitourinary: No incontinence, pain with urination  Musculoskeletal: No pain, swelling, decreased range of motion  Neurological: No headache, weakness  Psychiatric: No depression, anxiety  Endocrine: No weight loss or gain, increased thirst  All other systems are comprehensively negative.    Physical Exam:  Vital Signs Last 24 Hrs  T(C): 36.7 (30 Sep 2017 08:05), Max: 37.4 (30 Sep 2017 00:08)  T(F): 98.1 (30 Sep 2017 08:05), Max: 99.4 (30 Sep 2017 00:08)  HR: 100 (30 Sep 2017 08:00) (89 - 112)  BP: 154/67 (30 Sep 2017 08:00) (110/53 - 169/80)  BP(mean): 90 (30 Sep 2017 08:00) (69 - 115)  RR: 18 (30 Sep 2017 08:00) (17 - 24)  SpO2: 96% (30 Sep 2017 08:00) (95% - 99%)  General: NAD  HEENT: MMM  Neck: No JVD, no carotid bruit  Lungs: CTAB  CV: RRR, nl S1/S2, no M/R/G  Abdomen: S/NT/ND, +BS  Extremities: No LE edema, no cyanosis  Neuro: AAOx3, non-focal  Skin: No rash    Labs:             09-29    146<H>  |  114<H>  |  65<H>  ----------------------------<  552<HH>  3.7   |  25  |  2.16<H>    Ca    8.9      29 Sep 2017 06:22  Phos  1.9     09-29  Mg     2.1     09-29    TPro  6.6  /  Alb  1.8<L>  /  TBili  5.0<H>  /  DBili  4.0<H>  /  AST  80<H>  /  ALT  50  /  AlkPhos  179<H>  09-29                          12.9   9.4   )-----------( 59       ( 30 Sep 2017 06:55 )             40.7       Telemetry: Sinus rhythm, tachycardic at times, atrial run, PACs, PVCs

## 2017-09-30 NOTE — PROGRESS NOTE ADULT - PROBLEM SELECTOR PLAN 5
secondary to sepsis and ARF on CKD and hepatic encephalopathy (hyperammonemia)   lactulose as per GI

## 2017-09-30 NOTE — PROGRESS NOTE ADULT - ASSESSMENT
The patient is a 89 year old male with a history of HTN, DM, CKD, dementia who is admitted with urosepsis.    Plan:  - Echocardiogram results from 6/2017 noted; normal LV systolic function, no significant valve issues  - Troponin elevated and flat likely due to type II NSTEMI (demand ischemia) and retention of enzymes from ARVIN on CKD. No need to trend further at this point.  - Continue aspirin 81 mg daily  - Continue amlodipine 10 mg daily  - Continue hydralazine 25 mg q8h

## 2017-09-30 NOTE — PROGRESS NOTE ADULT - PROBLEM SELECTOR PLAN 3
increasing bili  trend enzymes  today's labs pending  GI consult increasing bili  trend enzymes  today's labs pending  check hep c viral load  GI f/u

## 2017-09-30 NOTE — PROGRESS NOTE ADULT - SUBJECTIVE AND OBJECTIVE BOX
Patient is a 89y Male whom presented to the hospital with     PAST MEDICAL & SURGICAL HISTORY:  Parkinson disease  Neuropathy  CKD (chronic kidney disease)  BPH (benign prostatic hyperplasia)  Diabetes  Angina pectoris  Hypertension  Diabetes  Renal failure  Dementia  No significant past surgical history      MEDICATIONS  (STANDING):  dextrose 5%. 1000 milliLiter(s) (50 mL/Hr) IV Continuous <Continuous>  dextrose 50% Injectable 12.5 Gram(s) IV Push once  dextrose 50% Injectable 25 Gram(s) IV Push once  dextrose 50% Injectable 25 Gram(s) IV Push once  nystatin Powder 1 Application(s) Topical two times a day  insulin glargine Injectable (LANTUS) 10 Unit(s) SubCutaneous at bedtime  famotidine Injectable 20 milliGRAM(s) IV Push daily  lactobacillus acidophilus 1 Tablet(s) Oral every 8 hours  tamsulosin 0.4 milliGRAM(s) Oral at bedtime  amLODIPine   Tablet 10 milliGRAM(s) Oral daily  hydrocortisone hemorrhoidal Suppository 1 Suppository(s) Rectal daily  senna 2 Tablet(s) Oral at bedtime  docusate sodium 100 milliGRAM(s) Oral three times a day  polyethylene glycol 3350 17 Gram(s) Oral daily  aspirin  chewable 81 milliGRAM(s) Enteral Tube daily  hydrALAZINE 25 milliGRAM(s) Oral every 8 hours  ertapenem  IVPB      insulin lispro (HumaLOG) corrective regimen sliding scale   SubCutaneous every 6 hours  ertapenem  IVPB 500 milliGRAM(s) IV Intermittent every 24 hours  rifaximin 550 milliGRAM(s) Oral two times a day  lactulose Syrup 30 Gram(s) Oral two times a day  ursodiol Capsule 300 milliGRAM(s) Oral three times a day with meals  fondaparinux Injectable 2.5 milliGRAM(s) SubCutaneous daily      Allergies    No Known Allergies    Intolerances        SOCIAL HISTORY:  Denies ETOh,Smoking,     FAMILY HISTORY:  No pertinent family history in first degree relatives      REVIEW OF SYSTEMS:    CONSTITUTIONAL: No  fevers or chills    VITAL:  T(C): , Max: 37.4 (09-30-17 @ 00:08)  T(F): , Max: 99.4 (09-30-17 @ 00:08)  HR: 99 (09-30-17 @ 12:00)  BP: 149/68 (09-30-17 @ 12:00)  BP(mean): 91 (09-30-17 @ 12:00)  RR: 21 (09-30-17 @ 12:00)  SpO2: 96% (09-30-17 @ 12:00)  Wt(kg): --    I and O's:    09-29 @ 07:01  -  09-30 @ 07:00  --------------------------------------------------------  IN: 2820 mL / OUT: 1700 mL / NET: 1120 mL          PHYSICAL EXAM:    Constitutional: NAD  Neck: No LAD, No JVD  Respiratory: CTAB  Cardiovascular: S1 and S2  Gastrointestinal: BS+, soft, NT/ND  Extremities: No peripheral edema    LABS:                        12.9   9.4   )-----------( 59       ( 30 Sep 2017 06:55 )             40.7     09-30    150<H>  |  117<H>  |  50<H>  ----------------------------<  154<H>  3.5   |  21<L>  |  1.79<H>    Ca    8.5      30 Sep 2017 06:55  Phos  2.5     09-30  Mg     2.0     09-30    TPro  5.9<L>  /  Alb  1.4<L>  /  TBili  4.7<H>  /  DBili  3.9<H>  /  AST  67<H>  /  ALT  32  /  AlkPhos  140<H>  09-30      Urine Studies:          RADIOLOGY & ADDITIONAL STUDIES:

## 2017-09-30 NOTE — CONSULT NOTE ADULT - SUBJECTIVE AND OBJECTIVE BOX
Hematology/Oncology consult     Patient is seen and examined  notes/labs reviewed  pt family is at bedside and d/w family.  consult dictated,  Job #    contact #  son/daughter----  phone #    IMPRESSION:      RECOMMENDATION:              ,thanks for courtsey of this consult,will follow this pt with you for hem/onc issue while pt is in hospital. Hematology/Oncology consult     Patient is seen and examined  notes/labs reviewed  pt family is at bedside and d/w family.  consult dictated,  Job # [id number]    contact #  son/daughter----wally jose  phone # [phone],, called pt daughter--    5 children  at [facility] around 5/2017, before at home  s/p [facility] round 4/2017    IMPRESSION:  thrombocytopenia--likley multifactorial--sepsis/meds  renal failure  dehydration  on iv antibiotics/fluids    RECOMMENDATION:  labs--b12/folate/spep/immunofixation/ldh/haptoglobin  spleen us  kerline ab  hold heparin, will discuss with pharmacy may switch to arixtra 2.5 mg daily--egfr at 37/renal function is improving  dtr refuses invasive hematology procedure/ct scan  hep c interpretation as per pcp/id  d/w pt daughter  d/w pcp  Dr llanes ,thanks for courtesy of this consult, will follow this pt with you for hem/onc issue while pt is in hospital.

## 2017-09-30 NOTE — PROGRESS NOTE ADULT - ASSESSMENT
89 yo AAM [facility] SNF resident was brought to ER for evaluation of cms  uncontrolled ,hyperglycemia ,recived total of 25 u of humalog this morning with no effect BG esvin dmission was found to be above 600 Quinones cath is draining purulent urine and patient diagnosed with uti and urosepsis Found to have hypotension ,ARVIN on CKD and elevated LFTS ,likely secondary to shock liver and due to sepsis Admitted for septic workup and evaluation,send blood and urine cx,serial lactate levels,monitor vitals closley,ivfs hydration,monitor urine output and renal profile,iv abx initiated Found to have troponin elevation and admitted to spcu ,seen by cardiologist ,likely troponinemia related to ARF patient became a long term resident about 3 mnts ago due to progression of dementia and Parkinsons disease Palliative care consult requested ,to discuss advance directives and complete MOLST Patient diagnosed with severe sepsis with GN bacteremia secondary to uti and obstructive uropathy

## 2017-09-30 NOTE — PROGRESS NOTE ADULT - SUBJECTIVE AND OBJECTIVE BOX
Patient is a 89y old  Male who presents with a chief complaint of cms (26 Sep 2017 13:48)      INTERVAL HPI/OVERNIGHT EVENTS: Patient seen and examined. NAD.       Vital Signs Last 24 Hrs  T(C): 37.1 (30 Sep 2017 04:37), Max: 37.4 (30 Sep 2017 00:08)  T(F): 98.8 (30 Sep 2017 04:37), Max: 99.4 (30 Sep 2017 00:08)  HR: 90 (30 Sep 2017 06:00) (89 - 112)  BP: 135/77 (30 Sep 2017 06:00) (110/53 - 173/91)  BP(mean): 93 (30 Sep 2017 06:00) (69 - 115)  RR: 19 (30 Sep 2017 06:00) (17 - 24)  SpO2: 95% (30 Sep 2017 06:00) (95% - 99%)I&O's Summary    29 Sep 2017 07:01  -  30 Sep 2017 07:00  --------------------------------------------------------  IN: 2820 mL / OUT: 1700 mL / NET: 1120 mL        LABS:                        12.9   9.4   )-----------( 59       ( 30 Sep 2017 06:55 )             40.7     09-29    146<H>  |  114<H>  |  65<H>  ----------------------------<  552<HH>  3.7   |  25  |  2.16<H>    Ca    8.9      29 Sep 2017 06:22  Phos  1.9     09-29  Mg     2.1     09-29    TPro  6.6  /  Alb  1.8<L>  /  TBili  5.0<H>  /  DBili  4.0<H>  /  AST  80<H>  /  ALT  50  /  AlkPhos  179<H>  09-29    PT/INR - ( 30 Sep 2017 06:55 )   PT: 11.4 sec;   INR: 1.04 ratio         PTT - ( 30 Sep 2017 06:55 )  PTT:29.8 sec    CAPILLARY BLOOD GLUCOSE  216 (29 Sep 2017 18:00)  331 (29 Sep 2017 12:00)              dextrose 5%. 1000 milliLiter(s) IV Continuous <Continuous>  dextrose Gel 1 Dose(s) Oral once PRN  dextrose 50% Injectable 12.5 Gram(s) IV Push once  dextrose 50% Injectable 25 Gram(s) IV Push once  dextrose 50% Injectable 25 Gram(s) IV Push once  glucagon  Injectable 1 milliGRAM(s) IntraMuscular once PRN  heparin  Injectable 5000 Unit(s) SubCutaneous every 12 hours  nystatin Powder 1 Application(s) Topical two times a day  insulin glargine Injectable (LANTUS) 10 Unit(s) SubCutaneous at bedtime  famotidine Injectable 20 milliGRAM(s) IV Push daily  lactobacillus acidophilus 1 Tablet(s) Oral every 8 hours  tamsulosin 0.4 milliGRAM(s) Oral at bedtime  amLODIPine   Tablet 10 milliGRAM(s) Oral daily  hydrocortisone hemorrhoidal Suppository 1 Suppository(s) Rectal daily  senna 2 Tablet(s) Oral at bedtime  docusate sodium 100 milliGRAM(s) Oral three times a day  bisacodyl Suppository 10 milliGRAM(s) Rectal daily PRN  polyethylene glycol 3350 17 Gram(s) Oral daily  aspirin  chewable 81 milliGRAM(s) Enteral Tube daily  sodium chloride 0.45%. 1000 milliLiter(s) IV Continuous <Continuous>  hydrALAZINE 25 milliGRAM(s) Oral every 8 hours  ertapenem  IVPB      insulin lispro (HumaLOG) corrective regimen sliding scale   SubCutaneous every 6 hours  ertapenem  IVPB 500 milliGRAM(s) IV Intermittent every 24 hours      REVIEW OF SYSTEMS: unobtainable       Consultant(s) Notes Reviewed:  [ x] YES  [ ] NO    PHYSICAL EXAM:  GENERAL: NAD, ill-appearing  HEAD:  Atraumatic, Normocephalic  ENMT: NGT  NERVOUS SYSTEM:  lethargic  CHEST/LUNG: Clear to auscultation bilaterally; No rales, rhonchi, wheezing,  HEART: Regular rate and rhythm  ABDOMEN: Soft  EXTREMITIES:  No clubbing      Advanced care planning discussed with patient/family [x ] YES   [ ] NO

## 2017-09-30 NOTE — PROGRESS NOTE ADULT - SUBJECTIVE AND OBJECTIVE BOX
ALESHA HODGE is a 89yMale , patient examined and chart reviewed.     INTERVAL HPI/ OVERNIGHT EVENTS:  Remains lethargic. Afebrile.  PICC placed.    Past Medical History--  PAST MEDICAL & SURGICAL HISTORY:  Parkinson disease  Neuropathy  CKD (chronic kidney disease)  BPH (benign prostatic hyperplasia)  Diabetes  Angina pectoris  Hypertension  Diabetes  Renal failure  Dementia  No significant past surgical history      For details regarding the patient's social history, family history, and other miscellaneous elements, please refer the initial infectious diseases consultation and/or the admitting history and physical examination for this admission.      ROS:  Unable to obtain due to : Lethargy    Current inpatient medications :    ANTIBIOTICS/RELEVANT:  lactobacillus acidophilus 1 Tablet(s) Oral every 8 hours  ertapenem  IVPB      ertapenem  IVPB 500 milliGRAM(s) IV Intermittent every 24 hours  rifaximin 550 milliGRAM(s) Oral two times a day    dextrose 5%. 1000 milliLiter(s) IV Continuous <Continuous>  dextrose Gel 1 Dose(s) Oral once PRN  dextrose 50% Injectable 12.5 Gram(s) IV Push once  dextrose 50% Injectable 25 Gram(s) IV Push once  dextrose 50% Injectable 25 Gram(s) IV Push once  glucagon  Injectable 1 milliGRAM(s) IntraMuscular once PRN  nystatin Powder 1 Application(s) Topical two times a day  insulin glargine Injectable (LANTUS) 10 Unit(s) SubCutaneous at bedtime  famotidine Injectable 20 milliGRAM(s) IV Push daily  tamsulosin 0.4 milliGRAM(s) Oral at bedtime  amLODIPine   Tablet 10 milliGRAM(s) Oral daily  hydrocortisone hemorrhoidal Suppository 1 Suppository(s) Rectal daily  senna 2 Tablet(s) Oral at bedtime  docusate sodium 100 milliGRAM(s) Oral three times a day  bisacodyl Suppository 10 milliGRAM(s) Rectal daily PRN  polyethylene glycol 3350 17 Gram(s) Oral daily  aspirin  chewable 81 milliGRAM(s) Enteral Tube daily  hydrALAZINE 25 milliGRAM(s) Oral every 8 hours  insulin lispro (HumaLOG) corrective regimen sliding scale   SubCutaneous every 6 hours  lactulose Syrup 30 Gram(s) Oral two times a day  ursodiol Capsule 300 milliGRAM(s) Oral three times a day with meals  fondaparinux Injectable 2.5 milliGRAM(s) SubCutaneous daily      Objective:    09-29 @ 07:01  -  09-30 @ 07:00  --------------------------------------------------------  IN: 2820 mL / OUT: 1700 mL / NET: 1120 mL    09-30 @ 07:01  -  09-30 @ 16:17  --------------------------------------------------------  IN: 1070 mL / OUT: 600 mL / NET: 470 mL      T(C): 37.1 (09-30-17 @ 16:02), Max: 37.4 (09-30-17 @ 00:08)  HR: 101 (09-30-17 @ 14:00) (89 - 112)  BP: 126/64 (09-30-17 @ 14:00) (110/53 - 169/80)  RR: 21 (09-30-17 @ 14:00) (17 - 24)  SpO2: 95% (09-30-17 @ 14:00) (95% - 97%)  Wt(kg): --      Physical Exam:  GEN: Lethargic NGT in place  HEENT: normocephalic and atraumatic. EOMI. MARTIR. Moist mucosa. Clear Posterior pharynx.  NECK: Supple. No carotid bruits.  No lymphadenopathy or thyromegaly.  LUNGS: Decreased to auscultation.  HEART: Regular rate and rhythm without murmur.  ABDOMEN: Soft, nontender, and nondistended.  Positive bowel sounds.  No hepatosplenomegaly was noted.  EXTREMITIES: Without any cyanosis, clubbing, rash, lesions or edema.  NEUROLOGIC: Lethargic  SKIN: No ulceration or induration present.      LABS:                        12.9   10.1  )-----------( 71       ( 30 Sep 2017 14:35 )             41.1       09-30    150<H>  |  117<H>  |  50<H>  ----------------------------<  154<H>  3.5   |  21<L>  |  1.79<H>    Ca    8.5      30 Sep 2017 06:55  Phos  2.5     09-30  Mg     2.0     09-30    TPro  5.9<L>  /  Alb  1.4<L>  /  TBili  4.7<H>  /  DBili  3.9<H>  /  AST  67<H>  /  ALT  32  /  AlkPhos  140<H>  09-30      PT/INR - ( 30 Sep 2017 06:55 )   PT: 11.4 sec;   INR: 1.04 ratio         PTT - ( 30 Sep 2017 06:55 )  PTT:29.8 sec           MICROBIOLOGY:  Culture - Blood (09.29.17 @ 02:16)    Gram Stain:   Growth in anaerobic bottle: Gram Negative Rods  Growth in aerobic bottle: Gram Negative Rods    Specimen Source: .Blood Blood-Peripheral    Culture Results:   Growth in aerobic and anaerobic bottles: Escherichia coli See previous  culture [id number]    Culture - Urine (09.26.17 @ 21:10)    -  Piperacillin/Tazobactam: R <=8    -  Tobramycin: R >8    -  Trimethoprim/Sulfamethoxazole: S <=0.5/9.5    -  Ertapenem: S <=0.5    -  Gentamicin: R >8    -  Imipenem: S <=1    -  Levofloxacin: R >4    -  Meropenem: S <=1    -  Nitrofurantoin: S <=32    -  Amikacin: S <=8    -  Ampicillin: R >16    -  Ampicillin/Sulbactam: R 16/8    -  Aztreonam: R >16    -  Cefazolin: R >16    -  Cefepime: R >16    -  Cefoxitin: S <=4    -  Ceftazidime: R >16    -  Ceftriaxone: R >32    -  Ciprofloxacin: R >2    Specimen Source: .Urine Clean Catch (Midstream)    Culture Results:   >100,000 CFU/ml Escherichia coli ESBL    Organism Identification: Escherichia coli ESBL    Organism: Escherichia coli ESBL    Method Type: PAMELA    Culture - Blood (09.28.17 @ 00:20)    Gram Stain:   Growth in aerobic and anaerobic bottles: Gram Negative Rods    Specimen Source: .Blood Blood-Peripheral    Culture Results:   Growth in aerobic and anaerobic bottles: Escherichia coli ESBL  See previous culture [id number]    RADIOLOGY & ADDITIONAL STUDIES:  EXAM:  CHEST-PORTABLE IMMEDIATE                                  PROCEDURE DATE:  09/29/2017          INTERPRETATION:  Clinical information: Poor access. Status post PICC line.    Technique: Portable AP chest film.    Comparison: Prior chest x-ray examination from 9/29/2017.    Findings: The patient is status post interval right-sided PICC line   placement with the tip at the SVC. The remaining tubes, lines, and life   support instruments are unchanged in position when compared to the prior   x-ray examination.    There is a small left-sided pleural effusion which appears unchanged. The   right lung is clear. The heart size is normal. There are mild multilevel   degenerative changes of the thoracic spine.    IMPRESSION: Interval placement of a right-sided PICC line with the tip at   the SVC. No right-sided pneumothorax.    Unchanged left-sided small left-sided pleural effusion.        Assessment :   89 year old male hx parkinson's dementia BPH admitted with severe sepsis with persistent Ecoli septicemia  sec obstructive uropathy sec urinary retention and UTI  Has ESBL Ecoli septicemia  Blood cultures still positive   Doubt pna suspect atelectasis  ARVIN improving  Abn LFTs sec sepsis  +troponins  Uncontrolled DM     Plan :   Cont Invanz 500mg IV daily  Fu repeat blood cultures  Trend LFTS  Trend wbc  Dm control  Aspiration precautions    Continue with present regiment.  Appropriate use of antibiotics and adverse effects reviewed.    Critical care time greater then 35 minutes reviewing notes, labs data/ imaging , discussion with multidisciplinary team.    Thank you for allowing me to participate in care of your patient .        Allen Hall MD  Infectious Disease  [phone]

## 2017-09-30 NOTE — PROGRESS NOTE ADULT - SUBJECTIVE AND OBJECTIVE BOX
Patient seen and examined today Plan discussed/Questions answered  (with patient/ancillary staff/colleagues) Chart notes reviewd More detailed Pulm/Critical Care notes, assessment and plan  will be added later today as needed after reviewing further labs as they become available     Notable interval events reviewed    ROS/PE  .   REVIEW OF SYMPTOMS    Able to give ROS  Yes     RELIABLE No   Weakness Yes   Chills No   Vision changes No  Lymph nodes No enlarged glands   Endocrine No unexplained hair loss No het or cold intolerance   Allergy No hives   Sore throat No   Coughing blood No  Headache No  Confusion YES  Chest pain No  Palpitations No   Pain abdomen NO   Shortness of breath YES  Vomiting NO  Pain neck No  Pain abdomen NO     PHYSICAL EXAM    HEENT Unremarkable PERRLA atraumatic  RESP Fair air entry EXP prolonged    Harsh breath sound Resp distres mild  CARDIAC S1 S2 No S3     NO JVD   ABDOMEN SOFT BS PRESENT NOT DISTENDED No hepatosplenomegaly  PEDAL EDEMA present No calf tenderness  NO rash GENERAL Not TOXIC looking  Awake A & O x 3   . VITALS/LABS  9/30/2017 1025 78 120/50 17 97%   9/30/2017 W 13.2 Hb 7.6 Plt 63Na 141 K 4.2  CO2 23 Cr 1.4   9/29/2017 W 8.7 Hb 8.8 Plt 80 Na 142 K 4.1 CO2 22 Cr 1.1 G 149   PROBLEM ASSESSMENT PLAN   RESP   9/29 5l 742/34/79   PULMONARY EMBOLISM  Lovenox 80.2 (9/29)   CTA Ch 9/29 cw 8/28/2017)   1) sm bl effsns 2) new interlobular septal thickening and scattered ggo poss p edema  3) New 6 mm nodule rul 4) diffuse peribronchial thickening 5) small pe rll 6) no rv strain 7) R hilar node increased 2.3 x 1.9 cm (prev 1.8 x 1.3 cm) Larger L hilar ln 1.4 x 1.1 (prev 1 x .7)    8/29/2017 V duplex legs Dilated thrombosed l ant calf vein   RO PNEUMONIA RECENT CHEMO   9/29-9/30 W 8.7-13.2  9/30 pct .55    zosyn (9/29) vanco (9/29)   LUNG NODULE   9/29 Lung nodule and increased size of hilomed ln suggests mets and bx should be done only if Onco recommends if that will alter managemen  LUNG NODULE   9/29 Lung nodule and increased size of hilomed ln suggests mets and bx should be done only if Onco recommends if that will alter management   POSSIBLE MI   9/29/2017 CK 61-45 Tr 1 1.08 (i) -.895 (i)   ASA 81 (9/29) atorvastat 80 (9/29) imdur 60 (9/29) lisinopril 40 (9/29)   POSSIBLE CHF  9/4/2017 ECHO n lvsf ef 60   NC ANEMIA   9/29-9/30 Hb 8.8-7.6   Likley sec ca   THROMBOCYTOPENIA   9/30 Plt 63 HIT ordered (9/30)   ELEVATED LFTS  9/30/2017 ap 107     GLOBAL ISSUE/BEST PRACTICE:        PROBLEM:      Analgesia:     na                        PROBLEM: Sedation:     na               PROBLEM: HOB elevation:   na             PROBLEM: Stress ulcer proph:    protonix 40 99/29)                       PROBLEM: VTE prophylaxis:        Lovenox 80.2 (9/29)                   PROBLEM: Glycemic control:    na   PROBLEM: Nutrition:    cons carb (9/29)           PROBLEM: Advanced directive: na     PROBLEM: Allergies:  na  PATIENT DESCRIPTION CC HPI PMH SOCIAL   This is a 73 year old woman with past medical history of  metastatic adenocarcinoma of the pancreas (dx in 9/2017) (chemo started 9/27 port placed 9/26) , CAD with PCI (LORENZO to RCA 2016 with reported 50% occlusion of LAD not intervened), HLD, HTN, DM type 2, and Paroxysmal Atrial Fibrillation (dx Dec 2016),  Right Breast Cancer (s/p lumpectomy 1998), brain aneurysm with coronary stent (2010), NSTEMI (8/28/17) admitted [facility] 9/29/2017 with SOB.   Pt woke up at midnight with SOB, non productive cough, fever, and the feeling that his throat is closing in. SOB worsened by sitting up, tried Xanax with no relief. Patient denies any CP, dizziness, palpitations, abd pain. Patient started chemotherapy for pancreatic cancer on 9/27, s/p  Powerport catheter placement on 9/26.  Home dose of lovenox was held prior to port placement  and Plavix was discontinued last week.  Patient was found to have VTE  and was Rxed with FD lvnx D-dimer 1963, Trop 1.3, BNP 3261, Hb 8.6 (baseline 9-10),Scattede ggo were noted and pt was Rxed with zosyn (9/29) vanco (9/29)  for poss pneumonia in chemo patient  TIME SPENT Over 25 minutes aggregate care time spent on encounter; activities included   direct patient care, counseling and/or coordinating care reviewing notes, lab data/ imaging , discussion with multidisciplinary team/ patient  /family. Risks, benefits, alternatives  discussed in detail. Questions/concerns  were addressed  to the best of my ability .

## 2017-09-30 NOTE — PROGRESS NOTE ADULT - PROBLEM SELECTOR PLAN 1
E. coli (ESBL) sepsis with persistent + blood cultures  Continue iv abx  F/u repeat cultures  ID f/u

## 2017-09-30 NOTE — PROGRESS NOTE ADULT - SUBJECTIVE AND OBJECTIVE BOX
Patient seen and examined today Plan discussed/Questions answered  (with patient/ancillary staff/colleagues) Chart notes reviewd More detailed Pulm/Critical Care notes, assessment and plan  will be added later today as needed after reviewing further labs as they become available     Notable interval events reviewed    ROS/PE  . REVIEW OF SYSTEMS Patient is unable to give any reliable review of systems Patient seen and examined today Plan discussed/Questions answered  (with patient/ancillary staff/colleagues) Chart notes reviewd More detailed Pulm/Critical Care notes, assessment and plan  will be added later today as needed after reviewing further labs as they become available     Notable interval events reviewed    ROS/PE  . REVIEW OF SYSTEMS Patient is unable to give any reliable review of systems   PHYSICAL EXAM    HEENT Unremarkable PERRLA atraumatic  RESP Fair air entry EXP prolonged    Harsh breath sound Resp distres mild  CARDIAC S1 S2 No S3     NO JVD   ABDOMEN SOFT BS PRESENT NOT DISTENDED No hepatosplenomegaly  PEDAL EDEMA present No calf tenderness  NO rash GENERAL Not TOXIC looking  Awake A & O x 1  . VITALS/LABS  9/30/2017 afeb 101 120/60 21 95%   9/30/2017 D5 80 (9/30)   9/30/2017 W 10.1 Hb 12.9 Plt 71  Na 150 K 3.5 CO2 21 Cr 1.7   PROBLEM ASSESSMENT PLAN   RESP   9/29 VBG pH 745   LACTICEMIA POA   9/26-9/28/2017 LA 2.9-1.5-1.6  9/27 LA 2.6   UTI PNEUMONIA SEPSIS ESBL E COLI PERSISTENT BACTEREMIA (9/26, 9/28)  Ertapenem (9/29)   9/27 Leg n   9/26-9/27-9/28-9/30 W 12.2-20.8-12.9-10.1    9/26-9/28 B 6-3  9/26 CT CAP NC 1) Basal atelectasis with focal consolidation lll likely pneumonia 2) Scattered subcm nodules 3) Nodular liver sugg cirrhosis 4) R low atteniation renal lesion needs sono 5) No hydro 6) No obstrn abundant feces   POSSIBLE MI   9/26-9/27-9/28-9/29/2017    CK - 0281-859-305 Tr 1 .531 (i) - .482 (i) - .491 (i)-.280 (i)-.158 (i)   ASA 81 (9/26) Lopressor 2.5x4 (9/28)  HYPERTENSION  Norvasc 10 (9/28) hydralazine 25.3 (9/29)   THROMBOCYTOPENIA   Likely sec sepsis   HYPERNATREMIA  .8 (9/27) D5 80 (9/30)   9/26-9/27-9/28-9/29-9/30/2017  Na 387-773-169-146-150  9/27 On q 3 h sl scale   ARVIN   9/26-9/27-9/28-9/29 Cr 5.2-3.7-2.3-2.16    HYPERGLYCEMIA  9/26/20-17 G 615 9/27 HbA1C 9.5   ELEVATED LFTs   9/26-9/27-9/28-9/29/2017   -[phone] -392-515-75 alt 92-87-69-47  9/28-9/29-9/30 BR 2.3-4-3.9   9/27 Hep C reactive   AMS 9/26 CT H No ac Rifaximin 550 (9/30) lactulose 30.2 (9/30) ursodiol 300.3 (9/30)   MALNUTRITION 9/29 Alb 1.7   GLOBAL ISSUE/BEST PRACTICE:        PROBLEM:      Analgesia:     na                        PROBLEM: Sedation:     na               PROBLEM: HOB elevation:   y              PROBLEM: Stress ulcer proph:    pepcid 20 (9/26)                       PROBLEM: VTE prophylaxis:      fondaparinux 2.5 (9/30) compr device (9/30)   PROBLEM: Glycemic control:    iss (9/26)  lantus 10 (9/26)   PROBLEM: Nutrition:    Glucerna 1.2 1440 (9/30)   PROBLEM: Advanced directive: dnr (9/27)      PROBLEM: Allergies:  na  PATIENT DESCRIPTION CC HPI PMH SOCIAL   89 m [facility] resident COURTNEYC was sent from [facility] for lethargy BP 90  Hi bld sugar found to be septic with pus draining from Quinones  admitted [facility] 9/26/2017   PM CVA DM2 Hypertension CAD 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   [facility] Meds  Norvasc 5 ASA 81 Lantus 10 mvi Tamsuloin .4 JANUSZ NCS Lo fat reg consistency Novolog R groin excoriated area   HOSPITAL COURSE 9/26/2017 ADMISSION [facility] S   PROBLEM SPICIFIC COURSE  PLAN   RESP  Gas exchange was acceptable on low flow O2 on arrival   LACTICEMIA POA Rxed with IVF   UTI PNEUMONIA SEPSIS E COLI PERSISTENT BACTEREMIA (9/26, 9/28)  (LIKELY SEC TO) ESBL E COLI UTI AND PNEUMONIA SEPSIS POA Rxed with zosyn (9/26) empirically changed to ertapenem (9/29)  9/26/2017 CXR napd   POSSIBLE MI  9/26/2017 Tr 1 .531 (i)  Was Rxed with ASA (9/26) bb  HYPERNATREMIA 9/26/2017 Na 151 Rxed with hypotonic fluids  IV changed to D5 (9/28) then 1/2 NS (9/29)   ARVIN   9/26 Cr 5.2 P{ossibly sec dehydrationm Was Rxed with IVF    HYPERGLYCEMIA 9/26/2017 G 615 Was Rxed with iss (9/26) No evidence of HONK or DKA (Ketones neg) 9/27 FW ordered NGT   ELEVATED LFTs  9/26/2017   alt 92 Poss sec shock US abd ordeerd 9/26/2017 9/27 Hep c came positive   AMS 9/26 CT H No ac  HYPERTENSION Norvasc Hydralazine added   THROMBOCYTOPENIA Likely sec sepsis   TIME SPENT Over 35 minutes aggregate critical care time spent on encounter; activities included   direct patient care, counseling and/or coordinating care reviewing notes, lab data/ imaging , discussion with multidisciplinary team/ patient  /family. Risks, benefits, alternatives  discussed in detail. Questions/concerns  were addressed  to the best of my ability .

## 2017-10-01 LAB
ALBUMIN SERPL ELPH-MCNC: 1.3 G/DL — LOW (ref 3.3–5)
ALBUMIN SERPL ELPH-MCNC: 1.4 G/DL — LOW (ref 3.3–5)
ALP SERPL-CCNC: 142 U/L — HIGH (ref 30–120)
ALP SERPL-CCNC: 149 U/L — HIGH (ref 30–120)
ALT FLD-CCNC: 28 U/L DA — SIGNIFICANT CHANGE UP (ref 10–60)
ALT FLD-CCNC: 30 U/L DA — SIGNIFICANT CHANGE UP (ref 10–60)
AMMONIA BLD-MCNC: 16 UMOL/L — SIGNIFICANT CHANGE UP (ref 11–32)
AMYLASE P1 CFR SERPL: 50 U/L — SIGNIFICANT CHANGE UP (ref 25–125)
ANION GAP SERPL CALC-SCNC: 10 MMOL/L — SIGNIFICANT CHANGE UP (ref 5–17)
ANION GAP SERPL CALC-SCNC: 11 MMOL/L — SIGNIFICANT CHANGE UP (ref 5–17)
ANION GAP SERPL CALC-SCNC: 9 MMOL/L — SIGNIFICANT CHANGE UP (ref 5–17)
APTT BLD: 25.9 SEC — LOW (ref 27.5–37.4)
AST SERPL-CCNC: 64 U/L — HIGH (ref 10–40)
AST SERPL-CCNC: 65 U/L — HIGH (ref 10–40)
BASE EXCESS BLDV CALC-SCNC: -1.6 MMOL/L — SIGNIFICANT CHANGE UP (ref -2–2)
BASOPHILS # BLD AUTO: 0.1 K/UL — SIGNIFICANT CHANGE UP (ref 0–0.2)
BASOPHILS NFR BLD AUTO: 0.9 % — SIGNIFICANT CHANGE UP (ref 0–2)
BILIRUB DIRECT SERPL-MCNC: 3.7 MG/DL — HIGH (ref 0–0.2)
BILIRUB INDIRECT FLD-MCNC: 0.6 MG/DL — SIGNIFICANT CHANGE UP (ref 0.2–1)
BILIRUB SERPL-MCNC: 4.2 MG/DL — HIGH (ref 0.2–1.2)
BILIRUB SERPL-MCNC: 4.3 MG/DL — HIGH (ref 0.2–1.2)
BUN SERPL-MCNC: 60 MG/DL — HIGH (ref 7–23)
BUN SERPL-MCNC: 71 MG/DL — HIGH (ref 7–23)
BUN SERPL-MCNC: 74 MG/DL — HIGH (ref 7–23)
CALCIUM SERPL-MCNC: 8 MG/DL — LOW (ref 8.4–10.5)
CALCIUM SERPL-MCNC: 8.1 MG/DL — LOW (ref 8.4–10.5)
CALCIUM SERPL-MCNC: 8.3 MG/DL — LOW (ref 8.4–10.5)
CHLORIDE SERPL-SCNC: 106 MMOL/L — SIGNIFICANT CHANGE UP (ref 96–108)
CHLORIDE SERPL-SCNC: 114 MMOL/L — HIGH (ref 96–108)
CHLORIDE SERPL-SCNC: 115 MMOL/L — HIGH (ref 96–108)
CK SERPL-CCNC: 206 U/L — SIGNIFICANT CHANGE UP (ref 39–308)
CO2 SERPL-SCNC: 21 MMOL/L — LOW (ref 22–31)
CO2 SERPL-SCNC: 22 MMOL/L — SIGNIFICANT CHANGE UP (ref 22–31)
CO2 SERPL-SCNC: 23 MMOL/L — SIGNIFICANT CHANGE UP (ref 22–31)
CREAT SERPL-MCNC: 2.26 MG/DL — HIGH (ref 0.5–1.3)
CREAT SERPL-MCNC: 2.41 MG/DL — HIGH (ref 0.5–1.3)
CREAT SERPL-MCNC: 2.47 MG/DL — HIGH (ref 0.5–1.3)
CULTURE RESULTS: SIGNIFICANT CHANGE UP
EOSINOPHIL # BLD AUTO: 0 K/UL — SIGNIFICANT CHANGE UP (ref 0–0.5)
EOSINOPHIL NFR BLD AUTO: 0.3 % — SIGNIFICANT CHANGE UP (ref 0–6)
GAS PNL BLDV: SIGNIFICANT CHANGE UP
GLUCOSE SERPL-MCNC: 178 MG/DL — HIGH (ref 70–99)
GLUCOSE SERPL-MCNC: 248 MG/DL — HIGH (ref 70–99)
GLUCOSE SERPL-MCNC: 492 MG/DL — CRITICAL HIGH (ref 70–99)
GRAM STN FLD: SIGNIFICANT CHANGE UP
HCO3 BLDV-SCNC: 23 MMOL/L — SIGNIFICANT CHANGE UP (ref 21–29)
HCT VFR BLD CALC: 38.3 % — LOW (ref 39–50)
HGB BLD-MCNC: 12 G/DL — LOW (ref 13–17)
HOROWITZ INDEX BLDV+IHG-RTO: 21 — SIGNIFICANT CHANGE UP
INR BLD: 1.05 RATIO — SIGNIFICANT CHANGE UP (ref 0.88–1.16)
LIDOCAIN IGE QN: 408 U/L — HIGH (ref 73–393)
LYMPHOCYTES # BLD AUTO: 1 K/UL — SIGNIFICANT CHANGE UP (ref 1–3.3)
LYMPHOCYTES # BLD AUTO: 10.2 % — LOW (ref 13–44)
MAGNESIUM SERPL-MCNC: 2.3 MG/DL — SIGNIFICANT CHANGE UP (ref 1.6–2.6)
MAGNESIUM SERPL-MCNC: 2.5 MG/DL — SIGNIFICANT CHANGE UP (ref 1.6–2.6)
MCHC RBC-ENTMCNC: 28.4 PG — SIGNIFICANT CHANGE UP (ref 27–34)
MCHC RBC-ENTMCNC: 31.4 GM/DL — LOW (ref 32–36)
MCV RBC AUTO: 90.3 FL — SIGNIFICANT CHANGE UP (ref 80–100)
MONOCYTES # BLD AUTO: 1.5 K/UL — HIGH (ref 0–0.9)
MONOCYTES NFR BLD AUTO: 15.4 % — HIGH (ref 2–14)
NEUTROPHILS # BLD AUTO: 6.9 K/UL — SIGNIFICANT CHANGE UP (ref 1.8–7.4)
NEUTROPHILS NFR BLD AUTO: 73.2 % — SIGNIFICANT CHANGE UP (ref 43–77)
OB PNL STL: NEGATIVE — SIGNIFICANT CHANGE UP
PCO2 BLDV: 34 MMHG — LOW (ref 35–50)
PF4 HEPARIN CMPLX AB SER-ACNC: NEGATIVE — SIGNIFICANT CHANGE UP
PF4 HEPARIN CMPLX AB SERPL QL IA: 0.18 ABS — SIGNIFICANT CHANGE UP
PH BLDV: 7.43 — SIGNIFICANT CHANGE UP (ref 7.35–7.45)
PHOSPHATE SERPL-MCNC: 3.3 MG/DL — SIGNIFICANT CHANGE UP (ref 2.5–4.5)
PHOSPHATE SERPL-MCNC: 3.4 MG/DL — SIGNIFICANT CHANGE UP (ref 2.5–4.5)
PLATELET # BLD AUTO: 81 K/UL — LOW (ref 150–400)
PO2 BLDV: 65 — HIGH (ref 25–45)
POTASSIUM SERPL-MCNC: 2.9 MMOL/L — CRITICAL LOW (ref 3.5–5.3)
POTASSIUM SERPL-MCNC: 2.9 MMOL/L — CRITICAL LOW (ref 3.5–5.3)
POTASSIUM SERPL-MCNC: 3.5 MMOL/L — SIGNIFICANT CHANGE UP (ref 3.5–5.3)
POTASSIUM SERPL-SCNC: 2.9 MMOL/L — CRITICAL LOW (ref 3.5–5.3)
POTASSIUM SERPL-SCNC: 2.9 MMOL/L — CRITICAL LOW (ref 3.5–5.3)
POTASSIUM SERPL-SCNC: 3.5 MMOL/L — SIGNIFICANT CHANGE UP (ref 3.5–5.3)
PROT SERPL-MCNC: 5.1 G/DL — LOW (ref 6–8.3)
PROT SERPL-MCNC: 5.1 G/DL — LOW (ref 6–8.3)
PROT SERPL-MCNC: 5.7 G/DL — LOW (ref 6–8.3)
PROT SERPL-MCNC: 5.9 G/DL — LOW (ref 6–8.3)
PROTHROM AB SERPL-ACNC: 11.5 SEC — SIGNIFICANT CHANGE UP (ref 9.8–12.7)
RBC # BLD: 4.24 M/UL — SIGNIFICANT CHANGE UP (ref 4.2–5.8)
RBC # FLD: 16.1 % — HIGH (ref 10.3–14.5)
SAO2 % BLDV: 91 % — HIGH (ref 67–88)
SODIUM SERPL-SCNC: 138 MMOL/L — SIGNIFICANT CHANGE UP (ref 135–145)
SODIUM SERPL-SCNC: 146 MMOL/L — HIGH (ref 135–145)
SODIUM SERPL-SCNC: 147 MMOL/L — HIGH (ref 135–145)
SPECIMEN SOURCE: SIGNIFICANT CHANGE UP
WBC # BLD: 9.5 K/UL — SIGNIFICANT CHANGE UP (ref 3.8–10.5)
WBC # FLD AUTO: 9.5 K/UL — SIGNIFICANT CHANGE UP (ref 3.8–10.5)

## 2017-10-01 PROCEDURE — 73502 X-RAY EXAM HIP UNI 2-3 VIEWS: CPT | Mod: 26,LT

## 2017-10-01 PROCEDURE — 73552 X-RAY EXAM OF FEMUR 2/>: CPT | Mod: 26,LT

## 2017-10-01 RX ORDER — POTASSIUM CHLORIDE 20 MEQ
10 PACKET (EA) ORAL
Qty: 0 | Refills: 0 | Status: COMPLETED | OUTPATIENT
Start: 2017-10-01 | End: 2017-10-01

## 2017-10-01 RX ADMIN — INSULIN GLARGINE 10 UNIT(S): 100 INJECTION, SOLUTION SUBCUTANEOUS at 22:23

## 2017-10-01 RX ADMIN — LACTULOSE 30 GRAM(S): 10 SOLUTION ORAL at 06:16

## 2017-10-01 RX ADMIN — Medication 81 MILLIGRAM(S): at 11:11

## 2017-10-01 RX ADMIN — Medication 25 MILLIGRAM(S): at 22:24

## 2017-10-01 RX ADMIN — Medication 1 TABLET(S): at 22:24

## 2017-10-01 RX ADMIN — AMLODIPINE BESYLATE 10 MILLIGRAM(S): 2.5 TABLET ORAL at 06:05

## 2017-10-01 RX ADMIN — URSODIOL 300 MILLIGRAM(S): 250 TABLET, FILM COATED ORAL at 09:06

## 2017-10-01 RX ADMIN — Medication 4: at 01:40

## 2017-10-01 RX ADMIN — Medication 25 MILLIGRAM(S): at 06:05

## 2017-10-01 RX ADMIN — NYSTATIN CREAM 1 APPLICATION(S): 100000 CREAM TOPICAL at 06:07

## 2017-10-01 RX ADMIN — Medication 100 MILLIEQUIVALENT(S): at 09:05

## 2017-10-01 RX ADMIN — Medication 10: at 06:04

## 2017-10-01 RX ADMIN — Medication 2: at 14:59

## 2017-10-01 RX ADMIN — SENNA PLUS 2 TABLET(S): 8.6 TABLET ORAL at 22:24

## 2017-10-01 RX ADMIN — URSODIOL 300 MILLIGRAM(S): 250 TABLET, FILM COATED ORAL at 13:01

## 2017-10-01 RX ADMIN — NYSTATIN CREAM 1 APPLICATION(S): 100000 CREAM TOPICAL at 17:24

## 2017-10-01 RX ADMIN — Medication 1 TABLET(S): at 06:05

## 2017-10-01 RX ADMIN — LACTULOSE 30 GRAM(S): 10 SOLUTION ORAL at 17:25

## 2017-10-01 RX ADMIN — Medication 1 SUPPOSITORY(S): at 11:11

## 2017-10-01 RX ADMIN — ERTAPENEM SODIUM 110 MILLIGRAM(S): 1 INJECTION, POWDER, LYOPHILIZED, FOR SOLUTION INTRAMUSCULAR; INTRAVENOUS at 10:56

## 2017-10-01 RX ADMIN — Medication 2: at 10:56

## 2017-10-01 RX ADMIN — FAMOTIDINE 20 MILLIGRAM(S): 10 INJECTION INTRAVENOUS at 11:11

## 2017-10-01 RX ADMIN — Medication 100 MILLIGRAM(S): at 22:24

## 2017-10-01 RX ADMIN — Medication 2: at 22:22

## 2017-10-01 RX ADMIN — Medication 1 TABLET(S): at 13:01

## 2017-10-01 RX ADMIN — Medication 25 MILLIGRAM(S): at 13:01

## 2017-10-01 RX ADMIN — Medication 100 MILLIEQUIVALENT(S): at 10:55

## 2017-10-01 RX ADMIN — Medication 100 MILLIGRAM(S): at 06:05

## 2017-10-01 RX ADMIN — URSODIOL 300 MILLIGRAM(S): 250 TABLET, FILM COATED ORAL at 22:24

## 2017-10-01 RX ADMIN — Medication 100 MILLIEQUIVALENT(S): at 11:57

## 2017-10-01 NOTE — PROGRESS NOTE ADULT - ASSESSMENT
89 yo AAM [facility] resident was brought to ER for evaluation of cms  uncontrolled ,hyperglycemia ,recived total of 25 u of humalog this morning with no effect BG esvin dmission was found to be above 600 Quinones cath is draining purulent urine and patient diagnosed with uti and urosepsis Found to have hypotension ,ARVIN on CKD and elevated LFTS ,likely secondary to shock liver and due to sepsis Admitted for septic workup and evaluation,send blood and urine cx,serial lactate levels,monitor vitals closley,ivfs hydration,monitor urine output and renal profile,iv abx initiated Found to have troponin elevation and admitted to spcu ,seen by cardiologist ,likely troponinemia related to ARF patient became a long term resident about 3 mnts ago due to progression of dementia and Parkinsons disease Palliative care consult requested ,to discuss advance directives and complete MOLST Patient diagnosed with severe sepsis with GN bacteremia secondary to uti and obstructive uropathy

## 2017-10-01 NOTE — PROGRESS NOTE ADULT - SUBJECTIVE AND OBJECTIVE BOX
INTERVAL HPI/OVERNIGHT EVENTS:  HPI:  89 yo [facility] resident was brought to ER for evaluation of cms  uncontrolled ,hyperglycemia ,recived total of 25 u of humalog this morning with no effect BG esvin dmission was found to be above 600 Quinones cath is draining purulent urine and patient diagnosed with uti and urosepsis Found to have hypotension ,ARVIN on CKD and elevated LFTS ,likely secondary to shock liver and due to sepsis Admitted for septic workup and evaluation,send blood and urine cx,serial lactate levels,monitor vitals closley,ivfs hydration,monitor urine output and renal profile,iv abx initiated Found to have troponin elevation and admitted to spcu ,seen by cardiologist ,likely troponinemia related to ARF patient became a long term resident about 3 mnts ago due to progression of dem,entia and Parkinsons disease Palliative care consult requested ,to discuss advance directives and complete MOLST ngt in place still lethargic, no n/v/d/f/c       MEDICATIONS  (STANDING):  dextrose 5%. 1000 milliLiter(s) (50 mL/Hr) IV Continuous <Continuous>  dextrose 50% Injectable 12.5 Gram(s) IV Push once  dextrose 50% Injectable 25 Gram(s) IV Push once  dextrose 50% Injectable 25 Gram(s) IV Push once  nystatin Powder 1 Application(s) Topical two times a day  insulin glargine Injectable (LANTUS) 10 Unit(s) SubCutaneous at bedtime  famotidine Injectable 20 milliGRAM(s) IV Push daily  lactobacillus acidophilus 1 Tablet(s) Oral every 8 hours  tamsulosin 0.4 milliGRAM(s) Oral at bedtime  amLODIPine   Tablet 10 milliGRAM(s) Oral daily  hydrocortisone hemorrhoidal Suppository 1 Suppository(s) Rectal daily  senna 2 Tablet(s) Oral at bedtime  docusate sodium 100 milliGRAM(s) Oral three times a day  polyethylene glycol 3350 17 Gram(s) Oral daily  aspirin  chewable 81 milliGRAM(s) Enteral Tube daily  hydrALAZINE 25 milliGRAM(s) Oral every 8 hours  ertapenem  IVPB      ertapenem  IVPB 500 milliGRAM(s) IV Intermittent every 24 hours  rifaximin 550 milliGRAM(s) Oral two times a day  lactulose Syrup 30 Gram(s) Oral two times a day  ursodiol Capsule 300 milliGRAM(s) Oral three times a day with meals  fondaparinux Injectable 2.5 milliGRAM(s) SubCutaneous daily  insulin lispro (HumaLOG) corrective regimen sliding scale   SubCutaneous every 4 hours    MEDICATIONS  (PRN):  dextrose Gel 1 Dose(s) Oral once PRN Blood Glucose LESS THAN 70 milliGRAM(s)/deciliter  glucagon  Injectable 1 milliGRAM(s) IntraMuscular once PRN Glucose LESS THAN 70 milligrams/deciliter  bisacodyl Suppository 10 milliGRAM(s) Rectal daily PRN Constipation      Allergies    No Known Allergies    Intolerances          General:  No wt loss, fevers, chills, night sweats, fatigue,   Eyes:  Good vision, no reported pain  ENT:  No sore throat, pain, runny nose, dysphagia  CV:  No pain, palpitations, hypo/hypertension  Resp:  No dyspnea, cough, tachypnea, wheezing  GI:  No pain, No nausea, No vomiting, No diarrhea, No constipation, No weight loss, No fever, No pruritis, No rectal bleeding, No tarry stools, No dysphagia,  :  No pain, bleeding, incontinence, nocturia  Muscle:  No pain, weakness  Neuro:  No weakness, tingling, memory problems  Psych:  No fatigue, insomnia, mood problems, depression  Endocrine:  No polyuria, polydipsia, cold/heat intolerance  Heme:  No petechiae, ecchymosis, easy bruisability  Skin:  No rash, tattoos, scars, edema      PHYSICAL EXAM:   Vital Signs:  Vital Signs Last 24 Hrs  T(C): 36.7 (01 Oct 2017 12:09), Max: 37.4 (30 Sep 2017 20:01)  T(F): 98.1 (01 Oct 2017 12:09), Max: 99.4 (30 Sep 2017 20:01)  HR: 87 (01 Oct 2017 12:00) (87 - 110)  BP: 123/58 (01 Oct 2017 12:00) (103/50 - 130/62)  BP(mean): 75 (01 Oct 2017 12:00) (64 - 82)  RR: 24 (01 Oct 2017 12:00) (11 - 31)  SpO2: 96% (01 Oct 2017 12:00) (92% - 97%)  Daily     Daily Weight in k.7 (01 Oct 2017 04:00)I&O's Summary    30 Sep 2017 07:01  -  01 Oct 2017 07:00  --------------------------------------------------------  IN: 3360 mL / OUT: 1050 mL / NET: 2310 mL    01 Oct 2017 07:  -  01 Oct 2017 13:52  --------------------------------------------------------  IN: 860 mL / OUT: 0 mL / NET: 860 mL        GENERAL:  Appears stated age, well-groomed, well-nourished, no distress  HEENT:  NC/AT,  conjunctivae clear and pink, no thyromegaly, nodules, adenopathy, no JVD, sclera -anicteric  CHEST:  Full & symmetric excursion, no increased effort, breath sounds clear ngt  HEART:  Regular rhythm, S1, S2, no murmur/rub/S3/S4, no abdominal bruit, no edema  ABDOMEN:  Soft, non-tender, non-distended, normoactive bowel sounds,  no masses ,no hepato-splenomegaly, no signs of chronic liver disease  EXTEREMITIES:  no cyanosis,clubbing or edema  SKIN:  No rash/erythema/ecchymoses/petechiae/wounds/abscess/warm/dry  NEURO:  Alert, oriented x0, no asterixis, no tremor, no encephalopathy      LABS:                        12.0   9.5   )-----------( 81       ( 01 Oct 2017 06:37 )             38.3     10-    146<H>  |  114<H>  |  71<H>  ----------------------------<  248<H>  2.9<LL>   |  23  |  2.47<H>    Ca    8.1<L>      01 Oct 2017 08:15  Phos  3.3     10-  Mg     2.3     10    TPro  5.9<L>  /  Alb  1.4<L>  /  TBili  4.2<H>  /  DBili  x   /  AST  65<H>  /  ALT  30  /  AlkPhos  149<H>  10-01    PT/INR - ( 01 Oct 2017 06:37 )   PT: 11.5 sec;   INR: 1.05 ratio         PTT - ( 01 Oct 2017 06:37 )  PTT:25.9 sec    amylaseAmylase, Serum Total: 50 U/L (10-01 @ 06:37)     lipaseLipase, Serum: 408 U/L (10-01 @ 06:37)    RADIOLOGY & ADDITIONAL TESTS:

## 2017-10-01 NOTE — PROGRESS NOTE ADULT - SUBJECTIVE AND OBJECTIVE BOX
Patient is a 89y Male with past medical history of           presenting with        who reports no complaints overnight.    REVIEW OF SYSTEMS:    CONSTITUTIONAL: No  fevers or chills    Allergies    No Known Allergies    Intolerances        MEDICATIONS  (STANDING):  dextrose 5%. 1000 milliLiter(s) (50 mL/Hr) IV Continuous <Continuous>  dextrose 50% Injectable 12.5 Gram(s) IV Push once  dextrose 50% Injectable 25 Gram(s) IV Push once  dextrose 50% Injectable 25 Gram(s) IV Push once  nystatin Powder 1 Application(s) Topical two times a day  insulin glargine Injectable (LANTUS) 10 Unit(s) SubCutaneous at bedtime  famotidine Injectable 20 milliGRAM(s) IV Push daily  lactobacillus acidophilus 1 Tablet(s) Oral every 8 hours  tamsulosin 0.4 milliGRAM(s) Oral at bedtime  amLODIPine   Tablet 10 milliGRAM(s) Oral daily  hydrocortisone hemorrhoidal Suppository 1 Suppository(s) Rectal daily  senna 2 Tablet(s) Oral at bedtime  docusate sodium 100 milliGRAM(s) Oral three times a day  polyethylene glycol 3350 17 Gram(s) Oral daily  aspirin  chewable 81 milliGRAM(s) Enteral Tube daily  hydrALAZINE 25 milliGRAM(s) Oral every 8 hours  ertapenem  IVPB      ertapenem  IVPB 500 milliGRAM(s) IV Intermittent every 24 hours  rifaximin 550 milliGRAM(s) Oral two times a day  lactulose Syrup 30 Gram(s) Oral two times a day  ursodiol Capsule 300 milliGRAM(s) Oral three times a day with meals  fondaparinux Injectable 2.5 milliGRAM(s) SubCutaneous daily  insulin lispro (HumaLOG) corrective regimen sliding scale   SubCutaneous every 4 hours  potassium chloride  10 mEq/100 mL IVPB 10 milliEquivalent(s) IV Intermittent every 1 hour      Vitals:  T(F): 97.6 (10-01-17 @ 08:03), Max: 99.4 (09-30-17 @ 20:01)  HR: 90 (10-01-17 @ 10:00)  BP: 123/54 (10-01-17 @ 10:00)  BP(mean): 73 (10-01-17 @ 10:00)  RR: 17 (10-01-17 @ 10:00)  SpO2: 97% (10-01-17 @ 10:00)  Wt(kg): --    I and O's:    09-30 @ 07:01  -  10-01 @ 07:00  --------------------------------------------------------  IN: 3360 mL / OUT: 1050 mL / NET: 2310 mL            PHYSICAL EXAM:    Constitutional: NAD,   Neck: No JVD  Respiratory: CTAB  Cardiovascular: S1 and S2  Gastrointestinal: BS+, soft,  Extremities: pos  peripheral edema    LABS:                        12.0   9.5   )-----------( 81       ( 01 Oct 2017 06:37 )             38.3                         12.9   10.1  )-----------( 71       ( 30 Sep 2017 14:35 )             41.1       146    |  114    |  71     ----------------------------<  248       01 Oct 2017 08:15  2.9     |  23     |  2.47     138    |  106    |  60     ----------------------------<  492       01 Oct 2017 06:37  2.9     |  21     |  2.26     150    |  117    |  50     ----------------------------<  154       30 Sep 2017 06:55  3.5     |  21     |  1.79     Ca    8.1        01 Oct 2017 08:15  Ca    8.0        01 Oct 2017 06:37    Phos  3.3       01 Oct 2017 06:37  Phos  2.5       30 Sep 2017 06:55    Mg     2.3       01 Oct 2017 06:37  Mg     2.0       30 Sep 2017 06:55    TPro  5.9    /  Alb  1.4    /  TBili  4.2    /        01 Oct 2017 08:15  DBili  x      /  AST  65     /  ALT  30     /  AlkPhos  149      TPro  5.7    /  Alb  1.3    /  TBili  4.3    /        01 Oct 2017 06:37  DBili  3.7    /  AST  64     /  ALT  28     /  AlkPhos  142          Serum Osmo:   Serum Uric Acid:   MAVERICK:   Double Stranded DNA:   C3:   C3 Nephritic Factor:   C4:   p-ANCA:   c-ANCA:   Anti-GBM:   CHAPO-Smith Antibody:   Immunofixation:   [employer]/Lambda Free Light Chain:   SPEP:   Hepatitis Panel: Hepatitis B Surface Antigen: Nonreact (09-29 @ 16:09)    HIV-1/2:     Urine Studies:      Urine chemistry:   Urine Na:   Urine Creatinine:   Urine Protein/Cr ratio:  Urine K:   Urine Osm:   24 Hr urine studies:     24 hr urine Creatinine Clearance:    RADIOLOGY & ADDITIONAL STUDIES:

## 2017-10-01 NOTE — PROGRESS NOTE ADULT - ASSESSMENT
89 yo AAM [facility] SN[facility] resident was brought to ER for evaluation of cms  uncontrolled ,hyperglycemia ,recived total of 25 u of humalog this morning with no effect BG esvin dmission was found to be above 600 Quinones cath is draining purulent urine and patient diagnosed with uti and urosepsis Found to have hypotension ,ARVIN on CKD and

## 2017-10-01 NOTE — PROGRESS NOTE ADULT - SUBJECTIVE AND OBJECTIVE BOX
Patient is a 89y old  Male who presents with a chief complaint of cms (26 Sep 2017 13:48)      INTERVAL HPI/OVERNIGHT EVENTS: Patient seen and examined. NAD.     Vital Signs Last 24 Hrs  T(C): 36.4 (01 Oct 2017 08:03), Max: 37.4 (30 Sep 2017 20:01)  T(F): 97.6 (01 Oct 2017 08:03), Max: 99.4 (30 Sep 2017 20:01)  HR: 88 (01 Oct 2017 08:00) (88 - 110)  BP: 109/59 (01 Oct 2017 08:00) (103/50 - 149/68)  BP(mean): 74 (01 Oct 2017 08:00) (64 - 91)  RR: 26 (01 Oct 2017 08:00) (11 - 31)  SpO2: 93% (01 Oct 2017 08:00) (92% - 96%)I&O's Summary    30 Sep 2017 07:01  -  01 Oct 2017 07:00  --------------------------------------------------------  IN: 3360 mL / OUT: 1050 mL / NET: 2310 mL        LABS:                        12.0   9.5   )-----------( x        ( 01 Oct 2017 06:37 )             38.3     10-01    138  |  106  |  60<H>  ----------------------------<  492<HH>  2.9<LL>   |  21<L>  |  2.26<H>    Ca    8.0<L>      01 Oct 2017 06:37  Phos  3.3     10-01  Mg     2.3     10-01    TPro  5.7<L>  /  Alb  1.3<L>  /  TBili  4.3<H>  /  DBili  3.7<H>  /  AST  64<H>  /  ALT  28  /  AlkPhos  142<H>  10-01    PT/INR - ( 01 Oct 2017 06:37 )   PT: 11.5 sec;   INR: 1.05 ratio         PTT - ( 01 Oct 2017 06:37 )  PTT:25.9 sec    CAPILLARY BLOOD GLUCOSE  394 (01 Oct 2017 06:00)  232 (01 Oct 2017 02:00)  196 (30 Sep 2017 22:00)              dextrose 5%. 1000 milliLiter(s) IV Continuous <Continuous>  dextrose Gel 1 Dose(s) Oral once PRN  dextrose 50% Injectable 12.5 Gram(s) IV Push once  dextrose 50% Injectable 25 Gram(s) IV Push once  dextrose 50% Injectable 25 Gram(s) IV Push once  glucagon  Injectable 1 milliGRAM(s) IntraMuscular once PRN  nystatin Powder 1 Application(s) Topical two times a day  insulin glargine Injectable (LANTUS) 10 Unit(s) SubCutaneous at bedtime  famotidine Injectable 20 milliGRAM(s) IV Push daily  lactobacillus acidophilus 1 Tablet(s) Oral every 8 hours  tamsulosin 0.4 milliGRAM(s) Oral at bedtime  amLODIPine   Tablet 10 milliGRAM(s) Oral daily  hydrocortisone hemorrhoidal Suppository 1 Suppository(s) Rectal daily  senna 2 Tablet(s) Oral at bedtime  docusate sodium 100 milliGRAM(s) Oral three times a day  bisacodyl Suppository 10 milliGRAM(s) Rectal daily PRN  polyethylene glycol 3350 17 Gram(s) Oral daily  aspirin  chewable 81 milliGRAM(s) Enteral Tube daily  hydrALAZINE 25 milliGRAM(s) Oral every 8 hours  ertapenem  IVPB      ertapenem  IVPB 500 milliGRAM(s) IV Intermittent every 24 hours  rifaximin 550 milliGRAM(s) Oral two times a day  lactulose Syrup 30 Gram(s) Oral two times a day  ursodiol Capsule 300 milliGRAM(s) Oral three times a day with meals  fondaparinux Injectable 2.5 milliGRAM(s) SubCutaneous daily  insulin lispro (HumaLOG) corrective regimen sliding scale   SubCutaneous every 4 hours      REVIEW OF SYSTEMS: unobtainable       Consultant(s) Notes Reviewed:  [x ] YES  [ ] NO    PHYSICAL EXAM:  GENERAL: NAD  HEAD:  Atraumatic, Normocephalic  EYES: EOMI, PERRLA, conjunctiva and sclera clear  ENMT: +NGT  NECK: Supple, No JVD  NERVOUS SYSTEM: lethargic  CHEST/LUNG: Clear to auscultation bilaterally; No rales, rhonchi, wheezing,  HEART: Regular rate and rhythm  ABDOMEN: Soft, Nontender, Nondistended; Bowel sounds present  EXTREMITIES:  No clubbing    Advanced care planning discussed with patient/family [x ] YES   [ ] NO

## 2017-10-01 NOTE — PROGRESS NOTE ADULT - SUBJECTIVE AND OBJECTIVE BOX
Chief Complaint: Fever, AMS    Interval Events: No events overnight. Worsening renal function.    Review of Systems:  General: No fevers, chills, weight loss or gain  Skin: No rashes, color changes  Cardiovascular: No chest pain, orthopnea  Respiratory: No shortness of breath, cough  Gastrointestinal: No nausea, abdominal pain  Genitourinary: No incontinence, pain with urination  Musculoskeletal: No pain, swelling, decreased range of motion  Neurological: No headache, weakness  Psychiatric: No depression, anxiety  Endocrine: No weight loss or gain, increased thirst  All other systems are comprehensively negative.    Physical Exam:  Vital Signs Last 24 Hrs  T(C): 36.4 (01 Oct 2017 08:03), Max: 37.4 (30 Sep 2017 20:01)  T(F): 97.6 (01 Oct 2017 08:03), Max: 99.4 (30 Sep 2017 20:01)  HR: 88 (01 Oct 2017 08:00) (88 - 110)  BP: 109/59 (01 Oct 2017 08:00) (103/50 - 149/68)  BP(mean): 74 (01 Oct 2017 08:00) (64 - 91)  RR: 26 (01 Oct 2017 08:00) (11 - 31)  SpO2: 93% (01 Oct 2017 08:00) (92% - 96%)  General: NAD  HEENT: MMM  Neck: No JVD, no carotid bruit  Lungs: CTAB  CV: RRR, nl S1/S2, no M/R/G  Abdomen: S/NT/ND, +BS  Extremities: No LE edema, no cyanosis  Neuro: AAOx3, non-focal  Skin: No rash    Labs:             10-01    146<H>  |  114<H>  |  71<H>  ----------------------------<  248<H>  2.9<LL>   |  23  |  2.47<H>    Ca    8.1<L>      01 Oct 2017 08:15  Phos  3.3     10-01  Mg     2.3     10-01    TPro  5.9<L>  /  Alb  1.4<L>  /  TBili  4.2<H>  /  DBili  x   /  AST  65<H>  /  ALT  30  /  AlkPhos  149<H>  10-01                          12.0   9.5   )-----------( x        ( 01 Oct 2017 06:37 )             38.3       Telemetry: Sinus rhythm, PVCs

## 2017-10-01 NOTE — PROGRESS NOTE ADULT - SUBJECTIVE AND OBJECTIVE BOX
ALESHA HODGE is a yMale , patient examined and chart reviewed. Patient being followed for     INTERVAL HPI/ OVERNIGHT EVENTS:  Remains lethargic. Afebrile.   Family at bedside.    Past Medical History--  PAST MEDICAL & SURGICAL HISTORY:  Parkinson disease  Neuropathy  CKD (chronic kidney disease)  BPH (benign prostatic hyperplasia)  Diabetes  Angina pectoris  Hypertension  Diabetes  Renal failure  Dementia  No significant past surgical history      For details regarding the patient's social history, family history, and other miscellaneous elements, please refer the initial infectious diseases consultation and/or the admitting history and physical examination for this admission.    ROS:  Unable to obtain due to : Lethargic    Current inpatient medications :    ANTIBIOTICS/RELEVANT:  lactobacillus acidophilus 1 Tablet(s) Oral every 8 hours  ertapenem  IVPB      ertapenem  IVPB 500 milliGRAM(s) IV Intermittent every 24 hours  rifaximin 550 milliGRAM(s) Oral two times a day      dextrose 5%. 1000 milliLiter(s) IV Continuous <Continuous>  dextrose Gel 1 Dose(s) Oral once PRN  dextrose 50% Injectable 12.5 Gram(s) IV Push once  dextrose 50% Injectable 25 Gram(s) IV Push once  dextrose 50% Injectable 25 Gram(s) IV Push once  glucagon  Injectable 1 milliGRAM(s) IntraMuscular once PRN  nystatin Powder 1 Application(s) Topical two times a day  insulin glargine Injectable (LANTUS) 10 Unit(s) SubCutaneous at bedtime  famotidine Injectable 20 milliGRAM(s) IV Push daily  tamsulosin 0.4 milliGRAM(s) Oral at bedtime  amLODIPine   Tablet 10 milliGRAM(s) Oral daily  hydrocortisone hemorrhoidal Suppository 1 Suppository(s) Rectal daily  senna 2 Tablet(s) Oral at bedtime  docusate sodium 100 milliGRAM(s) Oral three times a day  bisacodyl Suppository 10 milliGRAM(s) Rectal daily PRN  polyethylene glycol 3350 17 Gram(s) Oral daily  aspirin  chewable 81 milliGRAM(s) Enteral Tube daily  hydrALAZINE 25 milliGRAM(s) Oral every 8 hours  lactulose Syrup 30 Gram(s) Oral two times a day  ursodiol Capsule 300 milliGRAM(s) Oral three times a day with meals  fondaparinux Injectable 2.5 milliGRAM(s) SubCutaneous daily  insulin lispro (HumaLOG) corrective regimen sliding scale   SubCutaneous every 4 hours      Objective:    09-30 @ 07:01  -  10-01 @ 07:00  --------------------------------------------------------  IN: 3360 mL / OUT: 1050 mL / NET: 2310 mL    10-01 @ 07:01  -  10-01 @ 18:12  --------------------------------------------------------  IN: 860 mL / OUT: 300 mL / NET: 560 mL      T(C): 36.4 (10-01-17 @ 15:24), Max: 37.4 (09-30-17 @ 20:01)  HR: 90 (10-01-17 @ 18:00) (86 - 110)  BP: 116/65 (10-01-17 @ 18:00) (109/59 - 129/62)  RR: 18 (10-01-17 @ 18:00) (11 - 27)  SpO2: 96% (10-01-17 @ 18:00) (92% - 97%)  Wt(kg): --      Physical Exam:  GEN: Lethargic NGT in place   HEENT: normocephalic and atraumatic. EOMI. MARTIR. Moist mucosa. Clear Posterior pharynx.  NECK: Supple. No carotid bruits.  No lymphadenopathy or thyromegaly.  LUNGS: Decreased to auscultation.  HEART: Regular rate and rhythm without murmur.  ABDOMEN: Soft, nontender, and nondistended.  Positive bowel sounds.  No hepatosplenomegaly was noted.  EXTREMITIES: Without any cyanosis, clubbing, rash, lesions + edema.  NEUROLOGIC: Lethargic  SKIN: No ulceration or induration present.      LABS:                        12.0   9.5   )-----------( 81       ( 01 Oct 2017 06:37 )             38.3       10-01    146<H>  |  114<H>  |  71<H>  ----------------------------<  248<H>  2.9<LL>   |  23  |  2.47<H>    Ca    8.1<L>      01 Oct 2017 08:15  Phos  3.3     10-01  Mg     2.3     10-01    TPro  5.9<L>  /  Alb  1.4<L>  /  TBili  4.2<H>  /  DBili  x   /  AST  65<H>  /  ALT  30  /  AlkPhos  149<H>  10-01      PT/INR - ( 01 Oct 2017 06:37 )   PT: 11.5 sec;   INR: 1.05 ratio         PTT - ( 01 Oct 2017 06:37 )  PTT:25.9 sec           MICROBIOLOGY:  Culture - Blood in AM (09.30.17 @ 12:02)    Gram Stain:   Growth in aerobic bottle: Gram Negative Rods    Specimen Source: .Blood Blood-Peripheral    Culture Results:   Growth in aerobic bottle: Gram Negative Rods    Culture - Blood (09.30.17 @ 12:02)    Specimen Source: .Blood Blood    Culture Results:   No growth to date.    Culture - Blood (09.29.17 @ 02:16)    Gram Stain:   Growth in anaerobic bottle: Gram Negative Rods  Growth in aerobic bottle: Gram Negative Rods    Specimen Source: .Blood Blood-Peripheral    Culture Results:   Growth in aerobic and anaerobic bottles: Escherichia coli  See previous culture [id number]    Culture - Urine (09.26.17 @ 21:10)    -  Piperacillin/Tazobactam: R <=8    -  Tobramycin: R >8    -  Trimethoprim/Sulfamethoxazole: S <=0.5/9.5    -  Ertapenem: S <=0.5    -  Gentamicin: R >8    -  Imipenem: S <=1    -  Levofloxacin: R >4    -  Meropenem: S <=1    -  Nitrofurantoin: S <=32    -  Amikacin: S <=8    -  Ampicillin: R >16    -  Ampicillin/Sulbactam: R 16/8    -  Aztreonam: R >16    -  Cefazolin: R >16    -  Cefepime: R >16    -  Cefoxitin: S <=4    -  Ceftazidime: R >16    -  Ceftriaxone: R >32    -  Ciprofloxacin: R >2    Specimen Source: .Urine Clean Catch (Midstream)    Culture Results:   >100,000 CFU/ml Escherichia coli ESBL    Organism Identification: Escherichia coli ESBL    Organism: Escherichia coli ESBL    Method Type: PAMELA    Culture - Blood (09.28.17 @ 00:20)    Gram Stain:   Growth in aerobic and anaerobic bottles: Gram Negative Rods    Specimen Source: .Blood Blood-Peripheral    Culture Results:   Growth in aerobic and anaerobic bottles: Escherichia coli ESBL  See previous culture [id number]    Assessment :   89 year old male hx parkinson's dementia BPH admitted with severe sepsis with persistent Ecoli septicemia  sec obstructive uropathy sec urinary retention and UTI  Has ESBL Ecoli septicemia  Blood cultures still positive   ?abscess  Doubt pna suspect atelectasis  ARVIN improving  Abn LFTs worsening  +troponins  Uncontrolled DM     Plan :   CT A/P in am   Cont Invanz 500mg IV daily  Fu repeat blood cultures  Trend LFTS  Trend wbc  Dm control  Aspiration precautions    Continue with present regiment.  Appropriate use of antibiotics and adverse effects reviewed.    I have discussed the above plan of care with patient/ family in detail. They expressed understanding of the the treatment plan . Risks, benefits and alternatives discussed in detail.   I have asked if they have any questions or concerns and appropriately addressed them to the best of my ability .      Critical care time greater then 35 minutes reviewing notes, labs data/ imaging , discussion with multidisciplinary team.    Thank you for allowing me to participate in care of your patient .        Allen Hall MD  Infectious Disease  [phone]

## 2017-10-01 NOTE — PROGRESS NOTE ADULT - SUBJECTIVE AND OBJECTIVE BOX
Patient seen and examined today Plan discussed/Questions answered  (with patient/ancillary staff/colleagues) Chart notes reviewd More detailed Pulm/Critical Care notes, assessment and plan  will be added later today as needed after reviewing further labs as they become available     Notable interval events reviewed    ROS/PE  . REVIEW OF SYSTEMS Patient is unable to give any reliable review of systems   PHYSICAL EXAM  Urine clearer but dark and reddish NG t Tube feeds   HEENT Unremarkable PERRLA atraumatic  RESP Fair air entry EXP prolonged    Harsh breath sound Resp distres mild  CARDIAC S1 S2 No S3     NO JVD   ABDOMEN SOFT BS PRESENT NOT DISTENDED No hepatosplenomegaly  PEDAL EDEMA present No calf tenderness  NO rash GENERAL Not TOXIC looking  Awake A & O x 1  . Patient seen and examined today Plan discussed/Questions answered  (with patient/ancillary staff/colleagues) Chart notes reviewd More detailed Pulm/Critical Care notes, assessment and plan  will be added later today as needed after reviewing further labs as they become available     Notable interval events reviewed    ROS/PE  . REVIEW OF SYSTEMS Patient is unable to give any reliable review of systems   PHYSICAL EXAM  Urine clearer but dark and reddish NG t Tube feeds   HEENT Unremarkable PERRLA atraumatic  RESP Fair air entry EXP prolonged    Harsh breath sound Resp distres mild  CARDIAC S1 S2 No S3     NO JVD   ABDOMEN SOFT BS PRESENT NOT DISTENDED No hepatosplenomegaly  PEDAL EDEMA present No calf tenderness  NO rash GENERAL Not TOXIC looking  Awake A & O x 1  . VITALS/LABS  10/1/2017 afeb 87 123/58 24 96%  10/1/2017 W 9.5 Hb 12.Plt 81  Na 146 K 2.9 CO2 23 Cr 2.4 G 248   PROBLEM ASSESSMENT PLAN   RESP   9/29 VBG pH 745 10/1 VBG pH 743  LACTICEMIA POA   9/26-9/28/2017 LA 2.9-1.5-1.6  9/27 LA 2.6   UTI PNEUMONIA SEPSIS ESBL E COLI PERSISTENT BACTEREMIA (9/26, 9/28, 9/30)  Ertapenem (9/29)   9/27 Leg n   9/26-9/27-9/28-9/30-10/1 W 12.2-20.8-12.9-10.1-9.5  9/26-9/28 B 6-3  9/26 CT CAP NC 1) Basal atelectasis with focal consolidation lll likely pneumonia 2) Scattered subcm nodules 3) Nodular liver sugg cirrhosis 4) R low atteniation renal lesion needs sono 5) No hydro 6) No obstrn abundant feces   POSSIBLE MI   9/26-9/27-9/28-9/29/2017    CK - 2630-654-852 Tr 1 .531 (i) - .482 (i) - .491 (i)-.280 (i)-.158 (i)   ASA 81 (9/26) Lopressor 2.5x4 (9/28)  HYPERTENSION  Norvasc 10 (9/28) hydralazine 25.3 (9/29)   THROMBOCYTOPENIA   Likely sec sepsis   HYPERNATREMIA  Free water 400.6 (9/30)   9/26-9/27-9/28-9/29-9/30-10/1/2017  Na 875-909-819-146-150-146  9/27 On q 3 h sl scale   ARVIN   9/26-9/27-9/28-9/29-10/1 Cr 5.2-3.7-2.3-2.16-2.4    HYPERGLYCEMIA  9/26/20-17 G 615 9/27 HbA1C 9.5   ELEVATED LFTs   9/26-9/27-9/28-9/29-10/1/2017   -[phone] -980-717-75-65 alt 26-46-39-47-30  9/28-9/29-9/30 BR 2.3-4-3.9-4.2  9/27 Hep C reactive   AMS 9/26 CT H No ac Rifaximin 550 (9/30) lactulose 30.2 (9/30) ursodiol 300.3 (9/30)   MALNUTRITION 9/29 Alb 1.7   GLOBAL ISSUE/BEST PRACTICE:        PROBLEM:      Analgesia:     na                        PROBLEM: Sedation:     na               PROBLEM: HOB elevation:   y              PROBLEM: Stress ulcer proph:    pepcid 20 (9/26)                       PROBLEM: VTE prophylaxis:      fondaparinux 2.5 (9/30) compr device (9/30)   PROBLEM: Glycemic control:    iss (9/26)  lantus 10 (9/26)   PROBLEM: Nutrition:    Glucerna 1.2 1440 (9/30)   PROBLEM: Advanced directive: dnr (9/27)      PROBLEM: Allergies:  na  PATIENT DESCRIPTION CC HPI PMH SOCIAL   89 m [place] resident [facility] was sent from [facility] for lethargy BP 90  Hi bld sugar found to be septic with pus draining from Quinones  admitted [facility] S 9/26/2017   PM CVA DM2 Hypertension CAD 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   NH Meds  Norvasc 5 ASA 81 Lantus 10 mvi Tamsuloin .4 JANUSZ NCS Lo fat reg consistency Novolog R groin excoriated area   HOSPITAL COURSE 9/26/2017 ADMISSION [facility] S   PROBLEM SPICIFIC COURSE  PLAN   RESP  Gas exchange was acceptable on low flow O2 on arrival   LACTICEMIA POA Rxed with IVF   UTI PNEUMONIA SEPSIS E COLI PERSISTENT BACTEREMIA (9/26, 9/28)  (LIKELY SEC TO) ESBL E COLI UTI AND PNEUMONIA SEPSIS POA Rxed with zosyn (9/26) empirically changed to ertapenem (9/29)  9/26/2017 CXR napd   POSSIBLE MI  9/26/2017 Tr 1 .531 (i)  Was Rxed with ASA (9/26) bb  HYPERNATREMIA 9/26/2017 Na 151 Rxed with hypotonic fluids  IV changed to D5 (9/28) then 1/2 NS (9/29)   ARVIN   9/26 Cr 5.2 P{ossibly sec dehydrationm Was Rxed with IVF    HYPERGLYCEMIA 9/26/2017 G 615 Was Rxed with iss (9/26) No evidence of HONK or DKA (Ketones neg) 9/27 FW ordered NGT   ELEVATED LFTs  9/26/2017   alt 92 Poss sec shock US abd ordeerd 9/26/2017 9/27 Hep c came positive   AMS 9/26 CT H No ac  HYPERTENSION Norvasc Hydralazine added   THROMBOCYTOPENIA Likely sec sepsis   TIME SPENT Over 35 minutes aggregate critical care time spent on encounter; activities included   direct patient care, counseling and/or coordinating care reviewing notes, lab data/ imaging , discussion with multidisciplinary team/ patient  /family. Risks, benefits, alternatives  discussed in detail. Questions/concerns  were addressed  to the best of my ability .

## 2017-10-01 NOTE — PROGRESS NOTE ADULT - ASSESSMENT
The patient is a 89 year old male with a history of HTN, DM, CKD, dementia who is admitted with urosepsis.    Plan:  - Echocardiogram results from 6/2017 noted; normal LV systolic function, no significant valve issues  - Troponin elevated and flat likely due to type II NSTEMI (demand ischemia) and retention of enzymes from ARVIN on CKD. No need to trend further at this point.  - Continue aspirin 81 mg daily  - Continue amlodipine 10 mg daily  - Continue hydralazine 25 mg q8h  - IV antibiotics  - GI/ID follow-up

## 2017-10-02 DIAGNOSIS — E11.65 TYPE 2 DIABETES MELLITUS WITH HYPERGLYCEMIA: ICD-10-CM

## 2017-10-02 DIAGNOSIS — K52.9 NONINFECTIVE GASTROENTERITIS AND COLITIS, UNSPECIFIED: ICD-10-CM

## 2017-10-02 DIAGNOSIS — R13.10 DYSPHAGIA, UNSPECIFIED: ICD-10-CM

## 2017-10-02 LAB
% ALBUMIN: 36.2 % — SIGNIFICANT CHANGE UP
% ALPHA 1: 8.7 % — SIGNIFICANT CHANGE UP
% ALPHA 2: 14.7 % — SIGNIFICANT CHANGE UP
% BETA: 14.6 % — SIGNIFICANT CHANGE UP
% GAMMA: 25.8 % — SIGNIFICANT CHANGE UP
ALBUMIN SERPL ELPH-MCNC: 1.5 G/DL — LOW (ref 3.3–5)
ALBUMIN SERPL ELPH-MCNC: 1.8 G/DL — LOW (ref 3.6–5.5)
ALBUMIN/GLOB SERPL ELPH: 0.5 RATIO — SIGNIFICANT CHANGE UP
ALP SERPL-CCNC: 162 U/L — HIGH (ref 30–120)
ALPHA1 GLOB SERPL ELPH-MCNC: 0.4 G/DL — SIGNIFICANT CHANGE UP (ref 0.1–0.4)
ALPHA2 GLOB SERPL ELPH-MCNC: 0.7 G/DL — SIGNIFICANT CHANGE UP (ref 0.5–1)
ALT FLD-CCNC: 31 U/L DA — SIGNIFICANT CHANGE UP (ref 10–60)
AMMONIA BLD-MCNC: 15 UMOL/L — SIGNIFICANT CHANGE UP (ref 11–32)
ANION GAP SERPL CALC-SCNC: 13 MMOL/L — SIGNIFICANT CHANGE UP (ref 5–17)
APTT BLD: 24.3 SEC — LOW (ref 27.5–37.4)
AST SERPL-CCNC: 84 U/L — HIGH (ref 10–40)
B-GLOBULIN SERPL ELPH-MCNC: 0.7 G/DL — SIGNIFICANT CHANGE UP (ref 0.5–1)
BILIRUB SERPL-MCNC: 3.3 MG/DL — HIGH (ref 0.2–1.2)
BUN SERPL-MCNC: 74 MG/DL — HIGH (ref 7–23)
CALCIUM SERPL-MCNC: 8.4 MG/DL — SIGNIFICANT CHANGE UP (ref 8.4–10.5)
CHLORIDE SERPL-SCNC: 112 MMOL/L — HIGH (ref 96–108)
CO2 SERPL-SCNC: 21 MMOL/L — LOW (ref 22–31)
CREAT SERPL-MCNC: 3.07 MG/DL — HIGH (ref 0.5–1.3)
GAMMA GLOBULIN: 1.3 G/DL — SIGNIFICANT CHANGE UP (ref 0.6–1.6)
GLUCOSE SERPL-MCNC: 162 MG/DL — HIGH (ref 70–99)
HCT VFR BLD CALC: 39.7 % — SIGNIFICANT CHANGE UP (ref 39–50)
HGB BLD-MCNC: 12.5 G/DL — LOW (ref 13–17)
IGA FLD-MCNC: 535 MG/DL — HIGH (ref 68–378)
IGG FLD-MCNC: 1340 MG/DL — SIGNIFICANT CHANGE UP (ref 694–1618)
IGM SERPL-MCNC: 119 MG/DL — SIGNIFICANT CHANGE UP (ref 40–230)
INR BLD: 1.04 RATIO — SIGNIFICANT CHANGE UP (ref 0.88–1.16)
INTERPRETATION SERPL IFE-IMP: SIGNIFICANT CHANGE UP
KAPPA LC SER QL IFE: 17.9 MG/DL — HIGH (ref 0.33–1.94)
KAPPA/LAMBDA FREE LIGHT CHAIN RATIO, SERUM: 2.04 RATIO — HIGH (ref 0.26–1.65)
LAMBDA LC SER QL IFE: 8.76 MG/DL — HIGH (ref 0.57–2.63)
MAGNESIUM SERPL-MCNC: 2.7 MG/DL — HIGH (ref 1.6–2.6)
MCHC RBC-ENTMCNC: 28.7 PG — SIGNIFICANT CHANGE UP (ref 27–34)
MCHC RBC-ENTMCNC: 31.4 GM/DL — LOW (ref 32–36)
MCV RBC AUTO: 91.3 FL — SIGNIFICANT CHANGE UP (ref 80–100)
PHOSPHATE SERPL-MCNC: 3.5 MG/DL — SIGNIFICANT CHANGE UP (ref 2.5–4.5)
PLATELET # BLD AUTO: 104 K/UL — LOW (ref 150–400)
POTASSIUM SERPL-MCNC: 3.5 MMOL/L — SIGNIFICANT CHANGE UP (ref 3.5–5.3)
POTASSIUM SERPL-SCNC: 3.5 MMOL/L — SIGNIFICANT CHANGE UP (ref 3.5–5.3)
PROT PATTERN SERPL ELPH-IMP: SIGNIFICANT CHANGE UP
PROT SERPL-MCNC: 5.7 G/DL — LOW (ref 6–8.3)
PROTHROM AB SERPL-ACNC: 11.4 SEC — SIGNIFICANT CHANGE UP (ref 9.8–12.7)
RBC # BLD: 4.35 M/UL — SIGNIFICANT CHANGE UP (ref 4.2–5.8)
RBC # FLD: 16.3 % — HIGH (ref 10.3–14.5)
SODIUM SERPL-SCNC: 146 MMOL/L — HIGH (ref 135–145)
WBC # BLD: 8.8 K/UL — SIGNIFICANT CHANGE UP (ref 3.8–10.5)
WBC # FLD AUTO: 8.8 K/UL — SIGNIFICANT CHANGE UP (ref 3.8–10.5)

## 2017-10-02 PROCEDURE — 71250 CT THORAX DX C-: CPT | Mod: 26

## 2017-10-02 PROCEDURE — 74176 CT ABD & PELVIS W/O CONTRAST: CPT | Mod: 26

## 2017-10-02 RX ORDER — HEPARIN SODIUM 5000 [USP'U]/ML
5000 INJECTION INTRAVENOUS; SUBCUTANEOUS EVERY 12 HOURS
Qty: 0 | Refills: 0 | Status: DISCONTINUED | OUTPATIENT
Start: 2017-10-02 | End: 2017-10-04

## 2017-10-02 RX ORDER — SODIUM CHLORIDE 9 MG/ML
1000 INJECTION, SOLUTION INTRAVENOUS
Qty: 0 | Refills: 0 | Status: DISCONTINUED | OUTPATIENT
Start: 2017-10-02 | End: 2017-10-03

## 2017-10-02 RX ORDER — INSULIN LISPRO 100/ML
VIAL (ML) SUBCUTANEOUS EVERY 6 HOURS
Qty: 0 | Refills: 0 | Status: DISCONTINUED | OUTPATIENT
Start: 2017-10-02 | End: 2017-10-04

## 2017-10-02 RX ORDER — IOHEXOL 300 MG/ML
15 INJECTION, SOLUTION INTRAVENOUS
Qty: 0 | Refills: 0 | Status: COMPLETED | OUTPATIENT
Start: 2017-10-02 | End: 2017-10-02

## 2017-10-02 RX ORDER — ACETAMINOPHEN 500 MG
1000 TABLET ORAL ONCE
Qty: 0 | Refills: 0 | Status: COMPLETED | OUTPATIENT
Start: 2017-10-02 | End: 2017-10-02

## 2017-10-02 RX ADMIN — Medication 1: at 23:46

## 2017-10-02 RX ADMIN — SODIUM CHLORIDE 45 MILLILITER(S): 9 INJECTION, SOLUTION INTRAVENOUS at 10:12

## 2017-10-02 RX ADMIN — HEPARIN SODIUM 5000 UNIT(S): 5000 INJECTION INTRAVENOUS; SUBCUTANEOUS at 18:06

## 2017-10-02 RX ADMIN — IOHEXOL 15 MILLILITER(S): 300 INJECTION, SOLUTION INTRAVENOUS at 06:14

## 2017-10-02 RX ADMIN — AMLODIPINE BESYLATE 10 MILLIGRAM(S): 2.5 TABLET ORAL at 06:12

## 2017-10-02 RX ADMIN — Medication 6: at 13:58

## 2017-10-02 RX ADMIN — FAMOTIDINE 20 MILLIGRAM(S): 10 INJECTION INTRAVENOUS at 12:02

## 2017-10-02 RX ADMIN — Medication 2: at 01:39

## 2017-10-02 RX ADMIN — Medication 400 MILLIGRAM(S): at 21:48

## 2017-10-02 RX ADMIN — Medication 100 MILLIGRAM(S): at 06:12

## 2017-10-02 RX ADMIN — NYSTATIN CREAM 1 APPLICATION(S): 100000 CREAM TOPICAL at 06:14

## 2017-10-02 RX ADMIN — ERTAPENEM SODIUM 110 MILLIGRAM(S): 1 INJECTION, POWDER, LYOPHILIZED, FOR SOLUTION INTRAMUSCULAR; INTRAVENOUS at 12:01

## 2017-10-02 RX ADMIN — NYSTATIN CREAM 1 APPLICATION(S): 100000 CREAM TOPICAL at 18:06

## 2017-10-02 RX ADMIN — LACTULOSE 30 GRAM(S): 10 SOLUTION ORAL at 06:13

## 2017-10-02 RX ADMIN — Medication 1 SUPPOSITORY(S): at 12:02

## 2017-10-02 RX ADMIN — Medication 1 TABLET(S): at 06:12

## 2017-10-02 RX ADMIN — URSODIOL 300 MILLIGRAM(S): 250 TABLET, FILM COATED ORAL at 06:12

## 2017-10-02 RX ADMIN — Medication 2: at 10:14

## 2017-10-02 RX ADMIN — Medication 25 MILLIGRAM(S): at 06:12

## 2017-10-02 RX ADMIN — IOHEXOL 15 MILLILITER(S): 300 INJECTION, SOLUTION INTRAVENOUS at 08:07

## 2017-10-02 NOTE — PROGRESS NOTE ADULT - PROBLEM SELECTOR PLAN 7
PEG placement vs CMO Spoke to HCP in length and great details today ,requests more guidance from [facility] care service States likely family will request PEG

## 2017-10-02 NOTE — PROGRESS NOTE ADULT - SUBJECTIVE AND OBJECTIVE BOX
Patient seen and examined today Plan discussed/Questions answered  (with patient/ancillary staff/colleagues) Chart notes reviewd More detailed Pulm/Critical Care notes, assessment and plan  will be added later today as needed after reviewing further labs as they become available     Notable interval events reviewed    ROS/PE  . REVIEW OF SYSTEMS Patient is unable to give any reliable review of systems   PHYSICAL EXAM    HEENT Unremarkable PERRLA atraumatic  RESP Fair air entry EXP prolonged    Harsh breath sound Resp distres mild  CARDIAC S1 S2 No S3     NO JVD   ABDOMEN SOFT BS PRESENT NOT DISTENDED No hepatosplenomegaly  PEDAL EDEMA present No calf tenderness  NO rash GENERAL Not TOXIC looking  Awake A & O x 1  . VITALS/LABS  10/2/2017 992 97 101/47 90% 71   10/2/2017 acc 154-147   10/2/2017 W 8.8 Hb 12.5 Plt 104 Na 146 K 3.5 CO2 21 Cr 3.07  PROBLEM ASSESSMENT PLAN   RESP   9/29 VBG pH 745 10/1 VBG pH 743  LACTICEMIA POA   9/26-9/28/2017 LA 2.9-1.5-1.6  9/27 LA 2.6   UTI PNEUMONIA SEPSIS ESBL E COLI PERSISTENT BACTEREMIA (9/26, 9/28, 9/30)  Ertapenem (9/29)   10/2 For CTCAP   9/27 Leg n   9/26-9/27-9/28-9/30-10/1 W 12.2-20.8-12.9-10.1-9.5  9/26-9/28 B 6-3  9/26 CT CAP NC 1) Basal atelectasis with focal consolidation lll likely pneumonia 2) Scattered subcm nodules 3) Nodular liver sugg cirrhosis 4) R low atteniation renal lesion needs sono 5) No hydro 6) No obstrn abundant feces   POSSIBLE MI   9/26-9/27-9/28-9/29/2017    CK - 1640-122-995 Tr 1 .531 (i) - .482 (i) - .491 (i)-.280 (i)-.158 (i)   ASA 81 (9/26) Lopressor 2.5x4 (9/28)  HYPERTENSION  Norvasc 10 (9/28) hydralazine 25.3 (9/29)   THROMBOCYTOPENIA   Likely sec sepsis   HYPERNATREMIA  Free water 400.6 (9/30)   9/26-9/27-9/28-9/29-9/30-10/1/2017  Na 036-569-683-146-150-146  9/27 On q 3 h sl scale   ARVIN   9/26-9/27-9/28-9/29-10/1-10/2 Cr 5.2-3.7-2.3-2.16-2.4-3.0   HYPERGLYCEMIA  9/26/20-17 G 615 9/27 HbA1C 9.5   ELEVATED LFTs   9/26-9/27-9/28-9/29-10/1-10/2/2017   -960-533-176-149-162 -480-636-75-65-84 alt 01-48-21-47-30-31  9/28-9/29-9/30 BR 2.3-4-3.9-4.2  9/27 Hep C reactive   AMS 9/26 CT H No ac Rifaximin 550 (9/30) lactulose 30.2 (9/30) ursodiol 300.3 (9/30)   MALNUTRITION 9/29 Alb 1.7   GLOBAL ISSUE/BEST PRACTICE:        PROBLEM:      Analgesia:     na                        PROBLEM: Sedation:     na               PROBLEM: HOB elevation:   y              PROBLEM: Stress ulcer proph:    pepcid 20 (9/26)                       PROBLEM: VTE prophylaxis:      fondaparinux 2.5 (9/30) compr device (9/30)   PROBLEM: Glycemic control:    iss (9/26)  lantus 10 (9/26)   PROBLEM: Nutrition:    Glucerna 1.2 1440 (9/30)   PROBLEM: Advanced directive: dnr (9/27)      PROBLEM: Allergies:  na  PATIENT DESCRIPTION CC HPI PMH SOCIAL   89 m [place] resident [facility] was sent from [facility] for lethargy BP 90  Hi bld sugar found to be septic with pus draining from Quinones  admitted [facility] S 9/26/2017   PM CVA DM2 Hypertension CAD 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   NH Meds  Norvasc 5 ASA 81 Lantus 10 mvi Tamsuloin .4 JANUSZ NCS Lo fat reg consistency Novolog R groin excoriated area   HOSPITAL COURSE 9/26/2017 ADMISSION [facility] S   PROBLEM SPICIFIC COURSE  PLAN   RESP  Gas exchange was acceptable on low flow O2 on arrival   LACTICEMIA POA Rxed with IVF   UTI PNEUMONIA SEPSIS E COLI PERSISTENT BACTEREMIA (9/26, 9/28, 9/30)  (LIKELY SEC TO) (ESBL E COLI) UTI AND PNEUMONIA SEPSIS POA Rxed with zosyn (9/26) empirically changed to ertapenem (9/29)  9/26/2017 CXR napd   POSSIBLE MI  9/26/2017 Tr 1 .531 (i)  Was Rxed with ASA (9/26) bb  HYPERNATREMIA 9/26/2017 Na 151 Rxed with hypotonic fluids  IV changed to D5 (9/28) then 1/2 NS (9/29)   ARVIN   9/26 Cr 5.2 P{ossibly sec dehydrationm Was Rxed with IVF    HYPERGLYCEMIA 9/26/2017 G 615 Was Rxed with iss (9/26) No evidence of HONK or DKA (Ketones neg) 9/27 FW ordered NGT   ELEVATED LFTs  9/26/2017   alt 92 Poss sec shock US abd ordeerd 9/26/2017 9/27 Hep c came positive   AMS 9/26 CT H No ac  HYPERTENSION Norvasc Hydralazine added   THROMBOCYTOPENIA Likely sec sepsis   TIME SPENT Over 35 minutes aggregate critical care time spent on encounter; activities included   direct patient care, counseling and/or coordinating care reviewing notes, lab data/ imaging , discussion with multidisciplinary team/ patient  /family. Risks, benefits, alternatives  discussed in detail. Questions/concerns  were addressed  to the best of my ability .

## 2017-10-02 NOTE — PROGRESS NOTE ADULT - ASSESSMENT
89 yo [facility] resident was brought to ER for evaluation of cms  uncontrolled ,hyperglycemia ,recived total of 25 u of humalog this morning with no effect BG esvin dmission was found to be above 600 Quinones cath is draining purulent urine and patient diagnosed with uti and urosepsis Found to have hypotension ,ARVIN on CKD and

## 2017-10-02 NOTE — PROGRESS NOTE ADULT - ASSESSMENT
The patient is a 89 year old male with a history of HTN, DM, CKD, dementia who is admitted with urosepsis.    Plan:  - Echocardiogram results from 6/2017 noted; normal LV systolic function, no significant valve issues  - Troponin elevated and flat likely due to type II NSTEMI (demand ischemia) and retention of enzymes from ARVIN on CKD. No need to trend further at this point.  - Continue aspirin 81 mg daily  - BPs now on lower side. Off of antihypertensives.  - IV antibiotics  - GI/ID follow-up

## 2017-10-02 NOTE — PROGRESS NOTE ADULT - SUBJECTIVE AND OBJECTIVE BOX
Patient is a 89y Male with past medical history of           presenting with        who reports no complaints overnight.    REVIEW OF SYSTEMS:    CONSTITUTIONAL: No  fevers or chills    Allergies    No Known Allergies    Intolerances        MEDICATIONS  (STANDING):  dextrose 5%. 1000 milliLiter(s) (50 mL/Hr) IV Continuous <Continuous>  dextrose 50% Injectable 12.5 Gram(s) IV Push once  dextrose 50% Injectable 25 Gram(s) IV Push once  dextrose 50% Injectable 25 Gram(s) IV Push once  nystatin Powder 1 Application(s) Topical two times a day  insulin glargine Injectable (LANTUS) 10 Unit(s) SubCutaneous at bedtime  famotidine Injectable 20 milliGRAM(s) IV Push daily  lactobacillus acidophilus 1 Tablet(s) Oral every 8 hours  tamsulosin 0.4 milliGRAM(s) Oral at bedtime  hydrocortisone hemorrhoidal Suppository 1 Suppository(s) Rectal daily  senna 2 Tablet(s) Oral at bedtime  docusate sodium 100 milliGRAM(s) Oral three times a day  polyethylene glycol 3350 17 Gram(s) Oral daily  aspirin  chewable 81 milliGRAM(s) Enteral Tube daily  ertapenem  IVPB      ertapenem  IVPB 500 milliGRAM(s) IV Intermittent every 24 hours  rifaximin 550 milliGRAM(s) Oral two times a day  lactulose Syrup 30 Gram(s) Oral two times a day  ursodiol Capsule 300 milliGRAM(s) Oral three times a day with meals  fondaparinux Injectable 2.5 milliGRAM(s) SubCutaneous daily  insulin lispro (HumaLOG) corrective regimen sliding scale   SubCutaneous every 4 hours  dextrose 5%. 1000 milliLiter(s) (45 mL/Hr) IV Continuous <Continuous>      Vitals:  T(F): 99.2 (10-02-17 @ 07:59), Max: 99.5 (10-02-17 @ 04:10)  HR: 101 (10-02-17 @ 08:00)  BP: 107/57 (10-02-17 @ 08:00)  BP(mean): 70 (10-02-17 @ 08:00)  RR: 34 (10-02-17 @ 08:00)  SpO2: 95% (10-02-17 @ 08:00)  Wt(kg): --    I and O's:    10-01 @ 07:01  -  10-02 @ 07:00  --------------------------------------------------------  IN: 3095 mL / OUT: 400 mL / NET: 2695 mL            PHYSICAL EXAM:    Constitutional: NAD,   Neck: No JVD  Respiratory: decrease bs b/l   Cardiovascular: S1 and S2  Gastrointestinal: BS+, soft, NT/ND  Extremities: pos  peripheral edema  Neurological:  no focal deficits    LABS:                        12.5   8.8   )-----------( 104      ( 02 Oct 2017 07:11 )             39.7                         12.0   9.5   )-----------( 81       ( 01 Oct 2017 06:37 )             38.3       146    |  112    |  74     ----------------------------<  162       02 Oct 2017 07:11  3.5     |  21     |  3.07     147    |  115    |  74     ----------------------------<  178       01 Oct 2017 20:13  3.5     |  22     |  2.41     146    |  114    |  71     ----------------------------<  248       01 Oct 2017 08:15  2.9     |  23     |  2.47     Ca    8.4        02 Oct 2017 07:11  Ca    8.3        01 Oct 2017 20:13    Phos  3.5       02 Oct 2017 07:11  Phos  3.4       01 Oct 2017 20:13    Mg     2.7       02 Oct 2017 07:11  Mg     2.5       01 Oct 2017 20:13    TPro  5.7    /  Alb  1.5    /  TBili  3.3    /        02 Oct 2017 07:11  DBili  x      /  AST  84     /  ALT  31     /  AlkPhos  162      TPro  5.9    /  Alb  1.4    /  TBili  4.2    /        01 Oct 2017 08:15  DBili  x      /  AST  65     /  ALT  30     /  AlkPhos  149          Serum Osmo:   Serum Uric Acid:   MAVERICK:   Double Stranded DNA:   C3:   C3 Nephritic Factor:   C4:   p-ANCA:   c-ANCA:   Anti-GBM:   CHAPO-Smith Antibody:   Immunofixation:   [place]/Lambda Free Light Chain:   SPEP:   Hepatitis Panel: Hepatitis B Surface Antigen: Nonreact (09-29 @ 16:09)    HIV-1/2:     Urine Studies:      Urine chemistry:   Urine Na:   Urine Creatinine:   Urine Protein/Cr ratio:  Urine K:   Urine Osm:   24 Hr urine studies:     24 hr urine Creatinine Clearance:    RADIOLOGY & ADDITIONAL STUDIES:

## 2017-10-02 NOTE — PROGRESS NOTE ADULT - PROBLEM SELECTOR PLAN 3
right sided wall thickening noted on imaging  unlikely infectious etiology -- pt does not have diarrhea/hematochezia  ? secondary to portal hypertensive colopathy  monitor

## 2017-10-02 NOTE — PROGRESS NOTE ADULT - SUBJECTIVE AND OBJECTIVE BOX
PROGRESS NOTE    OVERNIGHT    SUBJECTIVE    PAST MEDICAL & SURGICAL HISTORY:  Parkinson disease  Neuropathy  CKD (chronic kidney disease)  BPH (benign prostatic hyperplasia)  Diabetes  Angina pectoris  Hypertension  Diabetes  Renal failure  Dementia  No significant past surgical history    HEALTH ISSUES - PROBLEM Dx:  Diabetes mellitus type 2, uncontrolled: Diabetes mellitus type 2, uncontrolled  Thrombocytopenia: Thrombocytopenia  Wound: Wound  Cirrhosis: Cirrhosis  Hyperbilirubinemia: Hyperbilirubinemia  Encephalopathy, metabolic: Encephalopathy, metabolic  Elevated LFTs: Elevated LFTs  Elevated troponin: Elevated troponin  Hyperglycemia: Hyperglycemia  Acute on chronic renal failure: Acute on chronic renal failure  Altered mental status: Altered mental status  Severe sepsis: Severe sepsis  ARVIN (acute kidney injury): ARVIN (acute kidney injury)  Prophylactic measure: Prophylactic measure  UTI (urinary tract infection): UTI (urinary tract infection)  Dementia: Dementia  Angina pectoris: Angina pectoris  Hypertension: Hypertension  Diabetes: Diabetes  Renal failure: Renal failure  Sepsis, due to unspecified organism: Sepsis, due to unspecified organism          MEDICATIONS  (STANDING):  dextrose 5%. 1000 milliLiter(s) (50 mL/Hr) IV Continuous <Continuous>  dextrose 50% Injectable 12.5 Gram(s) IV Push once  dextrose 50% Injectable 25 Gram(s) IV Push once  dextrose 50% Injectable 25 Gram(s) IV Push once  nystatin Powder 1 Application(s) Topical two times a day  insulin glargine Injectable (LANTUS) 10 Unit(s) SubCutaneous at bedtime  famotidine Injectable 20 milliGRAM(s) IV Push daily  lactobacillus acidophilus 1 Tablet(s) Oral every 8 hours  tamsulosin 0.4 milliGRAM(s) Oral at bedtime  hydrocortisone hemorrhoidal Suppository 1 Suppository(s) Rectal daily  senna 2 Tablet(s) Oral at bedtime  docusate sodium 100 milliGRAM(s) Oral three times a day  polyethylene glycol 3350 17 Gram(s) Oral daily  aspirin  chewable 81 milliGRAM(s) Enteral Tube daily  ertapenem  IVPB      ertapenem  IVPB 500 milliGRAM(s) IV Intermittent every 24 hours  rifaximin 550 milliGRAM(s) Oral two times a day  lactulose Syrup 30 Gram(s) Oral two times a day  ursodiol Capsule 300 milliGRAM(s) Oral three times a day with meals  fondaparinux Injectable 2.5 milliGRAM(s) SubCutaneous daily  insulin lispro (HumaLOG) corrective regimen sliding scale   SubCutaneous every 4 hours  dextrose 5%. 1000 milliLiter(s) (45 mL/Hr) IV Continuous <Continuous>    MEDICATIONS  (PRN):  dextrose Gel 1 Dose(s) Oral once PRN Blood Glucose LESS THAN 70 milliGRAM(s)/deciliter  glucagon  Injectable 1 milliGRAM(s) IntraMuscular once PRN Glucose LESS THAN 70 milligrams/deciliter  bisacodyl Suppository 10 milliGRAM(s) Rectal daily PRN Constipation      OBJECTIVE    T(C): 37.5 (10-02-17 @ 12:50), Max: 37.5 (10-02-17 @ 04:10)  HR: 105 (10-02-17 @ 12:00) (86 - 105)  BP: 151/54 (10-02-17 @ 12:00) (87/47 - 151/54)  RR: 26 (10-02-17 @ 12:00) (14 - 39)  SpO2: 93% (10-02-17 @ 12:00) (90% - 96%)  Wt(kg): --  I&O's Summary    01 Oct 2017 07:01  -  02 Oct 2017 07:00  --------------------------------------------------------  IN: 3095 mL / OUT: 400 mL / NET: 2695 mL          REVIEW OF SYSTEMS:  CONSTITUTIONAL: No fever, weight loss, or fatigue  EYES: No eye pain, visual disturbances, or discharge  ENMT:  No difficulty hearing, tinnitus, vertigo; No sinus or throat pain  NECK: No pain or stiffness  BREASTS: No pain, masses, or nipple discharge  RESPIRATORY: No cough, wheezing, chills or hemoptysis; No shortness of breath  CARDIOVASCULAR: No chest pain, palpitations, dizziness, or leg swelling  GASTROINTESTINAL: No abdominal pain. No nausea, vomiting; No diarrhea or constipation. No melena or hematochezia.  GENITOURINARY: No dysuria, frequency, hematuria, or incontinence  NEUROLOGICAL: No headaches, memory loss, loss of strength, numbness, or tremors  SKIN: No itching, burning, rashes, or lesions   LYMPH NODES: No enlarged glands  MUSCULOSKELETAL: No joint pain or swelling; No muscle, back, or extremity pain  HEME/LYMPH: No easy bruising, or bleeding gums  ALLERY AND IMMUNOLOGIC: No hives or eczema      PHYSICAL EXAM:  Appearance: NAD.   HEENT:   Moist oral mucosa. Conjunctiva clear b/l.   Neck : supple  Cardiovascular: RRR ,S1S2 present  Respiratory: Lungs CTAB.  Gastrointestinal:  Soft, nontender. No rebound. No rigidity. BS present	  Skin: No rashes, No ecchymoses, No cyanosis.	  Neurologic: Non-focal. Moving all extremities. Following commands.  Extremities: No edema. No erythema. No calf tenderness.  	  LABS:                        12.5   8.8   )-----------( 104      ( 02 Oct 2017 07:11 )             39.7     10-02    146<H>  |  112<H>  |  74<H>  ----------------------------<  162<H>  3.5   |  21<L>  |  3.07<H>    Ca    8.4      02 Oct 2017 07:11  Phos  3.5     10-02  Mg     2.7     10-02    TPro  5.7<L>  /  Alb  1.5<L>  /  TBili  3.3<H>  /  DBili  x   /  AST  84<H>  /  ALT  31  /  AlkPhos  162<H>  10-02    PT/INR - ( 02 Oct 2017 07:11 )   PT: 11.4 sec;   INR: 1.04 ratio         PTT - ( 02 Oct 2017 07:11 )  PTT:24.3 sec      RADIOLOGY & ADDITIONAL TESTS:    ASSESSMENT/PLAN: PROGRESS NOTE    OVERNIGHT  Patient lost NGT ,not clear if he pulled out it No agitation reported he remains very lethargic ,failed speech and swallow test again NPO for now Family considers PEG vs CMO   SUBJECTIVE  Patient is resting in a bed comfortably .Lethargy .No distress noted   PAST MEDICAL & SURGICAL HISTORY:  Parkinson disease  Neuropathy  CKD (chronic kidney disease)  BPH (benign prostatic hyperplasia)  Diabetes  Angina pectoris  Hypertension  Diabetes  Renal failure  Dementia  No significant past surgical history    HEALTH ISSUES - PROBLEM Dx:  Diabetes mellitus type 2, uncontrolled: Diabetes mellitus type 2, uncontrolled  Thrombocytopenia: Thrombocytopenia  Wound: Wound  Cirrhosis: Cirrhosis  Hyperbilirubinemia: Hyperbilirubinemia  Encephalopathy, metabolic: Encephalopathy, metabolic  Elevated LFTs: Elevated LFTs  Elevated troponin: Elevated troponin  Hyperglycemia: Hyperglycemia  Acute on chronic renal failure: Acute on chronic renal failure  Altered mental status: Altered mental status  Severe sepsis: Severe sepsis  ARVIN (acute kidney injury): ARVIN (acute kidney injury)  Prophylactic measure: Prophylactic measure  UTI (urinary tract infection): UTI (urinary tract infection)  Dementia: Dementia  Angina pectoris: Angina pectoris  Hypertension: Hypertension  Diabetes: Diabetes  Renal failure: Renal failure  Sepsis, due to unspecified organism: Sepsis, due to unspecified organism          MEDICATIONS  (STANDING):  dextrose 5%. 1000 milliLiter(s) (50 mL/Hr) IV Continuous <Continuous>  dextrose 50% Injectable 12.5 Gram(s) IV Push once  dextrose 50% Injectable 25 Gram(s) IV Push once  dextrose 50% Injectable 25 Gram(s) IV Push once  nystatin Powder 1 Application(s) Topical two times a day  insulin glargine Injectable (LANTUS) 10 Unit(s) SubCutaneous at bedtime  famotidine Injectable 20 milliGRAM(s) IV Push daily  lactobacillus acidophilus 1 Tablet(s) Oral every 8 hours  tamsulosin 0.4 milliGRAM(s) Oral at bedtime  hydrocortisone hemorrhoidal Suppository 1 Suppository(s) Rectal daily  senna 2 Tablet(s) Oral at bedtime  docusate sodium 100 milliGRAM(s) Oral three times a day  polyethylene glycol 3350 17 Gram(s) Oral daily  aspirin  chewable 81 milliGRAM(s) Enteral Tube daily  ertapenem  IVPB      ertapenem  IVPB 500 milliGRAM(s) IV Intermittent every 24 hours  rifaximin 550 milliGRAM(s) Oral two times a day  lactulose Syrup 30 Gram(s) Oral two times a day  ursodiol Capsule 300 milliGRAM(s) Oral three times a day with meals  fondaparinux Injectable 2.5 milliGRAM(s) SubCutaneous daily  insulin lispro (HumaLOG) corrective regimen sliding scale   SubCutaneous every 4 hours  dextrose 5%. 1000 milliLiter(s) (45 mL/Hr) IV Continuous <Continuous>    MEDICATIONS  (PRN):  dextrose Gel 1 Dose(s) Oral once PRN Blood Glucose LESS THAN 70 milliGRAM(s)/deciliter  glucagon  Injectable 1 milliGRAM(s) IntraMuscular once PRN Glucose LESS THAN 70 milligrams/deciliter  bisacodyl Suppository 10 milliGRAM(s) Rectal daily PRN Constipation      OBJECTIVE    T(C): 37.5 (10-02-17 @ 12:50), Max: 37.5 (10-02-17 @ 04:10)  HR: 105 (10-02-17 @ 12:00) (86 - 105)  BP: 151/54 (10-02-17 @ 12:00) (87/47 - 151/54)  RR: 26 (10-02-17 @ 12:00) (14 - 39)  SpO2: 93% (10-02-17 @ 12:00) (90% - 96%)  Wt(kg): --  I&O's Summary    01 Oct 2017 07:01  -  02 Oct 2017 07:00  --------------------------------------------------------  IN: 3095 mL / OUT: 400 mL / NET: 2695 mL          REVIEW OF SYSTEMS:  ROS is unobtainable dur to lethargy   PHYSICAL EXAM:  Appearance: NAD.   HEENT:   Moist oral mucosa. Conjunctiva clear b/l.   Neck : supple  Cardiovascular: RRR ,S1S2 present   Respiratory: Lungs CTAB.  Gastrointestinal:  Soft, nontender. No rebound. No rigidity. BS present	  Skin: No rashes, No ecchymoses, No cyanosis.	  Neurologic: Non-focal. Moving all extremities.   Extremities: No edema   perianal ulceration -wound care consult called	  LABS:                        12.5   8.8   )-----------( 104      ( 02 Oct 2017 07:11 )             39.7     10-02    146<H>  |  112<H>  |  74<H>  ----------------------------<  162<H>  3.5   |  21<L>  |  3.07<H>    Ca    8.4      02 Oct 2017 07:11  Phos  3.5     10-02  Mg     2.7     10-02    TPro  5.7<L>  /  Alb  1.5<L>  /  TBili  3.3<H>  /  DBili  x   /  AST  84<H>  /  ALT  31  /  AlkPhos  162<H>  10-02    PT/INR - ( 02 Oct 2017 07:11 )   PT: 11.4 sec;   INR: 1.04 ratio         PTT - ( 02 Oct 2017 07:11 )  PTT:24.3 sec      RADIOLOGY & ADDITIONAL TESTS:  Labs and imaging reviewed electronically   ASSESSMENT/PLAN: PROGRESS NOTE    OVERNIGHT  Patient lost NGT ,GI cons is aware and PEG recomended by Dr Clancy No agitation reported he remains very lethargic ,failed speech and swallow test again NPO for now Family considers PEG vs CMO   SUBJECTIVE  Patient is resting in a bed comfortably .Lethargy .No distress noted   PAST MEDICAL & SURGICAL HISTORY:  Parkinson disease  Neuropathy  CKD (chronic kidney disease)  BPH (benign prostatic hyperplasia)  Diabetes  Angina pectoris  Hypertension  Diabetes  Renal failure  Dementia  No significant past surgical history    HEALTH ISSUES - PROBLEM Dx:  Diabetes mellitus type 2, uncontrolled: Diabetes mellitus type 2, uncontrolled  Thrombocytopenia: Thrombocytopenia  Wound: Wound  Cirrhosis: Cirrhosis  Hyperbilirubinemia: Hyperbilirubinemia  Encephalopathy, metabolic: Encephalopathy, metabolic  Elevated LFTs: Elevated LFTs  Elevated troponin: Elevated troponin  Hyperglycemia: Hyperglycemia  Acute on chronic renal failure: Acute on chronic renal failure  Altered mental status: Altered mental status  Severe sepsis: Severe sepsis  ARVIN (acute kidney injury): ARVIN (acute kidney injury)  Prophylactic measure: Prophylactic measure  UTI (urinary tract infection): UTI (urinary tract infection)  Dementia: Dementia  Angina pectoris: Angina pectoris  Hypertension: Hypertension  Diabetes: Diabetes  Renal failure: Renal failure  Sepsis, due to unspecified organism: Sepsis, due to unspecified organism          MEDICATIONS  (STANDING):  dextrose 5%. 1000 milliLiter(s) (50 mL/Hr) IV Continuous <Continuous>  dextrose 50% Injectable 12.5 Gram(s) IV Push once  dextrose 50% Injectable 25 Gram(s) IV Push once  dextrose 50% Injectable 25 Gram(s) IV Push once  nystatin Powder 1 Application(s) Topical two times a day  insulin glargine Injectable (LANTUS) 10 Unit(s) SubCutaneous at bedtime  famotidine Injectable 20 milliGRAM(s) IV Push daily  lactobacillus acidophilus 1 Tablet(s) Oral every 8 hours  tamsulosin 0.4 milliGRAM(s) Oral at bedtime  hydrocortisone hemorrhoidal Suppository 1 Suppository(s) Rectal daily  senna 2 Tablet(s) Oral at bedtime  docusate sodium 100 milliGRAM(s) Oral three times a day  polyethylene glycol 3350 17 Gram(s) Oral daily  aspirin  chewable 81 milliGRAM(s) Enteral Tube daily  ertapenem  IVPB      ertapenem  IVPB 500 milliGRAM(s) IV Intermittent every 24 hours  rifaximin 550 milliGRAM(s) Oral two times a day  lactulose Syrup 30 Gram(s) Oral two times a day  ursodiol Capsule 300 milliGRAM(s) Oral three times a day with meals  fondaparinux Injectable 2.5 milliGRAM(s) SubCutaneous daily  insulin lispro (HumaLOG) corrective regimen sliding scale   SubCutaneous every 4 hours  dextrose 5%. 1000 milliLiter(s) (45 mL/Hr) IV Continuous <Continuous>    MEDICATIONS  (PRN):  dextrose Gel 1 Dose(s) Oral once PRN Blood Glucose LESS THAN 70 milliGRAM(s)/deciliter  glucagon  Injectable 1 milliGRAM(s) IntraMuscular once PRN Glucose LESS THAN 70 milligrams/deciliter  bisacodyl Suppository 10 milliGRAM(s) Rectal daily PRN Constipation      OBJECTIVE    T(C): 37.5 (10-02-17 @ 12:50), Max: 37.5 (10-02-17 @ 04:10)  HR: 105 (10-02-17 @ 12:00) (86 - 105)  BP: 151/54 (10-02-17 @ 12:00) (87/47 - 151/54)  RR: 26 (10-02-17 @ 12:00) (14 - 39)  SpO2: 93% (10-02-17 @ 12:00) (90% - 96%)  Wt(kg): --  I&O's Summary    01 Oct 2017 07:01  -  02 Oct 2017 07:00  --------------------------------------------------------  IN: 3095 mL / OUT: 400 mL / NET: 2695 mL          REVIEW OF SYSTEMS:  ROS is unobtainable dur to lethargy   PHYSICAL EXAM:  Appearance: NAD.   HEENT:   Moist oral mucosa. Conjunctiva clear b/l.   Neck : supple  Cardiovascular: RRR ,S1S2 present   Respiratory: Lungs CTAB.  Gastrointestinal:  Soft, nontender. No rebound. No rigidity. BS present	  Skin: No rashes, No ecchymoses, No cyanosis.	  Neurologic: Non-focal. Moving all extremities.   Extremities: No edema   perianal ulceration -wound care consult called	  LABS:                        12.5   8.8   )-----------( 104      ( 02 Oct 2017 07:11 )             39.7     10-02    146<H>  |  112<H>  |  74<H>  ----------------------------<  162<H>  3.5   |  21<L>  |  3.07<H>    Ca    8.4      02 Oct 2017 07:11  Phos  3.5     10-02  Mg     2.7     10-02    TPro  5.7<L>  /  Alb  1.5<L>  /  TBili  3.3<H>  /  DBili  x   /  AST  84<H>  /  ALT  31  /  AlkPhos  162<H>  10-02    PT/INR - ( 02 Oct 2017 07:11 )   PT: 11.4 sec;   INR: 1.04 ratio         PTT - ( 02 Oct 2017 07:11 )  PTT:24.3 sec      RADIOLOGY & ADDITIONAL TESTS:  Labs and imaging reviewed electronically   ASSESSMENT/PLAN:

## 2017-10-02 NOTE — PROGRESS NOTE ADULT - PROBLEM SELECTOR PLAN 10
Gastrointestinal stress ulcer prophylaxis l and DVT prophylaxis administrated Gastrointestinal stress ulcer prophylaxis l and DVT prophylaxis

## 2017-10-02 NOTE — PROGRESS NOTE ADULT - ASSESSMENT
89 yo AAM [facility] resident was brought to ER for evaluation of cms  uncontrolled ,hyperglycemia ,recived total of 25 u of humalog this morning with no effect BG esvin dmission was found to be above 600 [place] cath is draining purulent urine and patient diagnosed with uti and urosepsis Found to have hypotension ,ARVIN on CKD and elevated LFTS ,likely secondary to shock liver and due to sepsis Admitted for septic workup and evaluation,send blood and urine cx,serial lactate levels,monitor vitals closley,ivfs hydration,monitor urine output and renal profile,iv abx initiated Found to have troponin elevation and admitted to spcu ,seen by cardiologist ,likely troponinemia related to ARF patient became a long term resident about 3 mnts ago due to progression of dementia and Parkinsons disease Palliative care consult requested ,to discuss advance directives and complete MOLST Patient diagnosed with severe sepsis with GN bacteremia secondary to uti and obstructive uropathy 89 yo AAM [facility] resident was brought to ER for evaluation of cms  uncontrolled ,hyperglycemia ,recived total of 25 u of humalog this morning with no effect BG esvin dmission was found to be above 600 [place] cath is draining purulent urine and patient diagnosed with uti and urosepsis Found to have hypotension ,ARVIN on CKD and elevated LFTS ,likely secondary to shock liver and due to sepsis Admitted for septic workup and evaluation,send blood and urine cx,serial lactate levels,monitor vitals closley,ivfs hydration,monitor urine output and renal profile,iv abx initiated Found to have troponin elevation and admitted to spcu ,seen by cardiologist ,likely troponinemia related to ARF patient became a long term resident about 3 mnts ago due to progression of dementia and Parkinsons disease Palliative care consult requested ,to discuss advance directives and complete MOLST Patient diagnosed with severe sepsis with ESBL E.COLI  bacteremia secondary to uti and obstructive uropathy Palliative care consult requested ,to discuss advance directives and update  MOLST ,family requests guidance regarding artificial nutrition discussion PATIENT failed speech and swallow test several times ,strict NPO recommended

## 2017-10-02 NOTE — PROGRESS NOTE ADULT - PROBLEM SELECTOR PLAN 8
uncontrolled  increase RISS coverage to moderate  endocrine consult RISS coverage Hold lantus due to npo  endocrine consult FOLLOWUP D/w Dr Perlman

## 2017-10-02 NOTE — PROGRESS NOTE ADULT - SUBJECTIVE AND OBJECTIVE BOX
CAPILLARY BLOOD GLUCOSE  147 (02 Oct 2017 06:10)  154 (02 Oct 2017 02:00)  158 (01 Oct 2017 22:18)          Vital Signs Last 24 Hrs  T(C): 37.5 (02 Oct 2017 04:10), Max: 37.5 (02 Oct 2017 04:10)  T(F): 99.5 (02 Oct 2017 04:10), Max: 99.5 (02 Oct 2017 04:10)  HR: 97 (02 Oct 2017 06:10) (86 - 97)  BP: 101/47 (02 Oct 2017 06:10) (87/47 - 126/55)  BP(mean): 62 (02 Oct 2017 06:10) (58 - 95)  RR: 39 (02 Oct 2017 06:10) (14 - 39)  SpO2: 90% (02 Oct 2017 06:10) (90% - 97%)      Respiratory: CTA B/L  CV: RRR, S1S2, no murmurs, rubs or gallops  Abdominal: Soft, NT, ND +BS, Last BM  Extremities: No edema, + peripheral pulses     10-01    147<H>  |  115<H>  |  74<H>  ----------------------------<  178<H>  3.5   |  22  |  2.41<H>    Ca    8.3<L>      01 Oct 2017 20:13  Phos  3.4     10-01  Mg     2.5     10-01    TPro  5.9<L>  /  Alb  1.4<L>  /  TBili  4.2<H>  /  DBili  x   /  AST  65<H>  /  ALT  30  /  AlkPhos  149<H>  10-01      dextrose Gel 1 Dose(s) Oral once PRN  dextrose 50% Injectable 12.5 Gram(s) IV Push once  dextrose 50% Injectable 25 Gram(s) IV Push once  dextrose 50% Injectable 25 Gram(s) IV Push once  glucagon  Injectable 1 milliGRAM(s) IntraMuscular once PRN  insulin glargine Injectable (LANTUS) 10 Unit(s) SubCutaneous at bedtime  insulin lispro (HumaLOG) corrective regimen sliding scale   SubCutaneous every 4 hours

## 2017-10-02 NOTE — PROGRESS NOTE ADULT - PROBLEM SELECTOR PLAN 1
ammnia normal  continue lactulose and rifaxamin 550 bid  2gram sodium and 60 gr protein diet  unable to orally intake -- discuss with family for PEG tube placement

## 2017-10-02 NOTE — PROGRESS NOTE ADULT - ASSESSMENT
pt with multiple co morbid med condition, hem was consulted for thrombocytopenia  platelet slow improving  kerline antibody negative  pt with low egfr/renal failure  abn spep--family refused bm bx/further w/u to exclude plasma cell dyscrasia/other hem pathology  h/h stable  will d/c arixtra and restart heparin 5000 units s/c bid for dvtp  monitor cbcd  overall poor prognosis  case d/w daughter wally pemberton at [phone]

## 2017-10-02 NOTE — PROGRESS NOTE ADULT - PROBLEM SELECTOR PLAN 1
E. coli (ESBL) sepsis with persistent + blood cultures  Continue iv abx  F/u repeat cultures ID Followup   As per Dr Calvin requests to keep in SPCU X 24 hrs

## 2017-10-02 NOTE — PROGRESS NOTE ADULT - SUBJECTIVE AND OBJECTIVE BOX
ALESHA HODGE is a yMale , patient examined and chart reviewed.     INTERVAL HPI/ OVERNIGHT EVENTS:  Afebrile. Remains lethargic.    Past Medical History--  PAST MEDICAL & SURGICAL HISTORY:  Parkinson disease  Neuropathy  CKD (chronic kidney disease)  BPH (benign prostatic hyperplasia)  Diabetes  Angina pectoris  Hypertension  Diabetes  Renal failure  Dementia  No significant past surgical history    For details regarding the patient's social history, family history, and other miscellaneous elements, please refer the initial infectious diseases consultation and/or the admitting history and physical examination for this admission.    ROS:  Unable to obtain due to : Lethargy    Current inpatient medications :    ANTIBIOTICS/RELEVANT:  lactobacillus acidophilus 1 Tablet(s) Oral every 8 hours  ertapenem  IVPB      ertapenem  IVPB 500 milliGRAM(s) IV Intermittent every 24 hours  rifaximin 550 milliGRAM(s) Oral two times a day      dextrose 5%. 1000 milliLiter(s) IV Continuous <Continuous>  dextrose Gel 1 Dose(s) Oral once PRN  dextrose 50% Injectable 12.5 Gram(s) IV Push once  dextrose 50% Injectable 25 Gram(s) IV Push once  dextrose 50% Injectable 25 Gram(s) IV Push once  glucagon  Injectable 1 milliGRAM(s) IntraMuscular once PRN  nystatin Powder 1 Application(s) Topical two times a day  famotidine Injectable 20 milliGRAM(s) IV Push daily  tamsulosin 0.4 milliGRAM(s) Oral at bedtime  hydrocortisone hemorrhoidal Suppository 1 Suppository(s) Rectal daily  senna 2 Tablet(s) Oral at bedtime  docusate sodium 100 milliGRAM(s) Oral three times a day  bisacodyl Suppository 10 milliGRAM(s) Rectal daily PRN  polyethylene glycol 3350 17 Gram(s) Oral daily  aspirin  chewable 81 milliGRAM(s) Enteral Tube daily  lactulose Syrup 30 Gram(s) Oral two times a day  ursodiol Capsule 300 milliGRAM(s) Oral three times a day with meals  fondaparinux Injectable 2.5 milliGRAM(s) SubCutaneous daily  insulin lispro (HumaLOG) corrective regimen sliding scale   SubCutaneous every 4 hours  dextrose 5%. 1000 milliLiter(s) IV Continuous <Continuous>      Objective:    10-01 @ 07:01  -  10-02 @ 07:00  --------------------------------------------------------  IN: 3095 mL / OUT: 400 mL / NET: 2695 mL      T(C): 37.4 (10-02-17 @ 15:44), Max: 37.5 (10-02-17 @ 04:10)  HR: 105 (10-02-17 @ 12:00) (89 - 105)  BP: 151/54 (10-02-17 @ 12:00) (87/47 - 151/54)  RR: 26 (10-02-17 @ 12:00) (14 - 39)  SpO2: 93% (10-02-17 @ 12:00) (90% - 96%)  Wt(kg): --      Physical Exam:  GEN: Lethargic NGT in place   HEENT: normocephalic and atraumatic. EOMI. MARTIR. Moist mucosa. Clear Posterior pharynx.  NECK: Supple. No carotid bruits.  No lymphadenopathy or thyromegaly.  LUNGS: Decreased to auscultation.  HEART: Regular rate and rhythm without murmur.  ABDOMEN: Soft, nontender, and nondistended.  Positive bowel sounds.  No hepatosplenomegaly was noted.  EXTREMITIES: Without any cyanosis, clubbing, rash, lesions + edema.  NEUROLOGIC: Lethargic  SKIN: No ulceration or induration present.      LABS:                        12.5   8.8   )-----------( 104      ( 02 Oct 2017 07:11 )             39.7       10-02    146<H>  |  112<H>  |  74<H>  ----------------------------<  162<H>  3.5   |  21<L>  |  3.07<H>    Ca    8.4      02 Oct 2017 07:11  Phos  3.5     10-02  Mg     2.7     10-02    TPro  5.7<L>  /  Alb  1.5<L>  /  TBili  3.3<H>  /  DBili  x   /  AST  84<H>  /  ALT  31  /  AlkPhos  162<H>  10-02      PT/INR - ( 02 Oct 2017 07:11 )   PT: 11.4 sec;   INR: 1.04 ratio         PTT - ( 02 Oct 2017 07:11 )  PTT:24.3 sec           MICROBIOLOGY:  Culture - Blood in AM (09.30.17 @ 12:02)    Gram Stain:   Growth in aerobic bottle: Gram Negative Rods    Specimen Source: .Blood Blood-Peripheral    Culture Results:   Growth in aerobic bottle: Gram Negative Rods    Culture - Blood (09.30.17 @ 12:02)    Specimen Source: .Blood Blood    Culture Results:   No growth to date.    Culture - Blood (09.29.17 @ 02:16)    Gram Stain:   Growth in anaerobic bottle: Gram Negative Rods  Growth in aerobic bottle: Gram Negative Rods    Specimen Source: .Blood Blood-Peripheral    Culture Results:   Growth in aerobic and anaerobic bottles: Escherichia coli  See previous culture [id number]    Culture - Urine (09.26.17 @ 21:10)    -  Piperacillin/Tazobactam: R <=8    -  Tobramycin: R >8    -  Trimethoprim/Sulfamethoxazole: S <=0.5/9.5    -  Ertapenem: S <=0.5    -  Gentamicin: R >8    -  Imipenem: S <=1    -  Levofloxacin: R >4    -  Meropenem: S <=1    -  Nitrofurantoin: S <=32    -  Amikacin: S <=8    -  Ampicillin: R >16    -  Ampicillin/Sulbactam: R 16/8    -  Aztreonam: R >16    -  Cefazolin: R >16    -  Cefepime: R >16    -  Cefoxitin: S <=4    -  Ceftazidime: R >16    -  Ceftriaxone: R >32    -  Ciprofloxacin: R >2    Specimen Source: .Urine Clean Catch (Midstream)    Culture Results:   >100,000 CFU/ml Escherichia coli ESBL    Organism Identification: Escherichia coli ESBL    Organism: Escherichia coli ESBL    Method Type: PAMELA    Culture - Blood (09.28.17 @ 00:20)    Gram Stain:   Growth in aerobic and anaerobic bottles: Gram Negative Rods    Specimen Source: .Blood Blood-Peripheral    Culture Results:   Growth in aerobic and anaerobic bottles: Escherichia coli ESBL  See previous culture [id number]    RADIOLOGY:  EXAM:  CT CHEST                                  PROCEDURE DATE:  10/02/2017          INTERPRETATION:  Clinical information: Pneumonia    Comparison exam dated 9/26/2017.    CT chest abdomen and pelvis.    Axial images obtained, coronal and sagittal images computer reformatted.   No IV contrast present limiting evaluation. Oral contrast material   present.    Chest: Nasogastric tube present, tip in stomach. No pneumothorax. No   drainable pleural effusion. Mild airspace disease left lung base slightly   improved since prior exam may represent scarring or residual atelectasis,   minimal finding. No suspicious pulmonary nodules.    Central airway intact. Thyroid gland not enlarged. No aortic aneurysm or   pericardial effusion. No mediastinal lesions or hilar lesions, limited   evaluation, no IV contrast present.        Abdomen-pelvis: Minimal ascites in the right flank, density could   indicate infectious process or blood products within the ascites.    Stomach distended with oral contrast. The spleen is not enlarged.   Unenhanced pancreas unremarkable. No adrenal lesions. No obstructive   changes or masses in the kidneys. Bilateral renal cysts present. No   aortic aneurysm. No retroperitoneal lymphadenopathy or biliary ductal   dilatation. No adrenal lesions.    The gallbladder is distended. If suspicion present for occult biliary   tract disease, correlate with follow-up HIDA scan.    No bowel obstruction. Edematous appearance of mesentery. Thickening of   the wall, right side of large bowel. Quinones catheter present within the   urinary bladder. Small quantity of free pelvic fluid evident. Inguinal   regions intact. Soft tissue anasarca present. No acute osseous   abnormalities. Scattered mild compression deformities in the dorsal and   lumbar regions unchanged since prior study.    IMPRESSION: See above report.        Assessment :   89 year old male hx parkinson's dementia BPH admitted with severe sepsis with persistent Ecoli septicemia  sec obstructive uropathy sec urinary retention and UTI  Has ESBL Ecoli septicemia  Blood cultures still positive   CT noted with no abscess   Doubt pna suspect atelectasis  ARVIN improving  Abn LFTs better  HEP C  +troponins  Uncontrolled DM     Plan :    Cont Invanz 500mg IV daily  Fu repeat blood cultures  Trend LFTS  Trend wbc  Dm control  Aspiration precautions  Will likely need PEG    Continue with present regiment.  Appropriate use of antibiotics and adverse effects reviewed.    I have discussed the above plan of care with patient/ family in detail. They expressed understanding of the the treatment plan . Risks, benefits and alternatives discussed in detail.   I have asked if they have any questions or concerns and appropriately addressed them to the best of my ability .      Critical care time greater then 35 minutes reviewing notes, labs data/ imaging , discussion with multidisciplinary team.    Thank you for allowing me to participate in care of your patient .        Allen Hall MD  Infectious Disease  [phone]

## 2017-10-02 NOTE — PROGRESS NOTE ADULT - SUBJECTIVE AND OBJECTIVE BOX
INTERVAL HPI/OVERNIGHT EVENTS: patient pulled out NG tube, no improvement in mental status  HPI:  89 yo AAM [facility] SN[facility] resident was brought to ER for evaluation of cms  uncontrolled ,hyperglycemia ,recived total of 25 u of humalog this morning with no effect BG esvin dmission was found to be above 600 Quinones cath is draining purulent urine and patient diagnosed with uti and urosepsis Found to have hypotension ,ARVIN on CKD and elevated LFTS ,likely secondary to shock liver and due to sepsis Admitted for septic workup and evaluation,send blood and urine cx,serial lactate levels,monitor vitals closley,ivfs hydration,monitor urine output and renal profile,iv abx initiated Found to have troponin elevation and admitted to spcu ,seen by cardiologist ,likely troponinemia related to ARF patient became a long term resident about 3 mnts ago due to progression of dem,entia and Parkinsons disease Palliative care consult requested ,to discuss advance directives and complete MOLST ngt in place still lethargic, no n/v/d/f/c       MEDICATIONS  (STANDING):  dextrose 5%. 1000 milliLiter(s) (50 mL/Hr) IV Continuous <Continuous>  dextrose 50% Injectable 12.5 Gram(s) IV Push once  dextrose 50% Injectable 25 Gram(s) IV Push once  dextrose 50% Injectable 25 Gram(s) IV Push once  nystatin Powder 1 Application(s) Topical two times a day  insulin glargine Injectable (LANTUS) 10 Unit(s) SubCutaneous at bedtime  famotidine Injectable 20 milliGRAM(s) IV Push daily  lactobacillus acidophilus 1 Tablet(s) Oral every 8 hours  tamsulosin 0.4 milliGRAM(s) Oral at bedtime  amLODIPine   Tablet 10 milliGRAM(s) Oral daily  hydrocortisone hemorrhoidal Suppository 1 Suppository(s) Rectal daily  senna 2 Tablet(s) Oral at bedtime  docusate sodium 100 milliGRAM(s) Oral three times a day  polyethylene glycol 3350 17 Gram(s) Oral daily  aspirin  chewable 81 milliGRAM(s) Enteral Tube daily  hydrALAZINE 25 milliGRAM(s) Oral every 8 hours  ertapenem  IVPB      ertapenem  IVPB 500 milliGRAM(s) IV Intermittent every 24 hours  rifaximin 550 milliGRAM(s) Oral two times a day  lactulose Syrup 30 Gram(s) Oral two times a day  ursodiol Capsule 300 milliGRAM(s) Oral three times a day with meals  fondaparinux Injectable 2.5 milliGRAM(s) SubCutaneous daily  insulin lispro (HumaLOG) corrective regimen sliding scale   SubCutaneous every 4 hours    MEDICATIONS  (PRN):  dextrose Gel 1 Dose(s) Oral once PRN Blood Glucose LESS THAN 70 milliGRAM(s)/deciliter  glucagon  Injectable 1 milliGRAM(s) IntraMuscular once PRN Glucose LESS THAN 70 milligrams/deciliter  bisacodyl Suppository 10 milliGRAM(s) Rectal daily PRN Constipation      Allergies    No Known Allergies    Intolerances          General: not verbally communicative, does not follow commands. Further ROS unable to be obtained     PHYSICAL EXAM:   Vital Signs:  Vital Signs Last 24 Hrs  T(C): 36.7 (01 Oct 2017 12:09), Max: 37.4 (30 Sep 2017 20:01)  T(F): 98.1 (01 Oct 2017 12:09), Max: 99.4 (30 Sep 2017 20:01)  HR: 87 (01 Oct 2017 12:00) (87 - 110)  BP: 123/58 (01 Oct 2017 12:00) (103/50 - 130/62)  BP(mean): 75 (01 Oct 2017 12:00) (64 - 82)  RR: 24 (01 Oct 2017 12:00) (11 - 31)  SpO2: 96% (01 Oct 2017 12:00) (92% - 97%)  Daily     Daily Weight in k.7 (01 Oct 2017 04:00)I&O's Summary    30 Sep 2017 07:01  -  01 Oct 2017 07:00  --------------------------------------------------------  IN: 3360 mL / OUT: 1050 mL / NET: 2310 mL    01 Oct 2017 07:01  -  01 Oct 2017 13:52  --------------------------------------------------------  IN: 860 mL / OUT: 0 mL / NET: 860 mL        GENERAL:  does not verbal communicate, (+) responsive to minimal painful stimuli/does not follow commands  HEENT:  NC/AT,  conjunctivae clear and pink, no thyromegaly, nodules, adenopathy, no JVD, sclera -anicteric  CHEST:  Full & symmetric excursion, no increased effort, breath sounds clear ngt  HEART:  Regular rhythm, S1, S2, no murmur/rub/S3/S4, no abdominal bruit, no edema  ABDOMEN:  Soft, non-tender, non-distended, normoactive bowel sounds,  no masses ,no hepato-splenomegaly, no signs of chronic liver disease  EXTEREMITIES:  no cyanosis,clubbing or edema  SKIN:  No rash/erythema/ecchymoses/petechiae/wounds/abscess/warm/dry  NEURO:  Alert, oriented x0, no asterixis, no tremor, no encephalopathy      LABS: reviewed

## 2017-10-02 NOTE — PROGRESS NOTE ADULT - SUBJECTIVE AND OBJECTIVE BOX
Patient is a 89y old  Male who presents with a chief complaint of cms (26 Sep 2017 13:48)       Pt is seen and examined  pt is awake and lying in bed/out of bed to chair  pt seems comfortable and denies any complaints at this time    HPI:  89 yo AAM [facility] SN[facility] resident was brought to ER for evaluation of cms  uncontrolled ,hyperglycemia ,recived total of 25 u of humalog this morning with no effect BG esvin dmission was found to be above 600 Quinones cath is draining purulent urine and patient diagnosed with uti and urosepsis Found to have hypotension ,ARVIN on CKD and elevated LFTS ,likely secondary to shock liver and due to sepsis Admitted for septic workup and evaluation,send blood and urine cx,serial lactate levels,monitor vitals closley,ivfs hydration,monitor urine output and renal profile,iv abx initiated Found to have troponin elevation and admitted to spcu ,seen by cardiologist ,likely troponinemia related to ARF patient became a long term resident about 3 mnts ago due to progression of dem,entia and Parkinsons disease Palliative care consult requested ,to discuss advance directives and complete MOLST (26 Sep 2017 13:48)         ROS:  Negative except for:    MEDICATIONS  (STANDING):  dextrose 5%. 1000 milliLiter(s) (50 mL/Hr) IV Continuous <Continuous>  dextrose 50% Injectable 12.5 Gram(s) IV Push once  dextrose 50% Injectable 25 Gram(s) IV Push once  dextrose 50% Injectable 25 Gram(s) IV Push once  nystatin Powder 1 Application(s) Topical two times a day  famotidine Injectable 20 milliGRAM(s) IV Push daily  lactobacillus acidophilus 1 Tablet(s) Oral every 8 hours  tamsulosin 0.4 milliGRAM(s) Oral at bedtime  hydrocortisone hemorrhoidal Suppository 1 Suppository(s) Rectal daily  senna 2 Tablet(s) Oral at bedtime  docusate sodium 100 milliGRAM(s) Oral three times a day  polyethylene glycol 3350 17 Gram(s) Oral daily  aspirin  chewable 81 milliGRAM(s) Enteral Tube daily  ertapenem  IVPB      ertapenem  IVPB 500 milliGRAM(s) IV Intermittent every 24 hours  rifaximin 550 milliGRAM(s) Oral two times a day  lactulose Syrup 30 Gram(s) Oral two times a day  ursodiol Capsule 300 milliGRAM(s) Oral three times a day with meals  fondaparinux Injectable 2.5 milliGRAM(s) SubCutaneous daily  insulin lispro (HumaLOG) corrective regimen sliding scale   SubCutaneous every 4 hours  dextrose 5%. 1000 milliLiter(s) (45 mL/Hr) IV Continuous <Continuous>    MEDICATIONS  (PRN):  dextrose Gel 1 Dose(s) Oral once PRN Blood Glucose LESS THAN 70 milliGRAM(s)/deciliter  glucagon  Injectable 1 milliGRAM(s) IntraMuscular once PRN Glucose LESS THAN 70 milligrams/deciliter  bisacodyl Suppository 10 milliGRAM(s) Rectal daily PRN Constipation      Allergies    No Known Allergies    Intolerances        Vital Signs Last 24 Hrs  T(C): 37.4 (02 Oct 2017 15:44), Max: 37.5 (02 Oct 2017 04:10)  T(F): 99.4 (02 Oct 2017 15:44), Max: 99.5 (02 Oct 2017 04:10)  HR: 105 (02 Oct 2017 12:00) (89 - 105)  BP: 151/54 (02 Oct 2017 12:00) (87/47 - 151/54)  BP(mean): 70 (02 Oct 2017 08:00) (58 - 95)  RR: 26 (02 Oct 2017 12:00) (14 - 39)  SpO2: 93% (02 Oct 2017 12:00) (90% - 96%)    PHYSICAL EXAM  General: adult in NAD  HEENT: clear oropharynx, anicteric sclera, pink conjunctiva  Neck: supple  CV: normal S1/S2 with no murmur rubs or gallops  Lungs: positive air movement b/l ant lungs,clear to auscultation, no wheezes, no rales  Abdomen: soft non-tender non-distended, no hepatosplenomegaly  Ext: no clubbing cyanosis or edema  Skin: no rashes and no petechiae  Neuro: alert and oriented X 4, no focal deficits  LABS:                          12.5   8.8   )-----------( 104      ( 02 Oct 2017 07:11 )             39.7         Mean Cell Volume : 91.3 fl  Mean Cell Hemoglobin : 28.7 pg  Mean Cell Hemoglobin Concentration : 31.4 gm/dL  Auto Neutrophil # : x  Auto Lymphocyte # : x  Auto Monocyte # : x  Auto Eosinophil # : x  Auto Basophil # : x  Auto Neutrophil % : x  Auto Lymphocyte % : x  Auto Monocyte % : x  Auto Eosinophil % : x  Auto Basophil % : x    Serial CBC's  10-02 @ 07:11  Hct-39.7 / Hgb-12.5 / Plat-104 / RBC-4.35 / WBC-8.8          Serial CBC's  10-01 @ 06:37  Hct-38.3 / Hgb-12.0 / Plat-81 / RBC-4.24 / WBC-9.5          Serial CBC's   @ 19:38  Hct--- / Hgb--- / Plat--- / RBC-4.49 / WBC---          Serial CBC's   @ 14:35  Hct-41.1 / Hgb-12.9 / Plat-71 / RBC-4.60 / WBC-10.1          Serial CBC's   @ 06:55  Hct-40.7 / Hgb-12.9 / Plat-59 / RBC-4.52 / WBC-9.4            10    146<H>  |  112<H>  |  74<H>  ----------------------------<  162<H>  3.5   |  21<L>  |  3.07<H>    Ca    8.4      02 Oct 2017 07:11  Phos  3.5     10-  Mg     2.7     10    TPro  5.7<L>  /  Alb  1.5<L>  /  TBili  3.3<H>  /  DBili  x   /  AST  84<H>  /  ALT  31  /  AlkPhos  162<H>  10      PT/INR - ( 02 Oct 2017 07:11 )   PT: 11.4 sec;   INR: 1.04 ratio         PTT - ( 02 Oct 2017 07:11 )  PTT:24.3 sec    Reticulocyte Percent: 0.8 % (17 @ 19:38)  Vitamin B12, Serum: >2000 pg/mL (17 @ 19:38)  Folate, Serum: >20.0 ng/mL (17 @ 19:38)  Iron - Total Binding Capacity.: 154 ug/dL (17 @ 19:38)  Ferritin, Serum: 344.0 ng/mL (17 @ 19:38)      TITO Lambda: 8.76 mg/dL (17 @ 19:38)  TITO Kappa: 17.90 mg/dL (17 @ 19:38)  Quantitative IgA: 535 mg/dL (17 @ 19:38)  Quantitative IgM: 119 mg/dL (17 @ 19:38)  Quantitative Ig mg/dL (17 @ 19:38)            BLOOD SMEAR INTERPRETATION:       RADIOLOGY & ADDITIONAL STUDIES: Patient is a 89y old  Male who presents with a chief complaint of cms (26 Sep 2017 13:48)       Pt is seen and examined  pt is awake and lying in bed7/min and bp 130/60  pt is in icu/ on monitor bed/ hr   pt seems comfortable and denies any complaints at this time    HPI:  89 yo AAM [facility] SNF resident was brought to ER for evaluation of cms  uncontrolled ,hyperglycemia ,recived total of 25 u of humalog this morning with no effect BG esvin dmission was found to be above 600 Quinones cath is draining purulent urine and patient diagnosed with uti and urosepsis Found to have hypotension ,ARVIN on CKD and elevated LFTS ,likely secondary to shock liver and due to sepsis Admitted for septic workup and evaluation,send blood and urine cx,serial lactate levels,monitor vitals closley,ivfs hydration,monitor urine output and renal profile,iv abx initiated Found to have troponin elevation and admitted to spcu ,seen by cardiologist ,likely troponinemia related to ARF patient became a long term resident about 3 mnts ago due to progression of dem,entia and Parkinsons disease Palliative care consult requested ,to discuss advance directives and complete MOLST (26 Sep 2017 13:48)         MEDICATIONS  (STANDING):  dextrose 5%. 1000 milliLiter(s) (50 mL/Hr) IV Continuous <Continuous>  dextrose 50% Injectable 12.5 Gram(s) IV Push once  dextrose 50% Injectable 25 Gram(s) IV Push once  dextrose 50% Injectable 25 Gram(s) IV Push once  nystatin Powder 1 Application(s) Topical two times a day  famotidine Injectable 20 milliGRAM(s) IV Push daily  lactobacillus acidophilus 1 Tablet(s) Oral every 8 hours  tamsulosin 0.4 milliGRAM(s) Oral at bedtime  hydrocortisone hemorrhoidal Suppository 1 Suppository(s) Rectal daily  senna 2 Tablet(s) Oral at bedtime  docusate sodium 100 milliGRAM(s) Oral three times a day  polyethylene glycol 3350 17 Gram(s) Oral daily  aspirin  chewable 81 milliGRAM(s) Enteral Tube daily  ertapenem  IVPB      ertapenem  IVPB 500 milliGRAM(s) IV Intermittent every 24 hours  rifaximin 550 milliGRAM(s) Oral two times a day  lactulose Syrup 30 Gram(s) Oral two times a day  ursodiol Capsule 300 milliGRAM(s) Oral three times a day with meals  fondaparinux Injectable 2.5 milliGRAM(s) SubCutaneous daily  insulin lispro (HumaLOG) corrective regimen sliding scale   SubCutaneous every 4 hours  dextrose 5%. 1000 milliLiter(s) (45 mL/Hr) IV Continuous <Continuous>    MEDICATIONS  (PRN):  dextrose Gel 1 Dose(s) Oral once PRN Blood Glucose LESS THAN 70 milliGRAM(s)/deciliter  glucagon  Injectable 1 milliGRAM(s) IntraMuscular once PRN Glucose LESS THAN 70 milligrams/deciliter  bisacodyl Suppository 10 milliGRAM(s) Rectal daily PRN Constipation      Allergies    No Known Allergies    Intolerances        Vital Signs Last 24 Hrs  T(C): 37.4 (02 Oct 2017 15:44), Max: 37.5 (02 Oct 2017 04:10)  T(F): 99.4 (02 Oct 2017 15:44), Max: 99.5 (02 Oct 2017 04:10)  HR: 105 (02 Oct 2017 12:00) (89 - 105)  BP: 151/54 (02 Oct 2017 12:00) (87/47 - 151/54)  BP(mean): 70 (02 Oct 2017 08:00) (58 - 95)  RR: 26 (02 Oct 2017 12:00) (14 - 39)  SpO2: 93% (02 Oct 2017 12:00) (90% - 96%)    PHYSICAL EXAM  General: adult in NAD  HEENT: clear oropharynx, anicteric sclera, pink conjunctiva  Neck: supple  CV: normal S1/S2 with no murmur rubs or gallops  Lungs: positive air movement b/l ant lungs,clear to auscultation, no wheezes, no rales  Abdomen: soft non-tender non-distended, no hepatosplenomegaly  Ext: no clubbing cyanosis or edema  Skin: no rashes and no petechiae  Neuro: alert and oriented X 4, no focal deficits  LABS:                          12.5   8.8   )-----------( 104      ( 02 Oct 2017 07:11 )             39.7         Mean Cell Volume : 91.3 fl  Mean Cell Hemoglobin : 28.7 pg  Mean Cell Hemoglobin Concentration : 31.4 gm/dL  Auto Neutrophil # : x  Auto Lymphocyte # : x  Auto Monocyte # : x  Auto Eosinophil # : x  Auto Basophil # : x  Auto Neutrophil % : x  Auto Lymphocyte % : x  Auto Monocyte % : x  Auto Eosinophil % : x  Auto Basophil % : x    Serial CBC's  10-02 @ 07:11  Hct-39.7 / Hgb-12.5 / Plat-104 / RBC-4.35 / WBC-8.8          Serial CBC's  10-01 @ 06:37  Hct-38.3 / Hgb-12.0 / Plat-81 / RBC-4.24 / WBC-9.5          Serial CBC's   @ 19:38  Hct--- / Hgb--- / Plat--- / RBC-4.49 / WBC---          Serial CBC's   @ 14:35  Hct-41.1 / Hgb-12.9 / Plat-71 / RBC-4.60 / WBC-10.1          Serial CBC's   @ 06:55  Hct-40.7 / Hgb-12.9 / Plat-59 / RBC-4.52 / WBC-9.4            10    146<H>  |  112<H>  |  74<H>  ----------------------------<  162<H>  3.5   |  21<L>  |  3.07<H>    Ca    8.4      02 Oct 2017 07:11  Phos  3.5     10-  Mg     2.7     10    TPro  5.7<L>  /  Alb  1.5<L>  /  TBili  3.3<H>  /  DBili  x   /  AST  84<H>  /  ALT  31  /  AlkPhos  162<H>  10      PT/INR - ( 02 Oct 2017 07:11 )   PT: 11.4 sec;   INR: 1.04 ratio         PTT - ( 02 Oct 2017 07:11 )  PTT:24.3 sec    Reticulocyte Percent: 0.8 % (17 @ 19:38)  Vitamin B12, Serum: >2000 pg/mL (17 @ 19:38)  Folate, Serum: >20.0 ng/mL (17 @ 19:38)  Iron - Total Binding Capacity.: 154 ug/dL (17 @ 19:38)  Ferritin, Serum: 344.0 ng/mL (17 @ 19:38)      TITO Lambda: 8.76 mg/dL (17 @ 19:38)  TITO Kappa: 17.90 mg/dL (17 @ 19:38)  Quantitative IgA: 535 mg/dL (17 @ 19:38)  Quantitative IgM: 119 mg/dL (17 @ 19:38)  Quantitative Ig mg/dL (17 @ 19:38)    Lactate Dehydrogenase, Serum: 337 U/L (17 @ 19:38)    Haptoglobin, Serum: 95 mg/dL (17 @ 19:38)            BLOOD SMEAR INTERPRETATION: nl wbc series and morphology, no blast cells/no immature wbc, no schistocytes, no fragmented rbc, no clumping of platelets, no giant plt, mix of small and large platelets      RADIOLOGY & ADDITIONAL STUDIES:

## 2017-10-02 NOTE — PROGRESS NOTE ADULT - PROBLEM SELECTOR PLAN 5
secondary to sepsis and ARF on CKD and hepatic encephalopathy (hyperammonemia)   continue lactulose secondary to sepsis and ARF on CKD and hepatic encephalopathy (hyperammonemia) CURRENTLY  -no NGT    D/w Dr Gorman ,may hold po meds x 24 hrs until family decides about PEG CONTINUE IV HYDRATION

## 2017-10-02 NOTE — PROGRESS NOTE ADULT - SUBJECTIVE AND OBJECTIVE BOX
Chief Complaint: Fever, AMS    Interval Events: No events overnight.    Review of Systems:  General: No fevers, chills, weight loss or gain  Skin: No rashes, color changes  Cardiovascular: No chest pain, orthopnea  Respiratory: No shortness of breath, cough  Gastrointestinal: No nausea, abdominal pain  Genitourinary: No incontinence, pain with urination  Musculoskeletal: No pain, swelling, decreased range of motion  Neurological: No headache, weakness  Psychiatric: No depression, anxiety  Endocrine: No weight loss or gain, increased thirst  All other systems are comprehensively negative.    Physical Exam:  Vital Signs Last 24 Hrs  T(C): 37.3 (02 Oct 2017 07:59), Max: 37.5 (02 Oct 2017 04:10)  T(F): 99.2 (02 Oct 2017 07:59), Max: 99.5 (02 Oct 2017 04:10)  HR: 101 (02 Oct 2017 08:00) (86 - 101)  BP: 107/57 (02 Oct 2017 08:00) (87/47 - 126/55)  BP(mean): 70 (02 Oct 2017 08:00) (58 - 95)  RR: 34 (02 Oct 2017 08:00) (14 - 39)  SpO2: 95% (02 Oct 2017 08:00) (90% - 96%)  General: NAD  HEENT: MMM  Neck: No JVD, no carotid bruit  Lungs: CTAB  CV: RRR, nl S1/S2, no M/R/G  Abdomen: S/NT/ND, +BS  Extremities: No LE edema, no cyanosis  Neuro: AAOx3, non-focal  Skin: No rash    Labs:             10-02    146<H>  |  112<H>  |  74<H>  ----------------------------<  162<H>  3.5   |  21<L>  |  3.07<H>    Ca    8.4      02 Oct 2017 07:11  Phos  3.5     10-02  Mg     2.7     10-02    TPro  5.7<L>  /  Alb  1.5<L>  /  TBili  3.3<H>  /  DBili  x   /  AST  84<H>  /  ALT  31  /  AlkPhos  162<H>  10-02                          12.5   8.8   )-----------( 104      ( 02 Oct 2017 07:11 )             39.7       Telemetry: Sinus rhythm, tachycardic, PVCs

## 2017-10-03 LAB
-  AMIKACIN: SIGNIFICANT CHANGE UP
-  AMPICILLIN/SULBACTAM: SIGNIFICANT CHANGE UP
-  AMPICILLIN: SIGNIFICANT CHANGE UP
-  AZTREONAM: SIGNIFICANT CHANGE UP
-  CEFAZOLIN: SIGNIFICANT CHANGE UP
-  CEFEPIME: SIGNIFICANT CHANGE UP
-  CEFOXITIN: SIGNIFICANT CHANGE UP
-  CEFTAZIDIME: SIGNIFICANT CHANGE UP
-  CEFTRIAXONE: SIGNIFICANT CHANGE UP
-  CIPROFLOXACIN: SIGNIFICANT CHANGE UP
-  ERTAPENEM: SIGNIFICANT CHANGE UP
-  GENTAMICIN: SIGNIFICANT CHANGE UP
-  IMIPENEM: SIGNIFICANT CHANGE UP
-  LEVOFLOXACIN: SIGNIFICANT CHANGE UP
-  MEROPENEM: SIGNIFICANT CHANGE UP
-  PIPERACILLIN/TAZOBACTAM: SIGNIFICANT CHANGE UP
-  TOBRAMYCIN: SIGNIFICANT CHANGE UP
-  TRIMETHOPRIM/SULFAMETHOXAZOLE: SIGNIFICANT CHANGE UP
ALBUMIN SERPL ELPH-MCNC: 1.1 G/DL — LOW (ref 3.3–5)
ALP SERPL-CCNC: 141 U/L — HIGH (ref 30–120)
ALT FLD-CCNC: 18 U/L DA — SIGNIFICANT CHANGE UP (ref 10–60)
AMMONIA BLD-MCNC: 13 UMOL/L — SIGNIFICANT CHANGE UP (ref 11–32)
ANION GAP SERPL CALC-SCNC: 10 MMOL/L — SIGNIFICANT CHANGE UP (ref 5–17)
APTT BLD: 26.9 SEC — LOW (ref 27.5–37.4)
AST SERPL-CCNC: 59 U/L — HIGH (ref 10–40)
BILIRUB SERPL-MCNC: 3.4 MG/DL — HIGH (ref 0.2–1.2)
BUN SERPL-MCNC: 98 MG/DL — HIGH (ref 7–23)
CALCIUM SERPL-MCNC: 7.6 MG/DL — LOW (ref 8.4–10.5)
CHLORIDE SERPL-SCNC: 109 MMOL/L — HIGH (ref 96–108)
CO2 SERPL-SCNC: 22 MMOL/L — SIGNIFICANT CHANGE UP (ref 22–31)
CREAT SERPL-MCNC: 4.22 MG/DL — HIGH (ref 0.5–1.3)
CULTURE RESULTS: SIGNIFICANT CHANGE UP
GLUCOSE SERPL-MCNC: 208 MG/DL — HIGH (ref 70–99)
HCT VFR BLD CALC: 33 % — LOW (ref 39–50)
HCV RNA SERPL NAA DL=5-ACNC: HIGH IU/ML
HCV RNA SPEC NAA+PROBE-LOG IU: 6.45 LOGIU/ML — HIGH
HGB BLD-MCNC: 11.1 G/DL — LOW (ref 13–17)
INR BLD: 1.16 RATIO — SIGNIFICANT CHANGE UP (ref 0.88–1.16)
MAGNESIUM SERPL-MCNC: 2.6 MG/DL — SIGNIFICANT CHANGE UP (ref 1.6–2.6)
MCHC RBC-ENTMCNC: 30.2 PG — SIGNIFICANT CHANGE UP (ref 27–34)
MCHC RBC-ENTMCNC: 33.6 GM/DL — SIGNIFICANT CHANGE UP (ref 32–36)
MCV RBC AUTO: 89.8 FL — SIGNIFICANT CHANGE UP (ref 80–100)
METHOD TYPE: SIGNIFICANT CHANGE UP
ORGANISM # SPEC MICROSCOPIC CNT: SIGNIFICANT CHANGE UP
ORGANISM # SPEC MICROSCOPIC CNT: SIGNIFICANT CHANGE UP
PHOSPHATE SERPL-MCNC: 4.3 MG/DL — SIGNIFICANT CHANGE UP (ref 2.5–4.5)
PLATELET # BLD AUTO: 130 K/UL — LOW (ref 150–400)
POTASSIUM SERPL-MCNC: 3.3 MMOL/L — LOW (ref 3.5–5.3)
POTASSIUM SERPL-SCNC: 3.3 MMOL/L — LOW (ref 3.5–5.3)
PROT SERPL-MCNC: 5.1 G/DL — LOW (ref 6–8.3)
PROTHROM AB SERPL-ACNC: 12.7 SEC — SIGNIFICANT CHANGE UP (ref 9.8–12.7)
RBC # BLD: 3.68 M/UL — LOW (ref 4.2–5.8)
RBC # FLD: 15.8 % — HIGH (ref 10.3–14.5)
SODIUM SERPL-SCNC: 141 MMOL/L — SIGNIFICANT CHANGE UP (ref 135–145)
SPECIMEN SOURCE: SIGNIFICANT CHANGE UP
WBC # BLD: 12.3 K/UL — HIGH (ref 3.8–10.5)
WBC # FLD AUTO: 12.3 K/UL — HIGH (ref 3.8–10.5)

## 2017-10-03 PROCEDURE — 71010: CPT | Mod: 26

## 2017-10-03 PROCEDURE — 93970 EXTREMITY STUDY: CPT | Mod: 26

## 2017-10-03 RX ORDER — SODIUM CHLORIDE 9 MG/ML
1000 INJECTION, SOLUTION INTRAVENOUS
Qty: 0 | Refills: 0 | Status: DISCONTINUED | OUTPATIENT
Start: 2017-10-03 | End: 2017-10-04

## 2017-10-03 RX ORDER — POTASSIUM CHLORIDE 20 MEQ
10 PACKET (EA) ORAL ONCE
Qty: 0 | Refills: 0 | Status: COMPLETED | OUTPATIENT
Start: 2017-10-03 | End: 2017-10-03

## 2017-10-03 RX ADMIN — SODIUM CHLORIDE 50 MILLILITER(S): 9 INJECTION, SOLUTION INTRAVENOUS at 16:45

## 2017-10-03 RX ADMIN — Medication 1: at 12:35

## 2017-10-03 RX ADMIN — SODIUM CHLORIDE 100 MILLILITER(S): 9 INJECTION, SOLUTION INTRAVENOUS at 12:20

## 2017-10-03 RX ADMIN — NYSTATIN CREAM 1 APPLICATION(S): 100000 CREAM TOPICAL at 17:19

## 2017-10-03 RX ADMIN — NYSTATIN CREAM 1 APPLICATION(S): 100000 CREAM TOPICAL at 06:19

## 2017-10-03 RX ADMIN — Medication 100 MILLIEQUIVALENT(S): at 13:22

## 2017-10-03 RX ADMIN — Medication 1 SUPPOSITORY(S): at 12:36

## 2017-10-03 RX ADMIN — FAMOTIDINE 20 MILLIGRAM(S): 10 INJECTION INTRAVENOUS at 12:36

## 2017-10-03 RX ADMIN — Medication 2: at 17:18

## 2017-10-03 RX ADMIN — HEPARIN SODIUM 5000 UNIT(S): 5000 INJECTION INTRAVENOUS; SUBCUTANEOUS at 17:19

## 2017-10-03 RX ADMIN — Medication 3: at 23:54

## 2017-10-03 RX ADMIN — Medication 1 TABLET(S): at 22:29

## 2017-10-03 RX ADMIN — Medication 2: at 06:19

## 2017-10-03 RX ADMIN — Medication 100 MILLIEQUIVALENT(S): at 12:25

## 2017-10-03 RX ADMIN — ERTAPENEM SODIUM 110 MILLIGRAM(S): 1 INJECTION, POWDER, LYOPHILIZED, FOR SOLUTION INTRAMUSCULAR; INTRAVENOUS at 11:00

## 2017-10-03 RX ADMIN — HEPARIN SODIUM 5000 UNIT(S): 5000 INJECTION INTRAVENOUS; SUBCUTANEOUS at 06:19

## 2017-10-03 RX ADMIN — SODIUM CHLORIDE 100 MILLILITER(S): 9 INJECTION, SOLUTION INTRAVENOUS at 12:25

## 2017-10-03 NOTE — PROGRESS NOTE ADULT - SUBJECTIVE AND OBJECTIVE BOX
Patient is a 89y Male with past medical history of           presenting with        who reports no complaints overnight.    REVIEW OF SYSTEMS:    CONSTITUTIONAL: No  fevers or chills    Allergies    No Known Allergies    Intolerances        MEDICATIONS  (STANDING):  aspirin  chewable 81 milliGRAM(s) Enteral Tube daily  dextrose 5% + sodium chloride 0.9%. 1000 milliLiter(s) (100 mL/Hr) IV Continuous <Continuous>  dextrose 5%. 1000 milliLiter(s) (50 mL/Hr) IV Continuous <Continuous>  dextrose 50% Injectable 12.5 Gram(s) IV Push once  dextrose 50% Injectable 25 Gram(s) IV Push once  dextrose 50% Injectable 25 Gram(s) IV Push once  docusate sodium 100 milliGRAM(s) Oral three times a day  ertapenem  IVPB      ertapenem  IVPB 500 milliGRAM(s) IV Intermittent every 24 hours  famotidine Injectable 20 milliGRAM(s) IV Push daily  heparin  Injectable 5000 Unit(s) SubCutaneous every 12 hours  hydrocortisone hemorrhoidal Suppository 1 Suppository(s) Rectal daily  insulin lispro (HumaLOG) corrective regimen sliding scale   SubCutaneous every 6 hours  lactobacillus acidophilus 1 Tablet(s) Oral every 8 hours  nystatin Powder 1 Application(s) Topical two times a day  polyethylene glycol 3350 17 Gram(s) Oral daily  senna 2 Tablet(s) Oral at bedtime  tamsulosin 0.4 milliGRAM(s) Oral at bedtime      Vitals:  T(F): 98.5 (10-03-17 @ 11:57), Max: 101 (10-02-17 @ 21:52)  HR: 91 (10-03-17 @ 12:00)  BP: 123/55 (10-03-17 @ 12:00)  BP(mean): 76 (10-03-17 @ 12:00)  RR: 16 (10-03-17 @ 12:00)  SpO2: 96% (10-03-17 @ 12:00)  Wt(kg): --    I and O's:    10-02 @ 07:01  -  10-03 @ 07:00  --------------------------------------------------------  IN: 640 mL / OUT: 1400 mL / NET: -760 mL            PHYSICAL EXAM:    Constitutional: NAD,   Neck: No JVD  Respiratory: CTAB  Cardiovascular: S1 and S2  Gastrointestinal: BS+, soft, NT/ND    LABS:                        11.1   12.3  )-----------( 130      ( 03 Oct 2017 06:10 )             33.0                         12.5   8.8   )-----------( 104      ( 02 Oct 2017 07:11 )             39.7       141    |  109    |  98     ----------------------------<  208       03 Oct 2017 06:10  3.3     |  22     |  4.22     146    |  112    |  74     ----------------------------<  162       02 Oct 2017 07:11  3.5     |  21     |  3.07     147    |  115    |  74     ----------------------------<  178       01 Oct 2017 20:13  3.5     |  22     |  2.41     Ca    7.6        03 Oct 2017 06:10  Ca    8.4        02 Oct 2017 07:11    Phos  4.3       03 Oct 2017 06:10  Phos  3.5       02 Oct 2017 07:11    Mg     2.6       03 Oct 2017 06:10  Mg     2.7       02 Oct 2017 07:11    TPro  5.1    /  Alb  1.1    /  TBili  3.4    /        03 Oct 2017 06:10  DBili  x      /  AST  59     /  ALT  18     /  AlkPhos  141      TPro  5.7    /  Alb  1.5    /  TBili  3.3    /        02 Oct 2017 07:11  DBili  x      /  AST  84     /  ALT  31     /  AlkPhos  162          Serum Osmo:   Serum Uric Acid:   MAVERICK:   Double Stranded DNA:   C3:   C3 Nephritic Factor:   C4:   p-ANCA:   c-ANCA:   Anti-GBM:   CHAPO-Smith Antibody:   Immunofixation: Immunofixation, Serum: Abnormal pattern identified which is not fully diagnostic; however, the  pattern raises the possibility of a weak restricted band.  Advise  clinical correlation.    Reference Range: None Detected (09-30 @ 19:38)    Sylvania/Lambda Free Light Chain: Sylvania/Lambda Free Light Chain Ratio, Serum: 2.04 Ratio (09-30 @ 19:38)    SPEP: Serum Protein Electrophoresis Interp: clinical correlation. (09-30 @ 19:38)    Hepatitis Panel: Hepatitis B Surface Antigen: Nonreact (09-29 @ 16:09)    HIV-1/2:     Urine Studies:      Urine chemistry:   Urine Na:   Urine Creatinine:   Urine Protein/Cr ratio:  Urine K:   Urine Osm:   24 Hr urine studies:     24 hr urine Creatinine Clearance:    RADIOLOGY & ADDITIONAL STUDIES:

## 2017-10-03 NOTE — PROVIDER CONTACT NOTE (CRITICAL VALUE NOTIFICATION) - SITUATION
Blood culture 1 and 2  Result: Growth in aerobic/ anaerobic bottle positive for E.Coli
Positive Blood Culture, drawn 9/28/17, Ecoli and ESBL in both aerobic and anaerobic bottles.
hcv reactive
BLOOD C/S  FROM 9/28/2017  REPORT=GRAM NEG RODS IN BOTH AEROBIC  AND ANAEROBIC  BOTTLES
Positive Blood Culture, growth in both aerobic and anaerobic bottles. Gram negative rods
blood cultures at 30 hrs show growth in aerobic bottle- e.coli

## 2017-10-03 NOTE — PROGRESS NOTE ADULT - SUBJECTIVE AND OBJECTIVE BOX
ALESHA HODGE is a yMale , patient examined and chart reviewed.     INTERVAL HPI/ OVERNIGHT EVENTS:  Afebrile. Remains lethargic.  No events.    Past Medical History--  PAST MEDICAL & SURGICAL HISTORY:  Parkinson disease  Neuropathy  CKD (chronic kidney disease)  BPH (benign prostatic hyperplasia)  Diabetes  Angina pectoris  Hypertension  Diabetes  Renal failure  Dementia  No significant past surgical history    For details regarding the patient's social history, family history, and other miscellaneous elements, please refer the initial infectious diseases consultation and/or the admitting history and physical examination for this admission.    ROS:  Unable to obtain due to : Lethargy    Current inpatient medications :    ANTIBIOTICS/RELEVANT:  lactobacillus acidophilus 1 Tablet(s) Oral every 8 hours  ertapenem  IVPB      ertapenem  IVPB 500 milliGRAM(s) IV Intermittent every 24 hours  rifaximin 550 milliGRAM(s) Oral two times a day      dextrose 5%. 1000 milliLiter(s) IV Continuous <Continuous>  dextrose Gel 1 Dose(s) Oral once PRN  dextrose 50% Injectable 12.5 Gram(s) IV Push once  dextrose 50% Injectable 25 Gram(s) IV Push once  dextrose 50% Injectable 25 Gram(s) IV Push once  glucagon  Injectable 1 milliGRAM(s) IntraMuscular once PRN  nystatin Powder 1 Application(s) Topical two times a day  famotidine Injectable 20 milliGRAM(s) IV Push daily  tamsulosin 0.4 milliGRAM(s) Oral at bedtime  hydrocortisone hemorrhoidal Suppository 1 Suppository(s) Rectal daily  senna 2 Tablet(s) Oral at bedtime  docusate sodium 100 milliGRAM(s) Oral three times a day  bisacodyl Suppository 10 milliGRAM(s) Rectal daily PRN  polyethylene glycol 3350 17 Gram(s) Oral daily  aspirin  chewable 81 milliGRAM(s) Enteral Tube daily  lactulose Syrup 30 Gram(s) Oral two times a day  ursodiol Capsule 300 milliGRAM(s) Oral three times a day with meals  fondaparinux Injectable 2.5 milliGRAM(s) SubCutaneous daily  insulin lispro (HumaLOG) corrective regimen sliding scale   SubCutaneous every 4 hours  dextrose 5%. 1000 milliLiter(s) IV Continuous <Continuous>      Objective:    T(C): 37 (10-03-17 @ 13:45), Max: 38.3 (10-02-17 @ 21:52)  HR: 91 (10-03-17 @ 12:00) (75 - 105)  BP: 123/55 (10-03-17 @ 12:00) (78/41 - 130/60)  RR: 16 (10-03-17 @ 12:00) (15 - 34)  SpO2: 96% (10-03-17 @ 12:00) (93% - 99%)  Wt(kg): --  I&O's Summary    02 Oct 2017 07:01  -  03 Oct 2017 07:00  --------------------------------------------------------  IN: 640 mL / OUT: 1400 mL / NET: -760 mL    Physical Exam:  GEN: Lethargic NGT in place   HEENT: normocephalic and atraumatic. EOMI. MARTIR. Moist mucosa. Clear Posterior pharynx.  NECK: Supple. No carotid bruits.  No lymphadenopathy or thyromegaly.  LUNGS: Decreased to auscultation.  HEART: Regular rate and rhythm without murmur.  ABDOMEN: Soft, nontender, and nondistended.  Positive bowel sounds.  No hepatosplenomegaly was noted.  EXTREMITIES: Without any cyanosis, clubbing, rash, lesions + edema.  NEUROLOGIC: Lethargic  SKIN: No ulceration or induration present.      LABS:         11.1   12.3  )-----------( 130      ( 03 Oct 2017 06:10 )             33.0     10-03    141  |  109<H>  |  98<H>  ----------------------------<  208<H>  3.3<L>   |  22  |  4.22<H>    Ca    7.6<L>      03 Oct 2017 06:10  Phos  4.3     10-03  Mg     2.6     10-03    TPro  5.1<L>  /  Alb  1.1<L>  /  TBili  3.4<H>  /  DBili  x   /  AST  59<H>  /  ALT  18  /  AlkPhos  141<H>  10-03    PT/INR - ( 03 Oct 2017 06:10 )   PT: 12.7 sec;   INR: 1.16 ratio        PT/INR - ( 02 Oct 2017 07:11 )   PT: 11.4 sec;   INR: 1.04 ratio         PTT - ( 02 Oct 2017 07:11 )  PTT:24.3 sec      MICROBIOLOGY:  Culture - Blood in AM (10.02.17 @ 12:04)    Specimen Source: .Blood Blood-Peripheral    Culture Results:   No growth to date.      Culture - Blood in AM (09.30.17 @ 12:02)    Gram Stain:   Growth in aerobic bottle: Gram Negative Rods    -  Amikacin: S 16    -  Ampicillin: R >16    -  Ampicillin/Sulbactam: R 16/8    -  Aztreonam: R >16    -  Cefazolin: R >16    -  Trimethoprim/Sulfamethoxazole: S <=0.5/9.5    -  Meropenem: S <=1    -  Piperacillin/Tazobactam: R <=8    -  Tobramycin: R >8    -  Cefepime: R >16    -  Cefoxitin: S <=4    -  Ceftazidime: R >16    -  Ceftriaxone: R >32    -  Ciprofloxacin: R >2    -  Ertapenem: S <=0.5    -  Gentamicin: R >8    -  Imipenem: S <=1    -  Levofloxacin: R >4    Specimen Source: .Blood Blood-Peripheral    Organism: Escherichia coli ESBL    Culture Results:   Growth in aerobic bottle: Escherichia coli ESBL    Organism Identification: Escherichia coli ESBL    Method Type: PAMELA    Culture - Blood (09.30.17 @ 12:02)    Specimen Source: .Blood Blood    Culture Results:   No growth to date.    Culture - Blood (09.29.17 @ 02:16)    Gram Stain:   Growth in anaerobic bottle: Gram Negative Rods  Growth in aerobic bottle: Gram Negative Rods    Specimen Source: .Blood Blood-Peripheral    Culture Results:   Growth in aerobic and anaerobic bottles: Escherichia coli  See previous culture [id number]    Culture - Urine (09.26.17 @ 21:10)    -  Piperacillin/Tazobactam: R <=8    -  Tobramycin: R >8    -  Trimethoprim/Sulfamethoxazole: S <=0.5/9.5    -  Ertapenem: S <=0.5    -  Gentamicin: R >8    -  Imipenem: S <=1    -  Levofloxacin: R >4    -  Meropenem: S <=1    -  Nitrofurantoin: S <=32    -  Amikacin: S <=8    -  Ampicillin: R >16    -  Ampicillin/Sulbactam: R 16/8    -  Aztreonam: R >16    -  Cefazolin: R >16    -  Cefepime: R >16    -  Cefoxitin: S <=4    -  Ceftazidime: R >16    -  Ceftriaxone: R >32    -  Ciprofloxacin: R >2    Specimen Source: .Urine Clean Catch (Midstream)    Culture Results:   >100,000 CFU/ml Escherichia coli ESBL    Organism Identification: Escherichia coli ESBL    Organism: Escherichia coli ESBL    Method Type: PAMELA    Culture - Blood (09.28.17 @ 00:20)    Gram Stain:   Growth in aerobic and anaerobic bottles: Gram Negative Rods    Specimen Source: .Blood Blood-Peripheral    Culture Results:   Growth in aerobic and anaerobic bottles: Escherichia coli ESBL  See previous culture [id number]    RADIOLOGY:  EXAM:  CT CHEST                            PROCEDURE DATE:  10/02/2017          INTERPRETATION:  Clinical information: Pneumonia    Comparison exam dated 9/26/2017.    CT chest abdomen and pelvis.    Axial images obtained, coronal and sagittal images computer reformatted.   No IV contrast present limiting evaluation. Oral contrast material   present.    Chest: Nasogastric tube present, tip in stomach. No pneumothorax. No   drainable pleural effusion. Mild airspace disease left lung base slightly   improved since prior exam may represent scarring or residual atelectasis,   minimal finding. No suspicious pulmonary nodules.    Central airway intact. Thyroid gland not enlarged. No aortic aneurysm or   pericardial effusion. No mediastinal lesions or hilar lesions, limited   evaluation, no IV contrast present.        Abdomen-pelvis: Minimal ascites in the right flank, density could   indicate infectious process or blood products within the ascites.    Stomach distended with oral contrast. The spleen is not enlarged.   Unenhanced pancreas unremarkable. No adrenal lesions. No obstructive   changes or masses in the kidneys. Bilateral renal cysts present. No   aortic aneurysm. No retroperitoneal lymphadenopathy or biliary ductal   dilatation. No adrenal lesions.    The gallbladder is distended. If suspicion present for occult biliary   tract disease, correlate with follow-up HIDA scan.    No bowel obstruction. Edematous appearance of mesentery. Thickening of   the wall, right side of large bowel. Quinones catheter present within the   urinary bladder. Small quantity of free pelvic fluid evident. Inguinal   regions intact. Soft tissue anasarca present. No acute osseous   abnormalities. Scattered mild compression deformities in the dorsal and   lumbar regions unchanged since prior study.    IMPRESSION: See above report.        Assessment :   89 year old male hx parkinson's dementia BPH admitted with severe sepsis with persistent Ecoli septicemia  sec obstructive uropathy sec urinary retention and UTI  Has ESBL Ecoli septicemia  Blood cultures still positive   CT noted with no abscess   Doubt pna suspect atelectasis  ARVIN improving  Abn LFTs better  HEP C  +troponins  Uncontrolled DM     Plan :    Cont Invanz 500mg IV daily day 4  Fu repeat blood cultures  Trend LFTS  Trend wbc  Dm control  Aspiration precautions  Will likely need PEG    Continue with present regiment.  Appropriate use of antibiotics and adverse effects reviewed.    I have discussed the above plan of care with patient/ family in detail. They expressed understanding of the the treatment plan . Risks, benefits and alternatives discussed in detail.   I have asked if they have any questions or concerns and appropriately addressed them to the best of my ability .      Critical care time greater then 35 minutes reviewing notes, labs data/ imaging , discussion with multidisciplinary team.    Thank you for allowing me to participate in care of your patient .      Allen Hall MD  Infectious Disease  [phone]

## 2017-10-03 NOTE — PROGRESS NOTE ADULT - PROBLEM SELECTOR PLAN 5
secondary to sepsis and ARF on CKD and hepatic encephalopathy (hyperammonemia) CURRENTLY  -no NGT    D/w Dr Gorman ,may hold po meds x 24 hrs until family decides about PEG CONTINUE IV HYDRATION

## 2017-10-03 NOTE — PROGRESS NOTE ADULT - SUBJECTIVE AND OBJECTIVE BOX
Patient is a 89y old  Male who presents with a chief complaint of cms (26 Sep 2017 13:48)       Pt is seen and examined  pt is awake and lying in bed/out of bed to chair  pt seems comfortable and denies any complaints at this time    HPI:  89 yo AAM [facility] SNF resident was brought to ER for evaluation of cms  uncontrolled ,hyperglycemia ,recived total of 25 u of humalog this morning with no effect BG esvin dmission was found to be above 600 Quinones cath is draining purulent urine and patient diagnosed with uti and urosepsis Found to have hypotension ,ARVIN on CKD and elevated LFTS ,likely secondary to shock liver and due to sepsis Admitted for septic workup and evaluation,send blood and urine cx,serial lactate levels,monitor vitals closley,ivfs hydration,monitor urine output and renal profile,iv abx initiated Found to have troponin elevation and admitted to spcu ,seen by cardiologist ,likely troponinemia related to ARF patient became a long term resident about 3 mnts ago due to progression of dem,entia and Parkinsons disease Palliative care consult requested ,to discuss advance directives and complete MOLST (26 Sep 2017 13:48)         ROS:  Negative except for:    MEDICATIONS  (STANDING):  aspirin  chewable 81 milliGRAM(s) Enteral Tube daily  dextrose 5% + sodium chloride 0.9%. 1000 milliLiter(s) (50 mL/Hr) IV Continuous <Continuous>  dextrose 5%. 1000 milliLiter(s) (50 mL/Hr) IV Continuous <Continuous>  dextrose 50% Injectable 12.5 Gram(s) IV Push once  dextrose 50% Injectable 25 Gram(s) IV Push once  dextrose 50% Injectable 25 Gram(s) IV Push once  docusate sodium 100 milliGRAM(s) Oral three times a day  ertapenem  IVPB      ertapenem  IVPB 500 milliGRAM(s) IV Intermittent every 24 hours  famotidine Injectable 20 milliGRAM(s) IV Push daily  heparin  Injectable 5000 Unit(s) SubCutaneous every 12 hours  hydrocortisone hemorrhoidal Suppository 1 Suppository(s) Rectal daily  insulin lispro (HumaLOG) corrective regimen sliding scale   SubCutaneous every 6 hours  lactobacillus acidophilus 1 Tablet(s) Oral every 8 hours  nystatin Powder 1 Application(s) Topical two times a day  polyethylene glycol 3350 17 Gram(s) Oral daily  senna 2 Tablet(s) Oral at bedtime  tamsulosin 0.4 milliGRAM(s) Oral at bedtime    MEDICATIONS  (PRN):  bisacodyl Suppository 10 milliGRAM(s) Rectal daily PRN Constipation  dextrose Gel 1 Dose(s) Oral once PRN Blood Glucose LESS THAN 70 milliGRAM(s)/deciliter  glucagon  Injectable 1 milliGRAM(s) IntraMuscular once PRN Glucose LESS THAN 70 milligrams/deciliter      Allergies    No Known Allergies    Intolerances        Vital Signs Last 24 Hrs  T(C): 37.1 (03 Oct 2017 20:02), Max: 38.3 (02 Oct 2017 21:52)  T(F): 98.8 (03 Oct 2017 20:02), Max: 101 (02 Oct 2017 21:52)  HR: 91 (03 Oct 2017 18:00) (75 - 102)  BP: 102/44 (03 Oct 2017 18:00) (78/41 - 123/55)  BP(mean): 61 (03 Oct 2017 18:00) (52 - 76)  RR: 33 (03 Oct 2017 18:00) (16 - 33)  SpO2: 91% (03 Oct 2017 18:00) (91% - 99%)    PHYSICAL EXAM  General: adult in NAD  HEENT: clear oropharynx, anicteric sclera, pink conjunctiva  Neck: supple  CV: normal S1/S2 with no murmur rubs or gallops  Lungs: positive air movement b/l ant lungs,clear to auscultation, no wheezes, no rales  Abdomen: soft non-tender non-distended, no hepatosplenomegaly  Ext: no clubbing cyanosis or edema  Skin: no rashes and no petechiae  Neuro: alert and oriented X 4, no focal deficits  LABS:                          11.1   12.3  )-----------( 130      ( 03 Oct 2017 06:10 )             33.0         Mean Cell Volume : 89.8 fl  Mean Cell Hemoglobin : 30.2 pg  Mean Cell Hemoglobin Concentration : 33.6 gm/dL  Auto Neutrophil # : x  Auto Lymphocyte # : x  Auto Monocyte # : x  Auto Eosinophil # : x  Auto Basophil # : x  Auto Neutrophil % : x  Auto Lymphocyte % : x  Auto Monocyte % : x  Auto Eosinophil % : x  Auto Basophil % : x    Serial CBC's  10-03 @ 06:10  Hct-33.0 / Hgb-11.1 / Plat-130 / RBC-3.68 / WBC-12.3          Serial CBC's  10-02 @ 07:11  Hct-39.7 / Hgb-12.5 / Plat-104 / RBC-4.35 / WBC-8.8          Serial CBC's  10-01 @ 06:37  Hct-38.3 / Hgb-12.0 / Plat-81 / RBC-4.24 / WBC-9.5          Serial CBC's   @ 19:38  Hct--- / Hgb--- / Plat--- / RBC-4.49 / WBC---          Serial CBC's   @ 14:35  Hct-41.1 / Hgb-12.9 / Plat-71 / RBC-4.60 / WBC-10.1            10    141  |  109<H>  |  98<H>  ----------------------------<  208<H>  3.3<L>   |  22  |  4.22<H>    Ca    7.6<L>      03 Oct 2017 06:10  Phos  4.3     10-03  Mg     2.6     10-03    TPro  5.1<L>  /  Alb  1.1<L>  /  TBili  3.4<H>  /  DBili  x   /  AST  59<H>  /  ALT  18  /  AlkPhos  141<H>  10      PT/INR - ( 03 Oct 2017 06:10 )   PT: 12.7 sec;   INR: 1.16 ratio         PTT - ( 03 Oct 2017 06:10 )  PTT:26.9 sec    Reticulocyte Percent: 0.8 % (17 @ 19:38)  Vitamin B12, Serum: >2000 pg/mL (17 @ 19:38)  Folate, Serum: >20.0 ng/mL (17 @ 19:38)  Iron - Total Binding Capacity.: 154 ug/dL (17 @ 19:38)  Ferritin, Serum: 344.0 ng/mL (17 @ 19:38)      TITO Lambda: 8.76 mg/dL (17 @ 19:38)  TITO Kappa: 17.90 mg/dL (17 @ 19:38)  Quantitative IgA: 535 mg/dL (17 @ 19:38)  Serum Protein Electrophoresis Interp: clinical correlation. (17 @ 19:38)  Quantitative IgM: 119 mg/dL (17 @ 19:38)  Immunofixation, Serum: Abnormal pattern identified which is not fully diagnostic; however, the  pattern raises the possibility of a weak restricted band.  Advise  clinical correlation.    Reference Range: None Detected (17 @ 19:38)  Quantitative Ig mg/dL (17 @ 19:38)            BLOOD SMEAR INTERPRETATION:       RADIOLOGY & ADDITIONAL STUDIES: Patient is a 89y old  Male who presents with a chief complaint of cms (26 Sep 2017 13:48)       Pt is seen and examined  pt is awake and lying in bed, is on high flow oxygen, pul is following  remains in icu and on monitor bed hr is at 82/min and bp is at 162/71 this time  pt seems comfortable and denies any complaints at this time    HPI:  89 yo AAM [facility] SNF resident was brought to ER for evaluation of cms  uncontrolled ,hyperglycemia ,recived total of 25 u of humalog this morning with no effect BG esvin dmission was found to be above 600 Quinones cath is draining purulent urine and patient diagnosed with uti and urosepsis Found to have hypotension ,ARVIN on CKD and elevated LFTS ,likely secondary to shock liver and due to sepsis Admitted for septic workup and evaluation,send blood and urine cx,serial lactate levels,monitor vitals closley,ivfs hydration,monitor urine output and renal profile,iv abx initiated Found to have troponin elevation and admitted to spcu ,seen by cardiologist ,likely troponinemia related to ARF patient became a long term resident about 3 mnts ago due to progression of dem,entia and Parkinsons disease Palliative care consult requested ,to discuss advance directives and complete MOLST (26 Sep 2017 13:48)         MEDICATIONS  (STANDING):  aspirin  chewable 81 milliGRAM(s) Enteral Tube daily  dextrose 5% + sodium chloride 0.9%. 1000 milliLiter(s) (50 mL/Hr) IV Continuous <Continuous>  dextrose 5%. 1000 milliLiter(s) (50 mL/Hr) IV Continuous <Continuous>  dextrose 50% Injectable 12.5 Gram(s) IV Push once  dextrose 50% Injectable 25 Gram(s) IV Push once  dextrose 50% Injectable 25 Gram(s) IV Push once  docusate sodium 100 milliGRAM(s) Oral three times a day  ertapenem  IVPB      ertapenem  IVPB 500 milliGRAM(s) IV Intermittent every 24 hours  famotidine Injectable 20 milliGRAM(s) IV Push daily  heparin  Injectable 5000 Unit(s) SubCutaneous every 12 hours  hydrocortisone hemorrhoidal Suppository 1 Suppository(s) Rectal daily  insulin lispro (HumaLOG) corrective regimen sliding scale   SubCutaneous every 6 hours  lactobacillus acidophilus 1 Tablet(s) Oral every 8 hours  nystatin Powder 1 Application(s) Topical two times a day  polyethylene glycol 3350 17 Gram(s) Oral daily  senna 2 Tablet(s) Oral at bedtime  tamsulosin 0.4 milliGRAM(s) Oral at bedtime    MEDICATIONS  (PRN):  bisacodyl Suppository 10 milliGRAM(s) Rectal daily PRN Constipation  dextrose Gel 1 Dose(s) Oral once PRN Blood Glucose LESS THAN 70 milliGRAM(s)/deciliter  glucagon  Injectable 1 milliGRAM(s) IntraMuscular once PRN Glucose LESS THAN 70 milligrams/deciliter      Allergies    No Known Allergies    Intolerances        Vital Signs Last 24 Hrs  T(C): 37.1 (03 Oct 2017 20:02), Max: 38.3 (02 Oct 2017 21:52)  T(F): 98.8 (03 Oct 2017 20:02), Max: 101 (02 Oct 2017 21:52)  HR: 91 (03 Oct 2017 18:00) (75 - 102)  BP: 102/44 (03 Oct 2017 18:00) (78/41 - 123/55)  BP(mean): 61 (03 Oct 2017 18:00) (52 - 76)  RR: 33 (03 Oct 2017 18:00) (16 - 33)  SpO2: 91% (03 Oct 2017 18:00) (91% - 99%)    PHYSICAL EXAM  General: adult in NAD  HEENT: clear oropharynx, anicteric sclera, pink conjunctiva  Neck: supple  CV: normal S1/S2 with no murmur rubs or gallops  Lungs: positive air movement b/l ant lungs,clear to auscultation, no wheezes, no rales  Abdomen: soft non-tender non-distended, no hepatosplenomegaly  Ext: no clubbing cyanosis or edema  Skin: no rashes and no petechiae  Neuro: alert and oriented X 4, no focal deficits  LABS:                          11.1   12.3  )-----------( 130      ( 03 Oct 2017 06:10 )             33.0         Mean Cell Volume : 89.8 fl  Mean Cell Hemoglobin : 30.2 pg  Mean Cell Hemoglobin Concentration : 33.6 gm/dL  Auto Neutrophil # : x  Auto Lymphocyte # : x  Auto Monocyte # : x  Auto Eosinophil # : x  Auto Basophil # : x  Auto Neutrophil % : x  Auto Lymphocyte % : x  Auto Monocyte % : x  Auto Eosinophil % : x  Auto Basophil % : x    Serial CBC's  10-03 @ 06:10  Hct-33.0 / Hgb-11.1 / Plat-130 / RBC-3.68 / WBC-12.3          Serial CBC's  10-02 @ 07:11  Hct-39.7 / Hgb-12.5 / Plat-104 / RBC-4.35 / WBC-8.8          Serial CBC's  10-01 @ 06:37  Hct-38.3 / Hgb-12.0 / Plat-81 / RBC-4.24 / WBC-9.5          Serial CBC's   @ 19:38  Hct--- / Hgb--- / Plat--- / RBC-4.49 / WBC---          Serial CBC's   @ 14:35  Hct-41.1 / Hgb-12.9 / Plat-71 / RBC-4.60 / WBC-10.1            10    141  |  109<H>  |  98<H>  ----------------------------<  208<H>  3.3<L>   |  22  |  4.22<H>    Ca    7.6<L>      03 Oct 2017 06:10  Phos  4.3     10-  Mg     2.6     10-    TPro  5.1<L>  /  Alb  1.1<L>  /  TBili  3.4<H>  /  DBili  x   /  AST  59<H>  /  ALT  18  /  AlkPhos  141<H>  10-03      PT/INR - ( 03 Oct 2017 06:10 )   PT: 12.7 sec;   INR: 1.16 ratio         PTT - ( 03 Oct 2017 06:10 )  PTT:26.9 sec    Reticulocyte Percent: 0.8 % (17 @ 19:38)  Vitamin B12, Serum: >2000 pg/mL (17 @ 19:38)  Folate, Serum: >20.0 ng/mL (17 @ 19:38)  Iron - Total Binding Capacity.: 154 ug/dL (17 @ 19:38)  Ferritin, Serum: 344.0 ng/mL (17 @ 19:38)      TITO Lambda: 8.76 mg/dL (17 @ 19:38)  TITO Kappa: 17.90 mg/dL (17 @ 19:38)  Quantitative IgA: 535 mg/dL (17 @ 19:38)  Serum Protein Electrophoresis Interp: clinical correlation. (17 @ 19:38)  Quantitative IgM: 119 mg/dL (17 @ 19:38)  Immunofixation, Serum: Abnormal pattern identified which is not fully diagnostic; however, the  pattern raises the possibility of a weak restricted band.  Advise  clinical correlation.    Reference Range: None Detected (17 @ 19:38)  Quantitative Ig mg/dL (17 @ 19:38)            BLOOD SMEAR INTERPRETATION: nl wbc series and morphology, no blast cells/no immature wbc, no schistocytes, no fragmented rbc, no clumping of platelets, no giant plt, mix of small and large platelet Patient is a 89y old  Male who presents with a chief complaint of cms (26 Sep 2017 13:48)       Pt is seen and examined  pt is awake and lying in bed, is on high flow oxygen, pul is following  remains in icu and on monitor bed hr is at 89/min and bp is at 109/55 this time  pt seems comfortable and denies any complaints at this time    HPI:  89 yo AAM [facility] SN[facility] resident was brought to ER for evaluation of cms  uncontrolled ,hyperglycemia ,recived total of 25 u of humalog this morning with no effect BG esvin dmission was found to be above 600 Quinones cath is draining purulent urine and patient diagnosed with uti and urosepsis Found to have hypotension ,ARVIN on CKD and elevated LFTS ,likely secondary to shock liver and due to sepsis Admitted for septic workup and evaluation,send blood and urine cx,serial lactate levels,monitor vitals closley,ivfs hydration,monitor urine output and renal profile,iv abx initiated Found to have troponin elevation and admitted to spcu ,seen by cardiologist ,likely troponinemia related to ARF patient became a long term resident about 3 mnts ago due to progression of dem,entia and Parkinsons disease Palliative care consult requested ,to discuss advance directives and complete MOLST (26 Sep 2017 13:48)         MEDICATIONS  (STANDING):  aspirin  chewable 81 milliGRAM(s) Enteral Tube daily  dextrose 5% + sodium chloride 0.9%. 1000 milliLiter(s) (50 mL/Hr) IV Continuous <Continuous>  dextrose 5%. 1000 milliLiter(s) (50 mL/Hr) IV Continuous <Continuous>  dextrose 50% Injectable 12.5 Gram(s) IV Push once  dextrose 50% Injectable 25 Gram(s) IV Push once  dextrose 50% Injectable 25 Gram(s) IV Push once  docusate sodium 100 milliGRAM(s) Oral three times a day  ertapenem  IVPB      ertapenem  IVPB 500 milliGRAM(s) IV Intermittent every 24 hours  famotidine Injectable 20 milliGRAM(s) IV Push daily  heparin  Injectable 5000 Unit(s) SubCutaneous every 12 hours  hydrocortisone hemorrhoidal Suppository 1 Suppository(s) Rectal daily  insulin lispro (HumaLOG) corrective regimen sliding scale   SubCutaneous every 6 hours  lactobacillus acidophilus 1 Tablet(s) Oral every 8 hours  nystatin Powder 1 Application(s) Topical two times a day  polyethylene glycol 3350 17 Gram(s) Oral daily  senna 2 Tablet(s) Oral at bedtime  tamsulosin 0.4 milliGRAM(s) Oral at bedtime    MEDICATIONS  (PRN):  bisacodyl Suppository 10 milliGRAM(s) Rectal daily PRN Constipation  dextrose Gel 1 Dose(s) Oral once PRN Blood Glucose LESS THAN 70 milliGRAM(s)/deciliter  glucagon  Injectable 1 milliGRAM(s) IntraMuscular once PRN Glucose LESS THAN 70 milligrams/deciliter      Allergies    No Known Allergies    Intolerances        Vital Signs Last 24 Hrs  T(C): 37.1 (03 Oct 2017 20:02), Max: 38.3 (02 Oct 2017 21:52)  T(F): 98.8 (03 Oct 2017 20:02), Max: 101 (02 Oct 2017 21:52)  HR: 91 (03 Oct 2017 18:00) (75 - 102)  BP: 102/44 (03 Oct 2017 18:00) (78/41 - 123/55)  BP(mean): 61 (03 Oct 2017 18:00) (52 - 76)  RR: 33 (03 Oct 2017 18:00) (16 - 33)  SpO2: 91% (03 Oct 2017 18:00) (91% - 99%)    PHYSICAL EXAM  General: adult in NAD  HEENT: clear oropharynx, anicteric sclera, pink conjunctiva  Neck: supple  CV: normal S1/S2 with no murmur rubs or gallops  Lungs: positive air movement b/l ant lungs,clear to auscultation, no wheezes, no rales  Abdomen: soft non-tender non-distended, no hepatosplenomegaly  Ext: no clubbing cyanosis or edema  Skin: no rashes and no petechiae  Neuro: alert and oriented X 4, no focal deficits  LABS:                          11.1   12.3  )-----------( 130      ( 03 Oct 2017 06:10 )             33.0         Mean Cell Volume : 89.8 fl  Mean Cell Hemoglobin : 30.2 pg  Mean Cell Hemoglobin Concentration : 33.6 gm/dL  Auto Neutrophil # : x  Auto Lymphocyte # : x  Auto Monocyte # : x  Auto Eosinophil # : x  Auto Basophil # : x  Auto Neutrophil % : x  Auto Lymphocyte % : x  Auto Monocyte % : x  Auto Eosinophil % : x  Auto Basophil % : x    Serial CBC's  10-03 @ 06:10  Hct-33.0 / Hgb-11.1 / Plat-130 / RBC-3.68 / WBC-12.3          Serial CBC's  10-02 @ 07:11  Hct-39.7 / Hgb-12.5 / Plat-104 / RBC-4.35 / WBC-8.8          Serial CBC's  10-01 @ 06:37  Hct-38.3 / Hgb-12.0 / Plat-81 / RBC-4.24 / WBC-9.5          Serial CBC's   @ 19:38  Hct--- / Hgb--- / Plat--- / RBC-4.49 / WBC---          Serial CBC's   @ 14:35  Hct-41.1 / Hgb-12.9 / Plat-71 / RBC-4.60 / WBC-10.1            10    141  |  109<H>  |  98<H>  ----------------------------<  208<H>  3.3<L>   |  22  |  4.22<H>    Ca    7.6<L>      03 Oct 2017 06:10  Phos  4.3     10-  Mg     2.6     10-    TPro  5.1<L>  /  Alb  1.1<L>  /  TBili  3.4<H>  /  DBili  x   /  AST  59<H>  /  ALT  18  /  AlkPhos  141<H>  10-03      PT/INR - ( 03 Oct 2017 06:10 )   PT: 12.7 sec;   INR: 1.16 ratio         PTT - ( 03 Oct 2017 06:10 )  PTT:26.9 sec    Reticulocyte Percent: 0.8 % (17 @ 19:38)  Vitamin B12, Serum: >2000 pg/mL (17 @ 19:38)  Folate, Serum: >20.0 ng/mL (17 @ 19:38)  Iron - Total Binding Capacity.: 154 ug/dL (17 @ 19:38)  Ferritin, Serum: 344.0 ng/mL (17 @ 19:38)      TITO Lambda: 8.76 mg/dL (17 @ 19:38)  TITO Kappa: 17.90 mg/dL (17 @ 19:38)  Quantitative IgA: 535 mg/dL (17 @ 19:38)  Serum Protein Electrophoresis Interp: clinical correlation. (17 @ 19:38)  Quantitative IgM: 119 mg/dL (17 @ 19:38)  Immunofixation, Serum: Abnormal pattern identified which is not fully diagnostic; however, the  pattern raises the possibility of a weak restricted band.  Advise  clinical correlation.    Reference Range: None Detected (17 @ 19:38)  Quantitative Ig mg/dL (17 @ 19:38)            BLOOD SMEAR INTERPRETATION: nl wbc series and morphology, no blast cells/no immature wbc, no schistocytes, no fragmented rbc, no clumping of platelets, no giant plt, mix of small and large platelet

## 2017-10-03 NOTE — PROGRESS NOTE ADULT - ASSESSMENT
89 yo AAM [facility] SN[facility] resident was brought to ER for evaluation of cms  uncontrolled ,hyperglycemia ,recived total of 25 u of humalog this morning with no effect BG esvin dmission was found to be above 600 Quinones cath is draining purulent urine and patient diagnosed with uti and urosepsis Found to have hypotension ,ARVIN on CKD and elevated LFTS ,likely secondary to shock liver and due to sepsis Admitted for septic workup and evaluation,send blood and urine cx,serial lactate levels,monitor vitals closley,ivfs hydration,monitor urine output and renal profile,iv abx initiated Found to have troponin elevation and admitted to spcu ,seen by cardiologist ,likely troponinemia related to ARF patient became a long term resident about 3 mnts ago due to progression of dementia and Parkinsons disease Palliative care consult requested ,to discuss advance directives and complete MOLST Patient diagnosed with severe sepsis with ESBL E.COLI  bacteremia secondary to uti and obstructive uropathy Palliative care consult requested ,to discuss advance directives and update  MOLST ,family requests guidance regarding artificial nutrition discussion PATIENT failed speech and swallow test several times ,strict NPO recommended

## 2017-10-03 NOTE — PROGRESS NOTE ADULT - ASSESSMENT
89 yo AAM [facility] SNF resident was brought to ER for evaluation of cms  uncontrolled ,hyperglycemia ,recived total of 25 u of humalog this morning with no effect BG esvin dmission was found to be above 600 Quinones cath is draining purulent urine and patient diagnosed with uti and urosepsis Found to have hypotension ,ARVIN on CKD and

## 2017-10-03 NOTE — CONSULT NOTE ADULT - PROBLEM SELECTOR RECOMMENDATION 2
dtr is coming in tomorrow at 9 am for family meet and goals of care discussion  discussed with dtr pt's advance condition and multiple medical issues and prognosis  pt is DNR  there are 5 children  dtr is unsure of the decision regarding PEG  will cont to  about PEG in the setting of advanced dementia  I explained that PEG does not prevent aspiration or prolong life  will follow up and attempt to be at the meeting at 9 am tomorrow for further conversation  cont supportive medical care at present

## 2017-10-03 NOTE — PROGRESS NOTE ADULT - SUBJECTIVE AND OBJECTIVE BOX
PROGRESS NOTE    OVERNIGHT    SUBJECTIVE    PAST MEDICAL & SURGICAL HISTORY:  Parkinson disease  Neuropathy  CKD (chronic kidney disease)  BPH (benign prostatic hyperplasia)  Diabetes  Angina pectoris  Hypertension  Diabetes  Renal failure  Dementia  No significant past surgical history    HEALTH ISSUES - PROBLEM Dx:  Dysphagia: Dysphagia  Colitis: Colitis  Diabetes mellitus type 2, uncontrolled: Diabetes mellitus type 2, uncontrolled  Thrombocytopenia: Thrombocytopenia  Wound: Wound  Cirrhosis: Cirrhosis  Hyperbilirubinemia: Hyperbilirubinemia  Encephalopathy, metabolic: Encephalopathy, metabolic  Elevated LFTs: Elevated LFTs  Elevated troponin: Elevated troponin  Hyperglycemia: Hyperglycemia  Acute on chronic renal failure: Acute on chronic renal failure  Altered mental status: Altered mental status  Severe sepsis: Severe sepsis  ARVIN (acute kidney injury): ARVIN (acute kidney injury)  Prophylactic measure: Prophylactic measure  UTI (urinary tract infection): UTI (urinary tract infection)  Dementia: Dementia  Angina pectoris: Angina pectoris  Hypertension: Hypertension  Diabetes: Diabetes  Renal failure: Renal failure  Sepsis, due to unspecified organism: Sepsis, due to unspecified organism          MEDICATIONS  (STANDING):  aspirin  chewable 81 milliGRAM(s) Enteral Tube daily  dextrose 5% + sodium chloride 0.9%. 1000 milliLiter(s) (100 mL/Hr) IV Continuous <Continuous>  dextrose 5%. 1000 milliLiter(s) (50 mL/Hr) IV Continuous <Continuous>  dextrose 50% Injectable 12.5 Gram(s) IV Push once  dextrose 50% Injectable 25 Gram(s) IV Push once  dextrose 50% Injectable 25 Gram(s) IV Push once  docusate sodium 100 milliGRAM(s) Oral three times a day  ertapenem  IVPB      ertapenem  IVPB 500 milliGRAM(s) IV Intermittent every 24 hours  famotidine Injectable 20 milliGRAM(s) IV Push daily  heparin  Injectable 5000 Unit(s) SubCutaneous every 12 hours  hydrocortisone hemorrhoidal Suppository 1 Suppository(s) Rectal daily  insulin lispro (HumaLOG) corrective regimen sliding scale   SubCutaneous every 6 hours  lactobacillus acidophilus 1 Tablet(s) Oral every 8 hours  nystatin Powder 1 Application(s) Topical two times a day  polyethylene glycol 3350 17 Gram(s) Oral daily  senna 2 Tablet(s) Oral at bedtime  tamsulosin 0.4 milliGRAM(s) Oral at bedtime    MEDICATIONS  (PRN):  bisacodyl Suppository 10 milliGRAM(s) Rectal daily PRN Constipation  dextrose Gel 1 Dose(s) Oral once PRN Blood Glucose LESS THAN 70 milliGRAM(s)/deciliter  glucagon  Injectable 1 milliGRAM(s) IntraMuscular once PRN Glucose LESS THAN 70 milligrams/deciliter      OBJECTIVE    T(C): 37 (10-03-17 @ 13:45), Max: 38.3 (10-02-17 @ 21:52)  HR: 91 (10-03-17 @ 12:00) (75 - 105)  BP: 123/55 (10-03-17 @ 12:00) (78/41 - 130/60)  RR: 16 (10-03-17 @ 12:00) (15 - 34)  SpO2: 96% (10-03-17 @ 12:00) (93% - 99%)  Wt(kg): --  I&O's Summary    02 Oct 2017 07:01  -  03 Oct 2017 07:00  --------------------------------------------------------  IN: 640 mL / OUT: 1400 mL / NET: -760 mL          REVIEW OF SYSTEMS:  CONSTITUTIONAL: No fever, weight loss, or fatigue  EYES: No eye pain, visual disturbances, or discharge  ENMT:  No difficulty hearing, tinnitus, vertigo; No sinus or throat pain  NECK: No pain or stiffness  BREASTS: No pain, masses, or nipple discharge  RESPIRATORY: No cough, wheezing, chills or hemoptysis; No shortness of breath  CARDIOVASCULAR: No chest pain, palpitations, dizziness, or leg swelling  GASTROINTESTINAL: No abdominal pain. No nausea, vomiting; No diarrhea or constipation. No melena or hematochezia.  GENITOURINARY: No dysuria, frequency, hematuria, or incontinence  NEUROLOGICAL: No headaches, memory loss, loss of strength, numbness, or tremors  SKIN: No itching, burning, rashes, or lesions   LYMPH NODES: No enlarged glands  MUSCULOSKELETAL: No joint pain or swelling; No muscle, back, or extremity pain  HEME/LYMPH: No easy bruising, or bleeding gums  ALLERY AND IMMUNOLOGIC: No hives or eczema      PHYSICAL EXAM:  Appearance: NAD.   HEENT:   Moist oral mucosa. Conjunctiva clear b/l.   Neck : supple  Cardiovascular: RRR ,S1S2 present  Respiratory: Lungs CTAB.  Gastrointestinal:  Soft, nontender. No rebound. No rigidity. BS present	  Skin: No rashes, No ecchymoses, No cyanosis.	  Neurologic: Non-focal. Moving all extremities. Following commands.  Extremities: No edema. No erythema. No calf tenderness.  	  LABS:                        11.1   12.3  )-----------( 130      ( 03 Oct 2017 06:10 )             33.0     10-03    141  |  109<H>  |  98<H>  ----------------------------<  208<H>  3.3<L>   |  22  |  4.22<H>    Ca    7.6<L>      03 Oct 2017 06:10  Phos  4.3     10-03  Mg     2.6     10-03    TPro  5.1<L>  /  Alb  1.1<L>  /  TBili  3.4<H>  /  DBili  x   /  AST  59<H>  /  ALT  18  /  AlkPhos  141<H>  10-03    PT/INR - ( 03 Oct 2017 06:10 )   PT: 12.7 sec;   INR: 1.16 ratio         PTT - ( 03 Oct 2017 06:10 )  PTT:26.9 sec      RADIOLOGY & ADDITIONAL TESTS:    ASSESSMENT/PLAN: PROGRESS NOTE    OVERNIGHT  NGT was reinserted ,feedings are started Remains lethargic Was hypotensive in am ,responded to iv fluids   SUBJECTIVE  Patient is resting in a bed comfortably .Lethargy  .No distress noted   PAST MEDICAL & SURGICAL HISTORY:  Parkinson disease  Neuropathy  CKD (chronic kidney disease)  BPH (benign prostatic hyperplasia)  Diabetes  Angina pectoris  Hypertension  Diabetes  Renal failure  Dementia  No significant past surgical history    HEALTH ISSUES - PROBLEM Dx:  Dysphagia: Dysphagia  Colitis: Colitis  Diabetes mellitus type 2, uncontrolled: Diabetes mellitus type 2, uncontrolled  Thrombocytopenia: Thrombocytopenia  Wound: Wound  Cirrhosis: Cirrhosis  Hyperbilirubinemia: Hyperbilirubinemia  Encephalopathy, metabolic: Encephalopathy, metabolic  Elevated LFTs: Elevated LFTs  Elevated troponin: Elevated troponin  Hyperglycemia: Hyperglycemia  Acute on chronic renal failure: Acute on chronic renal failure  Altered mental status: Altered mental status  Severe sepsis: Severe sepsis  ARVIN (acute kidney injury): ARVIN (acute kidney injury)  Prophylactic measure: Prophylactic measure  UTI (urinary tract infection): UTI (urinary tract infection)  Dementia: Dementia  Angina pectoris: Angina pectoris  Hypertension: Hypertension  Diabetes: Diabetes  Renal failure: Renal failure  Sepsis, due to unspecified organism: Sepsis, due to unspecified organism          MEDICATIONS  (STANDING):  aspirin  chewable 81 milliGRAM(s) Enteral Tube daily  dextrose 5% + sodium chloride 0.9%. 1000 milliLiter(s) (100 mL/Hr) IV Continuous <Continuous>  dextrose 5%. 1000 milliLiter(s) (50 mL/Hr) IV Continuous <Continuous>  dextrose 50% Injectable 12.5 Gram(s) IV Push once  dextrose 50% Injectable 25 Gram(s) IV Push once  dextrose 50% Injectable 25 Gram(s) IV Push once  docusate sodium 100 milliGRAM(s) Oral three times a day  ertapenem  IVPB      ertapenem  IVPB 500 milliGRAM(s) IV Intermittent every 24 hours  famotidine Injectable 20 milliGRAM(s) IV Push daily  heparin  Injectable 5000 Unit(s) SubCutaneous every 12 hours  hydrocortisone hemorrhoidal Suppository 1 Suppository(s) Rectal daily  insulin lispro (HumaLOG) corrective regimen sliding scale   SubCutaneous every 6 hours  lactobacillus acidophilus 1 Tablet(s) Oral every 8 hours  nystatin Powder 1 Application(s) Topical two times a day  polyethylene glycol 3350 17 Gram(s) Oral daily  senna 2 Tablet(s) Oral at bedtime  tamsulosin 0.4 milliGRAM(s) Oral at bedtime    MEDICATIONS  (PRN):  bisacodyl Suppository 10 milliGRAM(s) Rectal daily PRN Constipation  dextrose Gel 1 Dose(s) Oral once PRN Blood Glucose LESS THAN 70 milliGRAM(s)/deciliter  glucagon  Injectable 1 milliGRAM(s) IntraMuscular once PRN Glucose LESS THAN 70 milligrams/deciliter      OBJECTIVE    T(C): 37 (10-03-17 @ 13:45), Max: 38.3 (10-02-17 @ 21:52)  HR: 91 (10-03-17 @ 12:00) (75 - 105)  BP: 123/55 (10-03-17 @ 12:00) (78/41 - 130/60)  RR: 16 (10-03-17 @ 12:00) (15 - 34)  SpO2: 96% (10-03-17 @ 12:00) (93% - 99%)  Wt(kg): --  I&O's Summary    02 Oct 2017 07:01  -  03 Oct 2017 07:00  --------------------------------------------------------  IN: 640 mL / OUT: 1400 mL / NET: -760 mL        REVIEW OF SYSTEMS:  ROS is unobtainable dur to lethargy   PHYSICAL EXAM:  Appearance: NAD.   HEENT:   Moist oral mucosa. Conjunctiva clear b/l.   Neck : supple  Cardiovascular: RRR ,S1S2 present   Respiratory: Lungs CTAB.  Gastrointestinal:  Soft, nontender. No rebound. No rigidity. BS present	  Skin: No rashes, No ecchymoses, No cyanosis.	  Neurologic: Non-focal. Moving all extremities.   Extremities: No edema   perianal ulceration -wound care consult obtained	          	  	  LABS:                        11.1   12.3  )-----------( 130      ( 03 Oct 2017 06:10 )             33.0     10-03    141  |  109<H>  |  98<H>  ----------------------------<  208<H>  3.3<L>   |  22  |  4.22<H>    Ca    7.6<L>      03 Oct 2017 06:10  Phos  4.3     10-03  Mg     2.6     10-03    TPro  5.1<L>  /  Alb  1.1<L>  /  TBili  3.4<H>  /  DBili  x   /  AST  59<H>  /  ALT  18  /  AlkPhos  141<H>  10-03    PT/INR - ( 03 Oct 2017 06:10 )   PT: 12.7 sec;   INR: 1.16 ratio         PTT - ( 03 Oct 2017 06:10 )  PTT:26.9 sec      RADIOLOGY & ADDITIONAL TESTS:  Labs and imaging reviewed electronically   ASSESSMENT/PLAN:

## 2017-10-03 NOTE — CONSULT NOTE ADULT - SUBJECTIVE AND OBJECTIVE BOX
Date/Time Patient Seen:  		  Referring MD:   Data Reviewed	       Patient is a 89y old  Male who presents with a chief complaint of cms (26 Sep 2017 13:48)      Subjective/HPI    spoke with dtr about PEG placement and dementia diagnosis, discussed with dtr - current status, prognosis, options and PEG feeding  The patient is a 89 year old male with a history of HTN, DM, CKD, dementia who is admitted with urosepsis.         PAST MEDICAL & SURGICAL HISTORY:  Parkinson disease  Neuropathy  CKD (chronic kidney disease)  BPH (benign prostatic hyperplasia)  Diabetes  Angina pectoris  Hypertension  Diabetes  Renal failure  Dementia  No pertinent past medical history  No significant past surgical history        Medication list         MEDICATIONS  (STANDING):  aspirin  chewable 81 milliGRAM(s) Enteral Tube daily  dextrose 5% + sodium chloride 0.9%. 1000 milliLiter(s) (100 mL/Hr) IV Continuous <Continuous>  dextrose 5%. 1000 milliLiter(s) (50 mL/Hr) IV Continuous <Continuous>  dextrose 50% Injectable 12.5 Gram(s) IV Push once  dextrose 50% Injectable 25 Gram(s) IV Push once  dextrose 50% Injectable 25 Gram(s) IV Push once  docusate sodium 100 milliGRAM(s) Oral three times a day  ertapenem  IVPB      ertapenem  IVPB 500 milliGRAM(s) IV Intermittent every 24 hours  famotidine Injectable 20 milliGRAM(s) IV Push daily  heparin  Injectable 5000 Unit(s) SubCutaneous every 12 hours  hydrocortisone hemorrhoidal Suppository 1 Suppository(s) Rectal daily  insulin lispro (HumaLOG) corrective regimen sliding scale   SubCutaneous every 6 hours  lactobacillus acidophilus 1 Tablet(s) Oral every 8 hours  nystatin Powder 1 Application(s) Topical two times a day  polyethylene glycol 3350 17 Gram(s) Oral daily  senna 2 Tablet(s) Oral at bedtime  tamsulosin 0.4 milliGRAM(s) Oral at bedtime    MEDICATIONS  (PRN):  bisacodyl Suppository 10 milliGRAM(s) Rectal daily PRN Constipation  dextrose Gel 1 Dose(s) Oral once PRN Blood Glucose LESS THAN 70 milliGRAM(s)/deciliter  glucagon  Injectable 1 milliGRAM(s) IntraMuscular once PRN Glucose LESS THAN 70 milligrams/deciliter         Vitals log        ICU Vital Signs Last 24 Hrs  T(C): 37 (03 Oct 2017 13:45), Max: 38.3 (02 Oct 2017 21:52)  T(F): 98.6 (03 Oct 2017 13:45), Max: 101 (02 Oct 2017 21:52)  HR: 91 (03 Oct 2017 12:00) (75 - 105)  BP: 123/55 (03 Oct 2017 12:00) (78/41 - 130/60)  BP(mean): 76 (03 Oct 2017 12:00) (52 - 77)  ABP: --  ABP(mean): --  RR: 16 (03 Oct 2017 12:00) (15 - 34)  SpO2: 96% (03 Oct 2017 12:00) (93% - 99%)           Input and Output:  I&O's Detail    02 Oct 2017 07:01  -  03 Oct 2017 07:00  --------------------------------------------------------  IN:    dextrose 5%.: 540 mL    IV PiggyBack: 100 mL  Total IN: 640 mL    OUT:    Indwelling Catheter - Urethral: 1400 mL  Total OUT: 1400 mL    Total NET: -760 mL          Lab Data                        11.1   12.3  )-----------( 130      ( 03 Oct 2017 06:10 )             33.0     10-03    141  |  109<H>  |  98<H>  ----------------------------<  208<H>  3.3<L>   |  22  |  4.22<H>    Ca    7.6<L>      03 Oct 2017 06:10  Phos  4.3     10-03  Mg     2.6     10-03    TPro  5.1<L>  /  Alb  1.1<L>  /  TBili  3.4<H>  /  DBili  x   /  AST  59<H>  /  ALT  18  /  AlkPhos  141<H>  10-03            Review of Systems	      Objective     Physical Examination  head at  heart - s1s2  lungs - dec BS  abd - soft        Pertinent Lab findings & Imaging      Joni:  NO   Adequate UO     I&O's Detail    02 Oct 2017 07:01  -  03 Oct 2017 07:00  --------------------------------------------------------  IN:    dextrose 5%.: 540 mL    IV PiggyBack: 100 mL  Total IN: 640 mL    OUT:    Indwelling Catheter - Urethral: 1400 mL  Total OUT: 1400 mL    Total NET: -760 mL               Discussed with:     Cultures:	        Radiology

## 2017-10-03 NOTE — PROVIDER CONTACT NOTE (CRITICAL VALUE NOTIFICATION) - NAME OF MD/NP/PA/DO NOTIFIED:
Sloan GREGORY
BRI Dunn
ty
BRI DONALDSON
Dr Doherty
Dr Doherty and Benjamin GREGORY
Dr. Hall
MD Doherty
PA, Shaun
Yao GREGORY
Dr. Hall
Dr. Hall

## 2017-10-03 NOTE — PROVIDER CONTACT NOTE (CRITICAL VALUE NOTIFICATION) - TEST AND RESULT REPORTED:
lac 5.2 ; Tro .499
hcv reactive
BLOOD C/S FROM 9/28/2017
Lactate - 2.9
Pos. blood c/s Gram - rods in aerobic bottle
Positive Blood Culture
Positive E. Coli in anerobic and aerobic bottles
Positive blood culture, growth in aerobic bottle gram negative rods.
blood c/s @ 30 hrs
glucose-619
gram negative cocci in aerobic and anaerobic bottles from 9/27
pos result culture on gram - olinda on both anairobic/airobic bottle
troponin 0.531
Blood culture 1 and 2  Result: Growth in aerobic/ anaerobic bottle positive for E.Coli
Positive Blood Culture

## 2017-10-03 NOTE — PROGRESS NOTE ADULT - PROBLEM SELECTOR PLAN 7
PEG placement vs CMO Spoke to HCP in length and great details 10/02  ,requests more guidance from [facility] service Dr Martinez and Tejal MEDEL spoke to the family today and meeting is scheduled for am HCP stated that likely family will request PEG

## 2017-10-03 NOTE — PROGRESS NOTE ADULT - ASSESSMENT
The patient is a 89 year old male with a history of HTN, DM, CKD, dementia who is admitted with urosepsis.    Plan:  - Echocardiogram results from 6/2017 noted; normal LV systolic function, no significant valve issues  - Troponin elevated and flat likely due to type II NSTEMI (demand ischemia) and retention of enzymes from ARVIN on CKD. No need to trend further at this point.  - Continue aspirin 81 mg daily  - Increase IVF rate  - Repeat potassium  - IV antibiotics  - Worsening renal function and oliguric  - Overall poor prognosis. Consider hospice.

## 2017-10-03 NOTE — PROGRESS NOTE ADULT - PROBLEM SELECTOR PLAN 1
ammonia normal  continue lactulose and rifaxamin 550 bid  2gram sodium and 60 gr protein diet  unable to orally intake -- discuss with family for PEG tube placement vs palliative care ammonia normal  continue lactulose and rifaxamin 550 bid  2gram sodium and 60 gr protein diet  unable to orally intake -- discuss with family for PEG tube placement vs palliative care  PEG tube re-inserted at bedside-- re start feeds ammonia normal  continue lactulose and rifaxamin 550 bid  unable to orally intake -- discuss with family for PEG tube placement vs palliative care  NG tube re-inserted at bedside-- re start feeds

## 2017-10-03 NOTE — PROGRESS NOTE ADULT - SUBJECTIVE AND OBJECTIVE BOX
Patient seen and examined today Plan discussed/Questions answered  (with patient/ancillary staff/colleagues) Chart notes reviewd More detailed Pulm/Critical Care notes, assessment and plan  will be added later today as needed after reviewing further labs as they become available     Notable interval events reviewed    ROS/PE  . REVIEW OF SYSTEMS Patient is unable to give any reliable review of systems   PHYSICAL EXAM    HEENT Unremarkable PERRLA atraumatic  RESP Fair air entry EXP prolonged    Harsh breath sound Resp distres mild  CARDIAC S1 S2 No S3     NO JVD   ABDOMEN SOFT BS PRESENT NOT DISTENDED No hepatosplenomegaly  PEDAL EDEMA present No calf tenderness  NO rash GENERAL Not TOXIC looking  Awake A & O x 1  . Patient seen and examined today Plan discussed/Questions answered  (with patient/ancillary staff/colleagues) Chart notes reviewd More detailed Pulm/Critical Care notes, assessment and plan  will be added later today as needed after reviewing further labs as they become available     Notable interval events reviewed    ROS/PE  . REVIEW OF SYSTEMS Patient is unable to give any reliable review of systems   PHYSICAL EXAM    HEENT Unremarkable PERRLA atraumatic  RESP Fair air entry EXP prolonged    Harsh breath sound Resp distres mild  CARDIAC S1 S2 No S3     NO JVD   ABDOMEN SOFT BS PRESENT NOT DISTENDED No hepatosplenomegaly  PEDAL EDEMA present No calf tenderness  NO rash GENERAL Not TOXIC looking  Awake A & O x 1  . VITALS/LABS  10/3/2017 afeb 91 120/50 16 96%   10/3/2017 acc 208-186  10/3/2017 U 1100 last s   10/3/2017 IV D5 ns 100 (10/3)   10/3/2017 W 12.3 Hb 11.1 Plt 130  Na 141 K 3.3 CO2 22 Cr 4.2 G 208   10/3 KCL 20 IV given   PROBLEM ASSESSMENT PLAN   RESP   9/29 VBG pH 745 10/1 VBG pH 743  UTI PNEUMONIA SEPSIS ESBL E COLI PERSISTENT BACTEREMIA (9/26, 9/28, 9/30)  Ertapenem (9/29)   10/2 For CTCAP   9/27 Leg n   9/26-9/27-9/28-9/30-10/1-10/3 W 12.2-20.8-12.9-10.1-9.5-12.3  9/26-9/28 B 6-3  9/26 CT CAP NC 1) Basal atelectasis with focal consolidation lll likely pneumonia 2) Scattered subcm nodules 3) Nodular liver sugg cirrhosis 4) R low atteniation renal lesion needs sono 5) No hydro 6) No obstrn abundant feces   POSSIBLE MI   9/26-9/27-9/28-9/29/2017    CK - [id number] Tr 1 .531 (i) - .482 (i) - .491 (i)-.280 (i)-.158 (i)   ASA 81 (9/26)   THROMBOCYTOPENIA   Likely sec sepsis   HYPERNATREMIA  Free water 400.6 (9/30)   9/26-9/27-9/28-9/29-9/30-10/1-10/3/2017  Na [phone]  ARVIN   9/26-9/27-9/28-9/29-10/1-10/2-10/3 Cr 5.2-3.7-2.3-2.16-2.4-3.0-4.2   HYPERGLYCEMIA  9/26/20-17 G 615 9/27 HbA1C 9.5   ELEVATED LFTs HYPERBILIRUBINEMIA   9/26-9/27-9/28-9/29-10/1-10/2-10/3/2017   [phone]   [phone]  alt [id number]  9/28-9/29-9/30-10/3   BR 2.3-4-3.9-4.2-3.4  9/27 Hep C reactive Genotype 1b  AMS 9/26 CT H No ac Rifaximin 550 (9/30) lactulose 30.2 (9/30) ursodiol 300.3 (9/30)   MALNUTRITION 9/29-10/3 Alb 1.7-1.1   GLOBAL ISSUE/BEST PRACTICE:        PROBLEM:      Analgesia:     na                        PROBLEM: Sedation:     na               PROBLEM: HOB elevation:   y              PROBLEM: Stress ulcer proph:    pepcid 20 (9/26)                       PROBLEM: VTE prophylaxis:      hsc (10/2) compr device (9/30)   PROBLEM: Glycemic control:    iss (9/26)    PROBLEM: Nutrition:    Glucerna 1.2 1440 (9/30)   PROBLEM: Advanced directive: dnr (9/27)      PROBLEM: Allergies:  na  TIME SPENT Over 35 minutes aggregate critical care time spent on encounter; activities included   direct patient care, counseling and/or coordinating care reviewing notes, lab data/ imaging , discussion with multidisciplinary team/ patient  /family. Risks, benefits, alternatives  discussed in detail. Questions/concerns  were addressed  to the best of my ability .

## 2017-10-03 NOTE — GOALS OF CARE CONVERSATION - PERSONAL ADVANCE DIRECTIVE - CONVERSATION DETAILS
spoke to pt daughter: Emmanuel on phone, regarding information ALBERTA Edouard, daughter will met w palliative nurse tomorrow at 9am to further discuss. daughter aware, pt  pulled out NGT & in process to reinsert, daughter agrees to feeds temporarily thru nose. Contact # given

## 2017-10-03 NOTE — PROGRESS NOTE ADULT - SUBJECTIVE AND OBJECTIVE BOX
Chief Complaint: Fever, AMS    Interval Events: Hypotensive this morning. Worsening creatinine.    Review of Systems:  General: No fevers, chills, weight loss or gain  Skin: No rashes, color changes  Cardiovascular: No chest pain, orthopnea  Respiratory: No shortness of breath, cough  Gastrointestinal: No nausea, abdominal pain  Genitourinary: No incontinence, pain with urination  Musculoskeletal: No pain, swelling, decreased range of motion  Neurological: No headache, weakness  Psychiatric: No depression, anxiety  Endocrine: No weight loss or gain, increased thirst  All other systems are comprehensively negative.    Physical Exam:  Vital Signs Last 24 Hrs  T(C): 36.9 (03 Oct 2017 11:57), Max: 38.3 (02 Oct 2017 21:52)  T(F): 98.5 (03 Oct 2017 11:57), Max: 101 (02 Oct 2017 21:52)  HR: 92 (03 Oct 2017 10:00) (75 - 109)  BP: 107/60 (03 Oct 2017 10:00) (78/41 - 151/54)  BP(mean): 73 (03 Oct 2017 10:00) (52 - 77)  RR: 18 (03 Oct 2017 10:00) (15 - 34)  SpO2: 99% (03 Oct 2017 10:00) (93% - 99%)  General: NAD  HEENT: MMM  Neck: No JVD, no carotid bruit  Lungs: CTAB  CV: RRR, nl S1/S2, no M/R/G  Abdomen: S/NT/ND, +BS  Extremities: No LE edema, no cyanosis  Neuro: AAOx3, non-focal  Skin: No rash    Labs:             10-03    141  |  109<H>  |  98<H>  ----------------------------<  208<H>  3.3<L>   |  22  |  4.22<H>    Ca    7.6<L>      03 Oct 2017 06:10  Phos  4.3     10-03  Mg     2.6     10-03    TPro  5.1<L>  /  Alb  1.1<L>  /  TBili  3.4<H>  /  DBili  x   /  AST  59<H>  /  ALT  18  /  AlkPhos  141<H>  10-03                          11.1   12.3  )-----------( 130      ( 03 Oct 2017 06:10 )             33.0         Telemetry: Sinus rhythm, tachycardic, PVCs

## 2017-10-03 NOTE — PROVIDER CONTACT NOTE (CRITICAL VALUE NOTIFICATION) - PERSON GIVING RESULT:
Janette
ALEJANDRO Field
AVINASH CEDENO
Core lab
Iam
Lab/Jane
Nab
Pura Howard
Renata Howard
[facility]
core lab- Homero Bernabe
Anne/ Marychuy Escobedo
Jorge Garvin

## 2017-10-03 NOTE — PROGRESS NOTE ADULT - ASSESSMENT
Patient seen and examined today Plan discussed/Questions answered  (with patient/ancillary staff/colleagues) Chart notes reviewd More detailed Pulm/Critical Care notes, assessment and plan  will be added later today as needed after reviewing further labs as they become available

## 2017-10-03 NOTE — PROGRESS NOTE ADULT - PROBLEM SELECTOR PLAN 1
ACUTE RENAL FAILURE: due to urosepsis , dehydration   increase iv fluid  ,   hypotension dc b/p med   continue with abx   Serum creatinine is increase     , approximating GFR decrease    ml/min.   [There is no progression]. [No uremic symptoms]   [No evidence of anemia].  Fluid status stable.  Will continue to avoid nephrotoxic drugs.  Patient remains asymptomatic.   Continue current therapy.  Start   Increase    Decrease  diuretic.  Start   Increase   Decrease  ACE inhibitor.  Start   Increase   Decrease  ARB.  Start  Increase   Decrease   B-blocker.  Start  Increase   Decrease  calcium channel blocker.  Start  Increase   Decrease  .  Additional evaluation:   ECG,    echocardiogram,   refer to cardiology,   CXR,   refer to vascular surgeon,   renal ultrasound,  renal biopsy.

## 2017-10-03 NOTE — PROGRESS NOTE ADULT - ASSESSMENT
89 yo AAM [facility] resident was brought to ER for evaluation of cms  uncontrolled ,hyperglycemia ,recived total of 25 u of humalog this morning with no effect BG esvin dmission was found to be above 600 Quinones cath is draining purulent urine and patient diagnosed with uti and urosepsis Found to have hypotension ,ARVIN on CKD and elevated LFTS ,likely secondary to shock liver and due to sepsis Admitted for septic workup and evaluation, hem was consulted for thrombocytopenia  platelet count is low but stable/slow improving, kerline antibody test result was negative  pt with low egfr/renal failure and nephrology is following  abn spep-- family/hcp  refused bm bx/further w/u to exclude plasma cell dyscrasia/other hem pathology  pt failed speech and swallow and gi/critical care is following and pt is on ngt feeding  pt is on heparin 5000 units s/c bid for dvtp  thrombocytopenia--more likely from underlying liver disease/ch itp , no splenomegaly on ct scan, cannot exclude mds/myeloma or other pathology as family declined invasive hem procedure, plt low but stable, monitor cbcd  overall poor prognosis  earlier case was d/w daughter wally monuche at [phone]

## 2017-10-03 NOTE — PROGRESS NOTE ADULT - SUBJECTIVE AND OBJECTIVE BOX
INTERVAL HPI/OVERNIGHT EVENTS: hypotensive episodes, worsening renal function, minimally responsive   HPI:  89 yo AAM [facility] resident was brought to ER for evaluation of cms  uncontrolled ,hyperglycemia ,recived total of 25 u of humalog this morning with no effect BG esvin dmission was found to be above 600 Quinones cath is draining purulent urine and patient diagnosed with uti and urosepsis Found to have hypotension ,ARVIN on CKD and elevated LFTS ,likely secondary to shock liver and due to sepsis Admitted for septic workup and evaluation,send blood and urine cx,serial lactate levels,monitor vitals closley,ivfs hydration,monitor urine output and renal profile,iv abx initiated Found to have troponin elevation and admitted to spcu ,seen by cardiologist ,likely troponinemia related to ARF patient became a long term resident about 3 mnts ago due to progression of dem,entia and Parkinsons disease Palliative care consult requested ,to discuss advance directives and complete MOLST ngt in place still lethargic, no n/v/d/f/c       MEDICATIONS  (STANDING):  dextrose 5%. 1000 milliLiter(s) (50 mL/Hr) IV Continuous <Continuous>  dextrose 50% Injectable 12.5 Gram(s) IV Push once  dextrose 50% Injectable 25 Gram(s) IV Push once  dextrose 50% Injectable 25 Gram(s) IV Push once  nystatin Powder 1 Application(s) Topical two times a day  insulin glargine Injectable (LANTUS) 10 Unit(s) SubCutaneous at bedtime  famotidine Injectable 20 milliGRAM(s) IV Push daily  lactobacillus acidophilus 1 Tablet(s) Oral every 8 hours  tamsulosin 0.4 milliGRAM(s) Oral at bedtime  amLODIPine   Tablet 10 milliGRAM(s) Oral daily  hydrocortisone hemorrhoidal Suppository 1 Suppository(s) Rectal daily  senna 2 Tablet(s) Oral at bedtime  docusate sodium 100 milliGRAM(s) Oral three times a day  polyethylene glycol 3350 17 Gram(s) Oral daily  aspirin  chewable 81 milliGRAM(s) Enteral Tube daily  hydrALAZINE 25 milliGRAM(s) Oral every 8 hours  ertapenem  IVPB      ertapenem  IVPB 500 milliGRAM(s) IV Intermittent every 24 hours  rifaximin 550 milliGRAM(s) Oral two times a day  lactulose Syrup 30 Gram(s) Oral two times a day  ursodiol Capsule 300 milliGRAM(s) Oral three times a day with meals  fondaparinux Injectable 2.5 milliGRAM(s) SubCutaneous daily  insulin lispro (HumaLOG) corrective regimen sliding scale   SubCutaneous every 4 hours    MEDICATIONS  (PRN):  dextrose Gel 1 Dose(s) Oral once PRN Blood Glucose LESS THAN 70 milliGRAM(s)/deciliter  glucagon  Injectable 1 milliGRAM(s) IntraMuscular once PRN Glucose LESS THAN 70 milligrams/deciliter  bisacodyl Suppository 10 milliGRAM(s) Rectal daily PRN Constipation      Allergies    No Known Allergies    Intolerances          General: not verbally communicative, does not follow commands. Further ROS unable to be obtained     PHYSICAL EXAM:   Vital Signs:  Vital Signs Last 24 Hrs  T(C): 36.7 (01 Oct 2017 12:09), Max: 37.4 (30 Sep 2017 20:01)  T(F): 98.1 (01 Oct 2017 12:09), Max: 99.4 (30 Sep 2017 20:01)  HR: 87 (01 Oct 2017 12:00) (87 - 110)  BP: 123/58 (01 Oct 2017 12:00) (103/50 - 130/62)  BP(mean): 75 (01 Oct 2017 12:00) (64 - 82)  RR: 24 (01 Oct 2017 12:00) (11 - 31)  SpO2: 96% (01 Oct 2017 12:00) (92% - 97%)  Daily     Daily Weight in k.7 (01 Oct 2017 04:00)I&O's Summary    30 Sep 2017 07:01  -  01 Oct 2017 07:00  --------------------------------------------------------  IN: 3360 mL / OUT: 1050 mL / NET: 2310 mL    01 Oct 2017 07:01  -  01 Oct 2017 13:52  --------------------------------------------------------  IN: 860 mL / OUT: 0 mL / NET: 860 mL        GENERAL:  does not verbal communicate, (+) minimal response to painful stimuli/does not follow commands  HEENT:  NC/AT,  conjunctivae clear and pink, no thyromegaly, nodules, adenopathy, no JVD, sclera -anicteric  CHEST:  Full & symmetric excursion, no increased effort, breath sounds clear ngt  HEART:  Regular rhythm, S1, S2, no murmur/rub/S3/S4, no abdominal bruit, no edema  ABDOMEN:  Soft, non-tender, non-distended, normoactive bowel sounds,  no masses ,no hepato-splenomegaly, no signs of chronic liver disease  EXTEREMITIES:  no cyanosis,clubbing or edema  SKIN:  No rash/erythema/ecchymoses/petechiae/wounds/abscess/warm/dry  NEURO:  Alert, oriented x0, no asterixis, no tremor, no encephalopathy      LABS: reviewed INTERVAL HPI/OVERNIGHT EVENTS: hypotensive episodes, worsening renal function, minimally responsive   HPI:  89 yo AAM [facility] SN[facility] resident was brought to ER for evaluation of cms  uncontrolled ,hyperglycemia ,recived total of 25 u of humalog this morning with no effect BG esvin dmission was found to be above 600 Quinones cath is draining purulent urine and patient diagnosed with uti and urosepsis Found to have hypotension ,ARVIN on CKD and elevated LFTS ,likely secondary to shock liver and due to sepsis Admitted for septic workup and evaluation,send blood and urine cx,serial lactate levels,monitor vitals closley,ivfs hydration,monitor urine output and renal profile,iv abx initiated Found to have troponin elevation and admitted to spcu ,seen by cardiologist ,likely troponinemia related to ARF patient became a long term resident about 3 mnts ago due to progression of dem,entia and Parkinsons disease Palliative care consult requested ,to discuss advance directives and complete MOLST ngt in place still lethargic, no n/v/d/f/c       MEDICATIONS  (STANDING):  dextrose 5%. 1000 milliLiter(s) (50 mL/Hr) IV Continuous <Continuous>  dextrose 50% Injectable 12.5 Gram(s) IV Push once  dextrose 50% Injectable 25 Gram(s) IV Push once  dextrose 50% Injectable 25 Gram(s) IV Push once  nystatin Powder 1 Application(s) Topical two times a day  insulin glargine Injectable (LANTUS) 10 Unit(s) SubCutaneous at bedtime  famotidine Injectable 20 milliGRAM(s) IV Push daily  lactobacillus acidophilus 1 Tablet(s) Oral every 8 hours  tamsulosin 0.4 milliGRAM(s) Oral at bedtime  amLODIPine   Tablet 10 milliGRAM(s) Oral daily  hydrocortisone hemorrhoidal Suppository 1 Suppository(s) Rectal daily  senna 2 Tablet(s) Oral at bedtime  docusate sodium 100 milliGRAM(s) Oral three times a day  polyethylene glycol 3350 17 Gram(s) Oral daily  aspirin  chewable 81 milliGRAM(s) Enteral Tube daily  hydrALAZINE 25 milliGRAM(s) Oral every 8 hours  ertapenem  IVPB      ertapenem  IVPB 500 milliGRAM(s) IV Intermittent every 24 hours  rifaximin 550 milliGRAM(s) Oral two times a day  lactulose Syrup 30 Gram(s) Oral two times a day  ursodiol Capsule 300 milliGRAM(s) Oral three times a day with meals  fondaparinux Injectable 2.5 milliGRAM(s) SubCutaneous daily  insulin lispro (HumaLOG) corrective regimen sliding scale   SubCutaneous every 4 hours    MEDICATIONS  (PRN):  dextrose Gel 1 Dose(s) Oral once PRN Blood Glucose LESS THAN 70 milliGRAM(s)/deciliter  glucagon  Injectable 1 milliGRAM(s) IntraMuscular once PRN Glucose LESS THAN 70 milligrams/deciliter  bisacodyl Suppository 10 milliGRAM(s) Rectal daily PRN Constipation      Allergies    No Known Allergies    Intolerances          General: not verbally communicative, does not follow commands. Further ROS unable to be obtained     PHYSICAL EXAM:   Vital Signs:  Vital Signs Last 24 Hrs  T(C): 36.7 (01 Oct 2017 12:09), Max: 37.4 (30 Sep 2017 20:01)  T(F): 98.1 (01 Oct 2017 12:09), Max: 99.4 (30 Sep 2017 20:01)  HR: 87 (01 Oct 2017 12:00) (87 - 110)  BP: 123/58 (01 Oct 2017 12:00) (103/50 - 130/62)  BP(mean): 75 (01 Oct 2017 12:00) (64 - 82)  RR: 24 (01 Oct 2017 12:00) (11 - 31)  SpO2: 96% (01 Oct 2017 12:00) (92% - 97%)  Daily     Daily Weight in k.7 (01 Oct 2017 04:00)I&O's Summary    30 Sep 2017 07:01  -  01 Oct 2017 07:00  --------------------------------------------------------  IN: 3360 mL / OUT: 1050 mL / NET: 2310 mL    01 Oct 2017 07:01  -  01 Oct 2017 13:52  --------------------------------------------------------  IN: 860 mL / OUT: 0 mL / NET: 860 mL        GENERAL:  does not verbal communicate, (+) minimal response to painful stimuli/does not follow commands  HEENT:  NC/AT,  conjunctivae clear and pink, no thyromegaly, nodules, adenopathy, no JVD, sclera -anicteric  CHEST:  Full & symmetric excursion, no increased effort, breath sounds clear ngt  HEART:  Regular rhythm, S1, S2, no murmur/rub/S3/S4, no abdominal bruit, no edema  ABDOMEN:  Soft, non-tender, non-distended, normoactive bowel sounds,  no masses ,no hepato-splenomegaly, no signs of chronic liver disease  EXTEREMITIES:  no cyanosis,clubbing or edema  SKIN:  (+) perianal wound        LABS: reviewed

## 2017-10-04 DIAGNOSIS — D64.9 ANEMIA, UNSPECIFIED: ICD-10-CM

## 2017-10-04 LAB
ALBUMIN SERPL ELPH-MCNC: 1.2 G/DL — LOW (ref 3.3–5)
ALP SERPL-CCNC: 147 U/L — HIGH (ref 30–120)
ALT FLD-CCNC: 18 U/L DA — SIGNIFICANT CHANGE UP (ref 10–60)
AMMONIA BLD-MCNC: 14 UMOL/L — SIGNIFICANT CHANGE UP (ref 11–32)
ANION GAP SERPL CALC-SCNC: 13 MMOL/L — SIGNIFICANT CHANGE UP (ref 5–17)
AST SERPL-CCNC: 48 U/L — HIGH (ref 10–40)
BILIRUB SERPL-MCNC: 2.9 MG/DL — HIGH (ref 0.2–1.2)
BUN SERPL-MCNC: 105 MG/DL — HIGH (ref 7–23)
CALCIUM SERPL-MCNC: 7.6 MG/DL — LOW (ref 8.4–10.5)
CHLORIDE SERPL-SCNC: 107 MMOL/L — SIGNIFICANT CHANGE UP (ref 96–108)
CO2 SERPL-SCNC: 19 MMOL/L — LOW (ref 22–31)
CREAT SERPL-MCNC: 4.15 MG/DL — HIGH (ref 0.5–1.3)
GLUCOSE SERPL-MCNC: 387 MG/DL — HIGH (ref 70–99)
HCT VFR BLD CALC: 33.4 % — LOW (ref 39–50)
HGB BLD-MCNC: 11.4 G/DL — LOW (ref 13–17)
INR BLD: 1.16 RATIO — SIGNIFICANT CHANGE UP (ref 0.88–1.16)
MAGNESIUM SERPL-MCNC: 2.7 MG/DL — HIGH (ref 1.6–2.6)
MCHC RBC-ENTMCNC: 30 PG — SIGNIFICANT CHANGE UP (ref 27–34)
MCHC RBC-ENTMCNC: 34 GM/DL — SIGNIFICANT CHANGE UP (ref 32–36)
MCV RBC AUTO: 88.3 FL — SIGNIFICANT CHANGE UP (ref 80–100)
PHOSPHATE SERPL-MCNC: 5.4 MG/DL — HIGH (ref 2.5–4.5)
PLATELET # BLD AUTO: 190 K/UL — SIGNIFICANT CHANGE UP (ref 150–400)
POTASSIUM SERPL-MCNC: 3.8 MMOL/L — SIGNIFICANT CHANGE UP (ref 3.5–5.3)
POTASSIUM SERPL-SCNC: 3.8 MMOL/L — SIGNIFICANT CHANGE UP (ref 3.5–5.3)
PROT SERPL-MCNC: 5.6 G/DL — LOW (ref 6–8.3)
PROTHROM AB SERPL-ACNC: 12.7 SEC — SIGNIFICANT CHANGE UP (ref 9.8–12.7)
RBC # BLD: 3.79 M/UL — LOW (ref 4.2–5.8)
RBC # FLD: 15.6 % — HIGH (ref 10.3–14.5)
SODIUM SERPL-SCNC: 139 MMOL/L — SIGNIFICANT CHANGE UP (ref 135–145)
WBC # BLD: 16.1 K/UL — HIGH (ref 3.8–10.5)
WBC # FLD AUTO: 16.1 K/UL — HIGH (ref 3.8–10.5)

## 2017-10-04 RX ORDER — INSULIN HUMAN 100 [IU]/ML
INJECTION, SOLUTION SUBCUTANEOUS EVERY 6 HOURS
Qty: 0 | Refills: 0 | Status: DISCONTINUED | OUTPATIENT
Start: 2017-10-04 | End: 2017-10-04

## 2017-10-04 RX ADMIN — Medication 1 TABLET(S): at 06:09

## 2017-10-04 RX ADMIN — INSULIN HUMAN 10: 100 INJECTION, SOLUTION SUBCUTANEOUS at 14:11

## 2017-10-04 RX ADMIN — ERTAPENEM SODIUM 110 MILLIGRAM(S): 1 INJECTION, POWDER, LYOPHILIZED, FOR SOLUTION INTRAMUSCULAR; INTRAVENOUS at 15:34

## 2017-10-04 RX ADMIN — HEPARIN SODIUM 5000 UNIT(S): 5000 INJECTION INTRAVENOUS; SUBCUTANEOUS at 06:08

## 2017-10-04 RX ADMIN — INSULIN HUMAN 10: 100 INJECTION, SOLUTION SUBCUTANEOUS at 18:47

## 2017-10-04 RX ADMIN — NYSTATIN CREAM 1 APPLICATION(S): 100000 CREAM TOPICAL at 06:09

## 2017-10-04 RX ADMIN — Medication 4: at 06:08

## 2017-10-04 RX ADMIN — Medication 81 MILLIGRAM(S): at 14:03

## 2017-10-04 RX ADMIN — Medication 1 TABLET(S): at 14:12

## 2017-10-04 RX ADMIN — SODIUM CHLORIDE 50 MILLILITER(S): 9 INJECTION, SOLUTION INTRAVENOUS at 04:10

## 2017-10-04 RX ADMIN — Medication 1 SUPPOSITORY(S): at 14:03

## 2017-10-04 RX ADMIN — HEPARIN SODIUM 5000 UNIT(S): 5000 INJECTION INTRAVENOUS; SUBCUTANEOUS at 18:45

## 2017-10-04 RX ADMIN — FAMOTIDINE 20 MILLIGRAM(S): 10 INJECTION INTRAVENOUS at 14:03

## 2017-10-04 RX ADMIN — NYSTATIN CREAM 1 APPLICATION(S): 100000 CREAM TOPICAL at 18:46

## 2017-10-04 NOTE — GOALS OF CARE CONVERSATION - PERSONAL ADVANCE DIRECTIVE - NS PRO AD PATIENT TYPE ON CHART
Medical Orders for Life-Sustaining Treatment (MOLST)
Health Care Proxy (HCP)/Medical Orders for Life-Sustaining Treatment (MOLST)
Medical Orders for Life-Sustaining Treatment (MOLST)

## 2017-10-04 NOTE — PROGRESS NOTE ADULT - SUBJECTIVE AND OBJECTIVE BOX
Patient seen and examined today Plan discussed/Questions answered  (with patient/ancillary staff/colleagues) Chart notes reviewd More detailed Pulm/Critical Care notes, assessment and plan  will be added later today as needed after reviewing further labs as they become available     Notable interval events reviewed    ROS/PE  . REVIEW OF SYSTEMS Patient is unable to give any reliable review of systems PHYSICAL EXAM    HEENT Unremarkable PERRLA atraumatic  RESP Fair air entry EXP prolonged    Harsh breath sound Resp distres mild  CARDIAC S1 S2 No S3     NO JVD   ABDOMEN SOFT BS PRESENT NOT DISTENDED No hepatosplenomegaly  PEDAL EDEMA present No calf tenderness  NO rash GENERAL Not TOXIC looking  Awake A & O x 1   . Patient seen and examined today Plan discussed/Questions answered  (with patient/ancillary staff/colleagues) Chart notes reviewd More detailed Pulm/Critical Care notes, assessment and plan  will be added later today as needed after reviewing further labs as they become available     Notable interval events reviewed    ROS/PE  . REVIEW OF SYSTEMS Patient is unable to give any reliable review of systems PHYSICAL EXAM    HEENT Unremarkable PERRLA atraumatic  RESP Fair air entry EXP prolonged    Harsh breath sound Resp distres mild  CARDIAC S1 S2 No S3     NO JVD   ABDOMEN SOFT BS PRESENT NOT DISTENDED No hepatosplenomegaly  PEDAL EDEMA present No calf tenderness  NO rash GENERAL Not TOXIC looking  Awake A & O x 1   . VITALS/LABS  10/4/2017 afeb 88 107/50 17   10/4/2017 IV D5 NS 50 (10/3) .6 (10/1)   10/4/2017 u 250 last s  10/4/2017 W 16.1 Hb 11.4 Plt 190 INR 1.16 Na 139 K 3.8 CO2 19 Cr 4.1  PROBLEM ASSESSMENT PLAN   RESP   9/29 VBG pH 745 10/1 VBG pH 743  UTI PNEUMONIA SEPSIS ESBL E COLI PERSISTENT BACTEREMIA (9/26, 9/28, 9/30) 10/2 bc n   Ertapenem (9/29)   10/2  CTCAP Mild airspace dis L base sl improved cw 9/26/2017 2) Mini ascites R flank density blood or infection 3) GB distended 4) Edematous mesentery 5) Thickened bowel wall R colon   9/27 Leg n   9/26-9/27-9/28-9/30-10/1-10/3-10/4   W 12.2-20.8-12.9-10.1-9.5-12.3-16.1  9/26-9/28 B 6-3  POSSIBLE MI   9/26-9/27-9/28-9/29/2017    CK - [id number] Tr 1 .531 (i) - .482 (i) - .491 (i)-.280 (i)-.158 (i)   ASA 81 (9/26)   THROMBOCYTOPENIA   Likely sec sepsis   HYPERNATREMIA  Free water 400.6 (9/30)   9/26-9/27-9/28-9/29-9/30-10/1-10/3-10/4/2017  Na [phone]  ARVIN   9/26-9/27-9/28-9/29-10/1-10/2-10/3-10/4   Cr 5.2-3.7-2.3-2.16-2.4-3.0-4.2-4.1   HYPERGLYCEMIA  9/26/20-17 G 615 9/27 HbA1C 9.5   ELEVATED LFTs HYPERBILIRUBINEMIA   9/26-9/27-9/28-9/29-10/1-10/2-10/3-10/4/2017   [phone]  [phone]  alt [id number]  9/28-9/29-9/30-10/3-10/4   BR 2.3-4-3.9-4.2-3.4-2.4  9/27 Hep C reactive Genotype 1b  AMS 9/26 CT H No ac   MALNUTRITION 9/29-10/3 Alb 1.7-1.1   LEG SWELLING   10/3 V duplex legs n   GLOBAL ISSUE/BEST PRACTICE:        PROBLEM:      Analgesia:     na                        PROBLEM: Sedation:     na               PROBLEM: HOB elevation:   y              PROBLEM: Stress ulcer proph:    pepcid 20 (9/26)                       PROBLEM: VTE prophylaxis:      hsc (10/2) compr device (9/30)   PROBLEM: Glycemic control:    iss (9/26)    PROBLEM: Nutrition:    Glucerna 1.2 1440 (9/30)   PROBLEM: Advanced directive: dnr (9/27)      PROBLEM: Allergies:  na  PATIENT DESCRIPTION CC HPI PMH SOCIAL   89 m [place] resident [facility] was sent from [facility] for lethargy BP 90  Hi bld sugar found to be septic with pus draining from Quinones  admitted [facility] 9/26/2017   PM CVA DM2 Hypertension CAD 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   NH Meds  Norvasc 5 ASA 81 Lantus 10 mvi Tamsuloin .4 JANUSZ NCS Lo fat reg consistency Novolog R groin excoriated area   HOSPITAL COURSE GENERAL 9/26/2017 ADMISSION [facility]  Pt was found to have ESBL E coli bacteremic urosepsis Bactermeia noted 9/26, 9/28, 9/30 BC became negative 10/2Rxed zosyn 9/26-9/29 Ertapenem 9/29 onward  ARVIN Nonoliguuric was poa (9/26 Cr 5.1) Hyperglycmic POA Pt was noted to have elevated LFTs with hyperbilirubinemia  and 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 PICC placed 10/2  SYNOPSIS Remains in nonoliguric renal failure on Ertapenem for ESBL sepsis with persistent hyperbilirubinemia poss sec Hep C cirrhosis   HOSPITAL COURSE 9/26/2017 ADMISSION [facility]   PROBLEM SPICIFIC COURSE  PLAN   RESP  Gas exchange was acceptable on low flow O2 on arrival   LACTICEMIA POA Rxed with IVF   UTI PNEUMONIA SEPSIS E COLI PERSISTENT BACTEREMIA (9/26, 9/28, 9/30)  (LIKELY SEC TO) (ESBL E COLI) UTI AND PNEUMONIA SEPSIS POA Rxed with zosyn (9/26) empirically changed to ertapenem (9/29)  9/26/2017 CXR napd   POSSIBLE MI  9/26/2017 Tr 1 .531 (i)  Was Rxed with ASA (9/26) bb  HYPERNATREMIA 9/26/2017 Na 151 Rxed with hypotonic fluids  IV changed to D5 (9/28) then 1/2 NS (9/29)   ARVIN Nonoliguric POA   9/26 Cr 5.2 P{ossibly sec dehydrationm Was Rxed with IVF    HYPERGLYCEMIA 9/26/2017 G 615 Was Rxed with iss (9/26) No evidence of HONK or DKA (Ketones neg) 9/27 FW ordered NGT   ELEVATED LFTs  9/26/2017   alt 92 Poss sec shock US abd ordeerd 9/26/2017 9/27 Hep c 1b came positive   AMS 9/26 CT H No ac  HYPERTENSION Norvasc Hydralazine added   THROMBOCYTOPENIA Likely sec sepsis   PICC 10/2   TIME SPENT Over 35 minutes aggregate critical care time spent on encounter; activities included   direct patient care, counseling and/or coordinating care reviewing notes, lab data/ imaging , discussion with multidisciplinary team/ patient  /family. Risks, benefits, alternatives  discussed in detail. Questions/concerns  were addressed  to the best of my ability .

## 2017-10-04 NOTE — PROGRESS NOTE ADULT - SUBJECTIVE AND OBJECTIVE BOX
ICU Progress Note    HPI:    S:    Pt seen and examined  HD # 9  Pt here for sepsis 2/2 UTI  Decision made today by family for DNR/DNI/comfort care  Awaiting disposition to hospice facility    Allergies    No Known Allergies    Intolerances    MEDICATIONS  (STANDING):    MEDICATIONS  (PRN):    Drug Dosing Weight  Height (cm): 170.18 (26 Sep 2017 12:24)  Weight (kg): 72.6 (26 Sep 2017 12:24)  BMI (kg/m2): 25.1 (26 Sep 2017 12:24)  BSA (m2): 1.84 (26 Sep 2017 12:24)    CENTRAL LINE: [x] YES [ ] NO  LOCATION:  RUE PICC    HOOVER: [x ] YES [ ] NO    DATE INSERTED:  REMOVE: [ ] YES [x ] NO  EXPLAIN: Pt comfort    PAST MEDICAL & SURGICAL HISTORY:  Parkinson disease  Neuropathy  CKD (chronic kidney disease)  BPH (benign prostatic hyperplasia)  Diabetes  Angina pectoris  Hypertension  Diabetes  Renal failure  Dementia  No significant past surgical history      FAMILY HISTORY:  No pertinent family history in first degree relatives      ROS: See HPI; otherwise, all systems reviewed and negative.    O:    ICU Vital Signs Last 24 Hrs  T(C): 36.9 (04 Oct 2017 20:08), Max: 37.4 (04 Oct 2017 00:02)  T(F): 98.4 (04 Oct 2017 20:08), Max: 99.3 (04 Oct 2017 00:02)  HR: 93 (04 Oct 2017 18:00) (88 - 96)  BP: 116/54 (04 Oct 2017 18:00) (93/39 - 116/54)  BP(mean): 72 (04 Oct 2017 18:00) (56 - 80)  ABP: --  ABP(mean): --  RR: 26 (04 Oct 2017 18:00) (17 - 30)  SpO2: 92% (04 Oct 2017 18:00) (92% - 98%)    I&O's Detail    03 Oct 2017 07:01  -  04 Oct 2017 07:00  --------------------------------------------------------  IN:    dextrose 5% + sodium chloride 0.9%: 1000 mL    dextrose 5%.: 225 mL    Enteral Tube Flush: 325 mL    Glucerna: 460 mL    IV PiggyBack: 250 mL  Total IN: 2260 mL    OUT:    Indwelling Catheter - Urethral: 550 mL  Total OUT: 550 mL    Total NET: 1710 mL      04 Oct 2017 07:01  -  04 Oct 2017 21:11  --------------------------------------------------------  IN:    dextrose 5% + sodium chloride 0.9%: 600 mL    Enteral Tube Flush: 225 mL    Free Water: 100 mL    Glucerna: 440 mL  Total IN: 1365 mL    OUT:    Indwelling Catheter - Urethral: 300 mL  Total OUT: 300 mL    Total NET: 1065 mL    PE:    Constitutional: Elderly M lying in bed in NAD.   Neck: No JVD, trachea midline.   Respiratory: Rhonchi B/L good BS B/L no W/R/R.  Cardiovascular: S1S2+ RRR no M/R/G.  Gastrointestinal: Soft, NTND.  Extremities: No peripheral edema, No cyanosis, clubbing.  Neurological: Somnolent, does not respond to commands.  Skin: No rashes, warm, moist.    LABS:    CBC Full  -  ( 04 Oct 2017 06:16 )  WBC Count : 16.1 K/uL  Hemoglobin : 11.4 g/dL  Hematocrit : 33.4 %  Platelet Count - Automated : 190 K/uL  Mean Cell Volume : 88.3 fl  Mean Cell Hemoglobin : 30.0 pg  Mean Cell Hemoglobin Concentration : 34.0 gm/dL  Auto Neutrophil # : x  Auto Lymphocyte # : x  Auto Monocyte # : x  Auto Eosinophil # : x  Auto Basophil # : x  Auto Neutrophil % : x  Auto Lymphocyte % : x  Auto Monocyte % : x  Auto Eosinophil % : x  Auto Basophil % : x    10-04    139  |  107  |  105<H>  ----------------------------<  387<H>  3.8   |  19<L>  |  4.15<H>    Ca    7.6<L>      04 Oct 2017 06:16  Phos  5.4     10-04  Mg     2.7     10-04    TPro  5.6<L>  /  Alb  1.2<L>  /  TBili  2.9<H>  /  DBili  x   /  AST  48<H>  /  ALT  18  /  AlkPhos  147<H>  10-04    PT/INR - ( 04 Oct 2017 06:16 )   PT: 12.7 sec;   INR: 1.16 ratio         PTT - ( 03 Oct 2017 06:10 )  PTT:26.9 sec    CAPILLARY BLOOD GLUCOSE  400 (04 Oct 2017 18:00)            LIVER FUNCTIONS - ( 04 Oct 2017 06:16 )  Alb: 1.2 g/dL / Pro: 5.6 g/dL / ALK PHOS: 147 U/L / ALT: 18 U/L DA / AST: 48 U/L / GGT: x             A:    89yMale  HD # 9    Here for:    1. Shock, septic, 2/2  2. UTI  3. ARVIN  4. Encephalopathy  5. Dysphagia    Family wishes per documentation in chart is DNR/DNI/comfort care, NO IVF, abx or artificial ffeding    I will respect these wishes and d/c'd all of these orders        P:    Pt comfort  Symptom mgmt    Dispo---> To hospice

## 2017-10-04 NOTE — PROGRESS NOTE ADULT - SUBJECTIVE AND OBJECTIVE BOX
ALESHA HODGE is a yMale , patient examined and chart reviewed.     INTERVAL HPI/ OVERNIGHT EVENTS:  Afebrile. Remains lethargic.  No events.    Past Medical History--  PAST MEDICAL & SURGICAL HISTORY:  Parkinson disease  Neuropathy  CKD (chronic kidney disease)  BPH (benign prostatic hyperplasia)  Diabetes  Angina pectoris  Hypertension  Diabetes  Renal failure  Dementia  No significant past surgical history    For details regarding the patient's social history, family history, and other miscellaneous elements, please refer the initial infectious diseases consultation and/or the admitting history and physical examination for this admission.    ROS:  Unable to obtain due to : Lethargy    Current inpatient medications :    ANTIBIOTICS/RELEVANT:  lactobacillus acidophilus 1 Tablet(s) Oral every 8 hours  ertapenem  IVPB      ertapenem  IVPB 500 milliGRAM(s) IV Intermittent every 24 hours  rifaximin 550 milliGRAM(s) Oral two times a day      dextrose 5%. 1000 milliLiter(s) IV Continuous <Continuous>  dextrose Gel 1 Dose(s) Oral once PRN  dextrose 50% Injectable 12.5 Gram(s) IV Push once  dextrose 50% Injectable 25 Gram(s) IV Push once  dextrose 50% Injectable 25 Gram(s) IV Push once  glucagon  Injectable 1 milliGRAM(s) IntraMuscular once PRN  nystatin Powder 1 Application(s) Topical two times a day  famotidine Injectable 20 milliGRAM(s) IV Push daily  tamsulosin 0.4 milliGRAM(s) Oral at bedtime  hydrocortisone hemorrhoidal Suppository 1 Suppository(s) Rectal daily  senna 2 Tablet(s) Oral at bedtime  docusate sodium 100 milliGRAM(s) Oral three times a day  bisacodyl Suppository 10 milliGRAM(s) Rectal daily PRN  polyethylene glycol 3350 17 Gram(s) Oral daily  aspirin  chewable 81 milliGRAM(s) Enteral Tube daily  lactulose Syrup 30 Gram(s) Oral two times a day  ursodiol Capsule 300 milliGRAM(s) Oral three times a day with meals  fondaparinux Injectable 2.5 milliGRAM(s) SubCutaneous daily  insulin lispro (HumaLOG) corrective regimen sliding scale   SubCutaneous every 4 hours  dextrose 5%. 1000 milliLiter(s) IV Continuous <Continuous>      Objective:  T(C): 36.9 (10-04-17 @ 07:36), Max: 37.4 (10-03-17 @ 18:30)  HR: 95 (10-04-17 @ 08:00) (88 - 96)  BP: 111/57 (10-04-17 @ 08:00) (93/39 - 123/55)  RR: 21 (10-04-17 @ 08:00) (16 - 33)  SpO2: 95% (10-04-17 @ 08:00) (91% - 99%)  Wt(kg): --  I&O's Summary    03 Oct 2017 07:01  -  04 Oct 2017 07:00  --------------------------------------------------------  IN: 2260 mL / OUT: 550 mL / NET: 1710 mL        Physical Exam:  GEN: Lethargic NGT in place   HEENT: normocephalic and atraumatic. EOMI. MARTIR. Moist mucosa. Clear Posterior pharynx.  NECK: Supple. No carotid bruits.  No lymphadenopathy or thyromegaly.  LUNGS: Decreased to auscultation.  HEART: Regular rate and rhythm without murmur.  ABDOMEN: Soft, nontender, and nondistended.  Positive bowel sounds.  No hepatosplenomegaly was noted.  EXTREMITIES: Without any cyanosis, clubbing, rash, lesions + edema.  NEUROLOGIC: Lethargic  SKIN: No ulceration or induration present.      LABS:             11.4   16.1  )-----------( 190      ( 04 Oct 2017 06:16 )             33.4     10-04    139  |  107  |  105<H>  ----------------------------<  387<H>  3.8   |  19<L>  |  4.15<H>    Ca    7.6<L>      04 Oct 2017 06:16  Phos  5.4     10-04  Mg     2.7     10-04    TPro  5.6<L>  /  Alb  1.2<L>  /  TBili  2.9<H>  /  DBili  x   /  AST  48<H>  /  ALT  18  /  AlkPhos  147<H>  10-04    PT/INR - ( 04 Oct 2017 06:16 )   PT: 12.7 sec;   INR: 1.16 ratio        MICROBIOLOGY:  Culture - Blood in AM (10.02.17 @ 12:04)    Specimen Source: .Blood Blood-Peripheral    Culture Results:   No growth to date.      Culture - Blood in AM (09.30.17 @ 12:02)    Gram Stain:   Growth in aerobic bottle: Gram Negative Rods    -  Amikacin: S 16    -  Ampicillin: R >16    -  Ampicillin/Sulbactam: R 16/8    -  Aztreonam: R >16    -  Cefazolin: R >16    -  Trimethoprim/Sulfamethoxazole: S <=0.5/9.5    -  Meropenem: S <=1    -  Piperacillin/Tazobactam: R <=8    -  Tobramycin: R >8    -  Cefepime: R >16    -  Cefoxitin: S <=4    -  Ceftazidime: R >16    -  Ceftriaxone: R >32    -  Ciprofloxacin: R >2    -  Ertapenem: S <=0.5    -  Gentamicin: R >8    -  Imipenem: S <=1    -  Levofloxacin: R >4    Specimen Source: .Blood Blood-Peripheral    Organism: Escherichia coli ESBL    Culture Results:   Growth in aerobic bottle: Escherichia coli ESBL    Organism Identification: Escherichia coli ESBL    Method Type: PAMELA    Culture - Blood (09.30.17 @ 12:02)    Specimen Source: .Blood Blood    Culture Results:   No growth to date.    Culture - Blood (09.29.17 @ 02:16)    Gram Stain:   Growth in anaerobic bottle: Gram Negative Rods  Growth in aerobic bottle: Gram Negative Rods    Specimen Source: .Blood Blood-Peripheral    Culture Results:   Growth in aerobic and anaerobic bottles: Escherichia coli  See previous culture [id number]    Culture - Urine (09.26.17 @ 21:10)    -  Piperacillin/Tazobactam: R <=8    -  Tobramycin: R >8    -  Trimethoprim/Sulfamethoxazole: S <=0.5/9.5    -  Ertapenem: S <=0.5    -  Gentamicin: R >8    -  Imipenem: S <=1    -  Levofloxacin: R >4    -  Meropenem: S <=1    -  Nitrofurantoin: S <=32    -  Amikacin: S <=8    -  Ampicillin: R >16    -  Ampicillin/Sulbactam: R 16/8    -  Aztreonam: R >16    -  Cefazolin: R >16    -  Cefepime: R >16    -  Cefoxitin: S <=4    -  Ceftazidime: R >16    -  Ceftriaxone: R >32    -  Ciprofloxacin: R >2    Specimen Source: .Urine Clean Catch (Midstream)    Culture Results:   >100,000 CFU/ml Escherichia coli ESBL    Organism Identification: Escherichia coli ESBL    Organism: Escherichia coli ESBL    Method Type: PAMELA    Culture - Blood (09.28.17 @ 00:20)    Gram Stain:   Growth in aerobic and anaerobic bottles: Gram Negative Rods    Specimen Source: .Blood Blood-Peripheral    Culture Results:   Growth in aerobic and anaerobic bottles: Escherichia coli ESBL  See previous culture [id number]    RADIOLOGY:    EXAM:  CHEST-PORTABLE URGENT                                  PROCEDURE DATE:  10/03/2017          INTERPRETATION:  Clinical information: Nasogastric tube placement    Portable study, 2:57 PM    3 images labeled 2, 3, final.    On the image labeledfinal, nasogastric tube is present with radiopaque   tip entering the stomach.    Cardiac monitor leads overlie the chest. PICC line present on the right   with tip localized to SVC. Slight haziness left lung base could indicate   atelectasis or developing infiltrate. Right lung normally expanded. Heart   size within normal limits. Aortic knob contains calcifications. No acute   osseous abnormalities.    IMPRESSION:    See above report.    Assessment :   89 year old male hx parkinson's dementia BPH admitted with severe sepsis with persistent Ecoli septicemia  sec obstructive uropathy sec urinary retention and UTI  Has ESBL Ecoli septicemia  Blood cultures from 10/02 ngtd  CT noted with no abscess   Doubt pna suspect atelectasis  ARVIN worsening  Abn LFTs better  HEP C  +troponins  Uncontrolled DM     Plan :    Cont Invanz 500mg IV daily day 5  Trend LFTS  Trend wbc  Dm control  Aspiration precautions  Will likely need PEG  Prognosis poor    Continue with present regiment.  Appropriate use of antibiotics and adverse effects reviewed.    I have discussed the above plan of care with patient/ family in detail. They expressed understanding of the the treatment plan . Risks, benefits and alternatives discussed in detail.   I have asked if they have any questions or concerns and appropriately addressed them to the best of my ability .      Critical care time greater then 35 minutes reviewing notes, labs data/ imaging , discussion with multidisciplinary team.    Thank you for allowing me to participate in care of your patient .      Allen Hall MD  Infectious Disease  [phone]

## 2017-10-04 NOTE — PROGRESS NOTE ADULT - PROBLEM SELECTOR PLAN 7
PEG placement vs CMO Spoke to HCP in length and great details 10/02  ,requests more guidance from [facility] service Dr Martinez and Tejal MEDEL spoke to the family 10/03  and meeting is scheduled for today  HCP stated that likely family will request PEG There are 5 sublings ,I spoke to the daughter YON,who is HCP  and one  son

## 2017-10-04 NOTE — PROGRESS NOTE ADULT - SUBJECTIVE AND OBJECTIVE BOX
INTERVAL HPI/OVERNIGHT EVENTS:  worsening renal function, minimally responsive   HPI:  89 yo AAM [facility] SN[facility] resident was brought to ER for evaluation of cms  uncontrolled ,hyperglycemia ,recived total of 25 u of humalog this morning with no effect BG esvin dmission was found to be above 600 Quinones cath is draining purulent urine and patient diagnosed with uti and urosepsis Found to have hypotension ,ARVIN on CKD and elevated LFTS ,likely secondary to shock liver and due to sepsis Admitted for septic workup and evaluation,send blood and urine cx,serial lactate levels,monitor vitals closley,ivfs hydration,monitor urine output and renal profile,iv abx initiated Found to have troponin elevation and admitted to spcu ,seen by cardiologist ,likely troponinemia related to ARF patient became a long term resident about 3 mnts ago due to progression of dem,entia and Parkinsons disease Palliative care consult requested ,to discuss advance directives and complete MOLST ngt in place still lethargic, no n/v/d/f/c       MEDICATIONS  (STANDING):  dextrose 5%. 1000 milliLiter(s) (50 mL/Hr) IV Continuous <Continuous>  dextrose 50% Injectable 12.5 Gram(s) IV Push once  dextrose 50% Injectable 25 Gram(s) IV Push once  dextrose 50% Injectable 25 Gram(s) IV Push once  nystatin Powder 1 Application(s) Topical two times a day  insulin glargine Injectable (LANTUS) 10 Unit(s) SubCutaneous at bedtime  famotidine Injectable 20 milliGRAM(s) IV Push daily  lactobacillus acidophilus 1 Tablet(s) Oral every 8 hours  tamsulosin 0.4 milliGRAM(s) Oral at bedtime  amLODIPine   Tablet 10 milliGRAM(s) Oral daily  hydrocortisone hemorrhoidal Suppository 1 Suppository(s) Rectal daily  senna 2 Tablet(s) Oral at bedtime  docusate sodium 100 milliGRAM(s) Oral three times a day  polyethylene glycol 3350 17 Gram(s) Oral daily  aspirin  chewable 81 milliGRAM(s) Enteral Tube daily  hydrALAZINE 25 milliGRAM(s) Oral every 8 hours  ertapenem  IVPB      ertapenem  IVPB 500 milliGRAM(s) IV Intermittent every 24 hours  rifaximin 550 milliGRAM(s) Oral two times a day  lactulose Syrup 30 Gram(s) Oral two times a day  ursodiol Capsule 300 milliGRAM(s) Oral three times a day with meals  fondaparinux Injectable 2.5 milliGRAM(s) SubCutaneous daily  insulin lispro (HumaLOG) corrective regimen sliding scale   SubCutaneous every 4 hours    MEDICATIONS  (PRN):  dextrose Gel 1 Dose(s) Oral once PRN Blood Glucose LESS THAN 70 milliGRAM(s)/deciliter  glucagon  Injectable 1 milliGRAM(s) IntraMuscular once PRN Glucose LESS THAN 70 milligrams/deciliter  bisacodyl Suppository 10 milliGRAM(s) Rectal daily PRN Constipation      Allergies    No Known Allergies    Intolerances          General: not verbally communicative, does not follow commands. Further ROS unable to be obtained     PHYSICAL EXAM:   Vital Signs:  Vital Signs Last 24 Hrs  T(C): 36.7 (01 Oct 2017 12:09), Max: 37.4 (30 Sep 2017 20:01)  T(F): 98.1 (01 Oct 2017 12:09), Max: 99.4 (30 Sep 2017 20:01)  HR: 87 (01 Oct 2017 12:00) (87 - 110)  BP: 123/58 (01 Oct 2017 12:00) (103/50 - 130/62)  BP(mean): 75 (01 Oct 2017 12:00) (64 - 82)  RR: 24 (01 Oct 2017 12:00) (11 - 31)  SpO2: 96% (01 Oct 2017 12:00) (92% - 97%)  Daily     Daily Weight in k.7 (01 Oct 2017 04:00)I&O's Summary    30 Sep 2017 07:01  -  01 Oct 2017 07:00  --------------------------------------------------------  IN: 3360 mL / OUT: 1050 mL / NET: 2310 mL    01 Oct 2017 07:01  -  01 Oct 2017 13:52  --------------------------------------------------------  IN: 860 mL / OUT: 0 mL / NET: 860 mL        GENERAL:  does not verbal communicate, (+) minimal response to painful stimuli/does not follow commands  HEENT:  NC/AT,  conjunctivae clear and pink, no thyromegaly, nodules, adenopathy, no JVD, sclera -anicteric  CHEST:  Full & symmetric excursion, no increased effort, breath sounds clear ngt  HEART:  Regular rhythm, S1, S2, no murmur/rub/S3/S4, no abdominal bruit, no edema  ABDOMEN:  Soft, non-tender, non-distended, normoactive bowel sounds,  no masses ,no hepato-splenomegaly, no signs of chronic liver disease  EXTEREMITIES:  no cyanosis,clubbing or edema  SKIN:  (+) perianal wound       LABS: reviewed

## 2017-10-04 NOTE — PROGRESS NOTE ADULT - SUBJECTIVE AND OBJECTIVE BOX
Patient is a 89y Male with past medical history of       ckd     presenting with    chrsisy     who reports no complaints overnight.    REVIEW OF SYSTEMS:    CONSTITUTIONAL: No  fevers or chills    Allergies    No Known Allergies    Intolerances        MEDICATIONS  (STANDING):  aspirin  chewable 81 milliGRAM(s) Enteral Tube daily  dextrose 5% + sodium chloride 0.9%. 1000 milliLiter(s) (50 mL/Hr) IV Continuous <Continuous>  dextrose 5%. 1000 milliLiter(s) (50 mL/Hr) IV Continuous <Continuous>  dextrose 50% Injectable 12.5 Gram(s) IV Push once  dextrose 50% Injectable 25 Gram(s) IV Push once  dextrose 50% Injectable 25 Gram(s) IV Push once  docusate sodium 100 milliGRAM(s) Oral three times a day  ertapenem  IVPB      ertapenem  IVPB 500 milliGRAM(s) IV Intermittent every 24 hours  famotidine Injectable 20 milliGRAM(s) IV Push daily  heparin  Injectable 5000 Unit(s) SubCutaneous every 12 hours  hydrocortisone hemorrhoidal Suppository 1 Suppository(s) Rectal daily  insulin regular  human corrective regimen sliding scale   SubCutaneous every 6 hours  lactobacillus acidophilus 1 Tablet(s) Oral every 8 hours  nystatin Powder 1 Application(s) Topical two times a day  polyethylene glycol 3350 17 Gram(s) Oral daily  senna 2 Tablet(s) Oral at bedtime  tamsulosin 0.4 milliGRAM(s) Oral at bedtime      Vitals:  T(F): 98.4 (10-04-17 @ 07:36), Max: 99.4 (10-03-17 @ 18:30)  HR: 95 (10-04-17 @ 08:00)  BP: 111/57 (10-04-17 @ 08:00)  BP(mean): 73 (10-04-17 @ 08:00)  RR: 21 (10-04-17 @ 08:00)  SpO2: 95% (10-04-17 @ 08:00)  Wt(kg): --    I and O's:    10-03 @ 07:01  -  10-04 @ 07:00  --------------------------------------------------------  IN: 2260 mL / OUT: 550 mL / NET: 1710 mL            PHYSICAL EXAM:    Constitutional: NAD,   Neck: No JVD  Respiratory: CTAB  Cardiovascular: S1 and S2  Gastrointestinal: BS+, soft, NT/ND  Extremities: No peripheral edema  Neurological:  no focal deficits    LABS:                        11.4   16.1  )-----------( 190      ( 04 Oct 2017 06:16 )             33.4                         11.1   12.3  )-----------( 130      ( 03 Oct 2017 06:10 )             33.0       139    |  107    |  105    ----------------------------<  387       04 Oct 2017 06:16  3.8     |  19     |  4.15     141    |  109    |  98     ----------------------------<  208       03 Oct 2017 06:10  3.3     |  22     |  4.22     146    |  112    |  74     ----------------------------<  162       02 Oct 2017 07:11  3.5     |  21     |  3.07     Ca    7.6        04 Oct 2017 06:16  Ca    7.6        03 Oct 2017 06:10    Phos  5.4       04 Oct 2017 06:16  Phos  4.3       03 Oct 2017 06:10    Mg     2.7       04 Oct 2017 06:16  Mg     2.6       03 Oct 2017 06:10    TPro  5.6    /  Alb  1.2    /  TBili  2.9    /        04 Oct 2017 06:16  DBili  x      /  AST  48     /  ALT  18     /  AlkPhos  147      TPro  5.1    /  Alb  1.1    /  TBili  3.4    /        03 Oct 2017 06:10  DBili  x      /  AST  59     /  ALT  18     /  AlkPhos  141          Serum Osmo:   Serum Uric Acid:   MAVERICK:   Double Stranded DNA:   C3:   C3 Nephritic Factor:   C4:   p-ANCA:   c-ANCA:   Anti-GBM:   CHAPO-Smith Antibody:   Immunofixation: Immunofixation, Serum: Abnormal pattern identified which is not fully diagnostic; however, the  pattern raises the possibility of a weak restricted band.  Advise  clinical correlation.    Reference Range: None Detected (09-30 @ 19:38)    [place]/Lambda Free Light Chain: [place]/Lambda Free Light Chain Ratio, Serum: 2.04 Ratio (09-30 @ 19:38)    SPEP: Serum Protein Electrophoresis Interp: clinical correlation. (09-30 @ 19:38)    Hepatitis Panel: Hepatitis B Surface Antigen: Nonreact (09-29 @ 16:09)    HIV-1/2:     Urine Studies:      Urine chemistry:   Urine Na:   Urine Creatinine:   Urine Protein/Cr ratio:  Urine K:   Urine Osm:   24 Hr urine studies:     24 hr urine Creatinine Clearance:    RADIOLOGY & ADDITIONAL STUDIES:

## 2017-10-04 NOTE — PROGRESS NOTE ADULT - ASSESSMENT
89 [facility]  uncontrolled ,hyperglycemia ,recived total of 25 u of humalog this morning with no effect BG esvin dmission was found to be above 600 Quinones cath is draining purulent urine and patient diagnosed with uti and urosepsis Found to have hypotension ,ARVIN on CKD and elevated LFTS ,likely secondary to shock liver and due to sepsis Admitted for septic workup and evaluation,send blood and urine cx,serial lactate levels,monitor vitals closley,ivfs hydration,monitor urine output and renal profile,iv abx initiated Found to have troponin elevation and admitted to spcu ,seen by cardiologist ,likely troponinemia related to ARF patient became a long term resident about 3 mnts ago due to progression of dementia and Parkinsons disease Palliative care consult requested ,to discuss advance directives and complete MOLST Patient diagnosed with severe sepsis with ESBL E.COLI  bacteremia secondary to uti and obstructive uropathy Palliative care consult requested ,to discuss advance directives and update  MOLST ,family requests guidance regarding artificial nutrition discussion PATIENT failed speech and swallow test several times ,strict NPO recommended Meeting with the family scheduled today by palliative care service regarding artificial nutrition and further plan of care

## 2017-10-04 NOTE — PROGRESS NOTE ADULT - ASSESSMENT
89 yo AAM [facility] resident was brought to ER for evaluation of cms  uncontrolled ,hyperglycemia ,recived total of 25 u of humalog this morning with no effect BG esvin dmission was found to be above 600 Quinones cath is draining purulent urine and patient diagnosed with uti and urosepsis Found to have hypotension ,ARVIN on CKD and

## 2017-10-04 NOTE — PROGRESS NOTE ADULT - ASSESSMENT
89 yo AAM [facility] SN[facility] resident was brought to ER for evaluation of cms  uncontrolled ,hyperglycemia ,recived total of 25 u of humalog this morning with no effect BG esvin dmission was found to be above 600 Quinones cath is draining purulent urine and patient diagnosed with uti and urosepsis Found to have hypotension ,ARVIN on CKD and elevated LFTS ,likely secondary to shock liver and due to sepsis Admitted for septic workup and evaluation, hem was consulted for thrombocytopenia

## 2017-10-04 NOTE — PROGRESS NOTE ADULT - SUBJECTIVE AND OBJECTIVE BOX
Chief Complaint: Fever, AMS    Interval Events: No significant changes.    Review of Systems:  General: No fevers, chills, weight loss or gain  Skin: No rashes, color changes  Cardiovascular: No chest pain, orthopnea  Respiratory: No shortness of breath, cough  Gastrointestinal: No nausea, abdominal pain  Genitourinary: No incontinence, pain with urination  Musculoskeletal: No pain, swelling, decreased range of motion  Neurological: No headache, weakness  Psychiatric: No depression, anxiety  Endocrine: No weight loss or gain, increased thirst  All other systems are comprehensively negative.    Physical Exam:  Vital Signs Last 24 Hrs  T(C): 36.9 (04 Oct 2017 07:36), Max: 37.4 (03 Oct 2017 18:30)  T(F): 98.4 (04 Oct 2017 07:36), Max: 99.4 (03 Oct 2017 18:30)  HR: 95 (04 Oct 2017 08:00) (88 - 96)  BP: 111/57 (04 Oct 2017 08:00) (93/39 - 123/55)  BP(mean): 73 (04 Oct 2017 08:00) (56 - 76)  RR: 21 (04 Oct 2017 08:00) (16 - 33)  SpO2: 95% (04 Oct 2017 08:00) (91% - 98%)  General: NAD  HEENT: MMM  Neck: No JVD, no carotid bruit  Lungs: CTAB  CV: RRR, nl S1/S2, no M/R/G  Abdomen: S/NT/ND, +BS  Extremities: No LE edema, no cyanosis  Neuro: AAOx3, non-focal  Skin: No rash    Labs:             10-03    141  |  109<H>  |  98<H>  ----------------------------<  208<H>  3.3<L>   |  22  |  4.22<H>    Ca    7.6<L>      03 Oct 2017 06:10  Phos  4.3     10-03  Mg     2.6     10-03    TPro  5.1<L>  /  Alb  1.1<L>  /  TBili  3.4<H>  /  DBili  x   /  AST  59<H>  /  ALT  18  /  AlkPhos  141<H>  10-03                          11.1   12.3  )-----------( 130      ( 03 Oct 2017 06:10 )             33.0         Telemetry: Sinus rhythm, PVCs

## 2017-10-04 NOTE — CONSULT NOTE ADULT - PROVIDER SPECIALTY LIST ADULT
Cardiology
Critical Care
Gastroenterology
Heme/Onc
Infectious Disease
Palliative Care
Urology
Critical Care
Endocrinology
Nephrology

## 2017-10-04 NOTE — PROGRESS NOTE ADULT - PROBLEM SELECTOR PLAN 1
ACUTE RENAL FAILURE: due to urosepsis , dehydration   increase iv fluid  ,   hypotension dc b/p med   continue with abx , comfort care consult and f/u   Serum creatinine is increase     , approximating GFR decrease    ml/min.   [There is no progression]. [No uremic symptoms]   [No evidence of anemia].  Fluid status stable.  Will continue to avoid nephrotoxic drugs.  Patient remains asymptomatic.   Continue current therapy.  Start   Increase    Decrease  diuretic.  Start   Increase   Decrease  ACE inhibitor.  Start   Increase   Decrease  ARB.  Start  Increase   Decrease   B-blocker.  Start  Increase   Decrease  calcium channel blocker.  Start  Increase   Decrease  .  Additional evaluation:   ECG,    echocardiogram,   refer to cardiology,   CXR,   refer to vascular surgeon,   renal ultrasound,  renal biopsy.

## 2017-10-04 NOTE — PROGRESS NOTE ADULT - SUBJECTIVE AND OBJECTIVE BOX
Date/Time Patient Seen:  		  Referring MD:   Data Reviewed	       Patient is a 89y old  Male who presents with a chief complaint of cms (26 Sep 2017 13:48)  in bed  seen and examined  vs and meds reviewed      Subjective/HPI     PAST MEDICAL & SURGICAL HISTORY:  Parkinson disease  Neuropathy  CKD (chronic kidney disease)  BPH (benign prostatic hyperplasia)  Diabetes  Angina pectoris  Hypertension  Diabetes  Renal failure  Dementia  No pertinent past medical history  No significant past surgical history        Medication list         MEDICATIONS  (STANDING):  aspirin  chewable 81 milliGRAM(s) Enteral Tube daily  dextrose 5% + sodium chloride 0.9%. 1000 milliLiter(s) (50 mL/Hr) IV Continuous <Continuous>  dextrose 5%. 1000 milliLiter(s) (50 mL/Hr) IV Continuous <Continuous>  dextrose 50% Injectable 12.5 Gram(s) IV Push once  dextrose 50% Injectable 25 Gram(s) IV Push once  dextrose 50% Injectable 25 Gram(s) IV Push once  docusate sodium 100 milliGRAM(s) Oral three times a day  ertapenem  IVPB      ertapenem  IVPB 500 milliGRAM(s) IV Intermittent every 24 hours  famotidine Injectable 20 milliGRAM(s) IV Push daily  heparin  Injectable 5000 Unit(s) SubCutaneous every 12 hours  hydrocortisone hemorrhoidal Suppository 1 Suppository(s) Rectal daily  insulin lispro (HumaLOG) corrective regimen sliding scale   SubCutaneous every 6 hours  lactobacillus acidophilus 1 Tablet(s) Oral every 8 hours  nystatin Powder 1 Application(s) Topical two times a day  polyethylene glycol 3350 17 Gram(s) Oral daily  senna 2 Tablet(s) Oral at bedtime  tamsulosin 0.4 milliGRAM(s) Oral at bedtime    MEDICATIONS  (PRN):  bisacodyl Suppository 10 milliGRAM(s) Rectal daily PRN Constipation  dextrose Gel 1 Dose(s) Oral once PRN Blood Glucose LESS THAN 70 milliGRAM(s)/deciliter  glucagon  Injectable 1 milliGRAM(s) IntraMuscular once PRN Glucose LESS THAN 70 milligrams/deciliter         Vitals log        ICU Vital Signs Last 24 Hrs  T(C): 37.3 (04 Oct 2017 04:03), Max: 37.4 (03 Oct 2017 18:30)  T(F): 99.2 (04 Oct 2017 04:03), Max: 99.4 (03 Oct 2017 18:30)  HR: 88 (04 Oct 2017 04:00) (75 - 96)  BP: 108/54 (04 Oct 2017 04:00) (78/41 - 123/55)  BP(mean): 69 (04 Oct 2017 04:00) (52 - 76)  ABP: --  ABP(mean): --  RR: 18 (04 Oct 2017 04:00) (16 - 33)  SpO2: 98% (04 Oct 2017 04:00) (91% - 99%)           Input and Output:  I&O's Detail    02 Oct 2017 07:01  -  03 Oct 2017 07:00  --------------------------------------------------------  IN:    dextrose 5%.: 540 mL    IV PiggyBack: 100 mL  Total IN: 640 mL    OUT:    Indwelling Catheter - Urethral: 1400 mL  Total OUT: 1400 mL    Total NET: -760 mL      03 Oct 2017 07:01  -  04 Oct 2017 06:51  --------------------------------------------------------  IN:    dextrose 5% + sodium chloride 0.9%.: 1000 mL    dextrose 5%.: 225 mL    Enteral Tube Flush: 325 mL    Glucerna: 460 mL    IV PiggyBack: 250 mL  Total IN: 2260 mL    OUT:    Indwelling Catheter - Urethral: 300 mL  Total OUT: 300 mL    Total NET: 1960 mL          Lab Data                        11.4   16.1  )-----------( 190      ( 04 Oct 2017 06:16 )             33.4     10-03    141  |  109<H>  |  98<H>  ----------------------------<  208<H>  3.3<L>   |  22  |  4.22<H>    Ca    7.6<L>      03 Oct 2017 06:10  Phos  4.3     10-03  Mg     2.6     10-03    TPro  5.1<L>  /  Alb  1.1<L>  /  TBili  3.4<H>  /  DBili  x   /  AST  59<H>  /  ALT  18  /  AlkPhos  141<H>  10-03            Review of Systems	      Objective     Physical Examination    ng tube in  frail  weak  heart - s1s2  lungs - dec BS      Pertinent Lab findings & Imaging      Joni:  NO   Adequate UO     I&O's Detail    02 Oct 2017 07:01  -  03 Oct 2017 07:00  --------------------------------------------------------  IN:    dextrose 5%.: 540 mL    IV PiggyBack: 100 mL  Total IN: 640 mL    OUT:    Indwelling Catheter - Urethral: 1400 mL  Total OUT: 1400 mL    Total NET: -760 mL      03 Oct 2017 07:01  -  04 Oct 2017 06:51  --------------------------------------------------------  IN:    dextrose 5% + sodium chloride 0.9%.: 1000 mL    dextrose 5%.: 225 mL    Enteral Tube Flush: 325 mL    Glucerna: 460 mL    IV PiggyBack: 250 mL  Total IN: 2260 mL    OUT:    Indwelling Catheter - Urethral: 300 mL  Total OUT: 300 mL    Total NET: 1960 mL               Discussed with:     Cultures:	        Radiology

## 2017-10-04 NOTE — PROGRESS NOTE ADULT - SUBJECTIVE AND OBJECTIVE BOX
CAPILLARY BLOOD GLUCOSE  234 (03 Oct 2017 17:17)  186 (03 Oct 2017 12:30)          Vital Signs Last 24 Hrs  T(C): 36.9 (04 Oct 2017 07:36), Max: 37.4 (03 Oct 2017 18:30)  T(F): 98.4 (04 Oct 2017 07:36), Max: 99.4 (03 Oct 2017 18:30)  HR: 95 (04 Oct 2017 08:00) (88 - 96)  BP: 111/57 (04 Oct 2017 08:00) (93/39 - 123/55)  BP(mean): 73 (04 Oct 2017 08:00) (56 - 76)  RR: 21 (04 Oct 2017 08:00) (16 - 33)  SpO2: 95% (04 Oct 2017 08:00) (91% - 99%)    General: WN/WD NAD  Respiratory: CTA B/L  CV: RRR, S1S2, no murmurs, rubs or gallops  Abdominal: Soft, NT, ND +BS, Last BM  Extremities: No edema, + peripheral pulses     10-04    139  |  107  |  105<H>  ----------------------------<  387<H>  3.8   |  19<L>  |  4.15<H>    Ca    7.6<L>      04 Oct 2017 06:16  Phos  5.4     10-04  Mg     2.7     10-04    TPro  5.6<L>  /  Alb  1.2<L>  /  TBili  2.9<H>  /  DBili  x   /  AST  48<H>  /  ALT  18  /  AlkPhos  147<H>  10-04      dextrose 50% Injectable 12.5 Gram(s) IV Push once  dextrose 50% Injectable 25 Gram(s) IV Push once  dextrose 50% Injectable 25 Gram(s) IV Push once  dextrose Gel 1 Dose(s) Oral once PRN  glucagon  Injectable 1 milliGRAM(s) IntraMuscular once PRN  insulin lispro (HumaLOG) corrective regimen sliding scale   SubCutaneous every 6 hours

## 2017-10-04 NOTE — GOALS OF CARE CONVERSATION - PERSONAL ADVANCE DIRECTIVE - TREATMENT GUIDELINES
DNI/No blood draws/No artificial nutrition/DNR Order/Do not re-hospitalize/No IV fluids/Comfort measures only/No antibiotics
no intubation/DNR Order

## 2017-10-04 NOTE — PROGRESS NOTE ADULT - PROBLEM SELECTOR PLAN 1
aspiration risk very high  advanced dementia  oral hygiene  pt is DNR  meeting with family today at 9 am to discuss goals of care, PEG pros and cons  prognosis poor  spoke with dtr last night  discussed with PMD and colleagues on palliative team

## 2017-10-04 NOTE — CONSULT NOTE ADULT - SUBJECTIVE AND OBJECTIVE BOX
CHIEF COMPLAINT:  89y Male with chief complaint of Urosepsis and Retention        HISTORY OF PRESENT ILLNESS:  89 yo AAM [facility] SN[facility] resident was brought to ER for evaluation of cms  uncontrolled ,hyperglycemia ,recived total of 25 u of humalog this morning with no effect BG esvin dmission was found to be above 600 Limon cath is draining purulent urine and patient diagnosed with uti and urosepsis Found to have hypotension ,ARVIN on CKD and elevated LFTS ,likely secondary to shock liver and due to sepsis Admitted for septic workup and evaluation,send blood and urine cx,serial lactate levels,monitor vitals closley,ivfs hydration,monitor urine output and renal profile,iv abx initiated Found to have troponin elevation and admitted to spcu ,seen by cardiologist ,likely troponinemia related to ARF patient became a long term resident about 3 mnts ago due to progression of dem,entia and Parkinsons disease Palliative care consult requested ,to discuss advance directives and complete MOLST       Nurse said came with limon from [facility] and limon was changed   *************************************************************************************************  PAST MEDICAL & SURGICAL HISTORY:  Parkinson disease  Neuropathy  CKD (chronic kidney disease)  BPH (benign prostatic hyperplasia)  Diabetes  Angina pectoris  Hypertension  Diabetes  Renal failure  Dementia  No significant past surgical history      MEDICATIONS  (STANDING):  aspirin  chewable 81 milliGRAM(s) Enteral Tube daily  dextrose 5% + sodium chloride 0.9%. 1000 milliLiter(s) (50 mL/Hr) IV Continuous <Continuous>  dextrose 5%. 1000 milliLiter(s) (50 mL/Hr) IV Continuous <Continuous>  dextrose 50% Injectable 12.5 Gram(s) IV Push once  dextrose 50% Injectable 25 Gram(s) IV Push once  dextrose 50% Injectable 25 Gram(s) IV Push once  docusate sodium 100 milliGRAM(s) Oral three times a day  ertapenem  IVPB      ertapenem  IVPB 500 milliGRAM(s) IV Intermittent every 24 hours  famotidine Injectable 20 milliGRAM(s) IV Push daily  heparin  Injectable 5000 Unit(s) SubCutaneous every 12 hours  hydrocortisone hemorrhoidal Suppository 1 Suppository(s) Rectal daily  insulin lispro (HumaLOG) corrective regimen sliding scale   SubCutaneous every 6 hours  lactobacillus acidophilus 1 Tablet(s) Oral every 8 hours  nystatin Powder 1 Application(s) Topical two times a day  polyethylene glycol 3350 17 Gram(s) Oral daily  senna 2 Tablet(s) Oral at bedtime  tamsulosin 0.4 milliGRAM(s) Oral at bedtime    MEDICATIONS  (PRN):  bisacodyl Suppository 10 milliGRAM(s) Rectal daily PRN Constipation  dextrose Gel 1 Dose(s) Oral once PRN Blood Glucose LESS THAN 70 milliGRAM(s)/deciliter  glucagon  Injectable 1 milliGRAM(s) IntraMuscular once PRN Glucose LESS THAN 70 milligrams/deciliter      ALLERGIES:  No Known Allergies      SOCIAL HISTORY:          ETOH:                                  Smoking:                                   Drugs:                                         Occupation:    FAMILY HISTORY:  No pertinent family history in first degree relatives      CONSTITUTIONAL: No weakness, fevers or chills    EYES/ENT: No visual changes;  No vertigo or throat pain     NECK: No pain or stiffness    RESPIRATORY: No cough, wheezing, hemoptysis; No shortness of breath    CARDIOVASCULAR: No chest pain or palpitations    GASTROINTESTINAL: No abdominal or epigastric pain. No nausea, vomiting, or hematemesis; No diarrhea or constipation. No melena or hematochezia.    GENITOURINARY:  had retention with limon     NEUROLOGICAL: No numbness or weakness    SKIN: No itching, burning, rashes, or lesions     All other review of systems is negative unless indicated above.    ****************************************************************************************************  PHYSICAL EXAM:    Vital Signs Last 24 Hrs  T(C): 37.3 (04 Oct 2017 04:03), Max: 37.4 (03 Oct 2017 18:30)  T(F): 99.2 (04 Oct 2017 04:03), Max: 99.4 (03 Oct 2017 18:30)  HR: 88 (04 Oct 2017 04:00) (88 - 96)  BP: 108/54 (04 Oct 2017 04:00) (93/39 - 123/55)  BP(mean): 69 (04 Oct 2017 04:00) (56 - 76)  RR: 18 (04 Oct 2017 04:00) (16 - 33)  SpO2: 98% (04 Oct 2017 04:00) (91% - 99%)    GENERAL:  unresponsive  has NG tube   PENIS:  uncirc with edema with 16 f limon  draining cler urine   TESTES:  soft   PROSTATE/ RECTAL:    ABDOMEN: soft  no palpable bladder     BACK:    LOWER EXTREMITIES:    NEUROLOGICAL:      *******************************************************************************************************  LABS:                        11.4   16.1  )-----------( 190      ( 04 Oct 2017 06:16 )             33.4     10-04    139  |  107  |  105<H>  ----------------------------<  387<H>  3.8   |  19<L>  |  4.15<H>    Ca    7.6<L>      04 Oct 2017 06:16  Phos  5.4     10-04  Mg     2.7     10-04    TPro  5.6<L>  /  Alb  1.2<L>  /  TBili  2.9<H>  /  DBili  x   /  AST  48<H>  /  ALT  18  /  AlkPhos  147<H>  10-04    PT/INR - ( 04 Oct 2017 06:16 )   PT: 12.7 sec;   INR: 1.16 ratio         PTT - ( 03 Oct 2017 06:10 )  PTT:26.9 sec    Urine Culture: 10-02 @ 12:04  Urine Culure Results   No growth to date.  Organism --      RADIOLOGY & ADDITIONAL STUDIES:  no hydro or stones on sono or CT   renal cysts      *********************************************************************************************************  IMPRESSION: Urosepsis  e coli esbl  last blood cultures negative   chronic urinary retention and renal failure   dementia       PLAN: maintain limon   Palliative care under consideration CHIEF COMPLAINT:  89y Male with chief complaint of Urosepsis and Retention        HISTORY OF PRESENT ILLNESS:  89 yo AAM [facility] SN[facility] resident was brought to ER for evaluation of cms  uncontrolled ,hyperglycemia ,recived total of 25 u of humalog this morning with no effect BG esvin dmission was found to be above 600 Limon cath is draining purulent urine and patient diagnosed with uti and urosepsis Found to have hypotension ,ARVIN on CKD and elevated LFTS ,likely secondary to shock liver and due to sepsis Admitted for septic workup and evaluation,send blood and urine cx,serial lactate levels,monitor vitals closley,ivfs hydration,monitor urine output and renal profile,iv abx initiated Found to have troponin elevation and admitted to spcu ,seen by cardiologist ,likely troponinemia related to ARF patient became a long term resident about 3 mnts ago due to progression of dem,entia and Parkinsons disease Palliative care consult requested ,to discuss advance directives and complete MOLST       Nurse said came from er with limon   was on flomax   daughter said he once had a prostate surgery   *************************************************************************************************  PAST MEDICAL & SURGICAL HISTORY:  Parkinson disease  Neuropathy  CKD (chronic kidney disease)  BPH (benign prostatic hyperplasia)  Diabetes  Angina pectoris  Hypertension  Diabetes  Renal failure  Dementia  No significant past surgical history      MEDICATIONS  (STANDING):  aspirin  chewable 81 milliGRAM(s) Enteral Tube daily  dextrose 5% + sodium chloride 0.9%. 1000 milliLiter(s) (50 mL/Hr) IV Continuous <Continuous>  dextrose 5%. 1000 milliLiter(s) (50 mL/Hr) IV Continuous <Continuous>  dextrose 50% Injectable 12.5 Gram(s) IV Push once  dextrose 50% Injectable 25 Gram(s) IV Push once  dextrose 50% Injectable 25 Gram(s) IV Push once  docusate sodium 100 milliGRAM(s) Oral three times a day  ertapenem  IVPB      ertapenem  IVPB 500 milliGRAM(s) IV Intermittent every 24 hours  famotidine Injectable 20 milliGRAM(s) IV Push daily  heparin  Injectable 5000 Unit(s) SubCutaneous every 12 hours  hydrocortisone hemorrhoidal Suppository 1 Suppository(s) Rectal daily  insulin lispro (HumaLOG) corrective regimen sliding scale   SubCutaneous every 6 hours  lactobacillus acidophilus 1 Tablet(s) Oral every 8 hours  nystatin Powder 1 Application(s) Topical two times a day  polyethylene glycol 3350 17 Gram(s) Oral daily  senna 2 Tablet(s) Oral at bedtime  tamsulosin 0.4 milliGRAM(s) Oral at bedtime    MEDICATIONS  (PRN):  bisacodyl Suppository 10 milliGRAM(s) Rectal daily PRN Constipation  dextrose Gel 1 Dose(s) Oral once PRN Blood Glucose LESS THAN 70 milliGRAM(s)/deciliter  glucagon  Injectable 1 milliGRAM(s) IntraMuscular once PRN Glucose LESS THAN 70 milligrams/deciliter      ALLERGIES:  No Known Allergies      SOCIAL HISTORY:          ETOH:                                  Smoking:                                   Drugs:                                         Occupation:    FAMILY HISTORY:  No pertinent family history in first degree relatives      CONSTITUTIONAL: No weakness, fevers or chills    EYES/ENT: No visual changes;  No vertigo or throat pain     NECK: No pain or stiffness    RESPIRATORY: No cough, wheezing, hemoptysis; No shortness of breath    CARDIOVASCULAR: No chest pain or palpitations    GASTROINTESTINAL: No abdominal or epigastric pain. No nausea, vomiting, or hematemesis; No diarrhea or constipation. No melena or hematochezia.    GENITOURINARY:  had retention with limon     NEUROLOGICAL: No numbness or weakness    SKIN: No itching, burning, rashes, or lesions     All other review of systems is negative unless indicated above.    ****************************************************************************************************  PHYSICAL EXAM:    Vital Signs Last 24 Hrs  T(C): 37.3 (04 Oct 2017 04:03), Max: 37.4 (03 Oct 2017 18:30)  T(F): 99.2 (04 Oct 2017 04:03), Max: 99.4 (03 Oct 2017 18:30)  HR: 88 (04 Oct 2017 04:00) (88 - 96)  BP: 108/54 (04 Oct 2017 04:00) (93/39 - 123/55)  BP(mean): 69 (04 Oct 2017 04:00) (56 - 76)  RR: 18 (04 Oct 2017 04:00) (16 - 33)  SpO2: 98% (04 Oct 2017 04:00) (91% - 99%)    GENERAL:  unresponsive  has NG tube   PENIS:  uncirc with edema with 16 f limon  draining cler urine   TESTES:  soft   PROSTATE/ RECTAL:    ABDOMEN: soft  no palpable bladder     BACK:    LOWER EXTREMITIES:    NEUROLOGICAL:      *******************************************************************************************************  LABS:                        11.4   16.1  )-----------( 190      ( 04 Oct 2017 06:16 )             33.4     10-04    139  |  107  |  105<H>  ----------------------------<  387<H>  3.8   |  19<L>  |  4.15<H>    Ca    7.6<L>      04 Oct 2017 06:16  Phos  5.4     10-04  Mg     2.7     10-04    TPro  5.6<L>  /  Alb  1.2<L>  /  TBili  2.9<H>  /  DBili  x   /  AST  48<H>  /  ALT  18  /  AlkPhos  147<H>  10-04    PT/INR - ( 04 Oct 2017 06:16 )   PT: 12.7 sec;   INR: 1.16 ratio         PTT - ( 03 Oct 2017 06:10 )  PTT:26.9 sec    Urine Culture: 10-02 @ 12:04  Urine Culure Results   No growth to date.  Organism --      RADIOLOGY & ADDITIONAL STUDIES:  no hydro or stones on sono or CT   renal cysts      *********************************************************************************************************  IMPRESSION: Urosepsis  e coli esbl  last blood cultures negative    urinary retention and renal failure   dementia       PLAN: maintain limon  Off flomax   Palliative care under consideration

## 2017-10-04 NOTE — PROGRESS NOTE ADULT - ASSESSMENT
The patient is a 89 year old male with a history of HTN, DM, CKD, dementia who is admitted with urosepsis.    Plan:  - Echocardiogram results from 6/2017 noted; normal LV systolic function, no significant valve issues  - Troponin elevated and flat likely due to type II NSTEMI (demand ischemia) and retention of enzymes from ARVIN on CKD. No need to trend further at this point.  - Continue aspirin 81 mg daily  - IV antibiotics  - Worsening renal function and oliguric  - Overall poor prognosis. Palliative care follow-up.

## 2017-10-04 NOTE — PROGRESS NOTE ADULT - PROBLEM SELECTOR PLAN 1
s/p thrombocytopenia  low plt--most likely from increased consumption/sepsis  plt improved and normalized  no splenomegaly on ct scan  cannot exclude mds/myeloma or other pathology as family declined invasive hem procedure, plt low but stable, monitor cbcd

## 2017-10-04 NOTE — CONSULT NOTE ADULT - CONSULT REASON
dm2 uncontrolled
Sepsis
Elevated cardiac enzymes, tachycardia
PEG counseling  goals of care discussion
Severe sepsis
Severe sepsis with GNR septicemia sec UTI
Urosepsis  Urinary Retention
elevated lfts
thrombocytopenia
ckd and chrissy

## 2017-10-04 NOTE — PROGRESS NOTE ADULT - SUBJECTIVE AND OBJECTIVE BOX
Patient is a 89y old  Male who presents with a chief complaint of cms (26 Sep 2017 13:48)       Pt is seen and examined  pt is awake and lying in bed/out of bed to chair  pt seems comfortable and denies any complaints at this time    HPI:  89 yo AAM [facility] SNF resident was brought to ER for evaluation of cms  uncontrolled ,hyperglycemia ,recived total of 25 u of humalog this morning with no effect BG esvin dmission was found to be above 600 Quinones cath is draining purulent urine and patient diagnosed with uti and urosepsis Found to have hypotension ,ARVIN on CKD and elevated LFTS ,likely secondary to shock liver and due to sepsis Admitted for septic workup and evaluation,send blood and urine cx,serial lactate levels,monitor vitals closley,ivfs hydration,monitor urine output and renal profile,iv abx initiated Found to have troponin elevation and admitted to spcu ,seen by cardiologist ,likely troponinemia related to ARF patient became a long term resident about 3 mnts ago due to progression of dem,entia and Parkinsons disease Palliative care consult requested ,to discuss advance directives and complete MOLST (26 Sep 2017 13:48)         ROS:  Negative except for:    MEDICATIONS  (STANDING):  aspirin  chewable 81 milliGRAM(s) Enteral Tube daily  dextrose 5% + sodium chloride 0.9%. 1000 milliLiter(s) (50 mL/Hr) IV Continuous <Continuous>  dextrose 5%. 1000 milliLiter(s) (50 mL/Hr) IV Continuous <Continuous>  dextrose 50% Injectable 12.5 Gram(s) IV Push once  dextrose 50% Injectable 25 Gram(s) IV Push once  dextrose 50% Injectable 25 Gram(s) IV Push once  docusate sodium 100 milliGRAM(s) Oral three times a day  ertapenem  IVPB      ertapenem  IVPB 500 milliGRAM(s) IV Intermittent every 24 hours  famotidine Injectable 20 milliGRAM(s) IV Push daily  heparin  Injectable 5000 Unit(s) SubCutaneous every 12 hours  hydrocortisone hemorrhoidal Suppository 1 Suppository(s) Rectal daily  insulin regular  human corrective regimen sliding scale   SubCutaneous every 6 hours  lactobacillus acidophilus 1 Tablet(s) Oral every 8 hours  nystatin Powder 1 Application(s) Topical two times a day  polyethylene glycol 3350 17 Gram(s) Oral daily  senna 2 Tablet(s) Oral at bedtime  tamsulosin 0.4 milliGRAM(s) Oral at bedtime    MEDICATIONS  (PRN):  bisacodyl Suppository 10 milliGRAM(s) Rectal daily PRN Constipation  dextrose Gel 1 Dose(s) Oral once PRN Blood Glucose LESS THAN 70 milliGRAM(s)/deciliter  glucagon  Injectable 1 milliGRAM(s) IntraMuscular once PRN Glucose LESS THAN 70 milligrams/deciliter      Allergies    No Known Allergies    Intolerances        Vital Signs Last 24 Hrs  T(C): 36.9 (04 Oct 2017 16:10), Max: 37.4 (04 Oct 2017 00:02)  T(F): 98.4 (04 Oct 2017 16:10), Max: 99.3 (04 Oct 2017 00:02)  HR: 95 (04 Oct 2017 16:00) (88 - 96)  BP: 115/64 (04 Oct 2017 16:00) (93/39 - 115/64)  BP(mean): 80 (04 Oct 2017 16:00) (56 - 80)  RR: 20 (04 Oct 2017 16:00) (17 - 31)  SpO2: 94% (04 Oct 2017 16:00) (94% - 98%)    PHYSICAL EXAM  General: adult in NAD  HEENT: clear oropharynx, anicteric sclera, pink conjunctiva  Neck: supple  CV: normal S1/S2 with no murmur rubs or gallops  Lungs: positive air movement b/l ant lungs,clear to auscultation, no wheezes, no rales  Abdomen: soft non-tender non-distended, no hepatosplenomegaly  Ext: no clubbing cyanosis or edema  Skin: no rashes and no petechiae  Neuro: alert and oriented X 4, no focal deficits  LABS:                          11.4   16.1  )-----------( 190      ( 04 Oct 2017 06:16 )             33.4         Mean Cell Volume : 88.3 fl  Mean Cell Hemoglobin : 30.0 pg  Mean Cell Hemoglobin Concentration : 34.0 gm/dL  Auto Neutrophil # : x  Auto Lymphocyte # : x  Auto Monocyte # : x  Auto Eosinophil # : x  Auto Basophil # : x  Auto Neutrophil % : x  Auto Lymphocyte % : x  Auto Monocyte % : x  Auto Eosinophil % : x  Auto Basophil % : x    Serial CBC's  10- @ 06:16  Hct-33.4 / Hgb-11.4 / Plat-190 / RBC-3.79 / WBC-16.1          Serial CBC's  10-03 @ 06:10  Hct-33.0 / Hgb-11.1 / Plat-130 / RBC-3.68 / WBC-12.3          Serial CBC's  10-02 @ 07:11  Hct-39.7 / Hgb-12.5 / Plat-104 / RBC-4.35 / WBC-8.8          Serial CBC's  10-01 @ 06:37  Hct-38.3 / Hgb-12.0 / Plat-81 / RBC-4.24 / WBC-9.5          Serial CBC's   @ 19:38  Hct--- / Hgb--- / Plat--- / RBC-4.49 / WBC---            10-04    139  |  107  |  105<H>  ----------------------------<  387<H>  3.8   |  19<L>  |  4.15<H>    Ca    7.6<L>      04 Oct 2017 06:16  Phos  5.4     10-  Mg     2.7     10    TPro  5.6<L>  /  Alb  1.2<L>  /  TBili  2.9<H>  /  DBili  x   /  AST  48<H>  /  ALT  18  /  AlkPhos  147<H>  10-04      PT/INR - ( 04 Oct 2017 06:16 )   PT: 12.7 sec;   INR: 1.16 ratio         PTT - ( 03 Oct 2017 06:10 )  PTT:26.9 sec    Reticulocyte Percent: 0.8 % (17 @ 19:38)  Vitamin B12, Serum: >2000 pg/mL (17 @ 19:38)  Folate, Serum: >20.0 ng/mL (17 @ 19:38)  Iron - Total Binding Capacity.: 154 ug/dL (17 @ 19:38)  Ferritin, Serum: 344.0 ng/mL (17 @ 19:38)      TITO Lambda: 8.76 mg/dL (17 @ 19:38)  TITO Kappa: 17.90 mg/dL (17 @ 19:38)  Quantitative IgA: 535 mg/dL (17 @ 19:38)  Serum Protein Electrophoresis Interp: clinical correlation. (17 @ 19:38)  Quantitative IgM: 119 mg/dL (17 @ 19:38)  Immunofixation, Serum: Abnormal pattern identified which is not fully diagnostic; however, the  pattern raises the possibility of a weak restricted band.  Advise  clinical correlation.    Reference Range: None Detected (17 @ 19:38)  Quantitative Ig mg/dL (17 @ 19:38)            BLOOD SMEAR INTERPRETATION:       RADIOLOGY & ADDITIONAL STUDIES: Patient is a 89y old  Male who presents with a chief complaint of cms (26 Sep 2017 13:48)       Pt is seen and examined  pt is lying in bed.  Pt remains in icu and on monitor bed hr is at 94/min and bp is at 116/54 this time    HPI:  89 yo [facility] SN[facility] resident was brought to ER for evaluation of cms  uncontrolled ,hyperglycemia ,recived total of 25 u of humalog this morning with no effect BG esvin dmission was found to be above 600 Quinones cath is draining purulent urine and patient diagnosed with uti and urosepsis Found to have hypotension ,ARVIN on CKD and elevated LFTS ,likely secondary to shock liver and due to sepsis Admitted for septic workup and evaluation,send blood and urine cx,serial lactate levels,monitor vitals closley,ivfs hydration,monitor urine output and renal profile,iv abx initiated Found to have troponin elevation and admitted to spcu ,seen by cardiologist ,likely troponinemia related to ARF patient became a long term resident about 3 mnts ago due to progression of dem,entia and Parkinsons disease Palliative care consult requested ,to discuss advance directives and complete MOLST (26 Sep 2017 13:48)         ROS:  Negative except for:    MEDICATIONS  (STANDING):  aspirin  chewable 81 milliGRAM(s) Enteral Tube daily  dextrose 5% + sodium chloride 0.9%. 1000 milliLiter(s) (50 mL/Hr) IV Continuous <Continuous>  dextrose 5%. 1000 milliLiter(s) (50 mL/Hr) IV Continuous <Continuous>  dextrose 50% Injectable 12.5 Gram(s) IV Push once  dextrose 50% Injectable 25 Gram(s) IV Push once  dextrose 50% Injectable 25 Gram(s) IV Push once  docusate sodium 100 milliGRAM(s) Oral three times a day  ertapenem  IVPB      ertapenem  IVPB 500 milliGRAM(s) IV Intermittent every 24 hours  famotidine Injectable 20 milliGRAM(s) IV Push daily  heparin  Injectable 5000 Unit(s) SubCutaneous every 12 hours  hydrocortisone hemorrhoidal Suppository 1 Suppository(s) Rectal daily  insulin regular  human corrective regimen sliding scale   SubCutaneous every 6 hours  lactobacillus acidophilus 1 Tablet(s) Oral every 8 hours  nystatin Powder 1 Application(s) Topical two times a day  polyethylene glycol 3350 17 Gram(s) Oral daily  senna 2 Tablet(s) Oral at bedtime  tamsulosin 0.4 milliGRAM(s) Oral at bedtime    MEDICATIONS  (PRN):  bisacodyl Suppository 10 milliGRAM(s) Rectal daily PRN Constipation  dextrose Gel 1 Dose(s) Oral once PRN Blood Glucose LESS THAN 70 milliGRAM(s)/deciliter  glucagon  Injectable 1 milliGRAM(s) IntraMuscular once PRN Glucose LESS THAN 70 milligrams/deciliter      Allergies    No Known Allergies    Intolerances        Vital Signs Last 24 Hrs  T(C): 36.9 (04 Oct 2017 16:10), Max: 37.4 (04 Oct 2017 00:02)  T(F): 98.4 (04 Oct 2017 16:10), Max: 99.3 (04 Oct 2017 00:02)  HR: 95 (04 Oct 2017 16:00) (88 - 96)  BP: 115/64 (04 Oct 2017 16:00) (93/39 - 115/64)  BP(mean): 80 (04 Oct 2017 16:00) (56 - 80)  RR: 20 (04 Oct 2017 16:00) (17 - 31)  SpO2: 94% (04 Oct 2017 16:00) (94% - 98%)    PHYSICAL EXAM  General: adult in NAD  HEENT: clear oropharynx, anicteric sclera, pink conjunctiva  Neck: supple  CV: normal S1/S2 with no murmur rubs or gallops  Lungs: positive air movement b/l ant lungs,clear to auscultation, no wheezes, no rales  Abdomen: soft non-tender non-distended, no hepatosplenomegaly  Ext: no clubbing cyanosis or edema  Skin: no rashes and no petechiae  Neuro: alert and oriented X 4, no focal deficits  LABS:                          11.4   16.1  )-----------( 190      ( 04 Oct 2017 06:16 )             33.4         Mean Cell Volume : 88.3 fl  Mean Cell Hemoglobin : 30.0 pg  Mean Cell Hemoglobin Concentration : 34.0 gm/dL  Auto Neutrophil # : x  Auto Lymphocyte # : x  Auto Monocyte # : x  Auto Eosinophil # : x  Auto Basophil # : x  Auto Neutrophil % : x  Auto Lymphocyte % : x  Auto Monocyte % : x  Auto Eosinophil % : x  Auto Basophil % : x    Serial CBC's  10-04 @ 06:16  Hct-33.4 / Hgb-11.4 / Plat-190 / RBC-3.79 / WBC-16.1          Serial CBC's  10-03 @ 06:10  Hct-33.0 / Hgb-11.1 / Plat-130 / RBC-3.68 / WBC-12.3          Serial CBC's  10-02 @ 07:11  Hct-39.7 / Hgb-12.5 / Plat-104 / RBC-4.35 / WBC-8.8          Serial CBC's  10-01 @ 06:37  Hct-38.3 / Hgb-12.0 / Plat-81 / RBC-4.24 / WBC-9.5          Serial CBC's   @ 19:38  Hct--- / Hgb--- / Plat--- / RBC-4.49 / WBC---            10-04    139  |  107  |  105<H>  ----------------------------<  387<H>  3.8   |  19<L>  |  4.15<H>    Ca    7.6<L>      04 Oct 2017 06:16  Phos  5.4     10-  Mg     2.7     10-04    TPro  5.6<L>  /  Alb  1.2<L>  /  TBili  2.9<H>  /  DBili  x   /  AST  48<H>  /  ALT  18  /  AlkPhos  147<H>  10-04      PT/INR - ( 04 Oct 2017 06:16 )   PT: 12.7 sec;   INR: 1.16 ratio         PTT - ( 03 Oct 2017 06:10 )  PTT:26.9 sec    Reticulocyte Percent: 0.8 % (17 @ 19:38)  Vitamin B12, Serum: >2000 pg/mL (17 @ 19:38)  Folate, Serum: >20.0 ng/mL (17 @ 19:38)  Iron - Total Binding Capacity.: 154 ug/dL (17 @ 19:38)  Ferritin, Serum: 344.0 ng/mL (17 @ 19:38)      TITO Lambda: 8.76 mg/dL (17 @ 19:38)  TITO Kappa: 17.90 mg/dL (17 @ 19:38)  Quantitative IgA: 535 mg/dL (17 @ 19:38)  Serum Protein Electrophoresis Interp: clinical correlation. (17 @ 19:38)  Quantitative IgM: 119 mg/dL (17 @ 19:38)  Immunofixation, Serum: Abnormal pattern identified which is not fully diagnostic; however, the  pattern raises the possibility of a weak restricted band.  Advise  clinical correlation.    Reference Range: None Detected (17 @ 19:38)  Quantitative Ig mg/dL (17 @ 19:38)            BLOOD SMEAR INTERPRETATION:       RADIOLOGY & ADDITIONAL STUDIES:

## 2017-10-04 NOTE — PROGRESS NOTE ADULT - PROBLEM SELECTOR PLAN 1
ammonia normal  continue lactulose and rifaxamin 550 bid  unable to orally intake -- discuss with family for PEG tube placement vs palliative care  continue NG tube feeds  family considering palliative care

## 2017-10-04 NOTE — PROGRESS NOTE ADULT - SUBJECTIVE AND OBJECTIVE BOX
PROGRESS NOTE    OVERNIGHT  No new issues reported by medical staff . All above noted BP is better Resumed on NGT yesterday and on IVFS at 50 cc/hr   SUBJECTIVE  Patient is resting in a bed comfortably .Confused ,poor mentation,not able to answer questions  .No distress noted   PAST MEDICAL & SURGICAL HISTORY:  Parkinson disease  Neuropathy  CKD (chronic kidney disease)  BPH (benign prostatic hyperplasia)  Diabetes  Angina pectoris  Hypertension  Diabetes  Renal failure  Dementia  No significant past surgical history    HEALTH ISSUES - PROBLEM Dx:  Dysphagia: Dysphagia  Colitis: Colitis  Diabetes mellitus type 2, uncontrolled: Diabetes mellitus type 2, uncontrolled  Thrombocytopenia: Thrombocytopenia  Wound: Wound  Cirrhosis: Cirrhosis  Hyperbilirubinemia: Hyperbilirubinemia  Encephalopathy, metabolic: Encephalopathy, metabolic  Elevated LFTs: Elevated LFTs  Elevated troponin: Elevated troponin  Hyperglycemia: Hyperglycemia  Acute on chronic renal failure: Acute on chronic renal failure  Altered mental status: Altered mental status  Severe sepsis: Severe sepsis  ARVIN (acute kidney injury): ARVIN (acute kidney injury)  Prophylactic measure: Prophylactic measure  UTI (urinary tract infection): UTI (urinary tract infection)  Dementia: Dementia  Angina pectoris: Angina pectoris  Hypertension: Hypertension  Diabetes: Diabetes  Renal failure: Renal failure  Sepsis, due to unspecified organism: Sepsis, due to unspecified organism          MEDICATIONS  (STANDING):  aspirin  chewable 81 milliGRAM(s) Enteral Tube daily  dextrose 5% + sodium chloride 0.9%. 1000 milliLiter(s) (50 mL/Hr) IV Continuous <Continuous>  dextrose 5%. 1000 milliLiter(s) (50 mL/Hr) IV Continuous <Continuous>  dextrose 50% Injectable 12.5 Gram(s) IV Push once  dextrose 50% Injectable 25 Gram(s) IV Push once  dextrose 50% Injectable 25 Gram(s) IV Push once  docusate sodium 100 milliGRAM(s) Oral three times a day  ertapenem  IVPB      ertapenem  IVPB 500 milliGRAM(s) IV Intermittent every 24 hours  famotidine Injectable 20 milliGRAM(s) IV Push daily  heparin  Injectable 5000 Unit(s) SubCutaneous every 12 hours  hydrocortisone hemorrhoidal Suppository 1 Suppository(s) Rectal daily  insulin regular  human corrective regimen sliding scale   SubCutaneous every 6 hours  lactobacillus acidophilus 1 Tablet(s) Oral every 8 hours  nystatin Powder 1 Application(s) Topical two times a day  polyethylene glycol 3350 17 Gram(s) Oral daily  senna 2 Tablet(s) Oral at bedtime  tamsulosin 0.4 milliGRAM(s) Oral at bedtime    MEDICATIONS  (PRN):  bisacodyl Suppository 10 milliGRAM(s) Rectal daily PRN Constipation  dextrose Gel 1 Dose(s) Oral once PRN Blood Glucose LESS THAN 70 milliGRAM(s)/deciliter  glucagon  Injectable 1 milliGRAM(s) IntraMuscular once PRN Glucose LESS THAN 70 milligrams/deciliter      OBJECTIVE    T(C): 36.9 (10-04-17 @ 07:36), Max: 37.4 (10-03-17 @ 18:30)  HR: 95 (10-04-17 @ 08:00) (88 - 96)  BP: 111/57 (10-04-17 @ 08:00) (93/39 - 123/55)  RR: 21 (10-04-17 @ 08:00) (16 - 33)  SpO2: 95% (10-04-17 @ 08:00) (91% - 99%)  Wt(kg): --  I&O's Summary    03 Oct 2017 07:01  -  04 Oct 2017 07:00  --------------------------------------------------------  IN: 2260 mL / OUT: 550 mL / NET: 1710 mL      REVIEW OF SYSTEMS:  ROS is unobtainable dur to lethargy   PHYSICAL EXAM:  Appearance: NAD.   HEENT:   Moist oral mucosa. Conjunctiva clear b/l.   Neck : supple  Cardiovascular: RRR ,S1S2 present   Respiratory: Lungs CTAB.  Gastrointestinal:  Soft, nontender. No rebound. No rigidity. BS present	  Skin: No rashes, No ecchymoses, No cyanosis.	  Neurologic: Non-focal. Moving all extremities.   Extremities: No edema   perianal ulceration -wound care consult obtained	  Quinones -argelia colored urine	  LABS:                        11.4   16.1  )-----------( 190      ( 04 Oct 2017 06:16 )             33.4     10-04    139  |  107  |  105<H>  ----------------------------<  387<H>  3.8   |  19<L>  |  4.15<H>    Ca    7.6<L>      04 Oct 2017 06:16  Phos  5.4     10-04  Mg     2.7     10-04    TPro  5.6<L>  /  Alb  1.2<L>  /  TBili  2.9<H>  /  DBili  x   /  AST  48<H>  /  ALT  18  /  AlkPhos  147<H>  10-04    PT/INR - ( 04 Oct 2017 06:16 )   PT: 12.7 sec;   INR: 1.16 ratio         PTT - ( 03 Oct 2017 06:10 )  PTT:26.9 sec      RADIOLOGY & ADDITIONAL TESTS:  Labs and imaging reviewed electronically   ASSESSMENT/PLAN:

## 2017-10-04 NOTE — GOALS OF CARE CONVERSATION - PERSONAL ADVANCE DIRECTIVE - CONVERSATION/DISCUSSION
ALBERTA
Diagnosis/MOLST Discussed/Prognosis
Hospice Referral/Palliative Care Referral/MOLST Discussed

## 2017-10-04 NOTE — CONSULT NOTE ADULT - CONSULT REQUESTED DATE/TIME
30-Sep-2017 10:50
26-Sep-2017
03-Oct-2017 15:57
04-Oct-2017
26-Sep-2017 14:07
26-Sep-2017 19:30
27-Sep-2017 10:59
29-Sep-2017
30-Sep-2017 11:11
26-Sep-2017 16:33

## 2017-10-04 NOTE — GOALS OF CARE CONVERSATION - PERSONAL ADVANCE DIRECTIVE - NS PRO AD PATIENT TYPE
Medical Orders for Life-Sustaining Treatment (MOLST)/Health Care Proxy (HCP)
Health Care Proxy (HCP)/Medical Orders for Life-Sustaining Treatment (MOLST)
Health Care Proxy (HCP)/Medical Orders for Life-Sustaining Treatment (MOLST)

## 2017-10-05 VITALS — HEART RATE: 87 BPM | RESPIRATION RATE: 17 BRPM | OXYGEN SATURATION: 98 %

## 2017-10-05 DIAGNOSIS — Z51.5 ENCOUNTER FOR PALLIATIVE CARE: ICD-10-CM

## 2017-10-05 LAB
CULTURE RESULTS: SIGNIFICANT CHANGE UP
SPECIMEN SOURCE: SIGNIFICANT CHANGE UP

## 2017-10-05 PROCEDURE — 82140 ASSAY OF AMMONIA: CPT

## 2017-10-05 PROCEDURE — 83605 ASSAY OF LACTIC ACID: CPT

## 2017-10-05 PROCEDURE — 80048 BASIC METABOLIC PNL TOTAL CA: CPT

## 2017-10-05 PROCEDURE — 82803 BLOOD GASES ANY COMBINATION: CPT

## 2017-10-05 PROCEDURE — 93970 EXTREMITY STUDY: CPT

## 2017-10-05 PROCEDURE — 71250 CT THORAX DX C-: CPT

## 2017-10-05 PROCEDURE — 85610 PROTHROMBIN TIME: CPT

## 2017-10-05 PROCEDURE — 84484 ASSAY OF TROPONIN QUANT: CPT

## 2017-10-05 PROCEDURE — C1751: CPT

## 2017-10-05 PROCEDURE — 86706 HEP B SURFACE ANTIBODY: CPT

## 2017-10-05 PROCEDURE — 82272 OCCULT BLD FECES 1-3 TESTS: CPT

## 2017-10-05 PROCEDURE — 87086 URINE CULTURE/COLONY COUNT: CPT

## 2017-10-05 PROCEDURE — 80074 ACUTE HEPATITIS PANEL: CPT

## 2017-10-05 PROCEDURE — 96372 THER/PROPH/DIAG INJ SC/IM: CPT

## 2017-10-05 PROCEDURE — 81001 URINALYSIS AUTO W/SCOPE: CPT

## 2017-10-05 PROCEDURE — 82550 ASSAY OF CK (CPK): CPT

## 2017-10-05 PROCEDURE — 87040 BLOOD CULTURE FOR BACTERIA: CPT

## 2017-10-05 PROCEDURE — 83690 ASSAY OF LIPASE: CPT

## 2017-10-05 PROCEDURE — 83735 ASSAY OF MAGNESIUM: CPT

## 2017-10-05 PROCEDURE — 87899 AGENT NOS ASSAY W/OPTIC: CPT

## 2017-10-05 PROCEDURE — 83615 LACTATE (LD) (LDH) ENZYME: CPT

## 2017-10-05 PROCEDURE — 73502 X-RAY EXAM HIP UNI 2-3 VIEWS: CPT

## 2017-10-05 PROCEDURE — 84443 ASSAY THYROID STIM HORMONE: CPT

## 2017-10-05 PROCEDURE — 85730 THROMBOPLASTIN TIME PARTIAL: CPT

## 2017-10-05 PROCEDURE — 85027 COMPLETE CBC AUTOMATED: CPT

## 2017-10-05 PROCEDURE — 36600 WITHDRAWAL OF ARTERIAL BLOOD: CPT

## 2017-10-05 PROCEDURE — 82977 ASSAY OF GGT: CPT

## 2017-10-05 PROCEDURE — 83550 IRON BINDING TEST: CPT

## 2017-10-05 PROCEDURE — 80053 COMPREHEN METABOLIC PANEL: CPT

## 2017-10-05 PROCEDURE — 82009 KETONE BODYS QUAL: CPT

## 2017-10-05 PROCEDURE — 96366 THER/PROPH/DIAG IV INF ADDON: CPT

## 2017-10-05 PROCEDURE — 87449 NOS EACH ORGANISM AG IA: CPT

## 2017-10-05 PROCEDURE — 71045 X-RAY EXAM CHEST 1 VIEW: CPT

## 2017-10-05 PROCEDURE — 84100 ASSAY OF PHOSPHORUS: CPT

## 2017-10-05 PROCEDURE — 86705 HEP B CORE ANTIBODY IGM: CPT

## 2017-10-05 PROCEDURE — 83010 ASSAY OF HAPTOGLOBIN QUANT: CPT

## 2017-10-05 PROCEDURE — 82607 VITAMIN B-12: CPT

## 2017-10-05 PROCEDURE — 96365 THER/PROPH/DIAG IV INF INIT: CPT

## 2017-10-05 PROCEDURE — 86022 PLATELET ANTIBODIES: CPT

## 2017-10-05 PROCEDURE — 82728 ASSAY OF FERRITIN: CPT

## 2017-10-05 PROCEDURE — 87902 NFCT AGT GNTYP ALYS HEP C: CPT

## 2017-10-05 PROCEDURE — 85045 AUTOMATED RETICULOCYTE COUNT: CPT

## 2017-10-05 PROCEDURE — 36569 INSJ PICC 5 YR+ W/O IMAGING: CPT

## 2017-10-05 PROCEDURE — 82248 BILIRUBIN DIRECT: CPT

## 2017-10-05 PROCEDURE — 83036 HEMOGLOBIN GLYCOSYLATED A1C: CPT

## 2017-10-05 PROCEDURE — 76700 US EXAM ABDOM COMPLETE: CPT

## 2017-10-05 PROCEDURE — 36415 COLL VENOUS BLD VENIPUNCTURE: CPT

## 2017-10-05 PROCEDURE — 73552 X-RAY EXAM OF FEMUR 2/>: CPT

## 2017-10-05 PROCEDURE — 87340 HEPATITIS B SURFACE AG IA: CPT

## 2017-10-05 PROCEDURE — 74176 CT ABD & PELVIS W/O CONTRAST: CPT

## 2017-10-05 PROCEDURE — 87522 HEPATITIS C REVRS TRNSCRPJ: CPT

## 2017-10-05 PROCEDURE — 84165 PROTEIN E-PHORESIS SERUM: CPT

## 2017-10-05 PROCEDURE — 80076 HEPATIC FUNCTION PANEL: CPT

## 2017-10-05 PROCEDURE — 82746 ASSAY OF FOLIC ACID SERUM: CPT

## 2017-10-05 PROCEDURE — 87150 DNA/RNA AMPLIFIED PROBE: CPT

## 2017-10-05 PROCEDURE — 86708 HEPATITIS A ANTIBODY: CPT

## 2017-10-05 PROCEDURE — 84145 PROCALCITONIN (PCT): CPT

## 2017-10-05 PROCEDURE — 82150 ASSAY OF AMYLASE: CPT

## 2017-10-05 PROCEDURE — 87186 SC STD MICRODIL/AGAR DIL: CPT

## 2017-10-05 PROCEDURE — 86704 HEP B CORE ANTIBODY TOTAL: CPT

## 2017-10-05 PROCEDURE — 70450 CT HEAD/BRAIN W/O DYE: CPT

## 2017-10-05 PROCEDURE — 87521 HEPATITIS C PROBE&RVRS TRNSC: CPT

## 2017-10-05 PROCEDURE — 99285 EMERGENCY DEPT VISIT HI MDM: CPT | Mod: 25

## 2017-10-05 PROCEDURE — 93005 ELECTROCARDIOGRAM TRACING: CPT

## 2017-10-05 RX ORDER — ACETAMINOPHEN 500 MG
650 TABLET ORAL
Qty: 0 | Refills: 0 | Status: DISCONTINUED | OUTPATIENT
Start: 2017-10-05 | End: 2017-10-06

## 2017-10-05 RX ORDER — DEXAMETHASONE 0.5 MG/5ML
2 ELIXIR ORAL
Qty: 0 | Refills: 0 | Status: DISCONTINUED | OUTPATIENT
Start: 2017-10-05 | End: 2017-10-06

## 2017-10-05 RX ORDER — ACETAMINOPHEN 500 MG
325 TABLET ORAL EVERY 6 HOURS
Qty: 0 | Refills: 0 | Status: DISCONTINUED | OUTPATIENT
Start: 2017-10-05 | End: 2017-10-06

## 2017-10-05 RX ORDER — ATROPINE SULFATE 1 %
2 DROPS OPHTHALMIC (EYE)
Qty: 0 | Refills: 0 | Status: DISCONTINUED | OUTPATIENT
Start: 2017-10-05 | End: 2017-10-06

## 2017-10-05 NOTE — PROGRESS NOTE ADULT - PROBLEM SELECTOR PROBLEM 2
UTI (urinary tract infection)
Elevated LFTs
Elevated LFTs
UTI (urinary tract infection)
Elevated LFTs
UTI (urinary tract infection)
Anemia, unspecified type
UTI (urinary tract infection)

## 2017-10-05 NOTE — PROGRESS NOTE ADULT - SUBJECTIVE AND OBJECTIVE BOX
INTERVAL HPI/OVERNIGHT EVENTS:  lethargic, unresponsive. Comfort measure only as per family  HPI:  89 yo AAM [facility] SNF resident was brought to ER for evaluation of cms  uncontrolled ,hyperglycemia ,recived total of 25 u of humalog this morning with no effect BG esvin dmission was found to be above 600 Quinones cath is draining purulent urine and patient diagnosed with uti and urosepsis Found to have hypotension ,ARVIN on CKD and elevated LFTS ,likely secondary to shock liver and due to sepsis Admitted for septic workup and evaluation,send blood and urine cx,serial lactate levels,monitor vitals closley,ivfs hydration,monitor urine output and renal profile,iv abx initiated Found to have troponin elevation and admitted to spcu ,seen by cardiologist ,likely troponinemia related to ARF patient became a long term resident about 3 mnts ago due to progression of dem,entia and Parkinsons disease Palliative care consult requested ,to discuss advance directives and complete MOLST ngt in place still lethargic, no n/v/d/f/c       MEDICATIONS  (STANDING):  dextrose 5%. 1000 milliLiter(s) (50 mL/Hr) IV Continuous <Continuous>  dextrose 50% Injectable 12.5 Gram(s) IV Push once  dextrose 50% Injectable 25 Gram(s) IV Push once  dextrose 50% Injectable 25 Gram(s) IV Push once  nystatin Powder 1 Application(s) Topical two times a day  insulin glargine Injectable (LANTUS) 10 Unit(s) SubCutaneous at bedtime  famotidine Injectable 20 milliGRAM(s) IV Push daily  lactobacillus acidophilus 1 Tablet(s) Oral every 8 hours  tamsulosin 0.4 milliGRAM(s) Oral at bedtime  amLODIPine   Tablet 10 milliGRAM(s) Oral daily  hydrocortisone hemorrhoidal Suppository 1 Suppository(s) Rectal daily  senna 2 Tablet(s) Oral at bedtime  docusate sodium 100 milliGRAM(s) Oral three times a day  polyethylene glycol 3350 17 Gram(s) Oral daily  aspirin  chewable 81 milliGRAM(s) Enteral Tube daily  hydrALAZINE 25 milliGRAM(s) Oral every 8 hours  ertapenem  IVPB      ertapenem  IVPB 500 milliGRAM(s) IV Intermittent every 24 hours  rifaximin 550 milliGRAM(s) Oral two times a day  lactulose Syrup 30 Gram(s) Oral two times a day  ursodiol Capsule 300 milliGRAM(s) Oral three times a day with meals  fondaparinux Injectable 2.5 milliGRAM(s) SubCutaneous daily  insulin lispro (HumaLOG) corrective regimen sliding scale   SubCutaneous every 4 hours    MEDICATIONS  (PRN):  dextrose Gel 1 Dose(s) Oral once PRN Blood Glucose LESS THAN 70 milliGRAM(s)/deciliter  glucagon  Injectable 1 milliGRAM(s) IntraMuscular once PRN Glucose LESS THAN 70 milligrams/deciliter  bisacodyl Suppository 10 milliGRAM(s) Rectal daily PRN Constipation      Allergies    No Known Allergies    Intolerances        ROS:  General: lethargic, unresponsive    PHYSICAL EXAM:   Vital Signs:  Vital Signs Last 24 Hrs  T(C): 36.7 (01 Oct 2017 12:09), Max: 37.4 (30 Sep 2017 20:01)  T(F): 98.1 (01 Oct 2017 12:09), Max: 99.4 (30 Sep 2017 20:01)  HR: 87 (01 Oct 2017 12:00) (87 - 110)  BP: 123/58 (01 Oct 2017 12:00) (103/50 - 130/62)  BP(mean): 75 (01 Oct 2017 12:00) (64 - 82)  RR: 24 (01 Oct 2017 12:00) (11 - 31)  SpO2: 96% (01 Oct 2017 12:00) (92% - 97%)  Daily     Daily Weight in k.7 (01 Oct 2017 04:00)I&O's Summary    30 Sep 2017 07:01  -  01 Oct 2017 07:00  --------------------------------------------------------  IN: 3360 mL / OUT: 1050 mL / NET: 2310 mL    01 Oct 2017 07:01  -  01 Oct 2017 13:52  --------------------------------------------------------  IN: 860 mL / OUT: 0 mL / NET: 860 mL        GENERAL:  unresponsive, lethargic  HEENT:  NC/AT,  conjunctivae clear and pink, no thyromegaly, nodules, adenopathy, no JVD, sclera -anicteric  CHEST:  Full & symmetric excursion, no increased effort, breath sounds clear   HEART:  Regular rhythm, S1, S2, no murmur/rub/S3/S4, no abdominal bruit, no edema  ABDOMEN:  Soft, non-tender, non-distended, normoactive bowel sounds,  no masses ,no hepato-splenomegaly, no signs of chronic liver disease  EXTEREMITIES:  no cyanosis,clubbing or edema  SKIN:  (+) perianal wound       LABS: reviewed

## 2017-10-05 NOTE — PROGRESS NOTE ADULT - PROBLEM SELECTOR PLAN 9
continue current managmnet and medications
continue current management and medications
Gastrointestina stress ulcer prophylaxis l and DVT prophylaxis administrated
ON CMO
continue current management and medications
continue current management and medications

## 2017-10-05 NOTE — PROGRESS NOTE ADULT - ASSESSMENT
89 [facility] cms  uncontrolled ,hyperglycemia ,recived total of 25 u of humalog this morning with no effect BG esvin dmission was found to be above 600 Quinones cath is draining purulent urine and patient diagnosed with uti and urosepsis Found to have hypotension ,ARVIN on CKD and elevated LFTS ,likely secondary to shock liver and due to sepsis Admitted for septic workup and evaluation,send blood and urine cx,serial lactate levels,monitor vitals closley,ivfs hydration,monitor urine output and renal profile,iv abx initiated Found to have troponin elevation and admitted to spcu ,seen by cardiologist ,likely troponinemia related to ARF patient became a long term resident about 3 mnts ago due to progression of dementia and Parkinsons disease Palliative care consult requested ,to discuss advance directives and complete MOLST Patient diagnosed with severe sepsis with ESBL E.COLI  bacteremia secondary to uti and obstructive uropathy Palliative care consult requested ,to discuss advance directives and update  MOLST ,family requests guidance regarding artificial nutrition discussion PATIENT failed speech and swallow test several times ,strict NPO recommended Meeting with the family scheduled today by palliative care service regarding artificial nutrition and further plan of care

## 2017-10-05 NOTE — PROGRESS NOTE ADULT - PROBLEM SELECTOR PLAN 2
send blood and urine cx,serial lactate levels,monitor vitals closley,ivfs hydration,monitor urine output and renal profile,iv abx as per id cons
RULED IN FOR SEPSIS secondary to uti,  ID followup
minimal ascites noted on imaging  elevated liver enzymes likely secondary to intrahepatic cholestasis from sepsis +/- cirrhosis  monitor, f/u HCV viral load/genotype
minimal ascites noted on imaging  elevated liver enzymes likely secondary to intrahepatic cholestasis from sepsis +/- cirrhosis  monitor, f/u HCV viral load/genotype
d/c ivf ascites  ruq US ? sbp
d/c ivf ascites  ruq US ? sbp
minimal ascites noted on imaging  elevated liver enzymes likely secondary to intrahepatic cholestasis from sepsis +/- cirrhosis  monitor, f/u HCV viral load/genotype
send blood and urine cx,serial lactate levels,monitor vitals closley,ivfs hydration,monitor urine output and renal profile,iv abx as per id cons
Admitted for septic workup and evaluation,send blood and urine cx,serial lactate levels,monitor vitals closley,ivfs hydration,monitor urine output and renal profile,iv abx initiated
Currently on CMO
send blood and urine cx,serial lactate levels,monitor vitals closley,ivfs hydration,monitor urine output and renal profile,iv abx as per id cons
RULED IN FOR SEPSIS secondary to uti,  ID followup
mild, h/h stable, likely secondary ro renal  abn spep-- family/hcp  refused bm bx/further w/u to exclude plasma cell dyscrasia/myeloma/other hem pathology,, cannot exclude mds/myeloma or other pathology as family declined invasive hem procedure  monitor serial cbcd  pt is on heparin 5000 units s/c bid for dvtp  nephrology is following for renal insufficiency and further plan and management as per renal
RULED IN FOR SEPSIS secondary to uti,  ID followup
send blood and urine cx,serial lactate levels,monitor vitals closley,ivfs hydration,monitor urine output and renal profile,iv abx as per id cons
RULED IN FOR SEPSIS secondary to uti,  ID followup
RULED IN FOR SEPSIS secondary to uti,IF followup
RULED IN FOR SEPSIS secondary to uti,IF followup
RULED IN FOR SEPSIS secondary to uti,  ID followup

## 2017-10-05 NOTE — PROGRESS NOTE ADULT - PROBLEM SELECTOR PROBLEM 4
Renal failure
Sepsis, due to unspecified organism
Encephalopathy, metabolic
Renal failure
Sepsis, due to unspecified organism
Renal failure
Renal failure
Sepsis, due to unspecified organism
Diabetes
Renal failure
Sepsis, due to unspecified organism
Renal failure
Renal failure

## 2017-10-05 NOTE — PROGRESS NOTE ADULT - PROBLEM SELECTOR PLAN 7
PEG placement vs CMO Spoke to HCP in length and great details TODAY 10/05  ,she stated she didn't refuse PEG yet and family still considers it There are 5 sublings and she wants to make sure they all are in agreement She stated that she didn't know that comfort care means "not to feed the patient "She knows that he is not able to have pleasure feeds due to unresponsivness Social service input requested regarding further plan of care

## 2017-10-05 NOTE — PROGRESS NOTE ADULT - PROBLEM SELECTOR PLAN 5
secondary to sepsis and ARF on CKD and hepatic encephalopathy (hyperammonemia) CURRENTLY  -no NGT    CONTINUE CMO DAUGHTER states family didn't make a decision regarding PEG yet ,she requests 24 more hours Hospice eval ordered and she agrees to obtain consult

## 2017-10-05 NOTE — PROGRESS NOTE ADULT - SUBJECTIVE AND OBJECTIVE BOX
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   NH Meds  Norvasc 5 ASA 81 Lantus 10 mvi Tamsuloin .4 JANUSZ NCS Lo fat reg consistency Novolog R groin excoriated area   HOSPITAL COURSE GENERAL 9/26/2017 ADMISSION [facility] S  Pt was found to have ESBL E coli bacteremic urosepsis Bactermeia noted 9/26, 9/28, 9/30 BC became negative 10/2Rxed zosyn 9/26-9/29 Ertapenem 9/29 onward  ARVIN Nonoliguuric was poa (9/26 Cr 5.1) Hyperglycmic POA Pt was noted to have elevated LFTs with hyperbilirubinemia  and 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 PICC placed 10/2  SYNOPSIS Remains in nonoliguric renal failure on Ertapenem for ESBL sepsis with persistent hyperbilirubinemia poss sec Hep C cirrhosis   HOSPITAL COURSE 9/26/2017 ADMISSION [facility]   PROBLEM SPICIFIC COURSE  PLAN   RESP  Gas exchange was acceptable on low flow O2 on arrival   LACTICEMIA POA Rxed with IVF   UTI PNEUMONIA SEPSIS E COLI PERSISTENT BACTEREMIA (9/26, 9/28, 9/30)  (LIKELY SEC TO) (ESBL E COLI) UTI AND PNEUMONIA SEPSIS POA Rxed with zosyn (9/26) empirically changed to ertapenem (9/29)  9/26/2017 CXR napd   POSSIBLE MI  9/26/2017 Tr 1 .531 (i)  Was Rxed with ASA (9/26) bb  HYPERNATREMIA 9/26/2017 Na 151 Rxed with hypotonic fluids  IV changed to D5 (9/28) then 1/2 NS (9/29)   ARVIN Nonoliguric POA   9/26 Cr 5.2 P{ossibly sec dehydrationm Was Rxed with IVF    HYPERGLYCEMIA 9/26/2017 G 615 Was Rxed with iss (9/26) No evidence of HONK or DKA (Ketones neg) 9/27 FW ordered NGT   ELEVATED LFTs  9/26/2017   alt 92 Poss sec shock US abd ordeerd 9/26/2017 9/27 Hep c 1b came positive   AMS 9/26 CT H No ac  HYPERTENSION Norvasc Hydralazine added   THROMBOCYTOPENIA Likely sec sepsis   PICC 10/2   Family declined PEG 10/4 On 10/5/2017 pt placed on comfort measures only in acc with family wishes and pt will be transferred to medicine and is for placement   TIME SPENT Over 25 minutes aggregate care time spent on encounter; activities included   direct patient care, counseling and/or coordinating care reviewing notes, lab data/ imaging , discussion with multidisciplinary team/ patient  /family. Risks, benefits, alternatives  discussed in detail. Questions/concerns  were addressed  to the best of my ability .

## 2017-10-05 NOTE — PROGRESS NOTE ADULT - ASSESSMENT
89 yo AAM [facility] SN[facility] resident was brought to ER for evaluation of cms  uncontrolled ,hyperglycemia ,recived total of 25 u of humalog this morning with no effect BG esvin dmission was found to be above 600 Quinones cath is draining purulent urine and patient diagnosed with uti and urosepsis Found to have hypotension ,ARVIN on CKD and

## 2017-10-05 NOTE — PROGRESS NOTE ADULT - SUBJECTIVE AND OBJECTIVE BOX
CAPILLARY BLOOD GLUCOSE  400 (04 Oct 2017 18:00)          Vital Signs Last 24 Hrs  T(C): 36.9 (04 Oct 2017 20:08), Max: 36.9 (04 Oct 2017 16:10)  T(F): 98.4 (04 Oct 2017 20:08), Max: 98.4 (04 Oct 2017 16:10)  HR: 89 (04 Oct 2017 21:00) (89 - 95)  BP: 122/56 (04 Oct 2017 20:00) (115/64 - 122/56)  BP(mean): 71 (04 Oct 2017 20:00) (71 - 80)  RR: 15 (04 Oct 2017 21:00) (15 - 26)  SpO2: 96% (04 Oct 2017 21:00) (92% - 97%)    pos ngt  Respiratory: CTA B/L  CV: RRR, S1S2, no murmurs, rubs or gallops  Abdominal: Soft, NT, ND +BS, Last BM  Extremities: No edema, + peripheral pulses     10-04    139  |  107  |  105<H>  ----------------------------<  387<H>  3.8   |  19<L>  |  4.15<H>    Ca    7.6<L>      04 Oct 2017 06:16  Phos  5.4     10-04  Mg     2.7     10-04    TPro  5.6<L>  /  Alb  1.2<L>  /  TBili  2.9<H>  /  DBili  x   /  AST  48<H>  /  ALT  18  /  AlkPhos  147<H>  10-04      dexamethasone  Injectable 2 milliGRAM(s) IV Push four times a day PRN

## 2017-10-05 NOTE — PROGRESS NOTE ADULT - PROBLEM SELECTOR PROBLEM 3
Hyperbilirubinemia
Diabetes
Colitis
Colitis
Hyperbilirubinemia
Altered mental status
Altered mental status
Colitis
Diabetes
Hyperbilirubinemia
Renal failure
Hyperbilirubinemia
Hyperbilirubinemia
Diabetes
Hyperbilirubinemia
Renal failure
Hyperbilirubinemia

## 2017-10-05 NOTE — PROGRESS NOTE ADULT - PROBLEM SELECTOR PROBLEM 5
Encephalopathy, metabolic
Diabetes
Encephalopathy, metabolic
Hypertension
Encephalopathy, metabolic
Encephalopathy, metabolic

## 2017-10-05 NOTE — PROGRESS NOTE ADULT - ASSESSMENT
The patient is a 89 year old male with a history of HTN, DM, CKD, dementia who is admitted with urosepsis.    Plan:  - Echocardiogram results from 6/2017 noted; normal LV systolic function, no significant valve issues  - Troponin elevated and flat likely due to type II NSTEMI (demand ischemia) and retention of enzymes from ARVIN on CKD. No need to trend further at this point.  - Off of cardiac meds  - Comfort measures only

## 2017-10-05 NOTE — PROGRESS NOTE ADULT - SUBJECTIVE AND OBJECTIVE BOX
NAVEENNAYARE is a 89yMale , patient examined and chart reviewed.     INTERVAL HPI/ OVERNIGHT EVENTS:  Afebrile. Remains lethargic.  No events.  Made comfort care.     Past Medical History--  PAST MEDICAL & SURGICAL HISTORY:  Parkinson disease  Neuropathy  CKD (chronic kidney disease)  BPH (benign prostatic hyperplasia)  Diabetes  Angina pectoris  Hypertension  Diabetes  Renal failure  Dementia  No significant past surgical history    For details regarding the patient's social history, family history, and other miscellaneous elements, please refer the initial infectious diseases consultation and/or the admitting history and physical examination for this admission.    ROS:  Unable to obtain due to : Lethargy    Current inpatient medications :  MEDICATIONS  (STANDING):    MEDICATIONS  (PRN):  acetaminophen  Suppository 650 milliGRAM(s) Rectal four times a day PRN For Temp greater than 38 C (100.4 F)  acetaminophen  Suppository. 325 milliGRAM(s) Rectal every 6 hours PRN Mild Pain (1 - 3)  aluminum hydroxide/magnesium hydroxide/simethicone Suspension 30 milliLiter(s) Oral every 6 hours PRN Dyspepsia  artificial tears (preservative free) Ophthalmic Solution 1 Drop(s) Both EYES three times a day PRN Dry Eyes  atropine 1% Ophthalmic Solution for SubLingual Use 2 Drop(s) SubLingual four times a day PRN oral secretions  bisacodyl Suppository 10 milliGRAM(s) Rectal daily PRN Constipation  dexamethasone  Injectable 2 milliGRAM(s) IV Push four times a day PRN nausea, vomiting,      ANTIBIOTICS/RELEVANT:  NONE    Objective:  Vital Signs Last 24 Hrs  T(C): 36.9 (04 Oct 2017 20:08), Max: 36.9 (04 Oct 2017 16:10)  T(F): 98.4 (04 Oct 2017 20:08), Max: 98.4 (04 Oct 2017 16:10)  HR: 87 (05 Oct 2017 14:00) (87 - 105)  BP: 107/54 (05 Oct 2017 14:00) (107/54 - 122/56)  BP(mean): 69 (05 Oct 2017 14:00) (69 - 80)  RR: 18 (05 Oct 2017 14:00) (15 - 26)  SpO2: 96% (05 Oct 2017 14:00) (92% - 97%)    Physical Exam:  GEN: Lethargic  HEENT: normocephalic and atraumatic. EOMI. MARTIR. Moist mucosa. Clear Posterior pharynx.  NECK: Supple. No carotid bruits.  No lymphadenopathy or thyromegaly.  LUNGS: Decreased to auscultation.  HEART: Regular rate and rhythm without murmur.  ABDOMEN: Soft, nontender, and nondistended.  Positive bowel sounds.  No hepatosplenomegaly was noted.  EXTREMITIES: Without any cyanosis, clubbing, rash, lesions + edema.  NEUROLOGIC: Lethargic  SKIN: No ulceration or induration present.      LABS:  NONE       Assessment :   89 year old male hx parkinson's dementia BPH admitted with severe sepsis with persistent Ecoli septicemia  sec obstructive uropathy sec urinary retention and UTI  Has ESBL Ecoli septicemia  Blood cultures from 10/02 ngtd  CT noted with no abscess   Doubt pna suspect atelectasis  ARVIN worsening  Abn LFTs better  HEP C  +troponins  Uncontrolled DM   Not progressing    Plan :    Comfort care  Prognosis poor  Will sign off    Continue with present regiment.  Appropriate use of antibiotics and adverse effects reviewed.    Critical care time greater then 25 minutes reviewing notes, labs data/ imaging , discussion with multidisciplinary team.    Thank you for allowing me to participate in care of your patient .      Allen Hall MD  Infectious Disease  [phone]

## 2017-10-05 NOTE — PROGRESS NOTE ADULT - PROBLEM SELECTOR PLAN 1
mild,, likely multifactorial, more likely secondary ro renal/ch disease  abn spep-- family/hcp had refused bm bx/further w/u to exclude plasma cell dyscrasia/myeloma/other hem pathology,, cannot exclude mds/myeloma or other pathology as family declined invasive hem procedure  10/5--pt is on comfort measure only per family  I will sign off the case, please re consult as needed.

## 2017-10-05 NOTE — PROGRESS NOTE ADULT - SUBJECTIVE AND OBJECTIVE BOX
HEM-ONC SIGN OFF NOTE      Patient is a 89y old  Male who presents with a chief complaint of cms (26 Sep 2017 13:48)  Pt is seen and examined  pt is lethargic, unresponsive. Comfort measure only as per family    HPI:  89 yo AAM [facility] SN[facility] resident was brought to ER for evaluation of cms  uncontrolled ,hyperglycemia ,recived total of 25 u of humalog this morning with no effect BG esvin dmission was found to be above 600 Quinones cath is draining purulent urine and patient diagnosed with uti and urosepsis Found to have hypotension ,ARVIN on CKD and elevated LFTS ,likely secondary to shock liver and due to sepsis Admitted for septic workup and evaluation,send blood and urine cx,serial lactate levels,monitor vitals closley,ivfs hydration,monitor urine output and renal profile,iv abx initiated Found to have troponin elevation and admitted to spcu ,seen by cardiologist ,likely troponinemia related to ARF patient became a long term resident about 3 mnts ago due to progression of dem,entia and Parkinsons disease Palliative care consult requested ,to discuss advance directives and complete MOLST (26 Sep 2017 13:48)       MEDICATIONS  (STANDING):    MEDICATIONS  (PRN):  acetaminophen  Suppository 650 milliGRAM(s) Rectal four times a day PRN For Temp greater than 38 C (100.4 F)  acetaminophen  Suppository. 325 milliGRAM(s) Rectal every 6 hours PRN Mild Pain (1 - 3)  aluminum hydroxide/magnesium hydroxide/simethicone Suspension 30 milliLiter(s) Oral every 6 hours PRN Dyspepsia  artificial tears (preservative free) Ophthalmic Solution 1 Drop(s) Both EYES three times a day PRN Dry Eyes  atropine 1% Ophthalmic Solution for SubLingual Use 2 Drop(s) SubLingual four times a day PRN oral secretions  bisacodyl Suppository 10 milliGRAM(s) Rectal daily PRN Constipation  dexamethasone  Injectable 2 milliGRAM(s) IV Push four times a day PRN nausea, vomiting,      Allergies    No Known Allergies    Intolerances        Vital Signs Last 24 Hrs  T(C): 36.9 (04 Oct 2017 20:08), Max: 36.9 (04 Oct 2017 20:08)  T(F): 98.4 (04 Oct 2017 20:08), Max: 98.4 (04 Oct 2017 20:08)  HR: 87 (05 Oct 2017 14:00) (87 - 105)  BP: 107/54 (05 Oct 2017 14:00) (107/54 - 122/56)  BP(mean): 69 (05 Oct 2017 14:00) (69 - 71)  RR: 18 (05 Oct 2017 14:00) (15 - 25)  SpO2: 96% (05 Oct 2017 14:00) (96% - 97%)    PHYSICAL EXAM  General: adult in NAD  HEENT: clear oropharynx, anicteric sclera, pink conjunctiva  Neck: supple  CV: normal S1/S2 with no murmur rubs or gallops  Lungs: positive air movement b/l ant lungs,clear to auscultation, no wheezes, no rales  Abdomen: soft non-tender non-distended, no hepatosplenomegaly  Ext: no clubbing cyanosis or edema  Skin: no rashes and no petechiae  Neuro: alert and oriented X 4, no focal deficits  LABS:                          11.4   16.1  )-----------( 190      ( 04 Oct 2017 06:16 )             33.4         Mean Cell Volume : 88.3 fl  Mean Cell Hemoglobin : 30.0 pg  Mean Cell Hemoglobin Concentration : 34.0 gm/dL  Auto Neutrophil # : x  Auto Lymphocyte # : x  Auto Monocyte # : x  Auto Eosinophil # : x  Auto Basophil # : x  Auto Neutrophil % : x  Auto Lymphocyte % : x  Auto Monocyte % : x  Auto Eosinophil % : x  Auto Basophil % : x    Serial CBC's  10-04 @ 06:16  Hct-33.4 / Hgb-11.4 / Plat-190 / RBC-3.79 / WBC-16.1          Serial CBC's  10-03 @ 06:10  Hct-33.0 / Hgb-11.1 / Plat-130 / RBC-3.68 / WBC-12.3          Serial CBC's  10-02 @ 07:11  Hct-39.7 / Hgb-12.5 / Plat-104 / RBC-4.35 / WBC-8.8            10    139  |  107  |  105<H>  ----------------------------<  387<H>  3.8   |  19<L>  |  4.15<H>    Ca    7.6<L>      04 Oct 2017 06:16  Phos  5.4     10-04  Mg     2.7     10-    TPro  5.6<L>  /  Alb  1.2<L>  /  TBili  2.9<H>  /  DBili  x   /  AST  48<H>  /  ALT  18  /  AlkPhos  147<H>  10-      PT/INR - ( 04 Oct 2017 06:16 )   PT: 12.7 sec;   INR: 1.16 ratio             Reticulocyte Percent: 0.8 % (17 @ 19:38)  Vitamin B12, Serum: >2000 pg/mL (17 @ 19:38)  Folate, Serum: >20.0 ng/mL (17 19:38)  Iron - Total Binding Capacity.: 154 ug/dL (17 @ 19:38)  Ferritin, Serum: 344.0 ng/mL (17 @ 19:38)      TITO Lambda: 8.76 mg/dL (17 @ 19:38)  TITO Kappa: 17.90 mg/dL (17 @ 19:38)  Quantitative IgA: 535 mg/dL (17 @ 19:38)  Serum Protein Electrophoresis Interp: clinical correlation. (17 @ 19:38)  Quantitative IgM: 119 mg/dL (17 @ 19:38)  Immunofixation, Serum: Abnormal pattern identified which is not fully diagnostic; however, the  pattern raises the possibility of a weak restricted band.  Advise  clinical correlation.    Reference Range: None Detected (17 @ 19:38)  Quantitative Ig mg/dL (17 @ 19:38)

## 2017-10-05 NOTE — PROGRESS NOTE ADULT - PROBLEM SELECTOR PLAN 1
ACUTE RENAL FAILURE: due to urosepsis , dehydration   continue iv fluid  ,   hypotension dc b/p med   continue with abx , comfort care consult and f/u   Serum creatinine is increase     , approximating GFR decrease    ml/min.   [There is no progression]. [No uremic symptoms]   [No evidence of anemia].  Fluid status stable.  Will continue to avoid nephrotoxic drugs.  Patient remains asymptomatic.   Continue current therapy.

## 2017-10-05 NOTE — PROGRESS NOTE ADULT - SUBJECTIVE AND OBJECTIVE BOX
Chief Complaint: Fever, AMS    Interval Events: No significant changes. Made comfort care.    Review of Systems:  Unable to obtain    Physical Exam:  Vital Signs Last 24 Hrs  T(C): 36.9 (04 Oct 2017 20:08), Max: 36.9 (04 Oct 2017 16:10)  T(F): 98.4 (04 Oct 2017 20:08), Max: 98.4 (04 Oct 2017 16:10)  HR: 89 (04 Oct 2017 21:00) (88 - 95)  BP: 122/56 (04 Oct 2017 20:00) (97/43 - 122/56)  BP(mean): 71 (04 Oct 2017 20:00) (59 - 80)  RR: 15 (04 Oct 2017 21:00) (15 - 30)  SpO2: 96% (04 Oct 2017 21:00) (92% - 97%)  General: NAD  HEENT: MMM  Neck: No JVD, no carotid bruit  Lungs: CTAB  CV: RRR, nl S1/S2, no M/R/G  Abdomen: S/NT/ND, +BS  Extremities: No LE edema, no cyanosis  Neuro: AAOx3, non-focal  Skin: No rash    Labs:             10-04    139  |  107  |  105<H>  ----------------------------<  387<H>  3.8   |  19<L>  |  4.15<H>    Ca    7.6<L>      04 Oct 2017 06:16  Phos  5.4     10-04  Mg     2.7     10-04    TPro  5.6<L>  /  Alb  1.2<L>  /  TBili  2.9<H>  /  DBili  x   /  AST  48<H>  /  ALT  18  /  AlkPhos  147<H>  10-04                          11.4   16.1  )-----------( 190      ( 04 Oct 2017 06:16 )             33.4         Telemetry: Sinus rhythm, PACs, atrial run

## 2017-10-05 NOTE — PROGRESS NOTE ADULT - SUBJECTIVE AND OBJECTIVE BOX
PROGRESS NOTE    OVERNIGHT  Placed on CMO yesterday after Palliative care meeting took place with family members  SUBJECTIVE  resting in a bed comfortably ,vss   PAST MEDICAL & SURGICAL HISTORY:  Parkinson disease  Neuropathy  CKD (chronic kidney disease)  BPH (benign prostatic hyperplasia)  Diabetes  Angina pectoris  Hypertension  Diabetes  Renal failure  Dementia  No significant past surgical history    HEALTH ISSUES - PROBLEM Dx:  End of life care: End of life care  Anemia, unspecified type: Anemia, unspecified type  Dysphagia: Dysphagia  Colitis: Colitis  Diabetes mellitus type 2, uncontrolled: Diabetes mellitus type 2, uncontrolled  Thrombocytopenia: Thrombocytopenia  Wound: Wound  Cirrhosis: Cirrhosis  Hyperbilirubinemia: Hyperbilirubinemia  Encephalopathy, metabolic: Encephalopathy, metabolic  Elevated LFTs: Elevated LFTs  Elevated troponin: Elevated troponin  Hyperglycemia: Hyperglycemia  Acute on chronic renal failure: Acute on chronic renal failure  Altered mental status: Altered mental status  Severe sepsis: Severe sepsis  ARVIN (acute kidney injury): ARVIN (acute kidney injury)  Prophylactic measure: Prophylactic measure  UTI (urinary tract infection): UTI (urinary tract infection)  Dementia: Dementia  Angina pectoris: Angina pectoris  Hypertension: Hypertension  Diabetes: Diabetes  Renal failure: Renal failure  Sepsis, due to unspecified organism: Sepsis, due to unspecified organism        Home Medications:  amlodipine-valsartan 5 mg-320 mg oral tablet: 1 tab(s) orally once a day (26 Sep 2017 12:47)  aspirin 325 mg oral tablet: 1 tab(s) orally once a day (26 Sep 2017 12:47)  insulin lispro 100 units/mL subcutaneous solution:  subcutaneous 3 times a day (before meals); 1 Unit(s) if Glucose 151 - 200  2 Unit(s) if Glucose 201 - 250  3 Unit(s) if Glucose 251 - 300  4 Unit(s) if Glucose 301 - 350  5 Unit(s) if Glucose 351 - 400  6 Unit(s) if Glucose Greater Than 400 (26 Sep 2017 12:47)  Lantus 100 units/mL subcutaneous solution: 10 unit(s) subcutaneous once a day (at bedtime) (26 Sep 2017 12:47)  Multiple Vitamins oral tablet: 1 tab(s) orally once a day (26 Sep 2017 12:47)  tamsulosin 0.4 mg oral capsule: 1 cap(s) orally once a day (at bedtime) (26 Sep 2017 12:47)    MEDICATIONS  (STANDING):    MEDICATIONS  (PRN):  acetaminophen  Suppository 650 milliGRAM(s) Rectal four times a day PRN For Temp greater than 38 C (100.4 F)  aluminum hydroxide/magnesium hydroxide/simethicone Suspension 30 milliLiter(s) Oral every 6 hours PRN Dyspepsia  artificial tears (preservative free) Ophthalmic Solution 1 Drop(s) Both EYES three times a day PRN Dry Eyes  atropine 1% Ophthalmic Solution for SubLingual Use 2 Drop(s) SubLingual four times a day PRN oral secretions  bisacodyl Suppository 10 milliGRAM(s) Rectal daily PRN Constipation  dexamethasone  Injectable 2 milliGRAM(s) IV Push four times a day PRN nausea, vomiting,      OBJECTIVE    T(C): 36.9 (10-04-17 @ 20:08), Max: 36.9 (10-04-17 @ 16:10)  HR: 89 (10-04-17 @ 21:00) (88 - 95)  BP: 122/56 (10-04-17 @ 20:00) (97/43 - 122/56)  RR: 15 (10-04-17 @ 21:00) (15 - 30)  SpO2: 96% (10-04-17 @ 21:00) (92% - 97%)  Wt(kg): --  I&O's Summary    04 Oct 2017 07:01  -  05 Oct 2017 07:00  --------------------------------------------------------  IN: 1595 mL / OUT: 600 mL / NET: 995 mL          REVIEW OF SYSTEMS:  ROS is unobtainable dur to lethargy   PHYSICAL EXAM:  Appearance: NAD.   HEENT:   Moist oral mucosa. Conjunctiva clear b/l.   Neck : supple  Cardiovascular: RRR ,S1S2 present   Respiratory: Lungs CTAB.  Gastrointestinal:  Soft, nontender. No rebound. No rigidity. BS present	  Skin: No rashes, No ecchymoses, No cyanosis.	  Neurologic: Non-focal. Moving all extremities.   Extremities: No edema   perianal ulceration -wound care consult obtained	  Quinones -argelia colored urine  	  LABS:                        11.4   16.1  )-----------( 190      ( 04 Oct 2017 06:16 )             33.4     10-04    139  |  107  |  105<H>  ----------------------------<  387<H>  3.8   |  19<L>  |  4.15<H>    Ca    7.6<L>      04 Oct 2017 06:16  Phos  5.4     10-04  Mg     2.7     10-04    TPro  5.6<L>  /  Alb  1.2<L>  /  TBili  2.9<H>  /  DBili  x   /  AST  48<H>  /  ALT  18  /  AlkPhos  147<H>  10-04    PT/INR - ( 04 Oct 2017 06:16 )   PT: 12.7 sec;   INR: 1.16 ratio               Culture - Blood (collected 02 Oct 2017 12:04)  Source: .Blood Blood-Peripheral  Preliminary Report (03 Oct 2017 13:01):    No growth to date.    Culture - Blood (collected 02 Oct 2017 12:04)  Source: .Blood Blood-Peripheral  Preliminary Report (03 Oct 2017 13:01):    No growth to date.    Culture - Blood (collected 30 Sep 2017 12:02)  Source: .Blood Blood-Peripheral  Gram Stain (01 Oct 2017 09:36):    Growth in aerobic bottle: Gram Negative Rods  Final Report (03 Oct 2017 10:20):    Growth in aerobic bottle: Escherichia coli ESBL  Organism: Escherichia coli ESBL (03 Oct 2017 10:20)  Organism: Escherichia coli ESBL (03 Oct 2017 10:20)    Culture - Blood (collected 30 Sep 2017 12:02)  Source: .Blood Blood  Preliminary Report (01 Oct 2017 13:01):    No growth to date.    Culture - Blood (collected 29 Sep 2017 02:16)  Source: .Blood Blood-Peripheral  Gram Stain (29 Sep 2017 13:06):    Growth in anaerobic bottle: Gram Negative Rods    Growth in aerobic bottle: Gram Negative Rods  Final Report (01 Oct 2017 11:03):    Growth in aerobic and anaerobic bottles: Escherichia coli    See previous culture [id number]    Culture - Blood (collected 28 Sep 2017 14:04)  Source: .Blood Blood  Gram Stain (29 Sep 2017 02:33):    Growth in anaerobic bottle: Gram Negative Rods    Growth in aerobic bottle: Gram Negative Rods  Final Report (29 Sep 2017 19:02):    Growth in aerobic and anaerobic bottles: Escherichia coli ESBL    See previous culture [id number]    Culture - Blood (collected 28 Sep 2017 00:20)  Source: .Blood Blood-Peripheral  Gram Stain (28 Sep 2017 11:55):    Growth in aerobic and anaerobic bottles: Gram Negative Rods  Final Report (30 Sep 2017 07:36):    Growth in aerobic and anaerobic bottles: Escherichia coli ESBL    See previous culture [id number]    Culture - Urine (collected 26 Sep 2017 21:10)  Source: .Urine Clean Catch (Midstream)  Final Report (28 Sep 2017 17:35):    >100,000 CFU/ml Escherichia coli ESBL  Organism: Escherichia coli ESBL (28 Sep 2017 17:35)  Organism: Escherichia coli ESBL (28 Sep 2017 17:35)    Culture - Blood (collected 26 Sep 2017 20:59)  Source: .Blood Blood-Peripheral  Gram Stain (27 Sep 2017 06:32):    Growth in aerobic and anaerobic bottles: Gram Negative Rods  Final Report (29 Sep 2017 08:32):    Growth in aerobic and anaerobic bottles: Escherichia coli ESBL    ***Blood Panel PCR results on this specimen are available    approximately 3 hours after the Gram stain result.***    Gram stain, PCR, and/or culture results may not always    correspond dueto difference in methodologies.    ************************************************************    This PCR assay was performed using [employer].    The following targets are tested for: Enterococcus,    vancomycin resistant enterococci, Listeria monocytogenes,    coagulase negative staphylococci, S. aureus,    methicillin resistant S. aureus, Streptococcus agalactiae    (Group B), S. pneumoniae, S. pyogenes (Group A),    Acinetobacter baumannii, Enterobacter cloacae, E. coli,    Klebsiella oxytoca, K. pneumoniae, Proteus sp.,    Serratia marcescens, Haemophilus influenzae,    Neisseria meningitidis, Pseudomonas aeruginosa, Candida    albicans, C. glabrata, C krusei, C parapsilosis,    C. tropicalis and the KPC resistance gene.  Organism: Blood Culture PCR  Escherichia coli ESBL (29 Sep 2017 08:32)  Organism: Escherichia coli ESBL (29 Sep 2017 08:32)  Organism: Blood Culture PCR (29 Sep 2017 08:32)    Culture - Blood (collected 26 Sep 2017 20:59)  Source: .Blood Blood-Peripheral  Gram Stain (27 Sep 2017 07:37):    Growth in aerobic and anaerobic bottles: Gram Negative Rods  Final Report (29 Sep 2017 08:32):    Growth in aerobic and anaerobic bottles: Escherichia coli ESBL See    previous culture [id number]      RADIOLOGY & ADDITIONAL TESTS:  Labs and imaging reviewed electronically   ASSESSMENT/PLAN:

## 2017-10-05 NOTE — PROGRESS NOTE ADULT - SUBJECTIVE AND OBJECTIVE BOX
Date/Time Patient Seen:  		  Referring MD:   Data Reviewed	       Patient is a 89y old  Male who presents with a chief complaint of cms (26 Sep 2017 13:48)    in bed  seen and examined  pt is now on comfort measures      Subjective/HPI     PAST MEDICAL & SURGICAL HISTORY:  Parkinson disease  Neuropathy  CKD (chronic kidney disease)  BPH (benign prostatic hyperplasia)  Diabetes  Angina pectoris  Hypertension  Diabetes  Renal failure  Dementia  No pertinent past medical history  No significant past surgical history        Medication list         MEDICATIONS  (STANDING):    MEDICATIONS  (PRN):  acetaminophen  Suppository 650 milliGRAM(s) Rectal four times a day PRN For Temp greater than 38 C (100.4 F)  aluminum hydroxide/magnesium hydroxide/simethicone Suspension 30 milliLiter(s) Oral every 6 hours PRN Dyspepsia  artificial tears (preservative free) Ophthalmic Solution 1 Drop(s) Both EYES three times a day PRN Dry Eyes  atropine 1% Ophthalmic Solution for SubLingual Use 2 Drop(s) SubLingual four times a day PRN oral secretions  bisacodyl Suppository 10 milliGRAM(s) Rectal daily PRN Constipation  dexamethasone  Injectable 2 milliGRAM(s) IV Push four times a day PRN nausea, vomiting,         Vitals log        ICU Vital Signs Last 24 Hrs  T(C): 36.9 (04 Oct 2017 20:08), Max: 37.2 (04 Oct 2017 08:00)  T(F): 98.4 (04 Oct 2017 20:08), Max: 99 (04 Oct 2017 08:00)  HR: 89 (04 Oct 2017 21:00) (88 - 95)  BP: 122/56 (04 Oct 2017 20:00) (97/43 - 122/56)  BP(mean): 71 (04 Oct 2017 20:00) (59 - 80)  ABP: --  ABP(mean): --  RR: 15 (04 Oct 2017 21:00) (15 - 30)  SpO2: 96% (04 Oct 2017 21:00) (92% - 97%)           Input and Output:  I&O's Detail    03 Oct 2017 07:01  -  04 Oct 2017 07:00  --------------------------------------------------------  IN:    dextrose 5% + sodium chloride 0.9%: 1000 mL    dextrose 5%.: 225 mL    Enteral Tube Flush: 325 mL    Glucerna: 460 mL    IV PiggyBack: 250 mL  Total IN: 2260 mL    OUT:    Indwelling Catheter - Urethral: 550 mL  Total OUT: 550 mL    Total NET: 1710 mL      04 Oct 2017 07:01  -  05 Oct 2017 06:39  --------------------------------------------------------  IN:    dextrose 5% + sodium chloride 0.9%: 700 mL    Enteral Tube Flush: 275 mL    Free Water: 100 mL    Glucerna: 520 mL  Total IN: 1595 mL    OUT:    Indwelling Catheter - Urethral: 600 mL  Total OUT: 600 mL    Total NET: 995 mL          Lab Data                        11.4   16.1  )-----------( 190      ( 04 Oct 2017 06:16 )             33.4     10-04    139  |  107  |  105<H>  ----------------------------<  387<H>  3.8   |  19<L>  |  4.15<H>    Ca    7.6<L>      04 Oct 2017 06:16  Phos  5.4     10-04  Mg     2.7     10-04    TPro  5.6<L>  /  Alb  1.2<L>  /  TBili  2.9<H>  /  DBili  x   /  AST  48<H>  /  ALT  18  /  AlkPhos  147<H>  10-04            Review of Systems	      Objective     Physical Examination      head at  heart - s1s2  lungs - dec BS      Pertinent Lab findings & Imaging      Joni:  NO   Adequate UO     I&O's Detail    03 Oct 2017 07:01  -  04 Oct 2017 07:00  --------------------------------------------------------  IN:    dextrose 5% + sodium chloride 0.9%: 1000 mL    dextrose 5%.: 225 mL    Enteral Tube Flush: 325 mL    Glucerna: 460 mL    IV PiggyBack: 250 mL  Total IN: 2260 mL    OUT:    Indwelling Catheter - Urethral: 550 mL  Total OUT: 550 mL    Total NET: 1710 mL      04 Oct 2017 07:01  -  05 Oct 2017 06:39  --------------------------------------------------------  IN:    dextrose 5% + sodium chloride 0.9%: 700 mL    Enteral Tube Flush: 275 mL    Free Water: 100 mL    Glucerna: 520 mL  Total IN: 1595 mL    OUT:    Indwelling Catheter - Urethral: 600 mL  Total OUT: 600 mL    Total NET: 995 mL               Discussed with:     Cultures:	        Radiology

## 2017-10-05 NOTE — PROGRESS NOTE ADULT - ASSESSMENT
89 yo AAM [facility] resident was brought to ER for evaluation of cms  uncontrolled ,hyperglycemia ,recived total of 25 u of humalog this morning with no effect BG esvin dmission was found to be above 600 Quinones cath is draining purulent urine and patient diagnosed with uti and urosepsis Found to have hypotension ,ARVIN on CKD and elevated LFTS ,likely secondary to shock  S/P Thrombocytopenia, platelets improved and normalized. now pt is on comfort measure only per family

## 2017-10-05 NOTE — PROGRESS NOTE ADULT - PROBLEM SELECTOR PLAN 1
comfort measures  pt is DNR DNI  family does not want pt to get Morphine  will add tylenol NH prn for fever  will add dulcolax NH prn for constipation  artificial tears, atropine prn for oral secretions  oral and skin hygiene  transfer out of ICU  prognosis very poor  advance dementia and multiple medical issues, met with family yesterday in a family meeting

## 2017-10-05 NOTE — PROGRESS NOTE ADULT - SUBJECTIVE AND OBJECTIVE BOX
Patient is a 89y Male with past medical history of           presenting with        who reports no complaints overnight.    REVIEW OF SYSTEMS:    CONSTITUTIONAL: No , fevers or chills    Allergies    No Known Allergies    Intolerances        MEDICATIONS  (STANDING):      Vitals:  T(F): 98.4 (10-04-17 @ 20:08), Max: 98.4 (10-04-17 @ 16:10)  HR: 89 (10-04-17 @ 21:00)  BP: 122/56 (10-04-17 @ 20:00)  BP(mean): 71 (10-04-17 @ 20:00)  RR: 15 (10-04-17 @ 21:00)  SpO2: 96% (10-04-17 @ 21:00)  Wt(kg): --    I and O's:    10-04 @ 07:01  -  10-05 @ 07:00  --------------------------------------------------------  IN: 1595 mL / OUT: 600 mL / NET: 995 mL            PHYSICAL EXAM:    Constitutional: NAD,   Neck: No JVD  Respiratory: CTAB  Cardiovascular: S1 and S2  Gastrointestinal: BS+, soft, NT/ND  Extremities: No peripheral edema  Neurological: no focal deficits    LABS:                        11.4   16.1  )-----------( 190      ( 04 Oct 2017 06:16 )             33.4       139    |  107    |  105    ----------------------------<  387       04 Oct 2017 06:16  3.8     |  19     |  4.15     141    |  109    |  98     ----------------------------<  208       03 Oct 2017 06:10  3.3     |  22     |  4.22     146    |  112    |  74     ----------------------------<  162       02 Oct 2017 07:11  3.5     |  21     |  3.07     Ca    7.6        04 Oct 2017 06:16  Ca    7.6        03 Oct 2017 06:10    Phos  5.4       04 Oct 2017 06:16  Phos  4.3       03 Oct 2017 06:10    Mg     2.7       04 Oct 2017 06:16  Mg     2.6       03 Oct 2017 06:10    TPro  5.6    /  Alb  1.2    /  TBili  2.9    /        04 Oct 2017 06:16  DBili  x      /  AST  48     /  ALT  18     /  AlkPhos  147      TPro  5.1    /  Alb  1.1    /  TBili  3.4    /        03 Oct 2017 06:10  DBili  x      /  AST  59     /  ALT  18     /  AlkPhos  141          Serum Osmo:   Serum Uric Acid:   MAVERICK:   Double Stranded DNA:   C3:   C3 Nephritic Factor:   C4:   p-ANCA:   c-ANCA:   Anti-GBM:   CHAPO-Smith Antibody:   Immunofixation: Immunofixation, Serum: Abnormal pattern identified which is not fully diagnostic; however, the  pattern raises the possibility of a weak restricted band.  Advise  clinical correlation.    Reference Range: None Detected (09-30 @ 19:38)    [place]/Lambda Free Light Chain: [place]/Lambda Free Light Chain Ratio, Serum: 2.04 Ratio (09-30 @ 19:38)    SPEP: Serum Protein Electrophoresis Interp: clinical correlation. (09-30 @ 19:38)    Hepatitis Panel: Hepatitis B Surface Antigen: Nonreact (09-29 @ 16:09)    HIV-1/2:     Urine Studies:      Urine chemistry:   Urine Na:   Urine Creatinine:   Urine Protein/Cr ratio:  Urine K:   Urine Osm:   24 Hr urine studies:     24 hr urine Creatinine Clearance:    RADIOLOGY & ADDITIONAL STUDIES:

## 2017-10-05 NOTE — PROGRESS NOTE ADULT - ATTENDING COMMENTS
Case d/w with med staff and  in [facility] - aware that family requests more guidance and clarification about feedings and nutrition . They request to tallk to palliative care cons again Hospice care and [facility] to [facility] was discussed with HCP and she agrees to get consult Patient may be transferred to [facility] until further decisions are made by HCP Daughter agrees NGT to be removed and will contact me within 24 hrs  regarding d/c plan when will tallk to the rest of family Social service input requested Case d/w Dr Martinez this morning

## 2017-10-05 NOTE — PROGRESS NOTE ADULT - PROBLEM SELECTOR PLAN 4
improving  continue iv fluids hydration and free water flushes via NGT ,  serial bmp  nephrology f/u
f/u with culture
Nephrology followup  continue iv fluids( DECREASE to 50 cc/hr as per DR Lala recommendation ,because GT feedings are started ) hydration and free water flushes via NGT ,  serial bmp  nephrology f/u
secondary to sepsis and ARF on CKD
f/u with culture
improving  continue iv fluids hydration and free water flushes via NGT ,  serial bmp  nephrology f/u
improving  continue iv fluids hydration and free water flushes via NGT ,  serial bmp  nephrology f/u
f/u with culture
continue current managmnet and medications ,check hb a 1 c in amd
f/u with culture
on CMO   Family requests 24 more hours to decide about PEG
Nephrology followup  continue iv fluids hydration and free water flushes via NGT ,  serial bmp  nephrology f/u
Nephrology followup  continue iv fluids( DECREASE to 50 cc/hr as per DR Lala recommendation ,because GT feedings are started ) hydration and free water flushes via NGT ,  serial bmp  nephrology f/u

## 2017-10-06 ENCOUNTER — TRANSCRIPTION ENCOUNTER (OUTPATIENT)
Age: 82
End: 2017-10-06

## 2017-10-06 LAB
CORTICOSTEROID BINDING GLOBULIN RESULT: 3.1 MG/DL — SIGNIFICANT CHANGE UP
CORTIS F/TOTAL MFR SERPL: 39 % — SIGNIFICANT CHANGE UP
CORTIS SERPL-MCNC: 34 UG/DL — SIGNIFICANT CHANGE UP
CORTISOL, FREE RESULT: 13 UG/DL — SIGNIFICANT CHANGE UP

## 2017-10-06 RX ORDER — AMLODIPINE AND VALSARTAN 5; 320 MG/1; MG/1
1 TABLET, FILM COATED ORAL
Qty: 0 | Refills: 0 | COMMUNITY

## 2017-10-06 RX ORDER — ACETAMINOPHEN 500 MG
1 TABLET ORAL
Qty: 0 | Refills: 0 | COMMUNITY
Start: 2017-10-06

## 2017-10-06 RX ORDER — INSULIN GLARGINE 100 [IU]/ML
10 INJECTION, SOLUTION SUBCUTANEOUS
Qty: 0 | Refills: 0 | COMMUNITY

## 2017-10-06 RX ORDER — ASPIRIN/CALCIUM CARB/MAGNESIUM 324 MG
1 TABLET ORAL
Qty: 0 | Refills: 0 | COMMUNITY

## 2017-10-06 RX ORDER — ATROPINE SULFATE 1 %
4 DROPS OPHTHALMIC (EYE)
Qty: 0 | Refills: 0 | COMMUNITY
Start: 2017-10-06

## 2017-10-06 NOTE — DISCHARGE NOTE ADULT - CARE PLAN
Principal Discharge DX:	Severe sepsis  Goal:	ON CMO  Instructions for follow-up, activity and diet:	Palliative care consult requested ,to discuss advance directives and complete MOLST Placed on CMO Hospice evaluation recommeneded if HCP agrees At this point family requests p[atient to return to SNF with CMO as per MOLST  .Case discussed with palliative care representative,comfort care and palliative measures seem to be appropriate level of care at this point  Secondary Diagnosis:	Acute on chronic renal failure  Secondary Diagnosis:	Encephalopathy, metabolic  Secondary Diagnosis:	Dysphagia  Secondary Diagnosis:	Diabetes  Secondary Diagnosis:	Wound  Secondary Diagnosis:	UTI (urinary tract infection)

## 2017-10-06 NOTE — DISCHARGE NOTE ADULT - SECONDARY DIAGNOSIS.
Acute on chronic renal failure Encephalopathy, metabolic Dysphagia Diabetes Wound UTI (urinary tract infection)

## 2017-10-06 NOTE — DISCHARGE NOTE ADULT - PLAN OF CARE
ON CMO Palliative care consult requested ,to discuss advance directives and complete MOLST Placed on CMO Hospice evaluation recommeneded if HCP agrees At this point family requests p[atient to return to SNF with CMO as per MOLST  .Case discussed with palliative care representative,comfort care and palliative measures seem to be appropriate level of care at this point

## 2017-10-06 NOTE — DISCHARGE NOTE ADULT - PATIENT PORTAL LINK FT
“You can access the [employer] Patient Portal, offered by [facility], by registering with the following website: [email or web address]”

## 2017-10-06 NOTE — PROGRESS NOTE ADULT - SUBJECTIVE AND OBJECTIVE BOX
pt in bed  seen and examined  palliative measures in progress  heart - s1s2  lungs - dec BS  abd - soft    a/p    cont current regimen  oral and skin care  HOB elevation  suction prn  prognosis very poor  dc planning pending  family does not want Opioids at present  will follow  pt is DNR

## 2017-10-06 NOTE — DISCHARGE NOTE ADULT - HOSPITAL COURSE
89 m NH resident [facility] was sent from [facility] for lethargy BP 90  Hi bld sugar found to be septic with pus draining from Quinones  admitted [facility] 9/26/2017   PMH CVA DM2 Hypertension CAD 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   NH Meds  Norvasc 5 ASA 81 Lantus 10 mvi Tamsuloin .4 JANUSZ NCS Lo fat reg consistency Novolog R groin excoriated area   HOSPITAL COURSE GENERAL 9/26/2017 ADMISSION [facility]  Pt was found to have ESBL E coli bacteremic urosepsis Bactermeia noted 9/26, 9/28, 9/30 BC became negative 10/2Rxed zosyn 9/26-9/29 Ertapenem 9/29 onward  ARVIN Nonoliguuric was poa (9/26 Cr 5.1) Hyperglycmic POA Pt was noted to have elevated LFTs with hyperbilirubinemia  and 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 PICC placed 10/2  SYNOPSIS Remains in nonoliguric renal failure on Ertapenem for ESBL sepsis with persistent hyperbilirubinemia poss sec Hep C cirrhosis   HOSPITAL COURSE 9/26/2017 ADMISSION [facility]   PROBLEM SPICIFIC COURSE  PLAN   RESP  Gas exchange was acceptable on low flow O2 on arrival   LACTICEMIA POA Rxed with IVF   UTI PNEUMONIA SEPSIS E COLI PERSISTENT BACTEREMIA (9/26, 9/28, 9/30)  (LIKELY SEC TO) (ESBL E COLI) UTI AND PNEUMONIA SEPSIS POA Rxed with zosyn (9/26) empirically changed to ertapenem (9/29)  9/26/2017 CXR napd   POSSIBLE MI  9/26/2017 Tr 1 .531 (i)  Was Rxed with ASA (9/26) bb  HYPERNATREMIA 9/26/2017 Na 151 Rxed with hypotonic fluids  IV changed to D5 (9/28) then 1/2 NS (9/29)   ARVIN Nonoliguric POA   9/26 Cr 5.2 P{ossibly sec dehydrationm Was Rxed with IVF    HYPERGLYCEMIA 9/26/2017 G 615 Was Rxed with iss (9/26) No evidence of HONK or DKA (Ketones neg) 9/27 FW ordered NGT   ELEVATED LFTs  9/26/2017   alt 92 Poss sec shock US abd ordeerd 9/26/2017 9/27 Hep c 1b came positive   AMS 9/26 CT H No ac  HYPERTENSION Norvasc Hydralazine added   THROMBOCYTOPENIA Likely sec sepsis   PICC 10/2   Family declined PEG 10/4 On 10/5/2017 pt placed on comfort measures only in acc with family wishes and pt will be transferred back to SNF with CMO Patient is on CMO Family requests transfer to SNF ,refuses morphine and hospice in  referral I spoke to MD from [facility] 10/05 and they recommended to discuss morphine admisnistration  with the HCP, if case  will be referred to [facility] .Daughter Angie( i spoke to her on  a phone in length last evening ) requests transfer to [facility] ,she refuses morphine at this point ,she states patient is comfortable and doesn't need pain managment She states that " morphine is killing people" She understands grave prognosis and already started calling family members who lives faraway. She would like patient to be transferred to [facility] today. PCP from [facility] Dr.P. Freedman informed about transfer

## 2017-10-06 NOTE — DISCHARGE NOTE ADULT - MEDICATION SUMMARY - MEDICATIONS TO STOP TAKING
I will STOP taking the medications listed below when I get home from the hospital:    Multiple Vitamins oral tablet  -- 1 tab(s) by mouth once a day    amlodipine-valsartan 5 mg-320 mg oral tablet  -- 1 tab(s) by mouth once a day    aspirin 325 mg oral tablet  -- 1 tab(s) by mouth once a day    Lantus 100 units/mL subcutaneous solution  -- 10 unit(s) subcutaneous once a day (at bedtime)    tamsulosin 0.4 mg oral capsule  -- 1 cap(s) by mouth once a day (at bedtime)    insulin lispro 100 units/mL subcutaneous solution  --  subcutaneous 3 times a day (before meals); 1 Unit(s) if Glucose 151 - 200  2 Unit(s) if Glucose 201 - 250  3 Unit(s) if Glucose 251 - 300  4 Unit(s) if Glucose 301 - 350  5 Unit(s) if Glucose 351 - 400  6 Unit(s) if Glucose Greater Than 400

## 2017-10-06 NOTE — PROGRESS NOTE ADULT - SUBJECTIVE AND OBJECTIVE BOX
Patient is a 89y Male with past medical history of           presenting with        who reports no complaints overnight.    REVIEW OF SYSTEMS:  seen early am   CONSTITUTIONAL: No  fevers or chills  Allergies    No Known Allergies    Intolerances        MEDICATIONS  (STANDING):      Vitals:  T(F): --  HR: 87 (10-05-17 @ 20:00)  BP: --  BP(mean): --  RR: 17 (10-05-17 @ 20:00)  SpO2: 98% (10-05-17 @ 20:00)  Wt(kg): --    I and O's:    10-05 @ 07:01  -  10-06 @ 07:00  --------------------------------------------------------  IN: 0 mL / OUT: 1100 mL / NET: -1100 mL            PHYSICAL EXAM:    Constitutional: NAD,   Neck: No JVD  Respiratory: CTAB  Cardiovascular: S1 and S2  Gastrointestinal: BS+, soft, NT/ND  LABS:      139    |  107    |  105    ----------------------------<  387       04 Oct 2017 06:16  3.8     |  19     |  4.15     141    |  109    |  98     ----------------------------<  208       03 Oct 2017 06:10  3.3     |  22     |  4.22     Ca    7.6        04 Oct 2017 06:16  Ca    7.6        03 Oct 2017 06:10    Phos  5.4       04 Oct 2017 06:16  Phos  4.3       03 Oct 2017 06:10    Mg     2.7       04 Oct 2017 06:16  Mg     2.6       03 Oct 2017 06:10    TPro  5.6    /  Alb  1.2    /  TBili  2.9    /        04 Oct 2017 06:16  DBili  x      /  AST  48     /  ALT  18     /  AlkPhos  147      TPro  5.1    /  Alb  1.1    /  TBili  3.4    /        03 Oct 2017 06:10  DBili  x      /  AST  59     /  ALT  18     /  AlkPhos  141          Serum Osmo:   Serum Uric Acid:   MAVERICK:   Double Stranded DNA:   C3:   C3 Nephritic Factor:   C4:   p-ANCA:   c-ANCA:   Anti-GBM:   CHAPO-Smith Antibody:   Immunofixation: Immunofixation, Serum: Abnormal pattern identified which is not fully diagnostic; however, the  pattern raises the possibility of a weak restricted band.  Advise  clinical correlation.    Reference Range: None Detected (09-30 @ 19:38)    [place]/Lambda Free Light Chain: [place]/Lambda Free Light Chain Ratio, Serum: 2.04 Ratio (09-30 @ 19:38)    SPEP: Serum Protein Electrophoresis Interp: clinical correlation. (09-30 @ 19:38)    Hepatitis Panel: Hepatitis B Surface Antigen: Nonreact (09-29 @ 16:09)    HIV-1/2:     Urine Studies:      Urine chemistry:   Urine Na:   Urine Creatinine:   Urine Protein/Cr ratio:  Urine K:   Urine Osm:   24 Hr urine studies:     24 hr urine Creatinine Clearance:    RADIOLOGY & ADDITIONAL STUDIES:

## 2017-10-06 NOTE — PROGRESS NOTE ADULT - SUBJECTIVE AND OBJECTIVE BOX
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   NH Meds  Norvasc 5 ASA 81 Lantus 10 mvi Tamsuloin .4 JANUSZ NCS Lo fat reg consistency Novolog R groin excoriated area   HOSPITAL COURSE GENERAL 9/26/2017 ADMISSION [facility]  Pt was found to have ESBL E coli bacteremic urosepsis Bactermeia noted 9/26, 9/28, 9/30 BC became negative 10/2Rxed zosyn 9/26-9/29 Ertapenem 9/29 onward  ARVIN Nonoliguuric was poa (9/26 Cr 5.1) Hyperglycmic POA Pt was noted to have elevated LFTs with hyperbilirubinemia  and 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 PICC placed 10/2  SYNOPSIS Remains in nonoliguric renal failure on Ertapenem for ESBL sepsis with persistent hyperbilirubinemia poss sec Hep C cirrhosis Pt made cmo 10/5 Will sign off case   HOSPITAL COURSE 9/26/2017 ADMISSION [facility]   PROBLEM SPICIFIC COURSE  PLAN   RESP  Gas exchange was acceptable on low flow O2 on arrival   LACTICEMIA POA Rxed with IVF   UTI PNEUMONIA SEPSIS E COLI PERSISTENT BACTEREMIA (9/26, 9/28, 9/30)  (LIKELY SEC TO) (ESBL E COLI) UTI AND PNEUMONIA SEPSIS POA Rxed with zosyn (9/26) empirically changed to ertapenem (9/29)  9/26/2017 CXR napd   POSSIBLE MI  9/26/2017 Tr 1 .531 (i)  Was Rxed with ASA (9/26) bb  HYPERNATREMIA 9/26/2017 Na 151 Rxed with hypotonic fluids  IV changed to D5 (9/28) then 1/2 NS (9/29)   ARVIN Nonoliguric POA   9/26 Cr 5.2 P{ossibly sec dehydrationm Was Rxed with IVF    HYPERGLYCEMIA 9/26/2017 G 615 Was Rxed with iss (9/26) No evidence of HONK or DKA (Ketones neg) 9/27 FW ordered NGT   ELEVATED LFTs  9/26/2017   alt 92 Poss sec shock US abd ordeerd 9/26/2017 9/27 Hep c 1b came positive   AMS 9/26 CT H No ac  HYPERTENSION Norvasc Hydralazine added   THROMBOCYTOPENIA Likely sec sepsis   PICC 10/2   Family declined PEG 10/4 On 10/5/2017 pt placed on comfort measures only in [facility] with family wishes and pt will be transferred to medicine and is for placement   10/6/2017 Pt is now on comfort care  He is off antibiotics I will sign off case Pl reconsult as needed  HOSPITAL COURSE 9/26/2017 ADMISSION [facility]   PROBLEM SPICIFIC COURSE  PLAN   RESP  Gas exchange was acceptable on low flow O2 on arrival   LACTICEMIA POA Rxed with IVF   UTI PNEUMONIA SEPSIS E COLI PERSISTENT BACTEREMIA (9/26, 9/28, 9/30)  (LIKELY SEC TO) (ESBL E COLI) UTI AND PNEUMONIA SEPSIS POA Rxed with zosyn (9/26) empirically changed to ertapenem (9/29)  9/26/2017 CXR napd   POSSIBLE MI  9/26/2017 Tr 1 .531 (i)  Was Rxed with ASA (9/26) bb  HYPERNATREMIA 9/26/2017 Na 151 Rxed with hypotonic fluids  IV changed to D5 (9/28) then 1/2 NS (9/29)   ARVIN Nonoliguric POA   9/26 Cr 5.2 P{ossibly sec dehydrationm Was Rxed with IVF    HYPERGLYCEMIA 9/26/2017 G 615 Was Rxed with iss (9/26) No evidence of HONK or DKA (Ketones neg) 9/27 FW ordered NGT   ELEVATED LFTs  9/26/2017   alt 92 Poss sec shock US abd ordeerd 9/26/2017 9/27 Hep c 1b came positive   AMS 9/26 CT H No ac  HYPERTENSION Norvasc Hydralazine added   THROMBOCYTOPENIA Likely sec sepsis   PICC 10/2   Family declined PEG 10/4 On 10/5/2017 pt placed on comfort measures only in acc with family wishes and pt will be transferred to medicine and is for placement   10/6/2017 Pt is now on comfort care  He is off antibiotics I will sign off case Pl reconsult as needed

## 2017-10-06 NOTE — DISCHARGE NOTE ADULT - MEDICATION SUMMARY - MEDICATIONS TO TAKE
I will START or STAY ON the medications listed below when I get home from the hospital:    acetaminophen 650 mg rectal suppository  -- 1 suppository(ies) rectally 4 times a day, As needed, For Temp greater than 38 C (100.4 F)  -- Indication: For CMO    acetaminophen 325 mg rectal suppository  -- 1 suppository(ies) rectally every 6 hours, As needed, Mild Pain (1 - 3)  -- Indication: For CMO    bisacodyl 10 mg rectal suppository  -- 1 suppository(ies) rectally once a day, As needed, Constipation  -- Indication: For CMO    ocular lubricant ophthalmic solution  -- 1 drop(s) to each affected eye 3 times a day, As needed, Dry Eyes  -- Indication: For CMO    atropine 1% ophthalmic solution  -- sublingual 4 times a day, As Needed secretions  -- Indication: For CMO

## 2017-10-06 NOTE — PROGRESS NOTE ADULT - ASSESSMENT
Patient is on CMO Family requests transfer to SNF ,refuses morphine and hospice in  referral I [facility] and they recommended to discuss morphine admisnistrtion again with the HCP,if case  will be referred to [facility]( i spoke to her in aphone in length last evening ) requests transfer to [facility] ,she refuses morphine at this point ,she states patient is comfortable and doesnt need pain managment She states that " morphine is killing people" She understands grave prognosis and already started calling family members who lives faraway She would like patient to be transferred to [facility] SNF today PCP Dr Freedman informed of plan

## 2017-10-06 NOTE — PROGRESS NOTE ADULT - SUBJECTIVE AND OBJECTIVE BOX
PROGRESS NOTE    OVERNIGHT  Patient is on CMO Family requests transfer to SNF ,refuses morphine and hospice in  referral [facility] and they recommended to discuss morphine admisnistrtion again with the HCP,if case  will be referred to [facility]( i spoke to her in aphone in length last evening ) requests transfer to [facility] ,she refuses morphine at this point ,she states patient is comfortable and doesnt need pain managment She states that " morphine is killing people" She understands grave prognosis and already started calling family members who lives faraway She would like patient to be transferred to [facility] today PCP Dr Freedman informed of plan   SUBJECTIVE -unresponsive ,on CMO   PAST MEDICAL & SURGICAL HISTORY:  Parkinson disease  Neuropathy  CKD (chronic kidney disease)  BPH (benign prostatic hyperplasia)  Diabetes  Angina pectoris  Hypertension  Diabetes  Renal failure  Dementia  No significant past surgical history    HEALTH ISSUES - PROBLEM Dx:  End of life care: End of life care  Anemia, unspecified type: Anemia, unspecified type  Dysphagia: Dysphagia  Colitis: Colitis  Diabetes mellitus type 2, uncontrolled: Diabetes mellitus type 2, uncontrolled  Thrombocytopenia: Thrombocytopenia  Wound: Wound  Cirrhosis: Cirrhosis  Hyperbilirubinemia: Hyperbilirubinemia  Encephalopathy, metabolic: Encephalopathy, metabolic  Elevated LFTs: Elevated LFTs  Elevated troponin: Elevated troponin  Hyperglycemia: Hyperglycemia  Acute on chronic renal failure: Acute on chronic renal failure  Altered mental status: Altered mental status  Severe sepsis: Severe sepsis  ARVIN (acute kidney injury): ARVIN (acute kidney injury)  Prophylactic measure: Prophylactic measure  UTI (urinary tract infection): UTI (urinary tract infection)  Dementia: Dementia  Angina pectoris: Angina pectoris  Hypertension: Hypertension  Diabetes: Diabetes  Renal failure: Renal failure  Sepsis, due to unspecified organism: Sepsis, due to unspecified organism          MEDICATIONS  (STANDING):    MEDICATIONS  (PRN):  acetaminophen  Suppository 650 milliGRAM(s) Rectal four times a day PRN For Temp greater than 38 C (100.4 F)  acetaminophen  Suppository. 325 milliGRAM(s) Rectal every 6 hours PRN Mild Pain (1 - 3)  aluminum hydroxide/magnesium hydroxide/simethicone Suspension 30 milliLiter(s) Oral every 6 hours PRN Dyspepsia  artificial tears (preservative free) Ophthalmic Solution 1 Drop(s) Both EYES three times a day PRN Dry Eyes  atropine 1% Ophthalmic Solution for SubLingual Use 2 Drop(s) SubLingual four times a day PRN oral secretions  bisacodyl Suppository 10 milliGRAM(s) Rectal daily PRN Constipation  dexamethasone  Injectable 2 milliGRAM(s) IV Push four times a day PRN nausea, vomiting,      OBJECTIVE    T(C): --  HR: 87 (10-05-17 @ 20:00) (87 - 87)  BP: 107/54 (10-05-17 @ 14:00) (107/54 - 107/54)  RR: 17 (10-05-17 @ 20:00) (17 - 18)  SpO2: 98% (10-05-17 @ 20:00) (96% - 98%)  Wt(kg): --  I&O's Summary    05 Oct 2017 07:01  -  06 Oct 2017 07:00  --------------------------------------------------------  IN: 0 mL / OUT: 1100 mL / NET: -1100 mL      REVIEW OF SYSTEMS:  ROS is unobtainable dur to lethargy   PHYSICAL EXAM:  Appearance: NAD.   HEENT:   Moist oral mucosa. Conjunctiva clear b/l.   Neck : supple  Cardiovascular: RRR ,S1S2 present   Respiratory: Lungs CTAB.  Gastrointestinal:  Soft, nontender. No rebound. No rigidity. BS present	  Skin: No rashes, No ecchymoses, No cyanosis.	  Neurologic: Non-focal. Moving all extremities.   Extremities: No edema   perianal ulceration -wound care consult obtained	  Quinones -argelia colored urine  	  LABS:                Culture - Blood (collected 02 Oct 2017 12:04)  Source: .Blood Blood-Peripheral  Preliminary Report (03 Oct 2017 13:01):    No growth to date.    Culture - Blood (collected 02 Oct 2017 12:04)  Source: .Blood Blood-Peripheral  Preliminary Report (03 Oct 2017 13:01):    No growth to date.    Culture - Blood (collected 30 Sep 2017 12:02)  Source: .Blood Blood-Peripheral  Gram Stain (01 Oct 2017 09:36):    Growth in aerobic bottle: Gram Negative Rods  Final Report (03 Oct 2017 10:20):    Growth in aerobic bottle: Escherichia coli ESBL  Organism: Escherichia coli ESBL (03 Oct 2017 10:20)  Organism: Escherichia coli ESBL (03 Oct 2017 10:20)    Culture - Blood (collected 30 Sep 2017 12:02)  Source: .Blood Blood  Final Report (05 Oct 2017 13:00):    No growth at 5 days.    Culture - Blood (collected 29 Sep 2017 02:16)  Source: .Blood Blood-Peripheral  Gram Stain (29 Sep 2017 13:06):    Growth in anaerobic bottle: Gram Negative Rods    Growth in aerobic bottle: Gram Negative Rods  Final Report (01 Oct 2017 11:03):    Growth in aerobic and anaerobic bottles: Escherichia coli    See previous culture [id number]    Culture - Blood (collected 28 Sep 2017 14:04)  Source: .Blood Blood  Gram Stain (29 Sep 2017 02:33):    Growth in anaerobic bottle: Gram Negative Rods    Growth in aerobic bottle: Gram Negative Rods  Final Report (29 Sep 2017 19:02):    Growth in aerobic and anaerobic bottles: Escherichia coli ESBL    See previous culture [id number]    Culture - Blood (collected 28 Sep 2017 00:20)  Source: .Blood Blood-Peripheral  Gram Stain (28 Sep 2017 11:55):    Growth in aerobic and anaerobic bottles: Gram Negative Rods  Final Report (30 Sep 2017 07:36):    Growth in aerobic and anaerobic bottles: Escherichia coli ESBL    See previous culture [id number]    Culture - Urine (collected 26 Sep 2017 21:10)  Source: .Urine Clean Catch (Midstream)  Final Report (28 Sep 2017 17:35):    >100,000 CFU/ml Escherichia coli ESBL  Organism: Escherichia coli ESBL (28 Sep 2017 17:35)  Organism: Escherichia coli ESBL (28 Sep 2017 17:35)    Culture - Blood (collected 26 Sep 2017 20:59)  Source: .Blood Blood-Peripheral  Gram Stain (27 Sep 2017 06:32):    Growth in aerobic and anaerobic bottles: Gram Negative Rods  Final Report (29 Sep 2017 08:32):    Growth in aerobic and anaerobic bottles: Escherichia coli ESBL    ***Blood Panel PCR results on this specimen are available    approximately 3 hours after the Gram stain result.***    Gram stain, PCR, and/or culture results may not always    correspond dueto difference in methodologies.    ************************************************************    This PCR assay was performed using [employer].    The following targets are tested for: Enterococcus,    vancomycin resistant enterococci, Listeria monocytogenes,    coagulase negative staphylococci, S. aureus,    methicillin resistant S. aureus, Streptococcus agalactiae    (Group B), S. pneumoniae, S. pyogenes (Group A),    Acinetobacter baumannii, Enterobacter cloacae, E. coli,    Klebsiella oxytoca, K. pneumoniae, Proteus sp.,    Serratia marcescens, Haemophilus influenzae,    Neisseria meningitidis, Pseudomonas aeruginosa, Candida    albicans, C. glabrata, C krusei, C parapsilosis,    C. tropicalis and the KPC resistance gene.  Organism: Blood Culture PCR  Escherichia coli ESBL (29 Sep 2017 08:32)  Organism: Escherichia coli ESBL (29 Sep 2017 08:32)  Organism: Blood Culture PCR (29 Sep 2017 08:32)    Culture - Blood (collected 26 Sep 2017 20:59)  Source: .Blood Blood-Peripheral  Gram Stain (27 Sep 2017 07:37):    Growth in aerobic and anaerobic bottles: Gram Negative Rods  Final Report (29 Sep 2017 08:32):    Growth in aerobic and anaerobic bottles: Escherichia coli ESBL See    previous culture [id number]      RADIOLOGY & ADDITIONAL TESTS:  Labs and imaging reviewed electronically   ASSESSMENT/PLAN:

## 2017-10-06 NOTE — DISCHARGE NOTE ADULT - CARE PROVIDER_API CALL
Gabrielle Freedman), Internal Medicine  [address] [place] [address]  Phone: [phone]  Fax: [phone]

## 2017-10-06 NOTE — PROGRESS NOTE ADULT - NSHPATTENDINGPLANDISCUSS_GEN_ALL_CORE
med staff of [facility] ,Dr Calvin, Dr Lala ,Dr Gorman, Dr Borjas,Dr Martinez and PCP Dr Freedman
 ,med staff ,HCP and Dr Calvin
team
team
MED STAFF,Dr Martinez ,Dr Calvin ,[facility] care nurse
med staff,Dr Calvin
MED STAFF ,Dr Calvin
med staff ,Dr Calvin , TAYLER Mccarthy , HCP on a phone
med staff ,Dr Martinez ,social service ,Dr Calvin and HCP daughter TAYLER. on a phone

## 2019-02-12 NOTE — PROGRESS NOTE ADULT - PROBLEM SELECTOR PROBLEM 1
Encephalopathy, metabolic
Sepsis, due to unspecified organism
ARVIN (acute kidney injury)
ARVIN (acute kidney injury)
Diabetes mellitus type 2, uncontrolled
Dysphagia
Encephalopathy, metabolic
End of life care
ARVIN (acute kidney injury)
Anemia, unspecified type
Sepsis, due to unspecified organism
Thrombocytopenia
Sepsis, due to unspecified organism
ARVIN (acute kidney injury)
ARVIN (acute kidney injury)
Sepsis, due to unspecified organism
Detail Level: Detailed
Provider Name, If Known (E.G., Dr. WHITING): Dr. Castillo

## 2019-04-05 NOTE — ED CLERICAL - NS ED CLERK UNITS
Pt last evaluated w/EC on 7-2018 and requesting refill of oc's, order pending, please sign if appropriate.
2S/282.2

## 2019-05-07 NOTE — PATIENT PROFILE ADULT. - FUNCTIONAL LEVEL PRIOR: EATING
(4) completely dependent Area L Indication Text: Tumors in this location are included in Area L (trunk and extremities).  Mohs surgery is indicated for larger tumors, or tumors with aggressive histologic features, in these anatomic locations.

## 2020-03-16 NOTE — PHYSICAL THERAPY INITIAL EVALUATION ADULT - IMPAIRMENTS FOUND, PT EVAL
With ambulating patient around pod 1 and 2  Heart rate 133 pulse ox on room air 95     Justa Blandon RN  03/16/20 [id number]
muscle strength/arousal, attention, and cognition/decreased independence with ADLs,functiional mobiliity/cognitive impairment/poor safety awareness/integumentary integrity

## 2020-03-26 NOTE — PROGRESS NOTE ADULT - PROBLEM SELECTOR PROBLEM 4
yes
Type 2 diabetes mellitus without complication, without long-term current use of insulin
Type 2 diabetes mellitus without complication, without long-term current use of insulin

## 2020-08-18 NOTE — CONSULT NOTE ADULT - PROBLEM/RECOMMENDATION-4
Sunday started having rt upper quad abd pain. Denies nausea or vomiting. HAs had \"pancreatitis in the past after a gall stone blocked\"about 5 years ago. Denies diarrhea, constipation, chest pain. Felt bloated but couldn't see the bloating. Was at the  and sent over for cait workup.  
DISPLAY PLAN FREE TEXT
DISPLAY PLAN FREE TEXT

## 2021-02-26 NOTE — DISCHARGE NOTE ADULT - NS DC ANGIO PCI YN
Physical Therapy  Visit Type: treatment  Co-treat with: Occupational therapist  Precautions:  Medical precautions:  fall risk; standard precautions.  Admit to [facility] from home  2/17: laparotomy, reduction of gastric volvulous, gastroplexy, G tube placement and EGD  Lines:       Lines in chart and on patient reviewed, cautions maintained throughout session.  Safety Measures: bed rails    SUBJECTIVE  Patient agreed to participate in therapy this date.  I want to get back in the bed  Patient / Family Goal: maximize function and return to previous functional status    Pain     At onset of session (out of 10): 0     OBJECTIVE     Pre:    BP 93/64   HR 85   O2 97   Supplemental rate 0    Post:   BP 86/45      O2 98   Supplemental rate 0  Oriented to person, place, time and situation     Arousal alertness: appropriate responses to stimuli  Balance    Sitting: Static: contact guard/touching/steadying assist    Standing - Firm Surface - Eyes Open: Static: minimal assist double upper extremity support    Bed Mobility:    Rolling left: moderate assist      Side-lying to sit: maximal assist, with tactile cues, with verbal cues and with demonstration  Training completed:    Tasks: rolling left (s/l to sit)    Education details: body mechanics, patient safety and patient requires additional training  Transfers:    Assistive devices: 2-wheeled walker    Sit to stand: maximal assist, with verbal cues, with demonstration and with tactile cues    Stand to sit: moderate assist, with tactile cues, with verbal cues and with demonstration    Stand pivot: with tactile cues, with verbal cues, with demonstration, 2 persons and total assist - non-dependent (ModA x2)  Training completed:    Tasks: sit to stand, stand to sit and stand pivot    Education details: body mechanics, patient safety and patient requires additional training    Stand from bed with MaxA x1; however, stand from toilet with ModA x2; decr LE strength and stability  requiring incr assist for transfer to toilet and bed  Gait/Ambulation:     Assistance: maximal assist   Assistive device: 2-wheeled walker    Distance (ft): 5    Type: decreased ousmane and unsteady    Stance phase: Left: decreased heel strike and decreased push off; Right: decreased heel strike and decreased push off    Swing phase: Left: decreased step length; Right: decreased step length  Training Completed:    Tasks: gait training on level surfaces    Education details: body mechanics, patient safety and patient requires additional training    Weak, LE instability; ambulated to toilet in room      Interventions     Training provided: activity tolerance, balance retraining, bed mobility training, body mechanics, breathing/relaxation, compensatory techniques, gait training, safety training and transfer training  Significant SOB with activity, with prolonged recovery period after transferring to chair; RN aware; O2 sat remained >95%    Educated re: breathing technique and use of IS, as well as education re: importance of being OOB to chair to incr activity tolerance and strength       ASSESSMENT    Impairments: balance deficits, safety awareness, pain, activity tolerance, endurance, strength and shortness of breath  Functional Limitations: all functional mobility  Pt cont to be limited by fatigue, weakness and SOB with activity, requiring Mod to MaxA for transfers. Pt demo B knee instability upon standing placing him at incr risk for falling.     Recommended Consults: PT: OT  Discharge Recommendations   Recommendation for Discharge: PT IL: Patient needs daily, skilled therapy for 1-3 hours a day by at least two disciplines        PT/OT Mobility Equipment for Discharge: TBD   PT/OT ADL Equipment for Discharge: tbd          Skilled therapy is required to address these limitations in attempt to maximize the patient's independence.  Progress: no functional improvements and slow progress, decreased activity  tolerance    End of Session:   Location: in chair  Safety measures: call light within reach, lines intact, equipment intact and alarm system in place/re-engaged  Handoff to: nurse    PLAN    Suggestions for next session as indicated: PT Frequency: 3 days/week  Frequency Comments: 02/26, ABDOUL SIMPSON, IPOC-3            Interventions: balance, bed mobility, body mechanics, energy conservation, gait training, strengthening, safety education, HEP train/position, functional transfer training and endurance training  Agreement to plan and goals: patient agrees with goals and treatment plan        GOALS  Review Date: 2/26/2021  Long Term Goals: (to be met by time of discharge from hospital)  Rolling left: Patient will complete bed mobility for rolling left modified independent.  Status: progressing/ongoing  Sidelying to sit: Patient will complete bed mobility for sidelying to sit modified independent.  Status: progressing/ongoing  Stand (even surface): Patient will stand on even surface for RW, supervision.   Status: progressing/ongoing  Sit to stand: Patient will complete sit to stand transfer with modified independent.  Status: progressing/ongoing  Stand to sit: Patient will complete stand to sit transfer with modified independent.  Status: progressing/ongoing  Stand pivot: Patient will complete stand pivot transfer with modified independent.  Status: progressing/ongoing  Ambulation (even): Patient will ambulate on even surface for 50 feet with 2-wheeled walker, minimal assist.   Status: progressing/ongoing    Documented in the chart in the following areas: Assessment.      Therapy procedure time and total treatment time can be found documented on the Time Entry flowsheet   no

## 2021-03-11 NOTE — PROGRESS NOTE ADULT - PROBLEM SELECTOR PLAN 1
Addended by: KWABENA MCKINNEY on: 3/11/2021 10:17 AM     Modules accepted: Orders     ACUTE RENAL FAILURE: due to urosepsis , dehydration   continue with 1/2 ns   continue with abx   Serum creatinine is improved    , approximating GFR improved    ml/min.   [There is no progression]. [No uremic symptoms]   [No evidence of anemia].  Fluid status stable.  Will continue to avoid nephrotoxic drugs.  Patient remains asymptomatic.   Continue current therapy.  Start   Increase    Decrease  diuretic.  Start   Increase   Decrease  ACE inhibitor.  Start   Increase   Decrease  ARB.  Start  Increase   Decrease   B-blocker.  Start  Increase   Decrease  calcium channel blocker.  Start  Increase   Decrease  .  Additional evaluation:   ECG,    echocardiogram,   refer to cardiology,   CXR,   refer to vascular surgeon,   renal ultrasound,  renal biopsy.

## 2022-06-03 NOTE — DISCHARGE NOTE ADULT - HOSPITAL COURSE
89/M admitted with    #unresponsiveness likely syncope   -admitted to tele  -MI ruled out  -ASA 325mg  -CT head negative  - echo EF 60-65%  -lipid panel  -cardio consult appreciated; no further workup at this time    #ARVIN on CKD3  #dehydration  -prior lab from 7/2016, Cr 1.14-1.40  -currently at Cr 1.2    #thrombocytopenia: chronic, 120 k platelets on 6/11    #HTN: cont home meds for now    #BPH: on flomax    #DM2: cont lantus 10 units qhs; fingerstick monitoring and ISS    #dementia + agitation: supportive care; psych consult appreciated; started on seroquel 12.5 mg po in am and 25 mg po q hs    # Left leg wound, chronic, infected;  treated with rocephin; no more ABX needed.  ID and wound care consults appreciated    # Left lower leg swelling: r/o DVT  venous doppler urgently done; NO DVT    # Leukopenia 2.8 k: repeat cbc in am tomorrow; 3.1 k. 2.9 on 6/12.     Dispo: Would be discharged to SNF today    Spoke with pt's daughter, Lois , she can't take care of him at home and he would need permanent placement in [facility].     poc discussed with team     total time spent 45 min 3 = A little assistance

## 2022-08-04 NOTE — PHYSICAL THERAPY INITIAL EVALUATION ADULT - LEVEL OF CONSCIOUSNESS, REHAB EVAL
Called and patient is going to follow up early next week. Recommended she give the antibiotics some time to work.   
Patient is asking if Dr. Echevarria wants her to come in tomorrow or not.  She thinks she is caught up for her med visits.  She is still having a little bit of SOB but no cough and still feels exhausted.  She is asking if she should get an xray of WBC lab instead of coming in.  She is just asking what she should do because she doesn't want to have appt tomorrow if someone else needs it more than her and she doesn't really need it.  
lethargic/somnolent

## 2022-11-29 NOTE — H&P ADULT - NSHPLABSRESULTS_GEN_ALL_CORE
11.6   3.2   )-----------( 131      ( 2017 01:00 )             36.6     2017 01:00    139    |  104    |  33     ----------------------------<  219    4.5     |  27     |  1.84     Ca    8.9        2017 01:00    TPro  7.7    /  Alb  3.5    /  TBili  0.3    /  DBili  x      /  AST  59     /  ALT  44     /  AlkPhos  127    2017 01:00    CARDIAC MARKERS ( 2017 01:00 )  <0.015 ng/mL / x     / x     / x     / x          LIVER FUNCTIONS - ( 2017 01:00 )  Alb: 3.5 g/dL / Pro: 7.7 gm/dL / ALK PHOS: 127 U/L / ALT: 44 U/L / AST: 59 U/L / GGT: x             Urinalysis Basic - ( 2017 01:12 )    Color: Yellow / Appearance: Clear / S.015 / pH: x  Gluc: x / Ketone: Negative  / Bili: Negative / Urobili: Negative mg/dL   Blood: x / Protein: 15 mg/dL / Nitrite: Negative   Leuk Esterase: Negative / RBC: >50 /HPF / WBC 11-25   Sq Epi: x / Non Sq Epi: Negative / Bacteria: Occasional        Lactate, Blood: 0.7 mmol/L ( @ 06:28) Perilesional Excision Additional Text (Leave Blank If You Do Not Want): The margin was drawn around the clinically apparent lesion. Incisions were then made along these lines to the appropriate tissue plane and the lesion was extirpated.

## 2023-05-01 NOTE — PHYSICAL THERAPY INITIAL EVALUATION ADULT - ADDITIONAL COMMENTS
pt familiar to me from prior episodes of care,pt with dementia,attended adult day care 5 days /week at [facility] in past,(8a-3p) ,transfers/amb short distances with min-modAx1 Detail Level: Detailed

## 2024-05-21 NOTE — ED ADULT NURSE NOTE - NS ED NURSE DISCH DISPOSITION
Report received from LILIANA Cervantes and LILIANA Perez. Patient resting in bed, no current complaints. Awaiting CT scan.
Admitted

## 2024-09-23 NOTE — PROGRESS NOTE ADULT - PROBLEM SELECTOR PLAN 7
hold  home medications due to ARF and hypotension uncontrolled  increase RISS coverage to moderate  endocrine consult ROM intact/normal gait/strength 5/5 bilateral upper extremities/strength 5/5 bilateral lower extremities details…
